# Patient Record
Sex: MALE | Race: WHITE | Employment: UNEMPLOYED | URBAN - METROPOLITAN AREA
[De-identification: names, ages, dates, MRNs, and addresses within clinical notes are randomized per-mention and may not be internally consistent; named-entity substitution may affect disease eponyms.]

---

## 2017-01-23 ENCOUNTER — TELEPHONE (OUTPATIENT)
Dept: PULMONOLOGY | Age: 57
End: 2017-01-23

## 2017-01-23 DIAGNOSIS — J44.1 COPD WITH EXACERBATION (HCC): Primary | ICD-10-CM

## 2017-01-25 ENCOUNTER — TELEPHONE (OUTPATIENT)
Dept: PULMONOLOGY | Age: 57
End: 2017-01-25

## 2017-03-27 RX ORDER — ACLIDINIUM BROMIDE 400 UG/1
POWDER, METERED RESPIRATORY (INHALATION)
Refills: 5 | OUTPATIENT
Start: 2017-03-27

## 2017-05-31 ENCOUNTER — HOSPITAL ENCOUNTER (OUTPATIENT)
Dept: PULMONOLOGY | Age: 57
Discharge: HOME | End: 2017-05-31
Attending: INTERNAL MEDICINE | Admitting: INTERNAL MEDICINE

## 2017-05-31 ENCOUNTER — OFFICE VISIT (OUTPATIENT)
Dept: PULMONOLOGY | Age: 57
End: 2017-05-31

## 2017-05-31 VITALS
DIASTOLIC BLOOD PRESSURE: 64 MMHG | RESPIRATION RATE: 16 BRPM | TEMPERATURE: 98 F | OXYGEN SATURATION: 94 % | WEIGHT: 126.4 LBS | BODY MASS INDEX: 19.16 KG/M2 | HEIGHT: 68 IN | SYSTOLIC BLOOD PRESSURE: 108 MMHG | HEART RATE: 63 BPM

## 2017-05-31 DIAGNOSIS — J44.1 COPD WITH EXACERBATION (HCC): Primary | ICD-10-CM

## 2017-05-31 DIAGNOSIS — J43.2 CENTRILOBULAR EMPHYSEMA (HCC): ICD-10-CM

## 2017-05-31 DIAGNOSIS — R05.9 COUGH: ICD-10-CM

## 2017-05-31 DIAGNOSIS — Z87.891 PERSONAL HISTORY OF TOBACCO USE: ICD-10-CM

## 2017-05-31 DIAGNOSIS — J44.1 COPD WITH EXACERBATION (HCC): ICD-10-CM

## 2017-05-31 DIAGNOSIS — J44.1 CHRONIC OBSTRUCTIVE PULMONARY DISEASE WITH ACUTE EXACERBATION (HCC): ICD-10-CM

## 2017-05-31 DIAGNOSIS — J43.2 CENTRILOBULAR EMPHYSEMA (HCC): Primary | ICD-10-CM

## 2017-05-31 LAB
DLCO %PRED: NORMAL
DLCO PRE: NORMAL
FEF 25-75%-POST: NORMAL
FEF 25-75%-PRE: NORMAL
FEV1-POST: NORMAL
FEV1-PRE: NORMAL
FEV1/FVC-POST: NORMAL
FEV1/FVC-PRE: NORMAL
FVC-POST: NORMAL
FVC-PRE: NORMAL
MEP: NORMAL
MIP: NORMAL
MVV %PRED-PRE: NORMAL
MVV-PRE: NORMAL
TLC %PRED: NORMAL
TLC PRE: NORMAL

## 2017-05-31 PROCEDURE — 94060 EVALUATION OF WHEEZING: CPT | Performed by: INTERNAL MEDICINE

## 2017-05-31 PROCEDURE — 94729 DIFFUSING CAPACITY: CPT | Performed by: INTERNAL MEDICINE

## 2017-05-31 PROCEDURE — 94727 GAS DIL/WSHOT DETER LNG VOL: CPT | Performed by: INTERNAL MEDICINE

## 2017-05-31 PROCEDURE — 99215 OFFICE O/P EST HI 40 MIN: CPT | Performed by: INTERNAL MEDICINE

## 2017-05-31 RX ORDER — RANITIDINE 300 MG/1
300 TABLET ORAL NIGHTLY
COMMUNITY
End: 2018-01-27 | Stop reason: ALTCHOICE

## 2017-05-31 RX ORDER — PANTOPRAZOLE SODIUM 40 MG/1
40 GRANULE, DELAYED RELEASE ORAL
COMMUNITY

## 2017-05-31 RX ORDER — GUAIFENESIN/DEXTROMETHORPHAN 100-10MG/5
5 SYRUP ORAL 3 TIMES DAILY PRN
Qty: 120 ML | Refills: 1 | Status: SHIPPED | OUTPATIENT
Start: 2017-05-31 | End: 2017-06-10

## 2017-05-31 RX ORDER — ALBUTEROL SULFATE 90 UG/1
4 AEROSOL, METERED RESPIRATORY (INHALATION) ONCE
Status: COMPLETED | OUTPATIENT
Start: 2017-05-31 | End: 2017-05-31

## 2017-05-31 RX ORDER — FLUTICASONE FUROATE AND VILANTEROL 100; 25 UG/1; UG/1
1 POWDER RESPIRATORY (INHALATION) DAILY
Qty: 1 EACH | Refills: 0 | Status: SHIPPED | COMMUNITY
Start: 2017-05-31 | End: 2018-01-27 | Stop reason: ALTCHOICE

## 2017-05-31 RX ORDER — NITROGLYCERIN 80 MG/1
1 PATCH TRANSDERMAL DAILY
COMMUNITY

## 2017-05-31 RX ORDER — FLUTICASONE FUROATE AND VILANTEROL 100; 25 UG/1; UG/1
1 POWDER RESPIRATORY (INHALATION) DAILY
Qty: 1 EACH | Refills: 3 | Status: SHIPPED | OUTPATIENT
Start: 2017-05-31

## 2017-05-31 RX ORDER — CLOPIDOGREL BISULFATE 75 MG/1
75 TABLET ORAL DAILY
COMMUNITY

## 2017-05-31 RX ADMIN — ALBUTEROL SULFATE 4 PUFF: 90 AEROSOL, METERED RESPIRATORY (INHALATION) at 09:56

## 2017-06-01 ENCOUNTER — TELEPHONE (OUTPATIENT)
Dept: PULMONOLOGY | Age: 57
End: 2017-06-01

## 2017-06-02 LAB
ALPHA-1 ANTITRYPSIN PHENOTYPE: NORMAL
ALPHA-1 ANTITRYPSIN: 148 MG/DL (ref 90–200)

## 2017-06-06 ENCOUNTER — TELEPHONE (OUTPATIENT)
Dept: PULMONOLOGY | Age: 57
End: 2017-06-06

## 2017-06-06 RX ORDER — DOXYCYCLINE HYCLATE 100 MG
100 TABLET ORAL 2 TIMES DAILY
Qty: 14 TABLET | Refills: 0 | Status: SHIPPED | OUTPATIENT
Start: 2017-06-06 | End: 2017-06-13

## 2017-08-22 ENCOUNTER — TELEPHONE (OUTPATIENT)
Dept: PULMONOLOGY | Age: 57
End: 2017-08-22

## 2017-08-22 RX ORDER — POLYETHYLENE GLYCOL 3350 17 G
2 POWDER IN PACKET (EA) ORAL PRN
Qty: 100 EACH | Refills: 3 | Status: SHIPPED | OUTPATIENT
Start: 2017-08-22 | End: 2017-11-17 | Stop reason: SDUPTHER

## 2017-09-05 ENCOUNTER — TELEPHONE (OUTPATIENT)
Dept: PULMONOLOGY | Age: 57
End: 2017-09-05

## 2017-09-11 ENCOUNTER — HOSPITAL ENCOUNTER (OUTPATIENT)
Dept: VASCULAR LAB | Age: 57
Discharge: HOME | End: 2017-09-11
Attending: PHYSICIAN ASSISTANT | Admitting: PHYSICIAN ASSISTANT

## 2017-09-11 DIAGNOSIS — I73.9 PERIPHERAL VASCULAR DISEASE (HCC): ICD-10-CM

## 2017-10-17 ENCOUNTER — TELEPHONE (OUTPATIENT)
Dept: PULMONOLOGY | Age: 57
End: 2017-10-17

## 2017-10-17 DIAGNOSIS — J44.1 COPD WITH EXACERBATION (HCC): Primary | ICD-10-CM

## 2017-10-17 NOTE — TELEPHONE ENCOUNTER
Fax from [employer] stating Flavio Machado not on [facility] formulary, they prefer Incruse.   Please advise

## 2017-10-18 ENCOUNTER — HOSPITAL ENCOUNTER (OUTPATIENT)
Dept: VASCULAR LAB | Age: 57
Discharge: HOME | End: 2017-10-18
Attending: PHYSICIAN ASSISTANT | Admitting: PHYSICIAN ASSISTANT

## 2017-10-18 DIAGNOSIS — I73.9 PERIPHERAL VASCULAR DISEASE (HCC): ICD-10-CM

## 2017-11-17 ENCOUNTER — TELEPHONE (OUTPATIENT)
Dept: PULMONOLOGY | Age: 57
End: 2017-11-17

## 2017-11-17 RX ORDER — POLYETHYLENE GLYCOL 3350 17 G
2 POWDER IN PACKET (EA) ORAL PRN
Qty: 100 EACH | Refills: 1 | Status: ON HOLD | OUTPATIENT
Start: 2017-11-17 | End: 2021-01-27

## 2017-11-17 NOTE — TELEPHONE ENCOUNTER
Will prescribe with 1 refill. Patient will need to follow up for additional medications to be prescribed.

## 2017-12-21 ENCOUNTER — TELEPHONE (OUTPATIENT)
Dept: PULMONOLOGY | Age: 57
End: 2017-12-21

## 2017-12-21 RX ORDER — VARENICLINE TARTRATE 25 MG
KIT ORAL
Qty: 63 TABLET | Refills: 0 | Status: SHIPPED | OUTPATIENT
Start: 2017-12-21 | End: 2018-01-27 | Stop reason: ALTCHOICE

## 2017-12-21 NOTE — TELEPHONE ENCOUNTER
Has he taken chantix before? I see anxiety listed on his problem list. Does he have depression? If so, he should not take chantix.

## 2017-12-22 ENCOUNTER — TELEPHONE (OUTPATIENT)
Dept: PULMONOLOGY | Age: 57
End: 2017-12-22

## 2017-12-22 DIAGNOSIS — Z72.0 TOBACCO ABUSE: Primary | ICD-10-CM

## 2017-12-22 RX ORDER — POLYETHYLENE GLYCOL 3350 17 G
4 POWDER IN PACKET (EA) ORAL PRN
Qty: 100 EACH | Refills: 3 | Status: SHIPPED | OUTPATIENT
Start: 2017-12-22 | End: 2018-02-15 | Stop reason: SDUPTHER

## 2018-01-25 ENCOUNTER — TELEPHONE (OUTPATIENT)
Dept: PULMONOLOGY | Age: 58
End: 2018-01-25

## 2018-02-13 RX ORDER — NICOTINE POLACRILEX 2 MG
2 LOZENGE BUCCAL PRN
Qty: 81 LOZENGE | Refills: 1 | OUTPATIENT
Start: 2018-02-13

## 2018-02-15 DIAGNOSIS — Z72.0 TOBACCO ABUSE: ICD-10-CM

## 2018-02-15 RX ORDER — POLYETHYLENE GLYCOL 3350 17 G
4 POWDER IN PACKET (EA) ORAL PRN
Qty: 100 EACH | Refills: 3 | Status: ON HOLD | OUTPATIENT
Start: 2018-02-15 | End: 2021-01-27

## 2018-03-22 RX ORDER — SODIUM CHLORIDE, SODIUM LACTATE, POTASSIUM CHLORIDE, CALCIUM CHLORIDE 600; 310; 30; 20 MG/100ML; MG/100ML; MG/100ML; MG/100ML
INJECTION, SOLUTION INTRAVENOUS ONCE
Status: CANCELLED | OUTPATIENT
Start: 2018-03-22 | End: 2018-03-22

## 2018-03-23 ENCOUNTER — HOSPITAL ENCOUNTER (OUTPATIENT)
Dept: OTHER | Age: 58
Discharge: HOME | End: 2018-03-23
Attending: INTERNAL MEDICINE | Admitting: INTERNAL MEDICINE

## 2018-05-21 ENCOUNTER — TELEPHONE (OUTPATIENT)
Dept: PULMONOLOGY | Age: 58
End: 2018-05-21

## 2018-09-20 ENCOUNTER — TELEPHONE (OUTPATIENT)
Dept: ORTHOPEDIC SURGERY | Age: 58
End: 2018-09-20

## 2018-09-20 ENCOUNTER — OFFICE VISIT (OUTPATIENT)
Dept: ORTHOPEDIC SURGERY | Age: 58
End: 2018-09-20

## 2018-09-20 VITALS — HEIGHT: 68 IN | BODY MASS INDEX: 21.98 KG/M2 | WEIGHT: 145 LBS

## 2018-09-20 DIAGNOSIS — M25.561 ACUTE PAIN OF RIGHT KNEE: Primary | ICD-10-CM

## 2018-09-20 DIAGNOSIS — M17.11 PRIMARY OSTEOARTHRITIS OF RIGHT KNEE: ICD-10-CM

## 2018-09-20 PROCEDURE — 1036F TOBACCO NON-USER: CPT | Performed by: ORTHOPAEDIC SURGERY

## 2018-09-20 PROCEDURE — L1810 KO ELASTIC WITH JOINTS: HCPCS | Performed by: ORTHOPAEDIC SURGERY

## 2018-09-20 PROCEDURE — 3017F COLORECTAL CA SCREEN DOC REV: CPT | Performed by: ORTHOPAEDIC SURGERY

## 2018-09-20 PROCEDURE — 99203 OFFICE O/P NEW LOW 30 MIN: CPT | Performed by: ORTHOPAEDIC SURGERY

## 2018-09-20 PROCEDURE — G8420 CALC BMI NORM PARAMETERS: HCPCS | Performed by: ORTHOPAEDIC SURGERY

## 2018-09-20 PROCEDURE — G8427 DOCREV CUR MEDS BY ELIG CLIN: HCPCS | Performed by: ORTHOPAEDIC SURGERY

## 2018-09-20 RX ORDER — MELOXICAM 15 MG/1
15 TABLET ORAL DAILY
Qty: 30 TABLET | Refills: 3 | Status: SHIPPED | OUTPATIENT
Start: 2018-09-20 | End: 2019-01-09 | Stop reason: SDUPTHER

## 2018-09-20 NOTE — PROGRESS NOTES
[facility].  [address] [facility] [address] [place], [address]  [phone]                                Endoscopic Ultrasound    NAME:  Seferino Adkins   :   1948   MRN:   [id number]       Date/Time:  10/11/2017   Procedure Type: Radial upper EUS    Indications: Dysphagia    Pre-operative Diagnosis: see indication above    Post-operative Diagnosis:  See findings below    : Carlos Alan MD    Referring Provider: -Patricia Carvajal MD -Marna Fabry, MD    Anethesia/Sedation:  MAC anesthesia      Procedure Details   After infom consent was obtained for the procedure, with all risks and benefits of procedure explained the patient was taken to the endoscopy suite and placed in the left lateral decubitus position. Following sequential administration of sedation as per above, the radial echoendoscope was inserted into the mouth and advanced under direct vision to second portion of the duodenum. A careful inspection was made as the gastroscope was withdrawn, including a retroflexed view of the proximal stomach; findings and interventions are described below. Findings:     Endoscopic:   Esophagus:normal   Stomach: Few 3-5 mm benign appearing fundic gland polyps were seen in the gastric cardia and body. Otherwise, the stomach was normal.    Duodenum/jejunum: normal    Ultrasound:   Esophagus: normal findings. No thickening of muscularis propria, mural nodule or extrinsic compression was seen in distal esophagus. Stomach: normal cardia   Pancreas: not examined              Lymph Node: no adenopathy        Specimen Removed:  None    Complications: None. EBL:  None.     Interventions: none      Recommendations:   -Follow symptoms  -Follow up with Dr Nunu Roberts current medications    Carlos Alan MD  10/11/2017  4:45 PM each 3    metFORMIN (GLUCOPHAGE) 500 MG tablet Take 500 mg by mouth 2 times daily (with meals)      tamsulosin (FLOMAX) 0.4 MG capsule Take 0.4 mg by mouth daily      simvastatin (ZOCOR) 40 MG tablet Take 40 mg by mouth nightly      gabapentin (NEURONTIN) 300 MG capsule Take 400 mg by mouth 3 times daily .  aspirin 81 MG tablet Take 81 mg by mouth daily      cetirizine (ZYRTEC) 10 MG tablet Take 10 mg by mouth daily      hydrOXYzine (VISTARIL) 25 MG capsule Take 25 mg by mouth 3 times daily as needed for Itching      albuterol (PROVENTIL HFA;VENTOLIN HFA) 108 (90 BASE) MCG/ACT inhaler Inhale 1 puff into the lungs every 6 hours as needed.  fluticasone (FLONASE) 50 MCG/ACT nasal spray 1 spray by Nasal route daily. Indications: EACH NOSTRIL      montelukast (SINGULAIR) 10 MG tablet Take 10 mg by mouth nightly.  cyclobenzaprine (FLEXERIL) 10 MG tablet Take 10 mg by mouth 3 times daily as needed.  omeprazole (PRILOSEC) 20 MG capsule Take 40 mg by mouth 2 times daily       topiramate (TOPAMAX) 100 MG tablet Take 50 mg by mouth 2 times daily. No current facility-administered medications for this visit. Social    Social History     Social History    Marital status: Single     Spouse name: N/A    Number of children: N/A    Years of education: N/A     Occupational History    Not on file.      Social History Main Topics    Smoking status: Former Smoker     Packs/day: 0.25     Years: 25.00     Quit date: 10/14/2015    Smokeless tobacco: Former User     Quit date: 10/3/2015    Alcohol use No    Drug use: No    Sexual activity: Yes     Partners: Female      Comment: smoke 2 cigaretees a day     Other Topics Concern    Not on file     Social History Narrative    No narrative on file       Family HISTORY    Family History   Problem Relation Age of Onset    High Blood Pressure Mother     Heart Disease Mother     High Blood Pressure Father     Heart Disease Father     Cancer Sister     Diabetes Sister     Other Sister         aneursym-brain       PHYSICAL EXAM    Vital Signs:  Ht 5' 8\" (1.727 m)   Wt 145 lb (65.8 kg)   BMI 22.05 kg/m²   General Appearance:  Normal body habitus. Alert and oriented to person, place, and time. Affect:  Normal.   Gait:  Normal. Good balance and coordination. Skin:  Intact. Sensation:  Intact. Strength:  Intact. Reflexes:  Intact. Pulses:  Intact. Knee Exam:    Effusion:  Negative    Range of Motion Right Left   Extension 0 0   Flexion 110 110     Provocative Test Right Left    Positive Negative Positive Negative   Anterior drawer [] [x] [] [x]   Lachman [] [x] [] [x]   Posterior drawer [] [x] [] [x]   Varus testing [] [x] [] [x]   Valgus testing [x] [] [] [x]   Joint line tenderness [x] [] [] [x]     Additional Exam Comments:  His neurocirculatory lymphatic exam is otherwise normal symmetric to both lower extremities. He has generalized pain mostly in the medial compartment of his knee to direct palpation and Janeth's maneuver. He does have some patellofemoral crepitus. IMAGING STUDIES    X-rays 3 views, the right knee reveal some early [place] changes. IMPRESSION    Right knee pain secondary to early arthritis and possible recurrent medial meniscus tear    PLAN      1. Conservative care options including physical therapy, NSAIDs, bracing, and activity modification were discussed. 2.  The indications for therapeutic injections were discussed. 3.  The indications for additional imaging studies were discussed. 4.  After considering the various options discussed, the patient elected to pursue a course that includes aggressive home directed physical therapy program, meloxicam, and a hinged knee support. If this problem does not substantially improve over the next few weeks consider scanning. Procedures    Economy Hinged Knee Wrap Around     Patient was prescribed a Breg Economy Hinged Wrap Around Knee Brace.   The

## 2019-01-09 RX ORDER — MELOXICAM 15 MG/1
TABLET ORAL
Qty: 30 TABLET | Refills: 3 | Status: SHIPPED | OUTPATIENT
Start: 2019-01-09 | End: 2019-05-03 | Stop reason: SDUPTHER

## 2019-02-06 ENCOUNTER — OFFICE VISIT (OUTPATIENT)
Dept: ENT CLINIC | Age: 59
End: 2019-02-06
Payer: MEDICAID

## 2019-02-06 VITALS
WEIGHT: 141 LBS | SYSTOLIC BLOOD PRESSURE: 148 MMHG | BODY MASS INDEX: 20.19 KG/M2 | HEIGHT: 70 IN | HEART RATE: 78 BPM | DIASTOLIC BLOOD PRESSURE: 83 MMHG

## 2019-02-06 DIAGNOSIS — H91.93 BILATERAL HEARING LOSS, UNSPECIFIED HEARING LOSS TYPE: Primary | ICD-10-CM

## 2019-02-06 PROCEDURE — 1036F TOBACCO NON-USER: CPT | Performed by: OTOLARYNGOLOGY

## 2019-02-06 PROCEDURE — 99203 OFFICE O/P NEW LOW 30 MIN: CPT | Performed by: OTOLARYNGOLOGY

## 2019-02-06 PROCEDURE — 3017F COLORECTAL CA SCREEN DOC REV: CPT | Performed by: OTOLARYNGOLOGY

## 2019-02-06 PROCEDURE — G8484 FLU IMMUNIZE NO ADMIN: HCPCS | Performed by: OTOLARYNGOLOGY

## 2019-02-06 PROCEDURE — G8427 DOCREV CUR MEDS BY ELIG CLIN: HCPCS | Performed by: OTOLARYNGOLOGY

## 2019-02-06 PROCEDURE — G8420 CALC BMI NORM PARAMETERS: HCPCS | Performed by: OTOLARYNGOLOGY

## 2019-02-06 ASSESSMENT — ENCOUNTER SYMPTOMS
SINUS PAIN: 0
SHORTNESS OF BREATH: 0
RHINORRHEA: 0
CHOKING: 0
TROUBLE SWALLOWING: 0
COUGH: 0
COLOR CHANGE: 0
CONSTIPATION: 0
BACK PAIN: 0
SINUS PRESSURE: 0
APNEA: 0
EYE DISCHARGE: 0
BLOOD IN STOOL: 0
EYE PAIN: 0
DIARRHEA: 0
CHEST TIGHTNESS: 0
STRIDOR: 0
FACIAL SWELLING: 0
VOMITING: 0
NAUSEA: 0
SORE THROAT: 0
VOICE CHANGE: 0
WHEEZING: 0

## 2019-03-25 ENCOUNTER — APPOINTMENT (OUTPATIENT)
Dept: CT IMAGING | Age: 59
End: 2019-03-25
Payer: MEDICAID

## 2019-03-25 ENCOUNTER — HOSPITAL ENCOUNTER (OUTPATIENT)
Age: 59
Discharge: HOME | End: 2019-03-25
Payer: MEDICAID

## 2019-03-25 ENCOUNTER — HOSPITAL ENCOUNTER (OUTPATIENT)
Dept: GENERAL RADIOLOGY | Age: 59
Discharge: HOME | End: 2019-03-25
Payer: MEDICAID

## 2019-03-25 ENCOUNTER — HOSPITAL ENCOUNTER (EMERGENCY)
Age: 59
Discharge: HOME | End: 2019-03-25
Attending: EMERGENCY MEDICINE
Payer: MEDICAID

## 2019-03-25 VITALS
HEART RATE: 57 BPM | HEIGHT: 68 IN | RESPIRATION RATE: 19 BRPM | WEIGHT: 144 LBS | SYSTOLIC BLOOD PRESSURE: 117 MMHG | OXYGEN SATURATION: 98 % | BODY MASS INDEX: 21.82 KG/M2 | TEMPERATURE: 98 F | DIASTOLIC BLOOD PRESSURE: 81 MMHG

## 2019-03-25 DIAGNOSIS — M25.511 ACUTE PAIN OF RIGHT SHOULDER: ICD-10-CM

## 2019-03-25 DIAGNOSIS — K92.0 HEMATEMESIS WITH NAUSEA: Primary | ICD-10-CM

## 2019-03-25 DIAGNOSIS — M25.512 ACUTE PAIN OF LEFT SHOULDER: ICD-10-CM

## 2019-03-25 DIAGNOSIS — R10.13 EPIGASTRIC PAIN: ICD-10-CM

## 2019-03-25 LAB
A/G RATIO: 1.7 (ref 1.1–2.2)
ALBUMIN SERPL-MCNC: 4 G/DL (ref 3.4–5)
ALP BLD-CCNC: 100 U/L (ref 40–129)
ALT SERPL-CCNC: 6 U/L (ref 10–40)
ANION GAP SERPL CALCULATED.3IONS-SCNC: 10 MMOL/L (ref 3–16)
AST SERPL-CCNC: 10 U/L (ref 15–37)
BASOPHILS ABSOLUTE: 0 K/UL (ref 0–0.2)
BASOPHILS RELATIVE PERCENT: 0.6 %
BILIRUB SERPL-MCNC: <0.2 MG/DL (ref 0–1)
BILIRUBIN URINE: NEGATIVE
BLOOD, URINE: NEGATIVE
BUN BLDV-MCNC: 10 MG/DL (ref 7–20)
CALCIUM SERPL-MCNC: 8.5 MG/DL (ref 8.3–10.6)
CHLORIDE BLD-SCNC: 110 MMOL/L (ref 99–110)
CLARITY: CLEAR
CO2: 23 MMOL/L (ref 21–32)
COLOR: YELLOW
CREAT SERPL-MCNC: 0.8 MG/DL (ref 0.9–1.3)
EKG ATRIAL RATE: 60 BPM
EKG DIAGNOSIS: NORMAL
EKG P AXIS: 77 DEGREES
EKG P-R INTERVAL: 138 MS
EKG Q-T INTERVAL: 432 MS
EKG QRS DURATION: 90 MS
EKG QTC CALCULATION (BAZETT): 432 MS
EKG R AXIS: 60 DEGREES
EKG T AXIS: 70 DEGREES
EKG VENTRICULAR RATE: 60 BPM
EOSINOPHILS ABSOLUTE: 0.3 K/UL (ref 0–0.6)
EOSINOPHILS RELATIVE PERCENT: 5.8 %
GFR AFRICAN AMERICAN: >60
GFR NON-AFRICAN AMERICAN: >60
GLOBULIN: 2.4 G/DL
GLUCOSE BLD-MCNC: 76 MG/DL (ref 70–99)
GLUCOSE URINE: NEGATIVE MG/DL
HCT VFR BLD CALC: 37.7 % (ref 40.5–52.5)
HEMOGLOBIN: 12.3 G/DL (ref 13.5–17.5)
KETONES, URINE: NEGATIVE MG/DL
LEUKOCYTE ESTERASE, URINE: NEGATIVE
LIPASE: 36 U/L (ref 13–60)
LYMPHOCYTES ABSOLUTE: 2 K/UL (ref 1–5.1)
LYMPHOCYTES RELATIVE PERCENT: 32.7 %
MCH RBC QN AUTO: 28.5 PG (ref 26–34)
MCHC RBC AUTO-ENTMCNC: 32.6 G/DL (ref 31–36)
MCV RBC AUTO: 87.7 FL (ref 80–100)
MICROSCOPIC EXAMINATION: NORMAL
MONOCYTES ABSOLUTE: 0.7 K/UL (ref 0–1.3)
MONOCYTES RELATIVE PERCENT: 10.9 %
NEUTROPHILS ABSOLUTE: 3 K/UL (ref 1.7–7.7)
NEUTROPHILS RELATIVE PERCENT: 50 %
NITRITE, URINE: NEGATIVE
PDW BLD-RTO: 16.5 % (ref 12.4–15.4)
PH UA: 7 (ref 5–8)
PLATELET # BLD: 186 K/UL (ref 135–450)
PMV BLD AUTO: 7.7 FL (ref 5–10.5)
POTASSIUM SERPL-SCNC: 3.7 MMOL/L (ref 3.5–5.1)
PROTEIN UA: NEGATIVE MG/DL
RBC # BLD: 4.3 M/UL (ref 4.2–5.9)
SODIUM BLD-SCNC: 143 MMOL/L (ref 136–145)
SPECIFIC GRAVITY UA: 1.01 (ref 1–1.03)
TOTAL PROTEIN: 6.4 G/DL (ref 6.4–8.2)
TROPONIN: <0.01 NG/ML
URINE TYPE: NORMAL
UROBILINOGEN, URINE: 0.2 E.U./DL
WBC # BLD: 6 K/UL (ref 4–11)

## 2019-03-25 PROCEDURE — 96374 THER/PROPH/DIAG INJ IV PUSH: CPT

## 2019-03-25 PROCEDURE — 93010 ELECTROCARDIOGRAM REPORT: CPT | Performed by: INTERNAL MEDICINE

## 2019-03-25 PROCEDURE — 80053 COMPREHEN METABOLIC PANEL: CPT

## 2019-03-25 PROCEDURE — 99284 EMERGENCY DEPT VISIT MOD MDM: CPT

## 2019-03-25 PROCEDURE — 6370000000 HC RX 637 (ALT 250 FOR IP): Performed by: EMERGENCY MEDICINE

## 2019-03-25 PROCEDURE — 83690 ASSAY OF LIPASE: CPT

## 2019-03-25 PROCEDURE — 73030 X-RAY EXAM OF SHOULDER: CPT

## 2019-03-25 PROCEDURE — 6360000002 HC RX W HCPCS: Performed by: EMERGENCY MEDICINE

## 2019-03-25 PROCEDURE — 74174 CTA ABD&PLVS W/CONTRAST: CPT

## 2019-03-25 PROCEDURE — 6360000004 HC RX CONTRAST MEDICATION: Performed by: EMERGENCY MEDICINE

## 2019-03-25 PROCEDURE — 84484 ASSAY OF TROPONIN QUANT: CPT

## 2019-03-25 PROCEDURE — 71275 CT ANGIOGRAPHY CHEST: CPT

## 2019-03-25 PROCEDURE — 93005 ELECTROCARDIOGRAM TRACING: CPT | Performed by: EMERGENCY MEDICINE

## 2019-03-25 PROCEDURE — 81003 URINALYSIS AUTO W/O SCOPE: CPT

## 2019-03-25 PROCEDURE — 85025 COMPLETE CBC W/AUTO DIFF WBC: CPT

## 2019-03-25 RX ORDER — MORPHINE SULFATE 4 MG/ML
4 INJECTION, SOLUTION INTRAMUSCULAR; INTRAVENOUS
Status: DISCONTINUED | OUTPATIENT
Start: 2019-03-25 | End: 2019-03-25 | Stop reason: HOSPADM

## 2019-03-25 RX ORDER — SUCRALFATE 1 G/1
1 TABLET ORAL 4 TIMES DAILY
Qty: 28 TABLET | Refills: 0 | Status: SHIPPED | OUTPATIENT
Start: 2019-03-25 | End: 2021-01-25

## 2019-03-25 RX ORDER — DICYCLOMINE HYDROCHLORIDE 10 MG/1
10 CAPSULE ORAL 4 TIMES DAILY PRN
Qty: 20 CAPSULE | Refills: 0 | Status: SHIPPED | OUTPATIENT
Start: 2019-03-25 | End: 2022-10-13

## 2019-03-25 RX ADMIN — IOPAMIDOL 85 ML: 755 INJECTION, SOLUTION INTRAVENOUS at 11:47

## 2019-03-25 RX ADMIN — MORPHINE SULFATE 4 MG: 4 INJECTION INTRAVENOUS at 11:40

## 2019-03-25 RX ADMIN — LIDOCAINE HYDROCHLORIDE: 20 SOLUTION ORAL; TOPICAL at 11:40

## 2019-03-25 ASSESSMENT — PAIN DESCRIPTION - LOCATION: LOCATION: ABDOMEN

## 2019-03-25 ASSESSMENT — PAIN DESCRIPTION - DESCRIPTORS: DESCRIPTORS: SHARP;STABBING;ACHING;BURNING

## 2019-03-25 ASSESSMENT — PAIN SCALES - GENERAL: PAINLEVEL_OUTOF10: 10

## 2019-04-04 ENCOUNTER — ANESTHESIA EVENT (OUTPATIENT)
Dept: ENDOSCOPY | Age: 59
End: 2019-04-04
Payer: MEDICAID

## 2019-04-04 ENCOUNTER — ANESTHESIA (OUTPATIENT)
Dept: ENDOSCOPY | Age: 59
End: 2019-04-04
Payer: MEDICAID

## 2019-04-04 ENCOUNTER — HOSPITAL ENCOUNTER (OUTPATIENT)
Age: 59
Setting detail: OUTPATIENT SURGERY
Discharge: HOME | End: 2019-04-04
Attending: INTERNAL MEDICINE | Admitting: INTERNAL MEDICINE
Payer: MEDICAID

## 2019-04-04 VITALS
OXYGEN SATURATION: 99 % | TEMPERATURE: 96.9 F | HEART RATE: 57 BPM | BODY MASS INDEX: 21.82 KG/M2 | SYSTOLIC BLOOD PRESSURE: 126 MMHG | RESPIRATION RATE: 16 BRPM | HEIGHT: 68 IN | WEIGHT: 144 LBS | DIASTOLIC BLOOD PRESSURE: 83 MMHG

## 2019-04-04 VITALS — OXYGEN SATURATION: 100 % | RESPIRATION RATE: 22 BRPM

## 2019-04-04 LAB
GLUCOSE BLD-MCNC: 83 MG/DL (ref 70–99)
PERFORMED ON: NORMAL

## 2019-04-04 PROCEDURE — 3700000001 HC ADD 15 MINUTES (ANESTHESIA): Performed by: INTERNAL MEDICINE

## 2019-04-04 PROCEDURE — 88342 IMHCHEM/IMCYTCHM 1ST ANTB: CPT

## 2019-04-04 PROCEDURE — 3609012400 HC EGD TRANSORAL BIOPSY SINGLE/MULTIPLE: Performed by: INTERNAL MEDICINE

## 2019-04-04 PROCEDURE — 2709999900 HC NON-CHARGEABLE SUPPLY: Performed by: INTERNAL MEDICINE

## 2019-04-04 PROCEDURE — 6360000002 HC RX W HCPCS: Performed by: NURSE ANESTHETIST, CERTIFIED REGISTERED

## 2019-04-04 PROCEDURE — 2580000003 HC RX 258: Performed by: INTERNAL MEDICINE

## 2019-04-04 PROCEDURE — 7100000010 HC PHASE II RECOVERY - FIRST 15 MIN: Performed by: INTERNAL MEDICINE

## 2019-04-04 PROCEDURE — 88305 TISSUE EXAM BY PATHOLOGIST: CPT

## 2019-04-04 PROCEDURE — 3700000000 HC ANESTHESIA ATTENDED CARE: Performed by: INTERNAL MEDICINE

## 2019-04-04 PROCEDURE — 2500000003 HC RX 250 WO HCPCS: Performed by: NURSE ANESTHETIST, CERTIFIED REGISTERED

## 2019-04-04 PROCEDURE — 7100000011 HC PHASE II RECOVERY - ADDTL 15 MIN: Performed by: INTERNAL MEDICINE

## 2019-04-04 RX ORDER — LIDOCAINE HYDROCHLORIDE 20 MG/ML
INJECTION, SOLUTION INFILTRATION; PERINEURAL PRN
Status: DISCONTINUED | OUTPATIENT
Start: 2019-04-04 | End: 2019-04-04 | Stop reason: SDUPTHER

## 2019-04-04 RX ORDER — SODIUM CHLORIDE, SODIUM LACTATE, POTASSIUM CHLORIDE, CALCIUM CHLORIDE 600; 310; 30; 20 MG/100ML; MG/100ML; MG/100ML; MG/100ML
INJECTION, SOLUTION INTRAVENOUS CONTINUOUS
Status: DISCONTINUED | OUTPATIENT
Start: 2019-04-04 | End: 2019-04-04 | Stop reason: HOSPADM

## 2019-04-04 RX ORDER — PROPOFOL 10 MG/ML
INJECTION, EMULSION INTRAVENOUS PRN
Status: DISCONTINUED | OUTPATIENT
Start: 2019-04-04 | End: 2019-04-04 | Stop reason: SDUPTHER

## 2019-04-04 RX ADMIN — LIDOCAINE HYDROCHLORIDE 60 MG: 20 INJECTION, SOLUTION INFILTRATION; PERINEURAL at 07:40

## 2019-04-04 RX ADMIN — PROPOFOL 150 MG: 10 INJECTION, EMULSION INTRAVENOUS at 07:40

## 2019-04-04 RX ADMIN — SODIUM CHLORIDE, POTASSIUM CHLORIDE, SODIUM LACTATE AND CALCIUM CHLORIDE: 600; 310; 30; 20 INJECTION, SOLUTION INTRAVENOUS at 07:36

## 2019-04-04 ASSESSMENT — ENCOUNTER SYMPTOMS: SHORTNESS OF BREATH: 1

## 2019-04-04 ASSESSMENT — PAIN - FUNCTIONAL ASSESSMENT: PAIN_FUNCTIONAL_ASSESSMENT: 0-10

## 2019-04-04 NOTE — H&P
Gastroenterology Preop Assessment    Patient:   Chalino Lockhart   :    1960   Facility:   [facility]  Referring/PCP: Raya Olguin  Date:     2019    Subjective:   Procedure:egd    HPI/Reason for procedure:  Patient with hematemesis    Past Medical History:   Diagnosis Date    Anxiety     Arterial stent thrombosis ([facility] [address] [facility]     Asthma     COPD (chronic obstructive pulmonary disease) ([facility] [facility]     Diabetes mellitus ([facility] [facility]     Hyperlipidemia     Pneumonia      Past Surgical History:   Procedure Laterality Date    CARDIAC SURGERY      stent placed    CARPAL TUNNEL RELEASE      CHOLECYSTECTOMY, LAPAROSCOPIC      KNEE ARTHROPLASTY      R knee x4    NASAL SINUS SURGERY      UPPER GASTROINTESTINAL ENDOSCOPY  11       Social:   Social History     Tobacco Use    Smoking status: Former Smoker     Packs/day: 0.25     Years: 25.00     Pack years: 6.25     Last attempt to quit: 2019     Years since quittin.1    Smokeless tobacco: Former User     Quit date: 10/3/2015   Substance Use Topics    Alcohol use: No     Alcohol/week: 0.0 oz     Family:   Family History   Problem Relation Age of Onset    High Blood Pressure Mother     Heart Disease Mother     High Blood Pressure Father     Heart Disease Father     Cancer Sister     Diabetes Sister     Other Sister         Emily Rollins       Scheduled Medications:     Current Medications:    Prior to Admission medications    Medication Sig Start Date End Date Taking?  Authorizing Provider   dicyclomine (BENTYL) 10 MG capsule Take 1 capsule by mouth 4 times daily as needed (abdominal pain/cramping) 3/25/19  Yes Shannan Siddiqi MD   meloxicam (MOBIC) 15 MG tablet TAKE 1 TABLET BY MOUTH EVERY DAY 19  Yes Arcelia Alexander MD   Multiple Vitamin (DAILY-CRISTINE PO) Take 1 tablet by mouth daily   Yes Historical Provider, MD   clopidogrel (PLAVIX) 75 MG tablet Take 75 mg by mouth daily   Yes Historical Provider, MD   pantoprazole sodium (PROTONIX) 40 MG PACK packet Take 40 mg by mouth 2 times daily (before meals)    Yes Historical Provider, MD   nitroGLYCERIN (NITRODUR) 0.2 MG/HR Place 1 patch onto the skin daily   Yes Historical Provider, MD   fluticasone-vilanterol (BREO ELLIPTA) 100-25 MCG/INH AEPB inhaler Inhale 1 puff into the lungs daily 5/31/17  Yes Lyric Andrade MD   metFORMIN (GLUCOPHAGE) 500 MG tablet Take 500 mg by mouth 2 times daily (with meals)   Yes Historical Provider, MD   tamsulosin (FLOMAX) 0.4 MG capsule Take 0.4 mg by mouth daily   Yes Historical Provider, MD   simvastatin (ZOCOR) 40 MG tablet Take 40 mg by mouth nightly   Yes Historical Provider, MD   gabapentin (NEURONTIN) 300 MG capsule Take 400 mg by mouth 3 times daily . Yes Historical Provider, MD   aspirin 81 MG tablet Take 81 mg by mouth daily   Yes Historical Provider, MD   cetirizine (ZYRTEC) 10 MG tablet Take 10 mg by mouth daily   Yes Historical Provider, MD   hydrOXYzine (VISTARIL) 25 MG capsule Take 25 mg by mouth 3 times daily as needed for Itching   Yes Historical Provider, MD   albuterol (PROVENTIL HFA;VENTOLIN HFA) 108 (90 BASE) MCG/ACT inhaler Inhale 1 puff into the lungs every 6 hours as needed. Yes Historical Provider, MD   fluticasone (FLONASE) 50 MCG/ACT nasal spray 1 spray by Nasal route daily. Indications: EACH NOSTRIL   Yes Historical Provider, MD   montelukast (SINGULAIR) 10 MG tablet Take 10 mg by mouth nightly. Yes Historical Provider, MD   cyclobenzaprine (FLEXERIL) 10 MG tablet Take 10 mg by mouth 3 times daily as needed. Yes Historical Provider, MD   topiramate (TOPAMAX) 100 MG tablet Take 50 mg by mouth 2 times daily.  11/17/10  Yes Historical Provider, MD   sucralfate (CARAFATE) 1 GM tablet Take 1 tablet by mouth 4 times daily for 7 days 3/25/19 4/1/19  Dc Duval MD   nicotine polacrilex (COMMIT) 4 MG lozenge Take 1 lozenge by mouth as needed for Smoking cessation 2/15/18   Colton Lentz, APRN - CNP   nicotine polacrilex (NICORETTE) 2 MG lozenge Take 1 lozenge by mouth as needed for Smoking cessation 11/17/17   James Lee MD   omeprazole (PRILOSEC) 20 MG capsule Take 40 mg by mouth 2 times daily  11/17/10   Historical Provider, MD         Current Facility-Administered Medications:     lactated ringers infusion, , Intravenous, Continuous, Dmitriy García DO      Infusions:    lactated ringers       PRN Medications: Allergies: Allergies   Allergen Reactions    Ritalin [Methylphenidate Hcl] Anxiety         Objective:     Physical Exam:   BP (!) 156/87   Pulse 56   Temp 98.5 °F (36.9 °C) (Temporal)   Resp 18   Ht 5' 8\" (1.727 m)   Wt 144 lb (65.3 kg)   SpO2 98%   BMI 21.90 kg/m²     HEENT: NCAT  Lungs: CTAB  CV: RRR  Abd: soft, ntd  Ext: dpi    Lab and Imaging Review   Labs:  CBC: No results for input(s): WBC, HGB, HCT, MCV, PLT in the last 72 hours. BMP: No results for input(s): NA, K, CL, CO2, PHOS, BUN, CREATININE in the last 72 hours. Invalid input(s): CA  LIVER PROFILE: No results for input(s): AST, ALT, LIPASE, PROT, BILIDIR, BILITOT, ALKPHOS in the last 72 hours. Invalid input(s): AMYLASE,  ALB  PT/INR: No results for input(s): INR in the last 72 hours. Invalid input(s): PT    Pre-Procedure Assessment / Plan:  ASA: Class 3 - A patient with severe systemic disease that limits activity but is not incapacitating  Airway: Mallampati: II (soft palate, uvula, fauces visible)  Level of Sedation Plan:Deep sedation  Post Procedure plan: Return to same level of care      Plan:   1. EGD    I assessed the patient and find that the patient is in satisfactory condition to proceed with the planned procedure and sedation plan. I have explained the risk, benefits, and alternatives to the procedure; the patient understands and agrees to proceed.        Ame[facility]  4/4/2019

## 2019-04-04 NOTE — ANESTHESIA PRE PROCEDURE
Department of Anesthesiology  Preprocedure Note       Name:  Renetta George   Age:  64 y.o.  :  1960                                          MRN:  [id number]         Date:  2019      Surgeon: Charlee Clemens):  Robin Duarte DO    Procedure: EGD W/ANES. (7:30) (N/A )    Medications prior to admission:   Prior to Admission medications    Medication Sig Start Date End Date Taking? Authorizing Provider   dicyclomine (BENTYL) 10 MG capsule Take 1 capsule by mouth 4 times daily as needed (abdominal pain/cramping) 3/25/19  Yes Jannie Lee MD   meloxicam (MOBIC) 15 MG tablet TAKE 1 TABLET BY MOUTH EVERY DAY 19  Yes Marygrace Mortimer, MD   Multiple Vitamin (DAILY-CRISTINE PO) Take 1 tablet by mouth daily   Yes Historical Provider, MD   clopidogrel (PLAVIX) 75 MG tablet Take 75 mg by mouth daily   Yes Historical Provider, MD   pantoprazole sodium (PROTONIX) 40 MG PACK packet Take 40 mg by mouth 2 times daily (before meals)    Yes Historical Provider, MD   nitroGLYCERIN (NITRODUR) 0.2 MG/HR Place 1 patch onto the skin daily   Yes Historical Provider, MD   fluticasone-vilanterol (BREO ELLIPTA) 100-25 MCG/INH AEPB inhaler Inhale 1 puff into the lungs daily 17  Yes Ivonne Churchill MD   metFORMIN (GLUCOPHAGE) 500 MG tablet Take 500 mg by mouth 2 times daily (with meals)   Yes Historical Provider, MD   tamsulosin (FLOMAX) 0.4 MG capsule Take 0.4 mg by mouth daily   Yes Historical Provider, MD   simvastatin (ZOCOR) 40 MG tablet Take 40 mg by mouth nightly   Yes Historical Provider, MD   gabapentin (NEURONTIN) 300 MG capsule Take 400 mg by mouth 3 times daily .    Yes Historical Provider, MD   aspirin 81 MG tablet Take 81 mg by mouth daily   Yes Historical Provider, MD   cetirizine (ZYRTEC) 10 MG tablet Take 10 mg by mouth daily   Yes Historical Provider, MD   hydrOXYzine (VISTARIL) 25 MG capsule Take 25 mg by mouth 3 times daily as needed for Itching   Yes Historical Provider, MD   albuterol (PROVENTIL mellitus ([facility] [address]     Hyperlipidemia     Pneumonia        Past Surgical History:        Procedure Laterality Date    CARDIAC SURGERY      stent placed    CARPAL TUNNEL RELEASE      CHOLECYSTECTOMY, LAPAROSCOPIC      KNEE ARTHROPLASTY      R knee x4    NASAL SINUS SURGERY      UPPER GASTROINTESTINAL ENDOSCOPY  11       Social History:    Social History     Tobacco Use    Smoking status: Former Smoker     Packs/day: 0.25     Years: 25.00     Pack years: 6.25     Last attempt to quit: 2019     Years since quittin.1    Smokeless tobacco: Former User     Quit date: 10/3/2015   Substance Use Topics    Alcohol use: No     Alcohol/week: 0.0 oz                                Counseling given: Not Answered      Vital Signs (Current):   Vitals:    19 0652   BP: (!) 156/87   Pulse: 56   Resp: 18   Temp: 98.5 °F (36.9 °C)   TempSrc: Temporal   SpO2: 98%   Weight: 144 lb (65.3 kg)   Height: 5' 8\" (1.727 m)                                              BP Readings from Last 3 Encounters:   19 (!) 156/87   19 117/81   19 (!) 148/83       NPO Status: Time of last liquid consumption:                         Time of last solid consumption:                         Date of last liquid consumption: 19                        Date of last solid food consumption: 19    BMI:   Wt Readings from Last 3 Encounters:   19 144 lb (65.3 kg)   19 144 lb (65.3 kg)   19 141 lb (64 kg)     Body mass index is 21.9 kg/m².     CBC:   Lab Results   Component Value Date    WBC 6.0 2019    RBC 4.30 2019    HGB 12.3 2019    HCT 37.7 2019    MCV 87.7 2019    RDW 16.5 2019     2019       CMP:   Lab Results   Component Value Date     2019    K 3.7 2019    K 5.0 2018     2019    CO2 23 2019    BUN 10 2019    CREATININE 0.8 2019    GFRAA >60 2019    AGRATIO 1.7 2019 LABGLOM >60 03/25/2019    GLUCOSE 76 03/25/2019    PROT 6.4 03/25/2019    CALCIUM 8.5 03/25/2019    BILITOT <0.2 03/25/2019    ALKPHOS 100 03/25/2019    AST 10 03/25/2019    ALT 6 03/25/2019       POC Tests: No results for input(s): POCGLU, POCNA, POCK, POCCL, POCBUN, POCHEMO, POCHCT in the last 72 hours. Coags: No results found for: PROTIME, INR, APTT    HCG (If Applicable): No results found for: PREGTESTUR, PREGSERUM, HCG, HCGQUANT     ABGs: No results found for: PHART, PO2ART, DAB9GOI, YDO0YYZ, BEART, J6ATUBZV     Type & Screen (If Applicable):  No results found for: LABABO, [address]    Anesthesia Evaluation  Patient summary reviewed and Nursing notes reviewed no history of anesthetic complications:   Airway: Mallampati: II     Neck ROM: full   Dental:          Pulmonary:   (+) pneumonia:  COPD:  shortness of breath:  asthma:                            Cardiovascular:                      Neuro/Psych:               GI/Hepatic/Renal:             Endo/Other:    (+) Diabetes, . Abdominal:           Vascular:                                        Anesthesia Plan      general     ASA 3     (Medications & allergies reviewed  All available lab & EKG data reviewed)  Induction: intravenous. Anesthetic plan and risks discussed with patient. Plan discussed with CRNA.                   Eleanora Mohs, MD   4/4/2019

## 2019-04-04 NOTE — ANESTHESIA POSTPROCEDURE EVALUATION
Department of Anesthesiology  Postprocedure Note    Patient: Shelli Quintanilla  MRN: [id number]  YOB: 1960  Date of evaluation: 4/4/2019  Time:  12:56 PM     Procedure Summary     Date:  04/04/19 [facility] / [facility]    Anesthesia Start:  6908 Anesthesia Stop:  1583    Procedure:  EGD BIOPSY (N/A ) Diagnosis:  (HEMATEMESIS)    Surgeon:  Edna Layne DO Responsible Provider:  Anupama Chapin MD    Anesthesia Type:  general ASA Status:  3          Anesthesia Type: general    Kadeem Phase I: Kadeem Score: 10    Kadeem Phase II: Kadeem Score: 10    Last vitals: Reviewed and per EMR flowsheets.        Anesthesia Post Evaluation    Comments: Postoperative Anesthesia Note    Name:    Shelli Quintanilla  MRN:      [id number]    Patient Vitals in the past 12 hrs:  04/04/19 0823, BP:126/83, Pulse:57, Resp:16, SpO2:99 %  04/04/19 0805, BP:110/75, Pulse:56, Resp:16, SpO2:98 %  04/04/19 0800, BP:100/65, Pulse:57, Resp:16, SpO2:98 %  04/04/19 0754, BP:86/62, Pulse:57, Resp:16, SpO2:98 %  04/04/19 0749, BP:87/63, Temp:96.9 °F (36.1 °C), Temp src:Temporal, Pulse:60, Resp:16, SpO2:98 %  04/04/19 0652, BP:(!) 156/87, Temp:98.5 °F (36.9 °C), Temp src:Temporal, Pulse:56, Resp:18, SpO2:98 %, Height:5' 8\" (1.727 m), Weight:144 lb (65.3 kg)     LABS:    CBC  Lab Results       Component                Value               Date/Time                  WBC                      6.0                 03/25/2019 10:49 AM        HGB                      12.3 (L)            03/25/2019 10:49 AM        HCT                      37.7 (L)            03/25/2019 10:49 AM        PLT                      186                 03/25/2019 10:49 AM   RENAL  Lab Results       Component                Value               Date/Time                  NA                       143                 03/25/2019 10:49 AM        K                        3.7                 03/25/2019 10:49 AM        K 5.0                 01/16/2018 06:20 PM        CL                       110                 03/25/2019 10:49 AM        CO2                      23                  03/25/2019 10:49 AM        BUN                      10                  03/25/2019 10:49 AM        CREATININE               0.8 (L)             03/25/2019 10:49 AM        GLUCOSE                  76                  03/25/2019 10:49 AM   COAGS  No results found for: PROTIME, INR, APTT    Intake & Output:  In: 200 (I.V.:200)  Out: -     Nausea & Vomiting:  No    Level of Consciousness:  Awake    Pain Assessment:  Adequate analgesia    Anesthesia Complications:  No apparent anesthetic complications    SUMMARY      Vital signs stable  OK to discharge from Stage I post anesthesia care.   Care transferred from Anesthesiology department on discharge from perioperative area

## 2019-04-04 NOTE — PROGRESS NOTES
Discharge instructions given to pt and friend,verbalize understanding. Pt discharged home stable condition.

## 2019-05-03 RX ORDER — MELOXICAM 15 MG/1
TABLET ORAL
Qty: 30 TABLET | Refills: 3 | Status: SHIPPED | OUTPATIENT
Start: 2019-05-03 | End: 2019-08-17 | Stop reason: SDUPTHER

## 2019-05-28 ENCOUNTER — HOSPITAL ENCOUNTER (OUTPATIENT)
Dept: ULTRASOUND IMAGING | Age: 59
Discharge: HOME | End: 2019-05-28
Payer: MEDICAID

## 2019-05-28 ENCOUNTER — HOSPITAL ENCOUNTER (OUTPATIENT)
Dept: PULMONOLOGY | Age: 59
Discharge: HOME | End: 2019-05-28
Payer: MEDICAID

## 2019-05-28 VITALS — OXYGEN SATURATION: 95 %

## 2019-05-28 DIAGNOSIS — R79.89 ELEVATED TSH: ICD-10-CM

## 2019-05-28 LAB
EXPIRATORY TIME-POST: NORMAL SEC
EXPIRATORY TIME: NORMAL SEC
FEF 25-75% %CHNG: NORMAL
FEF 25-75% %PRED-POST: NORMAL %
FEF 25-75% %PRED-PRE: NORMAL L/SEC
FEF 25-75% PRED: NORMAL L/SEC
FEF 25-75%-POST: NORMAL L/SEC
FEF 25-75%-PRE: NORMAL L/SEC
FEV1 %PRED-POST: 19 %
FEV1 %PRED-PRE: 59 %
FEV1 PRED: NORMAL L
FEV1-POST: NORMAL L
FEV1-PRE: NORMAL L
FEV1/FVC %PRED-POST: NORMAL %
FEV1/FVC %PRED-PRE: NORMAL %
FEV1/FVC PRED: NORMAL %
FEV1/FVC-POST: 28 %
FEV1/FVC-PRE: 95 %
FVC %PRED-POST: NORMAL L
FVC %PRED-PRE: NORMAL %
FVC PRED: NORMAL L
FVC-POST: NORMAL L
FVC-PRE: NORMAL L
PEF %PRED-POST: NORMAL %
PEF %PRED-PRE: NORMAL L/SEC
PEF PRED: NORMAL L/SEC
PEF%CHNG: NORMAL
PEF-POST: NORMAL L/SEC
PEF-PRE: NORMAL L/SEC

## 2019-05-28 PROCEDURE — 76536 US EXAM OF HEAD AND NECK: CPT

## 2019-05-28 PROCEDURE — 94060 EVALUATION OF WHEEZING: CPT

## 2019-05-28 PROCEDURE — 6360000002 HC RX W HCPCS: Performed by: PHYSICIAN ASSISTANT

## 2019-05-28 PROCEDURE — 94760 N-INVAS EAR/PLS OXIMETRY 1: CPT

## 2019-05-28 PROCEDURE — 94640 AIRWAY INHALATION TREATMENT: CPT

## 2019-05-28 RX ORDER — ALBUTEROL SULFATE 2.5 MG/3ML
2.5 SOLUTION RESPIRATORY (INHALATION) ONCE
Status: COMPLETED | OUTPATIENT
Start: 2019-05-28 | End: 2019-05-28

## 2019-05-28 RX ADMIN — ALBUTEROL SULFATE 2.5 MG: 2.5 SOLUTION RESPIRATORY (INHALATION) at 14:05

## 2019-05-28 ASSESSMENT — PULMONARY FUNCTION TESTS
FEV1_PERCENT_PREDICTED_PRE: 59
FEV1_PERCENT_PREDICTED_POST: 19
FEV1/FVC_POST: 28
FEV1/FVC_PRE: 95

## 2019-05-30 NOTE — PROCEDURES
[address]                 [address] [facility] [address]                               PULMONARY FUNCTION    PATIENT NAME: Renetta Araujo                :        1960  MED REC NO:   [id number]                          ROOM:  ACCOUNT NO:   [de-identified]                           ADMIT DATE: 2019  PROVIDER:     Estela Abel MD    DATE OF PROCEDURE:  2019    ORDERING PROVIDER:  PRESTON Hudson    GIVEN DIAGNOSIS:  COPD. SPIROMETRY:  The FEV1 is 2.03 liters or 59% of predicted. The FVC is  2.14 liters or 47% of predicted. The FEV1/FVC ratio is 95%. There is  no demonstrative response to inhaled bronchodilators. FLOW VOLUME LOOP:  Appears to be an obstructive defect pattern. IMPRESSION:  1. There is no obstructive lung defect according to FEV1/FVC ratio. 2.  There is no response to inhaled bronchodilators. 3.  The patient had variable efforts with procedure, so testing may not  be completely accurate. Clinical correlation is recommended.         Beena Padilla MD    D: 2019 10:34:35       T: 2019 19:59:30     [id number]  Job#: [id number]     Doc#: [id number]    CC:

## 2019-08-19 RX ORDER — MELOXICAM 15 MG/1
TABLET ORAL
Qty: 30 TABLET | Refills: 3 | Status: SHIPPED | OUTPATIENT
Start: 2019-08-19 | End: 2019-12-12 | Stop reason: SDUPTHER

## 2019-09-10 ENCOUNTER — HOSPITAL ENCOUNTER (EMERGENCY)
Age: 59
Discharge: HOME | End: 2019-09-10
Attending: EMERGENCY MEDICINE
Payer: MEDICAID

## 2019-09-10 VITALS
HEIGHT: 68 IN | BODY MASS INDEX: 23.49 KG/M2 | SYSTOLIC BLOOD PRESSURE: 145 MMHG | RESPIRATION RATE: 16 BRPM | TEMPERATURE: 98.4 F | WEIGHT: 155 LBS | OXYGEN SATURATION: 98 % | DIASTOLIC BLOOD PRESSURE: 86 MMHG | HEART RATE: 61 BPM

## 2019-09-10 DIAGNOSIS — Z98.890 S/P COLONOSCOPY: ICD-10-CM

## 2019-09-10 DIAGNOSIS — K92.2 LOWER GI BLEED: Primary | ICD-10-CM

## 2019-09-10 LAB
A/G RATIO: 1.5 (ref 1.1–2.2)
ALBUMIN SERPL-MCNC: 5 G/DL (ref 3.4–5)
ALP BLD-CCNC: 125 U/L (ref 40–129)
ALT SERPL-CCNC: 17 U/L (ref 10–40)
ANION GAP SERPL CALCULATED.3IONS-SCNC: 10 MMOL/L (ref 3–16)
AST SERPL-CCNC: 23 U/L (ref 15–37)
BASOPHILS ABSOLUTE: 0 K/UL (ref 0–0.2)
BASOPHILS RELATIVE PERCENT: 0.4 %
BILIRUB SERPL-MCNC: 0.4 MG/DL (ref 0–1)
BUN BLDV-MCNC: 10 MG/DL (ref 7–20)
CALCIUM SERPL-MCNC: 9.5 MG/DL (ref 8.3–10.6)
CHLORIDE BLD-SCNC: 105 MMOL/L (ref 99–110)
CO2: 25 MMOL/L (ref 21–32)
CREAT SERPL-MCNC: 0.9 MG/DL (ref 0.9–1.3)
EOSINOPHILS ABSOLUTE: 0.1 K/UL (ref 0–0.6)
EOSINOPHILS RELATIVE PERCENT: 1 %
GFR AFRICAN AMERICAN: >60
GFR NON-AFRICAN AMERICAN: >60
GLOBULIN: 3.4 G/DL
GLUCOSE BLD-MCNC: 85 MG/DL (ref 70–99)
HCT VFR BLD CALC: 44.7 % (ref 40.5–52.5)
HEMOGLOBIN: 14.9 G/DL (ref 13.5–17.5)
LYMPHOCYTES ABSOLUTE: 0.8 K/UL (ref 1–5.1)
LYMPHOCYTES RELATIVE PERCENT: 11.7 %
MCH RBC QN AUTO: 29.6 PG (ref 26–34)
MCHC RBC AUTO-ENTMCNC: 33.3 G/DL (ref 31–36)
MCV RBC AUTO: 89 FL (ref 80–100)
MONOCYTES ABSOLUTE: 0.5 K/UL (ref 0–1.3)
MONOCYTES RELATIVE PERCENT: 8.2 %
NEUTROPHILS ABSOLUTE: 5.2 K/UL (ref 1.7–7.7)
NEUTROPHILS RELATIVE PERCENT: 78.7 %
PDW BLD-RTO: 18.4 % (ref 12.4–15.4)
PLATELET # BLD: 187 K/UL (ref 135–450)
PMV BLD AUTO: 7.5 FL (ref 5–10.5)
POTASSIUM SERPL-SCNC: 4.5 MMOL/L (ref 3.5–5.1)
RBC # BLD: 5.02 M/UL (ref 4.2–5.9)
SODIUM BLD-SCNC: 140 MMOL/L (ref 136–145)
SPECIMEN STATUS: NORMAL
TOTAL PROTEIN: 8.4 G/DL (ref 6.4–8.2)
WBC # BLD: 6.6 K/UL (ref 4–11)

## 2019-09-10 PROCEDURE — 99283 EMERGENCY DEPT VISIT LOW MDM: CPT

## 2019-09-10 PROCEDURE — 85025 COMPLETE CBC W/AUTO DIFF WBC: CPT

## 2019-09-10 PROCEDURE — 80053 COMPREHEN METABOLIC PANEL: CPT

## 2019-09-10 ASSESSMENT — PAIN SCALES - GENERAL: PAINLEVEL_OUTOF10: 8

## 2019-09-10 NOTE — ED PROVIDER NOTES
[facility] Emergency Department    CHIEF COMPLAINT  Chief Complaint   Patient presents with    Rectal Bleeding     had colonoscopy today; they removed a polyp; had bright red blood per rectum after      HISTORY OF PRESENT ILLNESS  Rosalia Ward is a 64 y.o. male  who presents to the ED complaining of s/p colonoscopy earlier today by Dr. Sailaja Mejia.  He says they found a polyp today. After the procedure, he says he has a large bloody BM 3 times after the procedure so he was sent to the ED. He reports some epigastric abdominal pains since the procedure. No fevers, no lightheadedness right now but he does feel a little \"drugged\" from the medications he was given during the procedure. No syncope since the procedure. No chest pain, cough or SOB, no n/v and no fevers. He has pictures of internal hemorrhoids and a sigmoid polyp removed with EndoClip today. No other complaints, modifying factors or associated symptoms. I have reviewed the following from the nursing documentation.     Past Medical History:   Diagnosis Date    Anxiety     Arterial stent thrombosis (HCC)     Asthma     COPD (chronic obstructive pulmonary disease) ([address]     Diabetes mellitus ([address]     Hyperlipidemia     Pneumonia      Past Surgical History:   Procedure Laterality Date    CARDIAC SURGERY      stent placed    CARPAL TUNNEL RELEASE      CHOLECYSTECTOMY, LAPAROSCOPIC      KNEE ARTHROPLASTY      R knee x4    NASAL SINUS SURGERY      UPPER GASTROINTESTINAL ENDOSCOPY  7/14/11    UPPER GASTROINTESTINAL ENDOSCOPY N/A 4/4/2019    EGD BIOPSY performed by Kayla Howe DO at [facility]     Family History   Problem Relation Age of Onset    High Blood Pressure Mother     Heart Disease Mother     High Blood Pressure Father     Heart Disease Father     Cancer Sister     Diabetes Sister     Other Sister         aneursym-brain     Social History     Socioeconomic History    Marital status: capsule 0    nicotine polacrilex (COMMIT) 4 MG lozenge Take 1 lozenge by mouth as needed for Smoking cessation 100 each 3    nicotine polacrilex (NICORETTE) 2 MG lozenge Take 1 lozenge by mouth as needed for Smoking cessation 100 each 1    Multiple Vitamin (DAILY-CRISTINE PO) Take 1 tablet by mouth daily      clopidogrel (PLAVIX) 75 MG tablet Take 75 mg by mouth daily      pantoprazole sodium (PROTONIX) 40 MG PACK packet Take 40 mg by mouth 2 times daily (before meals)       nitroGLYCERIN (NITRODUR) 0.2 MG/HR Place 1 patch onto the skin daily      fluticasone-vilanterol (BREO ELLIPTA) 100-25 MCG/INH AEPB inhaler Inhale 1 puff into the lungs daily 1 each 3    metFORMIN (GLUCOPHAGE) 500 MG tablet Take 500 mg by mouth 2 times daily (with meals)      tamsulosin (FLOMAX) 0.4 MG capsule Take 0.4 mg by mouth daily      simvastatin (ZOCOR) 40 MG tablet Take 40 mg by mouth nightly      gabapentin (NEURONTIN) 300 MG capsule Take 400 mg by mouth 3 times daily .  aspirin 81 MG tablet Take 81 mg by mouth daily      cetirizine (ZYRTEC) 10 MG tablet Take 10 mg by mouth daily      hydrOXYzine (VISTARIL) 25 MG capsule Take 25 mg by mouth 3 times daily as needed for Itching      albuterol (PROVENTIL HFA;VENTOLIN HFA) 108 (90 BASE) MCG/ACT inhaler Inhale 1 puff into the lungs every 6 hours as needed.  fluticasone (FLONASE) 50 MCG/ACT nasal spray 1 spray by Nasal route daily. Indications: EACH NOSTRIL      montelukast (SINGULAIR) 10 MG tablet Take 10 mg by mouth nightly.  cyclobenzaprine (FLEXERIL) 10 MG tablet Take 10 mg by mouth 3 times daily as needed.  omeprazole (PRILOSEC) 20 MG capsule Take 40 mg by mouth 2 times daily       topiramate (TOPAMAX) 100 MG tablet Take 50 mg by mouth 2 times daily. Allergies   Allergen Reactions    Ritalin [Methylphenidate Hcl] Anxiety       REVIEW OF SYSTEMS  10 systems reviewed, pertinent positives per HPI otherwise noted to be negative.     PHYSICAL EXAM  BP Metabolic Panel   Result Value Ref Range    Sodium 140 136 - 145 mmol/L    Potassium 4.5 3.5 - 5.1 mmol/L    Chloride 105 99 - 110 mmol/L    CO2 25 21 - 32 mmol/L    Anion Gap 10 3 - 16    Glucose 85 70 - 99 mg/dL    BUN 10 7 - 20 mg/dL    CREATININE 0.9 0.9 - 1.3 mg/dL    GFR Non-African American >60 >60    GFR African American >60 >60    Calcium 9.5 8.3 - 10.6 mg/dL    Total Protein 8.4 (H) 6.4 - 8.2 g/dL    Alb 5.0 3.4 - 5.0 g/dL    Albumin/Globulin Ratio 1.5 1.1 - 2.2    Total Bilirubin 0.4 0.0 - 1.0 mg/dL    Alkaline Phosphatase 125 40 - 129 U/L    ALT 17 10 - 40 U/L    AST 23 15 - 37 U/L    Globulin 3.4 g/dL   Sample possible blood bank testing   Result Value Ref Range    Specimen Status HODAN        ED COURSE/MDM  Patient seen and evaluated. Old records reviewed. Labs and imaging reviewed and results discussed with patient. After initial evaluation, differential diagnostic considerations included: kidney stone, pyelonephritis, UTI, appendicitis, bowel obstruction, diverticulitis, hernia, gastritis/gastroenteritis, pancreatitis, cholecystitis, hepatitis, constipation, IBS, IBD, GIB, colonoscopy complication    The patient's ED workup was notable for benign-appearing examination. No case bleeding on rectal exam although he did have a tiny amount of redness in his stool in the ED when he had a bowel movement. His vitals are completely reassuring and his hemoglobin is greater than 14. His abdominal exam is benign. At this point I do not suspect active hemorrhaging anymore. I consulted and spoke with Dr. Jericho Pardo from GI about the patient's ED history, physical, workup, and course so far. Recommendations from this consultant included hold antiplatelets for 1 week, f/u in clinic. Agreed no need for imaging right now. Pt informed to return for worsening or progressive bleeding/pain, fevers, syncope, etc.    CLINICAL IMPRESSION  1. Lower GI bleed    2.  S/P colonoscopy        Blood pressure (!) 154/93, pulse 57, temperature 97.6 °F (36.4 °C), temperature source Oral, resp. rate 16, height 5' 8\" (1.727 m), weight 155 lb (70.3 kg), SpO2 96 %. DISPOSITION  Tania Alonzo was discharged to home in fair condition. I have discussed the findings of today's workup with the patient and addressed the patient's questions and concerns. Important warning signs as well as new or worsening symptoms which would necessitate immediate return to the ED were discussed. The plan is to discharge from the ED at this time, and the patient is in stable condition. The patient acknowledged understanding is agreeable with this plan. Follow-up with:  Neyda Oliva DO  [address] [facility] [address]  [phone]    Call   For symptom re-evaluation, If symptoms worsen    [facility]  ED  [facility] [address] [phone]  Go to   If symptoms worsen      DISCLAIMER: This chart was created using Dragon dictation software. Efforts were made by me to ensure accuracy, however some errors may be present due to limitations of this technology and occasionally words are not transcribed correctly.         Chel Bernal MD  09/10/19 [id number]

## 2019-12-12 RX ORDER — MELOXICAM 15 MG/1
TABLET ORAL
Qty: 30 TABLET | Refills: 3 | Status: SHIPPED | OUTPATIENT
Start: 2019-12-12 | End: 2021-01-25

## 2020-03-10 ENCOUNTER — HOSPITAL ENCOUNTER (OUTPATIENT)
Dept: ULTRASOUND IMAGING | Age: 60
Discharge: HOME | End: 2020-03-10
Payer: MEDICAID

## 2020-03-10 PROCEDURE — 76700 US EXAM ABDOM COMPLETE: CPT

## 2020-08-05 ENCOUNTER — TELEPHONE (OUTPATIENT)
Dept: ORTHOPEDIC SURGERY | Age: 60
End: 2020-08-05

## 2020-08-15 ENCOUNTER — HOSPITAL ENCOUNTER (EMERGENCY)
Age: 60
Discharge: HOME | End: 2020-08-15
Attending: EMERGENCY MEDICINE
Payer: MEDICAID

## 2020-08-15 VITALS
BODY MASS INDEX: 23.7 KG/M2 | HEIGHT: 69 IN | HEART RATE: 60 BPM | DIASTOLIC BLOOD PRESSURE: 98 MMHG | WEIGHT: 160 LBS | RESPIRATION RATE: 20 BRPM | OXYGEN SATURATION: 95 % | SYSTOLIC BLOOD PRESSURE: 145 MMHG | TEMPERATURE: 98.2 F

## 2020-08-15 PROCEDURE — 6370000000 HC RX 637 (ALT 250 FOR IP): Performed by: EMERGENCY MEDICINE

## 2020-08-15 PROCEDURE — 12001 RPR S/N/AX/GEN/TRNK 2.5CM/<: CPT

## 2020-08-15 PROCEDURE — 99282 EMERGENCY DEPT VISIT SF MDM: CPT

## 2020-08-15 RX ORDER — ACETAMINOPHEN 500 MG
1000 TABLET ORAL ONCE
Status: COMPLETED | OUTPATIENT
Start: 2020-08-16 | End: 2020-08-15

## 2020-08-15 RX ADMIN — ACETAMINOPHEN 1000 MG: 500 TABLET ORAL at 23:39

## 2020-08-15 ASSESSMENT — PAIN DESCRIPTION - LOCATION: LOCATION: FINGER (COMMENT WHICH ONE)

## 2020-08-15 ASSESSMENT — PAIN SCALES - GENERAL
PAINLEVEL_OUTOF10: 5
PAINLEVEL_OUTOF10: 7
PAINLEVEL_OUTOF10: 4

## 2020-08-15 ASSESSMENT — PAIN DESCRIPTION - ORIENTATION: ORIENTATION: LEFT

## 2020-08-16 NOTE — ED TRIAGE NOTES
Pt arrived via EMS, pt had cut left index finger while cutting meat with a knife.  Pt is on plavix, bleeding not controlled upon arrival. Mohs Rapid Report Verbiage: The area of clinically evident tumor was marked with skin marking ink and appropriately hatched.  The initial incision was made following the Mohs approach through the skin.  The specimen was taken to the lab, divided into the necessary number of pieces, chromacoded and processed according to the Mohs protocol.  This was repeated in successive stages until a tumor free defect was achieved.

## 2020-08-16 NOTE — ED PROVIDER NOTES
Pressure Mother     Heart Disease Mother     High Blood Pressure Father     Heart Disease Father     Cancer Sister     Diabetes Sister     Other Sister         aneursym-brain     Social History     Socioeconomic History    Marital status:      Spouse name: Not on file    Number of children: Not on file    Years of education: Not on file    Highest education level: Not on file   Occupational History    Not on file   Social Needs    Financial resource strain: Not on file    Food insecurity     Worry: Not on file     Inability: Not on file    Transportation needs     Medical: Not on file     Non-medical: Not on file   Tobacco Use    Smoking status: Former Smoker     Packs/day: 0.25     Years: 25.00     Pack years: 6.25     Last attempt to quit: 2019     Years since quittin.5    Smokeless tobacco: Former User     Quit date: 10/3/2015   Substance and Sexual Activity    Alcohol use: No     Alcohol/week: 0.0 standard drinks    Drug use: No    Sexual activity: Yes     Partners: Female     Comment: smoke 2 cigaretees a day   Lifestyle    Physical activity     Days per week: Not on file     Minutes per session: Not on file    Stress: Not on file   Relationships    Social connections     Talks on phone: Not on file     Gets together: Not on file     Attends [employer] service: Not on file     Active member of club or organization: Not on file     Attends meetings of clubs or organizations: Not on file     Relationship status: Not on file    Intimate partner violence     Fear of current or ex partner: Not on file     Emotionally abused: Not on file     Physically abused: Not on file     Forced sexual activity: Not on file   Other Topics Concern    Not on file   Social History Narrative    Not on file     No current facility-administered medications for this encounter.       Current Outpatient Medications   Medication Sig Dispense Refill    meloxicam (MOBIC) 15 MG tablet TAKE 1 TABLET BY MOUTH EVERY DAY 30 tablet 3    dicyclomine (BENTYL) 10 MG capsule Take 1 capsule by mouth 4 times daily as needed (abdominal pain/cramping) 20 capsule 0    Multiple Vitamin (DAILY-CRISTINE PO) Take 1 tablet by mouth daily      clopidogrel (PLAVIX) 75 MG tablet Take 75 mg by mouth daily      pantoprazole sodium (PROTONIX) 40 MG PACK packet Take 40 mg by mouth 2 times daily (before meals)       nitroGLYCERIN (NITRODUR) 0.2 MG/HR Place 1 patch onto the skin daily      fluticasone-vilanterol (BREO ELLIPTA) 100-25 MCG/INH AEPB inhaler Inhale 1 puff into the lungs daily 1 each 3    metFORMIN (GLUCOPHAGE) 500 MG tablet Take 500 mg by mouth 2 times daily (with meals)      tamsulosin (FLOMAX) 0.4 MG capsule Take 0.4 mg by mouth daily      simvastatin (ZOCOR) 40 MG tablet Take 40 mg by mouth nightly      gabapentin (NEURONTIN) 300 MG capsule Take 400 mg by mouth 3 times daily .  aspirin 81 MG tablet Take 81 mg by mouth daily      cetirizine (ZYRTEC) 10 MG tablet Take 10 mg by mouth daily      hydrOXYzine (VISTARIL) 25 MG capsule Take 25 mg by mouth 3 times daily as needed for Itching      albuterol (PROVENTIL HFA;VENTOLIN HFA) 108 (90 BASE) MCG/ACT inhaler Inhale 1 puff into the lungs every 6 hours as needed.  fluticasone (FLONASE) 50 MCG/ACT nasal spray 1 spray by Nasal route daily. Indications: EACH NOSTRIL      montelukast (SINGULAIR) 10 MG tablet Take 10 mg by mouth nightly.  cyclobenzaprine (FLEXERIL) 10 MG tablet Take 10 mg by mouth 3 times daily as needed.  omeprazole (PRILOSEC) 20 MG capsule Take 40 mg by mouth 2 times daily       topiramate (TOPAMAX) 100 MG tablet Take 50 mg by mouth 2 times daily.       sucralfate (CARAFATE) 1 GM tablet Take 1 tablet by mouth 4 times daily for 7 days 28 tablet 0    nicotine polacrilex (COMMIT) 4 MG lozenge Take 1 lozenge by mouth as needed for Smoking cessation 100 each 3    nicotine polacrilex (NICORETTE) 2 MG lozenge Take 1 lozenge by mouth as needed for Smoking cessation 100 each 1     Allergies   Allergen Reactions    Ritalin [Methylphenidate Hcl] Anxiety       REVIEW OF SYSTEMS  10 systems reviewed, pertinent positives per HPI otherwise noted to be negative     PHYSICAL EXAM  BP (!) 149/93   Pulse 72   Temp 98.2 °F (36.8 °C) (Oral)   Resp 20   Ht 5' 9\" (1.753 m)   Wt 160 lb (72.6 kg)   SpO2 95%   BMI 23.63 kg/m²   GENERAL APPEARANCE: Awake and alert. Cooperative. In no obvious distress. HEAD: Normocephalic. Atraumatic. EYES: PERRL. EOM's grossly intact. ENT: Mucous membranes are pink and moist.   NECK: Supple. HEART: RRR. No murmurs. LUNGS: Respirations unlabored. CTAB. Good air exchange. ABDOMEN: Soft. Non-distended. Non-tender. No masses. No organomegaly. No guarding or rebound. EXTREMITIES: No peripheral edema. Moves all extremities equally. All extremities neurovascularly intact. Patient has a 2 cm laceration on the index finger of his left hand just distal to the DIP   sKIN: Warm and dry. No acute rashes. NEUROLOGICAL: Alert and oriented. . Strength 5/5, sensation intact. Gait normal.   PSYCHIATRIC: Normal mood and affect. No HI or SI expressed to me. PROCEDURE:  LACERATION REPAIR  Destiny Aiken or their surrogate had an opportunity to ask questions, and the risks, benefits, and alternatives were discussed. The wound was prepped and draped to maintain a sterile field. Patient underwent a digital block with approximately 5 cc of 1% lidocaine without epinephrine was used to anesthetize the wound. It was copiously irrigated. It was explored to its depth in a bloodless field with no sign of tendon, nerve, or vascular injury. No foreign bodies were identified. Description: The wound was 1.5 cm in length on the index finger just distal to the DIP on the left hand  It was closed with 3 interrupted 5-0 nylon sutures. There were no complications during the procedure.   There was good wound edge approximation and

## 2020-12-09 ENCOUNTER — OFFICE VISIT (OUTPATIENT)
Dept: ORTHOPEDIC SURGERY | Age: 60
End: 2020-12-09
Payer: MEDICAID

## 2020-12-09 VITALS — WEIGHT: 160 LBS | BODY MASS INDEX: 23.7 KG/M2 | HEIGHT: 69 IN

## 2020-12-09 PROCEDURE — G8420 CALC BMI NORM PARAMETERS: HCPCS | Performed by: ORTHOPAEDIC SURGERY

## 2020-12-09 PROCEDURE — 99213 OFFICE O/P EST LOW 20 MIN: CPT | Performed by: ORTHOPAEDIC SURGERY

## 2020-12-09 PROCEDURE — 1036F TOBACCO NON-USER: CPT | Performed by: ORTHOPAEDIC SURGERY

## 2020-12-09 PROCEDURE — G8427 DOCREV CUR MEDS BY ELIG CLIN: HCPCS | Performed by: ORTHOPAEDIC SURGERY

## 2020-12-09 PROCEDURE — 20550 NJX 1 TENDON SHEATH/LIGAMENT: CPT | Performed by: ORTHOPAEDIC SURGERY

## 2020-12-09 PROCEDURE — 3017F COLORECTAL CA SCREEN DOC REV: CPT | Performed by: ORTHOPAEDIC SURGERY

## 2020-12-09 PROCEDURE — G8484 FLU IMMUNIZE NO ADMIN: HCPCS | Performed by: ORTHOPAEDIC SURGERY

## 2020-12-09 RX ORDER — LIDOCAINE HYDROCHLORIDE 10 MG/ML
20 INJECTION, SOLUTION INFILTRATION; PERINEURAL ONCE
Status: COMPLETED | OUTPATIENT
Start: 2020-12-09 | End: 2020-12-09

## 2020-12-09 RX ORDER — BETAMETHASONE SODIUM PHOSPHATE AND BETAMETHASONE ACETATE 3; 3 MG/ML; MG/ML
12 INJECTION, SUSPENSION INTRA-ARTICULAR; INTRALESIONAL; INTRAMUSCULAR; SOFT TISSUE ONCE
Status: COMPLETED | OUTPATIENT
Start: 2020-12-09 | End: 2020-12-09

## 2020-12-09 RX ADMIN — LIDOCAINE HYDROCHLORIDE 20 ML: 10 INJECTION, SOLUTION INFILTRATION; PERINEURAL at 15:03

## 2020-12-09 RX ADMIN — BETAMETHASONE SODIUM PHOSPHATE AND BETAMETHASONE ACETATE 12 MG: 3; 3 INJECTION, SUSPENSION INTRA-ARTICULAR; INTRALESIONAL; INTRAMUSCULAR; SOFT TISSUE at 15:03

## 2020-12-09 NOTE — PROGRESS NOTES
area of maximal tenderness, patient was in agreement. The risks and benefits of cortisone injection were discussed thoroughly. Risks discussed included infection, adverse drug reaction, increased pain post-injection, skin de-pigmentation, fatty atrophy, and persistent clinical symptoms despite injection. Use of ethyl chloride spray during injection to decrease injection site pain was discussed. The injection was given after cleansing the skin with alcohol. Left elbow lateral epicondyle and common extensor tendon sheath was injected with 1 cc of 1% lidocaine without epinephrine and 1 cc of Celestone without difficulty. The patient tolerated the injection well and a Band-aid was placed. Follow-up after nerve testing. All questions and concerns were addressed today. Patient is in agreement with the plan. Ivan Hernandez MD  Hand & Upper Extremity Surgery  [address] [facility]  A partner of [facility] ([facility])        Please note that this transcription was created using voice recognition software. Any errors are unintentional and may be due to voice recognition transcription.

## 2020-12-28 ENCOUNTER — HOSPITAL ENCOUNTER (OUTPATIENT)
Dept: NEUROLOGY | Age: 60
Discharge: HOME | End: 2020-12-28
Payer: MEDICAID

## 2020-12-28 PROCEDURE — 95910 NRV CNDJ TEST 7-8 STUDIES: CPT

## 2020-12-28 PROCEDURE — 95886 MUSC TEST DONE W/N TEST COMP: CPT

## 2020-12-28 NOTE — PROCEDURES
The above EMG needle exam was within normal limits. Nerve conduction studies demonstrate prolongation of both median sensory and motor distal latencies. Overall Impression: Bilateral carpal tunnel syndrome, moderate severity. No evidence of an acute radiculopathy or other entrapment neuropathy. Thank you. Electronically signed by:  Elli Mathis DO,12/28/2020,11:29 AM

## 2021-01-18 ENCOUNTER — OFFICE VISIT (OUTPATIENT)
Dept: ORTHOPEDIC SURGERY | Age: 61
End: 2021-01-18
Payer: MEDICAID

## 2021-01-18 VITALS — HEIGHT: 69 IN | WEIGHT: 160 LBS | BODY MASS INDEX: 23.7 KG/M2

## 2021-01-18 DIAGNOSIS — M77.12 LATERAL EPICONDYLITIS OF LEFT ELBOW: ICD-10-CM

## 2021-01-18 DIAGNOSIS — G56.03 BILATERAL CARPAL TUNNEL SYNDROME: Primary | ICD-10-CM

## 2021-01-18 PROCEDURE — G8420 CALC BMI NORM PARAMETERS: HCPCS | Performed by: ORTHOPAEDIC SURGERY

## 2021-01-18 PROCEDURE — G8484 FLU IMMUNIZE NO ADMIN: HCPCS | Performed by: ORTHOPAEDIC SURGERY

## 2021-01-18 PROCEDURE — 1036F TOBACCO NON-USER: CPT | Performed by: ORTHOPAEDIC SURGERY

## 2021-01-18 PROCEDURE — G8428 CUR MEDS NOT DOCUMENT: HCPCS | Performed by: ORTHOPAEDIC SURGERY

## 2021-01-18 PROCEDURE — 99213 OFFICE O/P EST LOW 20 MIN: CPT | Performed by: ORTHOPAEDIC SURGERY

## 2021-01-18 PROCEDURE — 3017F COLORECTAL CA SCREEN DOC REV: CPT | Performed by: ORTHOPAEDIC SURGERY

## 2021-01-18 NOTE — PROGRESS NOTES
Chief Complaint   Patient presents with    Follow-up     TR EMG BUE: ELBOW INJECTION DONE TO LEFT ELBOW LAST OV DID NOT HELP THAT MUCH       HISTORY OF PRESENT ILLNESS:  Orvil Cogan is a 64 y.o.  patient here for repeat evaluation after undergoing EMG testing for suspected carpal tunnel syndrome, in addition he follows up for lateral left elbow pain consistent with lateral epicondylitis. Patient states that the cortisone injection helped temporarily, for a few days and then wore off. ROS:  ROS neg     Past medical history, medications, and allergies are reviewed again today. There are no changes to report. PHYSICAL EXAMINATION:  Patient is alert and pleasant, in no acute distress. The affected extremity is examined once again today. Left elbow reveals no injection site change. Tenderness over the lateral condyle just distal to it. Full elbow motion without instability. Hands reveal no atrophy or clawing with a positive Tinel and positive Phalen over both carpal tunnels    X-rays:  None needed today. EMG of the Bilateral Upper Extremity is reviewed, impression: Moderate severity bilaterally    IMPRESSION AND PLAN:  Bilateral carpal tunnel syndrome, left elbow lateral epicondylitis    EMG discussed with the patient today and diagnosis reviewed. We discussed conservative and surgical intervention options, including, but not limited to, activity modifications, oral NSAIDs, splinting, therapy, and cortisone. After a detailed discussion today, the patient would like to proceed with MRI of the left elbow to rule out lateral tendon tear or other internal derangement. Follow-up after MRI to discuss results and treatment options. We may consider carpal tunnel release, he would like to consider left carpal tunnel release in addressing the left elbow surgically if indeed indicated. This will be discussed further after MRI results. All questions and concerns were addressed today.   Patient is in agreement with the plan.         Mechelle Taylor MD  Hand & Upper Extremity Surgery  [address] partner of [facility])

## 2021-01-25 ENCOUNTER — HOSPITAL ENCOUNTER (OUTPATIENT)
Age: 61
Setting detail: OBSERVATION
Discharge: ANOTHER HOSPITAL | End: 2021-01-27
Attending: EMERGENCY MEDICINE | Admitting: INTERNAL MEDICINE
Payer: MEDICAID

## 2021-01-25 ENCOUNTER — TELEPHONE (OUTPATIENT)
Dept: ORTHOPEDIC SURGERY | Age: 61
End: 2021-01-25

## 2021-01-25 ENCOUNTER — APPOINTMENT (OUTPATIENT)
Dept: GENERAL RADIOLOGY | Age: 61
End: 2021-01-25
Payer: MEDICAID

## 2021-01-25 ENCOUNTER — APPOINTMENT (OUTPATIENT)
Dept: CT IMAGING | Age: 61
End: 2021-01-25
Payer: MEDICAID

## 2021-01-25 DIAGNOSIS — R07.9 CHEST PAIN, UNSPECIFIED TYPE: Primary | ICD-10-CM

## 2021-01-25 DIAGNOSIS — J96.21 ACUTE ON CHRONIC RESPIRATORY FAILURE WITH HYPOXIA (HCC): ICD-10-CM

## 2021-01-25 LAB
A/G RATIO: 1.3 (ref 1.1–2.2)
ALBUMIN SERPL-MCNC: 3.9 G/DL (ref 3.4–5)
ALP BLD-CCNC: 90 U/L (ref 40–129)
ALT SERPL-CCNC: 9 U/L (ref 10–40)
ANION GAP SERPL CALCULATED.3IONS-SCNC: 7 MMOL/L (ref 3–16)
APTT: 32.9 SEC (ref 24.2–36.2)
AST SERPL-CCNC: 12 U/L (ref 15–37)
BASOPHILS ABSOLUTE: 0 K/UL (ref 0–0.2)
BASOPHILS RELATIVE PERCENT: 0.5 %
BILIRUB SERPL-MCNC: <0.2 MG/DL (ref 0–1)
BUN BLDV-MCNC: 13 MG/DL (ref 7–20)
CALCIUM SERPL-MCNC: 8.9 MG/DL (ref 8.3–10.6)
CHLORIDE BLD-SCNC: 103 MMOL/L (ref 99–110)
CO2: 32 MMOL/L (ref 21–32)
CREAT SERPL-MCNC: 0.8 MG/DL (ref 0.9–1.3)
D DIMER: 416 NG/ML DDU (ref 0–229)
EKG ATRIAL RATE: 72 BPM
EKG DIAGNOSIS: NORMAL
EKG P AXIS: 81 DEGREES
EKG P-R INTERVAL: 124 MS
EKG Q-T INTERVAL: 398 MS
EKG QRS DURATION: 80 MS
EKG QTC CALCULATION (BAZETT): 435 MS
EKG R AXIS: 78 DEGREES
EKG T AXIS: 78 DEGREES
EKG VENTRICULAR RATE: 72 BPM
EOSINOPHILS ABSOLUTE: 0.3 K/UL (ref 0–0.6)
EOSINOPHILS RELATIVE PERCENT: 6.3 %
GFR AFRICAN AMERICAN: >60
GFR NON-AFRICAN AMERICAN: >60
GLOBULIN: 2.9 G/DL
GLUCOSE BLD-MCNC: 131 MG/DL (ref 70–99)
GLUCOSE BLD-MCNC: 71 MG/DL (ref 70–99)
HCT VFR BLD CALC: 37 % (ref 40.5–52.5)
HEMOGLOBIN: 11.8 G/DL (ref 13.5–17.5)
INR BLD: 0.98 (ref 0.86–1.14)
LYMPHOCYTES ABSOLUTE: 1.7 K/UL (ref 1–5.1)
LYMPHOCYTES RELATIVE PERCENT: 32.9 %
MCH RBC QN AUTO: 27.1 PG (ref 26–34)
MCHC RBC AUTO-ENTMCNC: 31.8 G/DL (ref 31–36)
MCV RBC AUTO: 85.5 FL (ref 80–100)
MONOCYTES ABSOLUTE: 0.6 K/UL (ref 0–1.3)
MONOCYTES RELATIVE PERCENT: 11.1 %
NEUTROPHILS ABSOLUTE: 2.5 K/UL (ref 1.7–7.7)
NEUTROPHILS RELATIVE PERCENT: 49.2 %
PDW BLD-RTO: 17.5 % (ref 12.4–15.4)
PERFORMED ON: ABNORMAL
PLATELET # BLD: 183 K/UL (ref 135–450)
PMV BLD AUTO: 7.6 FL (ref 5–10.5)
POTASSIUM REFLEX MAGNESIUM: 4.4 MMOL/L (ref 3.5–5.1)
PRO-BNP: 116 PG/ML (ref 0–124)
PROTHROMBIN TIME: 11.4 SEC (ref 10–13.2)
RBC # BLD: 4.33 M/UL (ref 4.2–5.9)
SARS-COV-2, NAAT: NOT DETECTED
SODIUM BLD-SCNC: 142 MMOL/L (ref 136–145)
TOTAL PROTEIN: 6.8 G/DL (ref 6.4–8.2)
TROPONIN: <0.01 NG/ML
TROPONIN: <0.01 NG/ML
WBC # BLD: 5.1 K/UL (ref 4–11)

## 2021-01-25 PROCEDURE — 2580000003 HC RX 258: Performed by: INTERNAL MEDICINE

## 2021-01-25 PROCEDURE — 71045 X-RAY EXAM CHEST 1 VIEW: CPT

## 2021-01-25 PROCEDURE — 93010 ELECTROCARDIOGRAM REPORT: CPT | Performed by: INTERNAL MEDICINE

## 2021-01-25 PROCEDURE — 2700000000 HC OXYGEN THERAPY PER DAY

## 2021-01-25 PROCEDURE — 94761 N-INVAS EAR/PLS OXIMETRY MLT: CPT

## 2021-01-25 PROCEDURE — 84484 ASSAY OF TROPONIN QUANT: CPT

## 2021-01-25 PROCEDURE — 6360000004 HC RX CONTRAST MEDICATION: Performed by: EMERGENCY MEDICINE

## 2021-01-25 PROCEDURE — 93005 ELECTROCARDIOGRAM TRACING: CPT | Performed by: EMERGENCY MEDICINE

## 2021-01-25 PROCEDURE — G0378 HOSPITAL OBSERVATION PER HR: HCPCS

## 2021-01-25 PROCEDURE — 6370000000 HC RX 637 (ALT 250 FOR IP): Performed by: INTERNAL MEDICINE

## 2021-01-25 PROCEDURE — 83880 ASSAY OF NATRIURETIC PEPTIDE: CPT

## 2021-01-25 PROCEDURE — 85610 PROTHROMBIN TIME: CPT

## 2021-01-25 PROCEDURE — 36415 COLL VENOUS BLD VENIPUNCTURE: CPT

## 2021-01-25 PROCEDURE — 85730 THROMBOPLASTIN TIME PARTIAL: CPT

## 2021-01-25 PROCEDURE — 71260 CT THORAX DX C+: CPT

## 2021-01-25 PROCEDURE — U0002 COVID-19 LAB TEST NON-CDC: HCPCS

## 2021-01-25 PROCEDURE — 80053 COMPREHEN METABOLIC PANEL: CPT

## 2021-01-25 PROCEDURE — 85025 COMPLETE CBC W/AUTO DIFF WBC: CPT

## 2021-01-25 PROCEDURE — 99284 EMERGENCY DEPT VISIT MOD MDM: CPT

## 2021-01-25 PROCEDURE — 85379 FIBRIN DEGRADATION QUANT: CPT

## 2021-01-25 RX ORDER — ALBUTEROL SULFATE 2.5 MG/3ML
2.5 SOLUTION RESPIRATORY (INHALATION) EVERY 4 HOURS PRN
Status: DISCONTINUED | OUTPATIENT
Start: 2021-01-25 | End: 2021-01-27 | Stop reason: HOSPADM

## 2021-01-25 RX ORDER — BUDESONIDE AND FORMOTEROL FUMARATE DIHYDRATE 80; 4.5 UG/1; UG/1
2 AEROSOL RESPIRATORY (INHALATION) 2 TIMES DAILY
Status: DISCONTINUED | OUTPATIENT
Start: 2021-01-25 | End: 2021-01-27 | Stop reason: HOSPADM

## 2021-01-25 RX ORDER — PROMETHAZINE HYDROCHLORIDE 25 MG/1
12.5 TABLET ORAL EVERY 6 HOURS PRN
Status: DISCONTINUED | OUTPATIENT
Start: 2021-01-25 | End: 2021-01-27 | Stop reason: HOSPADM

## 2021-01-25 RX ORDER — CYCLOBENZAPRINE HCL 10 MG
10 TABLET ORAL 3 TIMES DAILY PRN
Status: DISCONTINUED | OUTPATIENT
Start: 2021-01-25 | End: 2021-01-27 | Stop reason: HOSPADM

## 2021-01-25 RX ORDER — DIVALPROEX SODIUM 500 MG/1
TABLET, DELAYED RELEASE ORAL
COMMUNITY
Start: 2021-01-18

## 2021-01-25 RX ORDER — FLUTICASONE PROPIONATE 50 MCG
1 SPRAY, SUSPENSION (ML) NASAL DAILY
Status: DISCONTINUED | OUTPATIENT
Start: 2021-01-26 | End: 2021-01-27 | Stop reason: HOSPADM

## 2021-01-25 RX ORDER — PANTOPRAZOLE SODIUM 40 MG/1
40 TABLET, DELAYED RELEASE ORAL
Status: DISCONTINUED | OUTPATIENT
Start: 2021-01-26 | End: 2021-01-27 | Stop reason: HOSPADM

## 2021-01-25 RX ORDER — ONDANSETRON 2 MG/ML
4 INJECTION INTRAMUSCULAR; INTRAVENOUS EVERY 6 HOURS PRN
Status: DISCONTINUED | OUTPATIENT
Start: 2021-01-25 | End: 2021-01-27 | Stop reason: HOSPADM

## 2021-01-25 RX ORDER — ACETAMINOPHEN 650 MG/1
650 SUPPOSITORY RECTAL EVERY 6 HOURS PRN
Status: DISCONTINUED | OUTPATIENT
Start: 2021-01-25 | End: 2021-01-27 | Stop reason: HOSPADM

## 2021-01-25 RX ORDER — MONTELUKAST SODIUM 10 MG/1
10 TABLET ORAL NIGHTLY
Status: DISCONTINUED | OUTPATIENT
Start: 2021-01-25 | End: 2021-01-27 | Stop reason: HOSPADM

## 2021-01-25 RX ORDER — SODIUM CHLORIDE 0.9 % (FLUSH) 0.9 %
10 SYRINGE (ML) INJECTION EVERY 12 HOURS SCHEDULED
Status: DISCONTINUED | OUTPATIENT
Start: 2021-01-25 | End: 2021-01-27 | Stop reason: HOSPADM

## 2021-01-25 RX ORDER — GABAPENTIN 400 MG/1
400 CAPSULE ORAL 3 TIMES DAILY
Status: DISCONTINUED | OUTPATIENT
Start: 2021-01-25 | End: 2021-01-27 | Stop reason: HOSPADM

## 2021-01-25 RX ORDER — ATORVASTATIN CALCIUM 40 MG/1
40 TABLET, FILM COATED ORAL NIGHTLY
Status: DISCONTINUED | OUTPATIENT
Start: 2021-01-25 | End: 2021-01-25 | Stop reason: ALTCHOICE

## 2021-01-25 RX ORDER — ASPIRIN 81 MG/1
81 TABLET, CHEWABLE ORAL DAILY
Status: DISCONTINUED | OUTPATIENT
Start: 2021-01-26 | End: 2021-01-27 | Stop reason: HOSPADM

## 2021-01-25 RX ORDER — QUETIAPINE FUMARATE 300 MG/1
TABLET, FILM COATED ORAL
COMMUNITY
Start: 2021-01-19

## 2021-01-25 RX ORDER — TAMSULOSIN HYDROCHLORIDE 0.4 MG/1
0.4 CAPSULE ORAL DAILY
Status: DISCONTINUED | OUTPATIENT
Start: 2021-01-26 | End: 2021-01-27 | Stop reason: HOSPADM

## 2021-01-25 RX ORDER — CLOPIDOGREL BISULFATE 75 MG/1
75 TABLET ORAL DAILY
Status: DISCONTINUED | OUTPATIENT
Start: 2021-01-26 | End: 2021-01-27 | Stop reason: HOSPADM

## 2021-01-25 RX ORDER — ALBUTEROL SULFATE 90 UG/1
1 AEROSOL, METERED RESPIRATORY (INHALATION) EVERY 6 HOURS PRN
Status: DISCONTINUED | OUTPATIENT
Start: 2021-01-25 | End: 2021-01-25

## 2021-01-25 RX ORDER — DIVALPROEX SODIUM 500 MG/1
500 TABLET, DELAYED RELEASE ORAL EVERY 12 HOURS SCHEDULED
Status: DISCONTINUED | OUTPATIENT
Start: 2021-01-25 | End: 2021-01-27 | Stop reason: HOSPADM

## 2021-01-25 RX ORDER — POLYETHYLENE GLYCOL 3350 17 G/17G
17 POWDER, FOR SOLUTION ORAL DAILY PRN
Status: DISCONTINUED | OUTPATIENT
Start: 2021-01-25 | End: 2021-01-27 | Stop reason: HOSPADM

## 2021-01-25 RX ORDER — SODIUM CHLORIDE 0.9 % (FLUSH) 0.9 %
10 SYRINGE (ML) INJECTION PRN
Status: DISCONTINUED | OUTPATIENT
Start: 2021-01-25 | End: 2021-01-27 | Stop reason: HOSPADM

## 2021-01-25 RX ORDER — ACETAMINOPHEN 325 MG/1
650 TABLET ORAL EVERY 6 HOURS PRN
Status: DISCONTINUED | OUTPATIENT
Start: 2021-01-25 | End: 2021-01-27 | Stop reason: HOSPADM

## 2021-01-25 RX ORDER — ATORVASTATIN CALCIUM 10 MG/1
20 TABLET, FILM COATED ORAL NIGHTLY
Status: DISCONTINUED | OUTPATIENT
Start: 2021-01-25 | End: 2021-01-27 | Stop reason: HOSPADM

## 2021-01-25 RX ADMIN — QUETIAPINE FUMARATE 300 MG: 200 TABLET ORAL at 22:53

## 2021-01-25 RX ADMIN — DIVALPROEX SODIUM 500 MG: 500 TABLET, DELAYED RELEASE ORAL at 22:53

## 2021-01-25 RX ADMIN — MONTELUKAST SODIUM 10 MG: 10 TABLET, COATED ORAL at 22:53

## 2021-01-25 RX ADMIN — Medication 10 ML: at 22:54

## 2021-01-25 RX ADMIN — ATORVASTATIN CALCIUM 20 MG: 10 TABLET, FILM COATED ORAL at 22:53

## 2021-01-25 RX ADMIN — IOPAMIDOL 75 ML: 755 INJECTION, SOLUTION INTRAVENOUS at 18:04

## 2021-01-25 RX ADMIN — GABAPENTIN 400 MG: 400 CAPSULE ORAL at 22:53

## 2021-01-25 RX ADMIN — CYCLOBENZAPRINE 10 MG: 10 TABLET, FILM COATED ORAL at 22:53

## 2021-01-25 ASSESSMENT — PAIN DESCRIPTION - LOCATION: LOCATION: CHEST

## 2021-01-25 ASSESSMENT — PAIN DESCRIPTION - DESCRIPTORS: DESCRIPTORS: PRESSURE

## 2021-01-25 ASSESSMENT — PAIN DESCRIPTION - PAIN TYPE: TYPE: ACUTE PAIN

## 2021-01-25 ASSESSMENT — PAIN SCALES - GENERAL: PAINLEVEL_OUTOF10: 9

## 2021-01-25 NOTE — ED PROVIDER NOTES
[facility] [facility]      Pt Name: Danette Matamoros  MRN: [id number]  [place] 1960  Date of evaluation: 1/25/2021  Provider: Val Gilliam MD    CHIEF COMPLAINT       Chief Complaint   Patient presents with    Chest Pain     dizziness, cp, and sob. 18g in [address] [facility] [address] per EMS. pt put on a nitro patch at home. BS 84. c/o generalized pain. HISTORY OF PRESENT ILLNESS   (Location/Symptom, Timing/Onset, Context/Setting, Quality, Duration, Modifying Factors, Severity)  Note limiting factors. Danette Matamoros is a 64 y.o. male with past medical history of COPD on intermittent home oxygen, hypertension, hyperlipidemia, diabetes, presenting with chest pain. Patient states for last 1 to 2 days he has been having a substernal pressure-like chest pain with shortness of breath and mild cough. States he feels somewhat dizzy. He denies any fevers but has had some chills. No lower extremity swelling, redness or pain. No obvious alleviating or aggravating factors. Because of his shortness of breath he has been using his home oxygen more frequently than usual.  No known sick contacts. Pain is mild. No obvious alleviating factors    HPI    Nursing Notes were reviewed. REVIEW OF SYSTEMS    (2-9 systems for level 4, 10 or more for level 5)     Review of Systems    Please see HPI for pertinent positive and negative review of system findings. A full 10 system ROS was performed and otherwise negative.         PAST MEDICAL HISTORY     Past Medical History:   Diagnosis Date    Anxiety     Arterial stent thrombosis ([address]     Asthma     COPD (chronic obstructive pulmonary disease) ([address]     Diabetes mellitus ([address]     Hyperlipidemia     Pneumonia          SURGICAL HISTORY       Past Surgical History:   Procedure Laterality Date    CARDIAC SURGERY      stent placed    CARPAL TUNNEL RELEASE      CHOLECYSTECTOMY, LAPAROSCOPIC      KNEE ARTHROPLASTY      R knee x4    NASAL SINUS SURGERY      UPPER GASTROINTESTINAL ENDOSCOPY  7/14/11    UPPER GASTROINTESTINAL ENDOSCOPY N/A 4/4/2019    EGD BIOPSY performed by Alexandra Lindsay DO at [address]       Previous Medications    ALBUTEROL (PROVENTIL HFA;VENTOLIN HFA) 108 (90 BASE) MCG/ACT INHALER    Inhale 1 puff into the lungs every 6 hours as needed. ASPIRIN 81 MG TABLET    Take 81 mg by mouth daily    CETIRIZINE (ZYRTEC) 10 MG TABLET    Take 10 mg by mouth daily    CLOPIDOGREL (PLAVIX) 75 MG TABLET    Take 75 mg by mouth daily    CYCLOBENZAPRINE (FLEXERIL) 10 MG TABLET    Take 10 mg by mouth 3 times daily as needed. DICYCLOMINE (BENTYL) 10 MG CAPSULE    Take 1 capsule by mouth 4 times daily as needed (abdominal pain/cramping)    DIVALPROEX (DEPAKOTE) 500 MG DR TABLET        FLUTICASONE (FLONASE) 50 MCG/ACT NASAL SPRAY    1 spray by Nasal route daily. Indications: EACH NOSTRIL    FLUTICASONE-VILANTEROL (BREO ELLIPTA) 100-25 MCG/INH AEPB INHALER    Inhale 1 puff into the lungs daily    GABAPENTIN (NEURONTIN) 300 MG CAPSULE    Take 400 mg by mouth 3 times daily . HYDROXYZINE (VISTARIL) 25 MG CAPSULE    Take 25 mg by mouth 3 times daily as needed for Itching    MONTELUKAST (SINGULAIR) 10 MG TABLET    Take 10 mg by mouth nightly.     MULTIPLE VITAMIN (DAILY-CRITSINE PO)    Take 1 tablet by mouth daily    NICOTINE POLACRILEX (COMMIT) 4 MG LOZENGE    Take 1 lozenge by mouth as needed for Smoking cessation    NICOTINE POLACRILEX (NICORETTE) 2 MG LOZENGE    Take 1 lozenge by mouth as needed for Smoking cessation    NITROGLYCERIN (NITRODUR) 0.2 MG/HR    Place 1 patch onto the skin daily    OMEPRAZOLE (PRILOSEC) 20 MG CAPSULE    Take 40 mg by mouth 2 times daily     PANTOPRAZOLE SODIUM (PROTONIX) 40 MG PACK PACKET    Take 40 mg by mouth 2 times daily (before meals)     QUETIAPINE (SEROQUEL) 300 MG TABLET        SIMVASTATIN (ZOCOR) 40 MG TABLET    Take 40 mg by mouth nightly    TAMSULOSIN (FLOMAX) 0.4 MG CAPSULE    Take 0.4 mg by mouth daily       ALLERGIES     Methylphenidate, Oxycodone-acetaminophen, Propoxyphene, and Ritalin [methylphenidate hcl]    FAMILY HISTORY       Family History   Problem Relation Age of Onset    High Blood Pressure Mother     Heart Disease Mother     High Blood Pressure Father     Heart Disease Father     Cancer Sister     Diabetes Sister     Other Sister         aneursym-brain          SOCIAL HISTORY       Social History     Socioeconomic History    Marital status:       Spouse name: None    Number of children: None    Years of education: None    Highest education level: None   Occupational History    None   Social Needs    Financial resource strain: None    Food insecurity     Worry: None     Inability: None    Transportation needs     Medical: None     Non-medical: None   Tobacco Use    Smoking status: Former Smoker     Packs/day: 0.25     Years: 25.00     Pack years: 6.25     Quit date: 2019     Years since quittin.9    Smokeless tobacco: Former User     Quit date: 10/3/2015   Substance and Sexual Activity    Alcohol use: No     Alcohol/week: 0.0 standard drinks    Drug use: No    Sexual activity: Yes     Partners: Female     Comment: smoke 2 cigaretees a day   Lifestyle    Physical activity     Days per week: None     Minutes per session: None    Stress: None   Relationships    Social connections     Talks on phone: None     Gets together: None     Attends Sikh service: None     Active member of club or organization: None     Attends meetings of clubs or organizations: None     Relationship status: None    Intimate partner violence     Fear of current or ex partner: None     Emotionally abused: None     Physically abused: None     Forced sexual activity: None   Other Topics Concern    None   Social History Narrative    None       SCREENINGS               PHYSICAL EXAM    (up to 7 for level 4, 8 or more for level 5)     ED Triage Vitals BP Temp Temp Source Pulse Resp SpO2 Height Weight   01/25/21 1642 01/25/21 1647 01/25/21 1647 01/25/21 1642 01/25/21 1642 01/25/21 1642 01/25/21 1642 01/25/21 1642   (!) 144/94 98.4 °F (36.9 °C) Oral 71 18 100 % 5' 8\" (1.727 m) 174 lb (78.9 kg)       Physical Exam    General appearance:  Cooperative. No acute distress. Skin:  Warm. Dry. Eye:  Extraocular movements intact. Ears, nose, mouth and throat:  Oral mucosa moist,  Neck:  Trachea midline. Heart:  Regular rate and rhythm  Perfusion:  intact  Respiratory:  Lungs clear to auscultation bilaterally. Respirations nonlabored. Abdominal:   Non distended. Nontender  Neurological:  Alert and oriented x 3. Moves all extremities spontaneously  Musculoskeletal:   Normal ROM, no deformities          Psychiatric:  Normal mood      DIAGNOSTIC RESULTS       Labs Reviewed   CBC WITH AUTO DIFFERENTIAL - Abnormal; Notable for the following components:       Result Value    Hemoglobin 11.8 (*)     Hematocrit 37.0 (*)     RDW 17.5 (*)     All other components within normal limits    Narrative:     Performed at:  [facility]. [facility]  [address] [place] [address]. [facility], [address] [place] [address]   Phone [phone]   COMPREHENSIVE METABOLIC PANEL W/ REFLEX TO MG FOR LOW K - Abnormal; Notable for the following components:    CREATININE 0.8 (*)     ALT 9 (*)     AST 12 (*)     All other components within normal limits    Narrative:     Performed at:  [facility]. [facility]  [address] [place] [address]. [facility], [address] [place] [address]   Phone [phone]   D-DIMER, QUANTITATIVE - Abnormal; Notable for the following components:    D-Dimer, Quant 416 (*)     All other components within normal limits    Narrative:     Performed at:  [facility]. [facility]  [address] [place] [address]. [facility], [facility]    Phone [phone]   TROPONIN    Narrative:     Performed at:  [facility].  [facility]  [address] [place] [address] [place]. [employer]   Phone [phone]    Narrative:     Performed at:  [facility]. [facility]  [address] [place] [address] [place]. [employer]   Phone [phone]    Narrative:     Performed at:  [facility]. [facility]  [address] [place] [address] [place]. [employer]   Phone [phone]   APTT    Narrative:     Performed at:  [facility]. [facility]  [address] [place] [address]. [employer]   Phone [phone]       Interpretation per the Radiologist below, if obtained/available at the time of this note:    CT CHEST PULMONARY EMBOLISM W CONTRAST   Final Result   No evidence of pulmonary embolism or acute pulmonary abnormality. Mildly dilated and atherosclerotic thoracic aorta with no aneurysm or   dissection      Calcified granulomas scattered in the mediastinum with no mediastinal mass or   adenopathy         XR CHEST PORTABLE   Final Result   No acute process. All other labs/imaging were within normal range or not returned as of this dictation. EMERGENCY DEPARTMENT COURSE and DIFFERENTIAL DIAGNOSIS/MDM:   Vitals:    Vitals:    01/25/21 1642 01/25/21 1647   BP: (!) 144/94    Pulse: 71    Resp: 18    Temp:  98.4 °F (36.9 °C)   TempSrc:  Oral   SpO2: 100%    Weight: 174 lb (78.9 kg)    Height: 5' 8\" (1.727 m)        EKG: Sinus rhythm rate of 72 bpm.  No ST elevation or arrhythmia. Patient presents the emergency department today complaining of chest pain some shortness of breath and dizziness. Ultimately found to be satting 88 to 90% on room air requiring 2 L nasal cannula. He has oxygen at home but usually only has to wear it at night. EKG was nondiagnostic. Troponin negative.   D-dimer was elevated prompting CT scan that shows cardiovascular disease and some granulomatous disease but no evidence of pulmonary embolism. Patient continues to have a mild chest pressure. Otherwise resting comfortably. Was given an aspirin by EMS. Will require admission to the hospital for further cardiovascular work-up and evaluation    MDM    CONSULTS     IP CONSULT TO HOSPITALIST    Critical Care:     CRITICAL CARE TIME   Total Critical Care time was 30 minutes, excluding separately reportable procedures. There was a high probability of clinically significant/life threatening deterioration in the patient's condition which required my urgent intervention. This time was spent reviewing the patient chart, interpreting diagnostic/laboratory data, administration of supplemental oxygen for hypoxic respiratory failure      REASSESSMENT          PROCEDURE     Unless otherwise noted below, none     Procedures      FINAL IMPRESSION      1. Chest pain, unspecified type    2. Acute on chronic respiratory failure with hypoxia [facility])            DISPOSITION/PLAN   DISPOSITION Decision To Admit 01/25/2021 06:40:08 PM        PATIENT REFERRED TO:  No follow-up provider specified. DISCHARGE MEDICATIONS:  New Prescriptions    No medications on file     Controlled Substances Monitoring:     No flowsheet data found.     (Please note that portions of this note were completed with a voice recognition program.  Efforts were made to edit the dictations but occasionally words are mis-transcribed.)    Yousuf James MD (electronically signed)  Attending Emergency Physician            En Carlos MD  01/25/21 [address] Yasmine Cuevas MD  01/25/21 8302

## 2021-01-26 ENCOUNTER — APPOINTMENT (OUTPATIENT)
Dept: NUCLEAR MEDICINE | Age: 61
End: 2021-01-26
Payer: MEDICAID

## 2021-01-26 LAB
ANION GAP SERPL CALCULATED.3IONS-SCNC: 7 MMOL/L (ref 3–16)
BUN BLDV-MCNC: 16 MG/DL (ref 7–20)
CALCIUM SERPL-MCNC: 9.1 MG/DL (ref 8.3–10.6)
CHLORIDE BLD-SCNC: 104 MMOL/L (ref 99–110)
CHOLESTEROL, TOTAL: 179 MG/DL (ref 0–199)
CO2: 32 MMOL/L (ref 21–32)
CREAT SERPL-MCNC: 0.9 MG/DL (ref 0.9–1.3)
EKG ATRIAL RATE: 71 BPM
EKG DIAGNOSIS: NORMAL
EKG P AXIS: 76 DEGREES
EKG P-R INTERVAL: 132 MS
EKG Q-T INTERVAL: 382 MS
EKG QRS DURATION: 84 MS
EKG QTC CALCULATION (BAZETT): 415 MS
EKG R AXIS: 70 DEGREES
EKG T AXIS: 72 DEGREES
EKG VENTRICULAR RATE: 71 BPM
GFR AFRICAN AMERICAN: >60
GFR NON-AFRICAN AMERICAN: >60
GLUCOSE BLD-MCNC: 102 MG/DL (ref 70–99)
GLUCOSE BLD-MCNC: 116 MG/DL (ref 70–99)
GLUCOSE BLD-MCNC: 159 MG/DL (ref 70–99)
GLUCOSE BLD-MCNC: 64 MG/DL (ref 70–99)
GLUCOSE BLD-MCNC: 74 MG/DL (ref 70–99)
GLUCOSE BLD-MCNC: 80 MG/DL (ref 70–99)
HCT VFR BLD CALC: 35.8 % (ref 40.5–52.5)
HDLC SERPL-MCNC: 48 MG/DL (ref 40–60)
HEMOGLOBIN: 11.6 G/DL (ref 13.5–17.5)
LDL CHOLESTEROL CALCULATED: 114 MG/DL
LV EF: 60 %
LVEF MODALITY: NORMAL
MCH RBC QN AUTO: 27.5 PG (ref 26–34)
MCHC RBC AUTO-ENTMCNC: 32.3 G/DL (ref 31–36)
MCV RBC AUTO: 85.2 FL (ref 80–100)
PDW BLD-RTO: 17.3 % (ref 12.4–15.4)
PERFORMED ON: ABNORMAL
PERFORMED ON: NORMAL
PLATELET # BLD: 175 K/UL (ref 135–450)
PMV BLD AUTO: 7.6 FL (ref 5–10.5)
POTASSIUM REFLEX MAGNESIUM: 4.4 MMOL/L (ref 3.5–5.1)
RBC # BLD: 4.21 M/UL (ref 4.2–5.9)
SODIUM BLD-SCNC: 143 MMOL/L (ref 136–145)
TRIGL SERPL-MCNC: 87 MG/DL (ref 0–150)
TROPONIN: <0.01 NG/ML
VLDLC SERPL CALC-MCNC: 17 MG/DL
WBC # BLD: 4.2 K/UL (ref 4–11)

## 2021-01-26 PROCEDURE — 6360000002 HC RX W HCPCS: Performed by: INTERNAL MEDICINE

## 2021-01-26 PROCEDURE — A9502 TC99M TETROFOSMIN: HCPCS | Performed by: INTERNAL MEDICINE

## 2021-01-26 PROCEDURE — 2580000003 HC RX 258: Performed by: INTERNAL MEDICINE

## 2021-01-26 PROCEDURE — 80048 BASIC METABOLIC PNL TOTAL CA: CPT

## 2021-01-26 PROCEDURE — 3430000000 HC RX DIAGNOSTIC RADIOPHARMACEUTICAL: Performed by: INTERNAL MEDICINE

## 2021-01-26 PROCEDURE — 80061 LIPID PANEL: CPT

## 2021-01-26 PROCEDURE — G0378 HOSPITAL OBSERVATION PER HR: HCPCS

## 2021-01-26 PROCEDURE — 93005 ELECTROCARDIOGRAM TRACING: CPT | Performed by: INTERNAL MEDICINE

## 2021-01-26 PROCEDURE — 93010 ELECTROCARDIOGRAM REPORT: CPT | Performed by: INTERNAL MEDICINE

## 2021-01-26 PROCEDURE — 85027 COMPLETE CBC AUTOMATED: CPT

## 2021-01-26 PROCEDURE — 93017 CV STRESS TEST TRACING ONLY: CPT

## 2021-01-26 PROCEDURE — 84484 ASSAY OF TROPONIN QUANT: CPT

## 2021-01-26 PROCEDURE — 6370000000 HC RX 637 (ALT 250 FOR IP): Performed by: INTERNAL MEDICINE

## 2021-01-26 PROCEDURE — 99226 PR SBSQ OBSERVATION CARE/DAY 35 MINUTES: CPT | Performed by: INTERNAL MEDICINE

## 2021-01-26 PROCEDURE — 94640 AIRWAY INHALATION TREATMENT: CPT

## 2021-01-26 PROCEDURE — 78452 HT MUSCLE IMAGE SPECT MULT: CPT

## 2021-01-26 PROCEDURE — 36415 COLL VENOUS BLD VENIPUNCTURE: CPT

## 2021-01-26 RX ORDER — ALBUTEROL SULFATE 2.5 MG/3ML
2.5 SOLUTION RESPIRATORY (INHALATION) 3 TIMES DAILY
Status: DISCONTINUED | OUTPATIENT
Start: 2021-01-26 | End: 2021-01-27 | Stop reason: HOSPADM

## 2021-01-26 RX ADMIN — ALBUTEROL SULFATE 2.5 MG: 2.5 SOLUTION RESPIRATORY (INHALATION) at 17:00

## 2021-01-26 RX ADMIN — Medication 2 PUFF: at 17:00

## 2021-01-26 RX ADMIN — ALBUTEROL SULFATE 2.5 MG: 2.5 SOLUTION RESPIRATORY (INHALATION) at 00:21

## 2021-01-26 RX ADMIN — Medication 2 PUFF: at 07:10

## 2021-01-26 RX ADMIN — DIVALPROEX SODIUM 500 MG: 500 TABLET, DELAYED RELEASE ORAL at 20:22

## 2021-01-26 RX ADMIN — GABAPENTIN 400 MG: 400 CAPSULE ORAL at 20:23

## 2021-01-26 RX ADMIN — ASPIRIN 81 MG: 81 TABLET, CHEWABLE ORAL at 10:54

## 2021-01-26 RX ADMIN — TETROFOSMIN 11 MILLICURIE: 1.38 INJECTION, POWDER, LYOPHILIZED, FOR SOLUTION INTRAVENOUS at 07:50

## 2021-01-26 RX ADMIN — TETROFOSMIN 33.8 MILLICURIE: 1.38 INJECTION, POWDER, LYOPHILIZED, FOR SOLUTION INTRAVENOUS at 09:00

## 2021-01-26 RX ADMIN — REGADENOSON 0.4 MG: 0.08 INJECTION, SOLUTION INTRAVENOUS at 08:59

## 2021-01-26 RX ADMIN — Medication 10 ML: at 20:23

## 2021-01-26 RX ADMIN — DIVALPROEX SODIUM 500 MG: 500 TABLET, DELAYED RELEASE ORAL at 10:53

## 2021-01-26 RX ADMIN — FLUTICASONE PROPIONATE 1 SPRAY: 50 SPRAY, METERED NASAL at 10:58

## 2021-01-26 RX ADMIN — ALBUTEROL SULFATE 2.5 MG: 2.5 SOLUTION RESPIRATORY (INHALATION) at 12:21

## 2021-01-26 RX ADMIN — GABAPENTIN 400 MG: 400 CAPSULE ORAL at 15:50

## 2021-01-26 RX ADMIN — ATORVASTATIN CALCIUM 20 MG: 10 TABLET, FILM COATED ORAL at 20:23

## 2021-01-26 RX ADMIN — CLOPIDOGREL BISULFATE 75 MG: 75 TABLET ORAL at 10:54

## 2021-01-26 RX ADMIN — GABAPENTIN 400 MG: 400 CAPSULE ORAL at 10:53

## 2021-01-26 RX ADMIN — ALBUTEROL SULFATE 2.5 MG: 2.5 SOLUTION RESPIRATORY (INHALATION) at 07:10

## 2021-01-26 RX ADMIN — MONTELUKAST SODIUM 10 MG: 10 TABLET, COATED ORAL at 20:23

## 2021-01-26 RX ADMIN — TAMSULOSIN HYDROCHLORIDE 0.4 MG: 0.4 CAPSULE ORAL at 10:54

## 2021-01-26 RX ADMIN — QUETIAPINE FUMARATE 300 MG: 200 TABLET ORAL at 20:22

## 2021-01-26 NOTE — H&P
Hospital Medicine History & Physical      PCP: Efra Helton PA-C    Date of Admission: 1/25/2021    Date of Service: Pt seen/examined on 1/25/2021    Chief Complaint: Chest pain    History Of Present Illness:    64 y.o. male who presented to the hospital with a chief complaint of chest pain and dizziness that started yesterday. Patient states he woke up with sternal chest pain radiating to his left arm. He describes the pain as a pressure, not associated with nausea or vomiting. His symptoms also was associated with shortness of breath. Of note he is on 4 L of oxygen chronically at nighttime. But with this episode today he felt he needed oxygen at the time. He has not felt right the last couple of days. He has a history of COPD, diabetes type 2 and coronary artery disease. He presents to the emergency department where all initial testing was negative. He was transferred for further cardiac evaluation. Past Medical History:        Diagnosis Date    Anxiety     Arterial stent thrombosis (HCC)     Asthma     COPD (chronic obstructive pulmonary disease) ([address]     Diabetes mellitus ([address]     Hyperlipidemia     Pneumonia        Past Surgical History:        Procedure Laterality Date    CARDIAC SURGERY      stent placed    CARPAL TUNNEL RELEASE      CHOLECYSTECTOMY, LAPAROSCOPIC      KNEE ARTHROPLASTY      R knee x4    NASAL SINUS SURGERY      UPPER GASTROINTESTINAL ENDOSCOPY  7/14/11    UPPER GASTROINTESTINAL ENDOSCOPY N/A 4/4/2019    EGD BIOPSY performed by Isacc Sharpe DO at [facility]       Medications Prior to Admission:      Prior to Admission medications    Medication Sig Start Date End Date Taking?  Authorizing Provider   divalproex (DEPAKOTE) 500 MG DR tablet  1/18/21  Yes Historical Provider, MD   QUEtiapine (SEROQUEL) 300 MG tablet  1/19/21  Yes Historical Provider, MD   dicyclomine (BENTYL) 10 MG capsule Take 1 capsule by mouth 4 times daily as needed (abdominal pain/cramping) 3/25/19  Yes Chinmay Cannon MD   nicotine polacrilex (COMMIT) 4 MG lozenge Take 1 lozenge by mouth as needed for Smoking cessation 2/15/18  Yes SHANTHI Pringle - CNP   nicotine polacrilex (NICORETTE) 2 MG lozenge Take 1 lozenge by mouth as needed for Smoking cessation 11/17/17  Yes Iwona Dugan MD   Multiple Vitamin (DAILY-CRISTINE PO) Take 1 tablet by mouth daily   Yes Historical Provider, MD   clopidogrel (PLAVIX) 75 MG tablet Take 75 mg by mouth daily   Yes Historical Provider, MD   pantoprazole sodium (PROTONIX) 40 MG PACK packet Take 40 mg by mouth 2 times daily (before meals)    Yes Historical Provider, MD   nitroGLYCERIN (NITRODUR) 0.2 MG/HR Place 1 patch onto the skin daily   Yes Historical Provider, MD   fluticasone-vilanterol (BREO ELLIPTA) 100-25 MCG/INH AEPB inhaler Inhale 1 puff into the lungs daily 5/31/17  Yes Iwona Dugan MD   tamsulosin (FLOMAX) 0.4 MG capsule Take 0.4 mg by mouth daily   Yes Historical Provider, MD   simvastatin (ZOCOR) 40 MG tablet Take 40 mg by mouth nightly   Yes Historical Provider, MD   gabapentin (NEURONTIN) 300 MG capsule Take 400 mg by mouth 3 times daily . Yes Historical Provider, MD   aspirin 81 MG tablet Take 81 mg by mouth daily   Yes Historical Provider, MD   cetirizine (ZYRTEC) 10 MG tablet Take 10 mg by mouth daily   Yes Historical Provider, MD   hydrOXYzine (VISTARIL) 25 MG capsule Take 25 mg by mouth 3 times daily as needed for Itching   Yes Historical Provider, MD   albuterol (PROVENTIL HFA;VENTOLIN HFA) 108 (90 BASE) MCG/ACT inhaler Inhale 1 puff into the lungs every 6 hours as needed. Yes Historical Provider, MD   fluticasone (FLONASE) 50 MCG/ACT nasal spray 1 spray by Nasal route daily. Indications: EACH NOSTRIL   Yes Historical Provider, MD   montelukast (SINGULAIR) 10 MG tablet Take 10 mg by mouth nightly. Yes Historical Provider, MD   cyclobenzaprine (FLEXERIL) 10 MG tablet Take 10 mg by mouth 3 times daily as needed. Yes Historical Provider, MD   omeprazole (PRILOSEC) 20 MG capsule Take 40 mg by mouth 2 times daily  11/17/10  Yes Historical Provider, MD       Allergies:  Methylphenidate, Oxycodone-acetaminophen, Propoxyphene, and Ritalin [methylphenidate hcl]    Social History:    TOBACCO:   reports that he quit smoking about 1 years ago. He has a 6.25 pack-year smoking history. He quit smokeless tobacco use about 5 years ago. ETOH:   reports no history of alcohol use. Family History:        Problem Relation Age of Onset    High Blood Pressure Mother     Heart Disease Mother     High Blood Pressure Father     Heart Disease Father     Cancer Sister     Diabetes Sister     Other Sister         aneursym-brain       REVIEW OF SYSTEMS:   Constitutional:  Negative for fever,chills or night sweats  ENT:  Negative for rhinorrhea, epistaxis, hoarseness, sore throat. Respiratory: Positive for shortness of breath  Cardiovascular:    Positive for chest pain  Gastrointestinal:  Negative for nausea, vomiting, diarrhea  Genitourinary:  Negative for polyuria, dysuria   Hematologic/Lymphatic:  Negative for  bleeding tendency, easy bruising  Musculoskeletal:  Negative for myalgias and arthralgias  Neurologic:  Negative for  confusion,dysarthria. Skin:  Negative for itching,rash  Psychiatric:  Negative for depression,anxiety, agitation. Endocrine:  Negative for polydipsia,polyuria,heat /cold intolerance. PHYSICAL EXAM:  /85   Pulse 74   Temp 98.2 °F (36.8 °C) (Oral)   Resp 16   Ht 5' 8\" (1.727 m)   Wt 158 lb 14.4 oz (72.1 kg)   SpO2 99%   BMI 24.16 kg/m²   General appearance:  No apparent distress, appears stated age and cooperative. HEENT:  Normal cephalic, atraumatic without obvious deformity. Pupils equal, round, and reactive to light. Extra ocular muscles intact. Conjunctivae/corneas clear. Neck: Supple, with full range of motion. No jugular venous distention. Trachea midline.   Respiratory: Diminished with emphysema, not in exacerbation  Diabetes mellitus type 2  Peripheral arterial disease    PLAN:  Rule out ACS with serial troponins, nuclear med stress test in a.m.  Keep n.p.o. at midnight. Resume home medications  Rapid Covid test, if negative start nebulizer treatments for shortness of breath if necessary.  -Sliding scale insulin    DVT Prophylaxis: Lovenox  Diet: Diet NPO, After Midnight  DIET CARB CONTROL; No Caffeine  Diet NPO, After Midnight  Code Status: Full Code    Dispo -admit to [facility] on telemetry      Thank you for the opportunity to be involved in this patient's care.       (Please note that portions of this note were completed with a voice recognition program. Efforts were made to edit the dictations but occasionally words are mis-transcribed.)

## 2021-01-26 NOTE — PROGRESS NOTES
RESPIRATORY THERAPY ASSESSMENT    [facility] Record Number:  [id number]  Age: 64 y.o. Gender: male  : 1960  Today's Date:  2021  Room:  /0313-02    Assessment     Is the patient being admitted for a COPD or Asthma exacerbation? No   (If yes the patient will be seen every 4 hours for the first 24 hours and then reassessed)    Patient Admission Diagnosis      Allergies  Allergies   Allergen Reactions    Methylphenidate      Other reaction(s): Hyperactive behavior    Oxycodone-Acetaminophen     Propoxyphene     Ritalin [Methylphenidate Hcl] Anxiety       Minimum Predicted Vital Capacity:               Actual Vital Capacity:                    Pulmonary History: COPD, Asthma  Home Oxygen Therapy:  4 lpm QHS, PRN day  Home Respiratory Therapy: Albuterol TID, Albuerol prn,  Breo  Current Respiratory Therapy:   Albuterol prn, Symbicort BID  Treatment Type: HHN  Medications: Albuterol    Respiratory Severity Index(RSI)   Patients with orders for inhalation medications, oxygen, or any therapeutic treatment modality will be placed on Respiratory Protocol. They will be assessed with the first treatment and at least every 72 hours thereafter. The following severity scale will be used to determine frequency of treatment intervention.     Smoking History: Pulmonary Disease or Smoking History, Greater than 15 pack year = 2    Social History  Social History     Tobacco Use    Smoking status: Former Smoker     Packs/day: 0.25     Years: 25.00     Pack years: 6.25     Quit date: 2019     Years since quittin.9    Smokeless tobacco: Former User     Quit date: 10/3/2015   Substance Use Topics    Alcohol use: No     Alcohol/week: 0.0 standard drinks    Drug use: No       Recent Surgical History: None = 0  Past Surgical History  Past Surgical History:   Procedure Laterality Date    CARDIAC SURGERY      stent placed    CARPAL TUNNEL RELEASE      CHOLECYSTECTOMY, LAPAROSCOPIC beats per minute, pending direction from physician. 3. Bronchodilators will be administered via Metered Dose Inhaler (MDI) with spacer when the following criteria are met:  a. Alert and cooperative     b. HR < 140 bpm  c. RR < 30 bpm                d. Can demonstrate a 2-3 second inspiratory hold  4. Bronchodilators will be administered via Hand Held Nebulizer [facility]) to patients when ANY of the following criteria are met  a. Incognizant or uncooperative          b. Patients treated with HHN at Home        c. Unable to demonstrate proper use of MDI with spacer     d. RR > 30 bpm   5. Bronchodilators will be delivered via Metered Dose Inhaler (MDI), HHN, Aerogen to intubated patients on mechanical ventilation. 6. Inhalation medication orders will be delivered and/or substituted as outlined below. Aerosolized Medications Ordering and Administration Guidelines:    1. All Medications will be ordered by a physician, and their frequency and/or modality will be adjusted as defined by the patients Respiratory Severity Index (RSI) score. 2. If the patient does not have documented COPD, consider discontinuing anticholinergics when RSI is less than 9.  3. If the bronchospasm worsens (increased RSI), then the bronchodilator frequency can be increased to a maximum of every 4 hours. If greater than every 4 hours is required, the physician will be contacted. 4. If the bronchospasm improves, the frequency of the bronchodilator can be decreased, based on the patient's RSI, but not less than home treatment regimen frequency. 5. Bronchodilator(s) will be discontinued if patient has a RSI less than 9 and has received no scheduled or as needed treatment for 72  Hrs. Patients Ordered on a Mucolytic Agent:    1. Must always be administered with a bronchodilator.     2. Discontinue if patient experiences worsened bronchospasm, or secretions have lessened to the point that the patient is able to clear them with a cough.    Anti-inflammatory and Combination Medications:    1. If the patient lacks prior history of lung disease, is not using inhaled anti-inflammatory medication at home, and lacks wheezing by examination or by history for at least 24 hours, contact physician for possible discontinuation.

## 2021-01-26 NOTE — PROGRESS NOTES
Patient admitted to room 313-02 from [place]. Patient oriented to room, call light, bed rails, phone, lights and bathroom. Patient instructed about the schedule of the day including: vital sign frequency, lab draws, possible tests, frequency of MD and staff rounds, daily weights, I &O's and prescribed diet. Bed alarm in place, patient aware of placement and reason. Telemetry box in place, patient aware of placement and reason. Bed locked, in lowest position, side rails up 2/4, call light within reach. Recliner Assessment  Patient is not able to demonstrated the ability to move from a reclining position to an upright position within the recliner. however patient is alert, oriented and able to provide informed consent    4 Eyes Skin Assessment     The patient is being assess for   Admission    I agree that 2 RN's have performed a thorough Head to Toe Skin Assessment on the patient. ALL assessment sites listed below have been assessed. Areas assessed by both nurses:   [x]   Head, Face, and Ears   [x]   Shoulders, Back, and Chest, Abdomen  [x]   Arms, Elbows, and Hands   [x]   Coccyx, Sacrum, and Ischium  [x]   Legs, Feet, and Heels        Scattered bruising. No redness or open areas noted. **SHARE this note so that the co-signing nurse is able to place an eSignature**    Co-signer eSignature: Electronically signed by Fabián Brown RN on 1/26/21 at 12:53 AM EST    Does the Patient have Skin Breakdown?   No          [place] Prevention initiated:  NA   Wound Care Orders initiated:  NA      [facility] nurse consulted for Pressure Injury (Stage 3,4, Unstageable, DTI, NWPT, Complex wounds)and New or Established Ostomies:  NA      Primary Nurse eSignature: Electronically signed by Kierra Nath on 1/25/21 at 10:02 PM EST

## 2021-01-26 NOTE — PROGRESS NOTES
Progress Note    Admit Date:  1/25/2021    Subjective:  Mr. Janee Jesus feels well. Denies any current chest pain. Objective:   Patient Vitals for the past 4 hrs:   BP Temp Temp src Pulse Resp SpO2   01/26/21 1509 104/65 97.1 °F (36.2 °C) Axillary 79 20 93 %   01/26/21 1258 96/61 97.8 °F (36.6 °C) Oral 75 20 98 %   01/26/21 1221 -- -- -- 75 20 100 %            Intake/Output Summary (Last 24 hours) at 1/26/2021 1513  Last data filed at 1/26/2021 1310  Gross per 24 hour   Intake 10 ml   Output 500 ml   Net -490 ml       Physical Exam:  Gen: No distress. Alert. Middle aged  male, resting comfortably  Eyes: No sclera icterus. No conjunctival injection. Glasses  Neck:  Trachea midline. Resp: No accessory muscle use. No crackles. No wheezes. No rhonchi. On RA  CV: Regular rate. Regular rhythm. No murmur. No rub. No edema. GI: Soft, Non-tender. Non-distended. Normal bowel sounds. Skin: Warm and dry. No rash on exposed extremities. Neuro: Awake. Grossly nonfocal, moves all extremities, follows commands    Psych: Oriented x 3. No anxiety or agitation.        Scheduled Meds:   albuterol  2.5 mg Nebulization TID    clopidogrel  75 mg Oral Daily    divalproex  500 mg Oral 2 times per day    fluticasone  1 spray Nasal Daily    budesonide-formoterol  2 puff Inhalation BID    gabapentin  400 mg Oral TID    montelukast  10 mg Oral Nightly    pantoprazole  40 mg Oral QAM AC    QUEtiapine  300 mg Oral Nightly    atorvastatin  20 mg Oral Nightly    tamsulosin  0.4 mg Oral Daily    sodium chloride flush  10 mL Intravenous 2 times per day    enoxaparin  40 mg Subcutaneous Daily    aspirin  81 mg Oral Daily       Continuous Infusions:      PRN Meds:  cyclobenzaprine, sodium chloride flush, promethazine **OR** ondansetron, acetaminophen **OR** acetaminophen, polyethylene glycol, albuterol      Data:  CBC:   Recent Labs     01/25/21  1645 01/26/21  0559   WBC 5.1 4.2   HGB 11.8* 11.6*   HCT 37.0* 35.8* MCV 85.5 85.2    175     BMP:   Recent Labs     01/25/21  1645 01/26/21  0559    143   K 4.4 4.4    104   CO2 32 32   BUN 13 16   CREATININE 0.8* 0.9     LIVER PROFILE:   Recent Labs     01/25/21  1645   AST 12*   ALT 9*   BILITOT <0.2   ALKPHOS 90     PT/INR:   Recent Labs     01/25/21 1645   PROTIME 11.4   INR 0.98       CULTURES  none     RADIOLOGY    NM Cardiac Stress Test Nuclear Imaging   Final Result   Summary    Normal LVEF >60%    Grossly normal wall motion (cannot exclude relative hypokinesis of basal    inferior wall)    Fixed inferior defect suggestive of attenuation artifact, however, scar    cannot be excluded.    Small area of apical ischemia        Overall, this would be considered an abnormal, intermediate risk, study    CT CHEST PULMONARY EMBOLISM W CONTRAST   Final Result   No evidence of pulmonary embolism or acute pulmonary abnormality. Mildly dilated and atherosclerotic thoracic aorta with no aneurysm or   dissection      Calcified granulomas scattered in the mediastinum with no mediastinal mass or   adenopathy         XR CHEST PORTABLE   Final Result   No acute process. Luci Medina have reviewed the chart on Rey Malloy and personally interviewed and examined patient, reviewed the data (labs and imaging) and after discussion with my PA formulated the plan. Agree with note with the following edits. HPI:     Patient denies any chest pain. His stress test is positive. I reviewed the patient's Past Medical History, Past Surgical History, Medications, and Allergies. Physical exam:    /65   Pulse 81   Temp 97.1 °F (36.2 °C) (Axillary)   Resp 18   Ht 5' 8\" (1.727 m)   Wt 158 lb 3.2 oz (71.8 kg)   SpO2 94%   BMI 24.05 kg/m²     Gen: No distress. Alert. Eyes: PERRL. No sclera icterus. No conjunctival injection. ENT: No discharge. Pharynx clear. Neck: Trachea midline. Normal thyroid.    Resp: No accessory muscle use. No crackles. No wheezes. No rhonchi. No dullness on percussion. CV: Regular rate. Regular rhythm. No murmur or rub. No edema.             Assessment/Plan:    Chest Pain  - Admitted to PCU, tele  - troponins negative x 3  - checked stress test which was abnormal - small area of apical ischemia - results discussed with patient  - on ASA, Lipitor  - cardiology consult, NPO after midnight    Chronic Hypoxic RF  - on 4L  - stable    COPD  - no AE  - cont Albuterol nebs, Symbicort    DM Type II  - well controlled  - hold Metformin  - BG stable, no SSI at this time    PAD  - cont ASA, Plavix, Lipitor    GERD  - cont Protonix    RAPID COVID WAS NEGATIVE    DVT Prophylaxis: Lovenox  Diet: DIET CARB CONTROL; Carb Control: 4 carb choices (60 gms)/meal; No Caffeine  Diet NPO, After Midnight  Code Status: Full Code    Dispo: cont shira Morgan PA-C 3:18 PM 1/26/2021       HYUN HURT 1/26/2021 5:50 PM

## 2021-01-26 NOTE — ED NOTES
Pt ambulated to bathroom without difficulty. Upon return to room pt placed in gown. Shirts, pants, coat, and shoes placed into belongings bag. Pt has retained cell phone for bedside but agreeable to wallet with chains, keys, and pocket knife to be placed into bag all still in pants pockets. Pt has large cloth bag with his medications, aware not to take any of these medications at this time, placed with rest of his belongings.       Joelle Johnson RN  01/25/21 2031

## 2021-01-26 NOTE — PROGRESS NOTES
EOS report and transfer of care to Glenice Buerger, [place]. Patient resting in bed, stable condition at hand off.

## 2021-01-26 NOTE — PROGRESS NOTES
Patient states he has valuables in his belongings bag at bedside. He requests for writer to lock up this bag which contains his wallet. Bag locked in dwarer in room.

## 2021-01-26 NOTE — PROGRESS NOTES
Rapid COVID-19 test is negative. Per Dr. Eduin frazier okay to DC droplet plus precautions at this time.

## 2021-01-26 NOTE — PROGRESS NOTES
Assessment complete as per flow sheets. VSS. Patient is A/O x 4. Unlabored breathing at rest on 4L NC (wears 4L while sleeping @ home, baseline). Normal sinus rhythm on cardiac monitor. Bowel sounds + x4 quads. Patient denies pain. Comfort measures provided. Patient voids per urinal. No issues noted at this time. PIV appears WNL. Dressing is C/D/I. Discussed POC, labs, testing, medical equipment and unit routine. Answered questions at this time. Meds as per MAR. Safety measures in place and will continue to monitor.

## 2021-01-26 NOTE — PROGRESS NOTES
Patient resting in bed, NPO for stress test this AM. Lungs decreased. BS+. INT intact. No acute distress noted. Call light in reach. Will continue to monitor.

## 2021-01-27 ENCOUNTER — HOSPITAL ENCOUNTER (INPATIENT)
Dept: CARDIAC CATH/INVASIVE PROCEDURES | Age: 61
LOS: 1 days | Discharge: HOME | DRG: 175 | End: 2021-01-28
Attending: INTERNAL MEDICINE | Admitting: INTERNAL MEDICINE
Payer: MEDICAID

## 2021-01-27 VITALS
WEIGHT: 161 LBS | RESPIRATION RATE: 16 BRPM | BODY MASS INDEX: 24.4 KG/M2 | HEART RATE: 64 BPM | OXYGEN SATURATION: 98 % | DIASTOLIC BLOOD PRESSURE: 76 MMHG | HEIGHT: 68 IN | TEMPERATURE: 97.6 F | SYSTOLIC BLOOD PRESSURE: 116 MMHG

## 2021-01-27 PROBLEM — R94.39 ABNORMAL STRESS TEST: Status: ACTIVE | Noted: 2021-01-27

## 2021-01-27 LAB
ALBUMIN SERPL-MCNC: 3.8 G/DL (ref 3.4–5)
ANION GAP SERPL CALCULATED.3IONS-SCNC: 5 MMOL/L (ref 3–16)
BUN BLDV-MCNC: 13 MG/DL (ref 7–20)
CALCIUM SERPL-MCNC: 9.3 MG/DL (ref 8.3–10.6)
CHLORIDE BLD-SCNC: 102 MMOL/L (ref 99–110)
CO2: 33 MMOL/L (ref 21–32)
CREAT SERPL-MCNC: 0.8 MG/DL (ref 0.9–1.3)
GFR AFRICAN AMERICAN: >60
GFR NON-AFRICAN AMERICAN: >60
GLUCOSE BLD-MCNC: 144 MG/DL (ref 70–99)
GLUCOSE BLD-MCNC: 169 MG/DL (ref 70–99)
GLUCOSE BLD-MCNC: 92 MG/DL (ref 70–99)
GLUCOSE BLD-MCNC: 97 MG/DL (ref 70–99)
PERFORMED ON: ABNORMAL
PERFORMED ON: ABNORMAL
PERFORMED ON: NORMAL
PHOSPHORUS: 4.4 MG/DL (ref 2.5–4.9)
POTASSIUM SERPL-SCNC: 4.3 MMOL/L (ref 3.5–5.1)
SODIUM BLD-SCNC: 140 MMOL/L (ref 136–145)

## 2021-01-27 PROCEDURE — 92920 PRQ TRLUML C ANGIOP 1ART&/BR: CPT | Performed by: INTERNAL MEDICINE

## 2021-01-27 PROCEDURE — 6360000002 HC RX W HCPCS

## 2021-01-27 PROCEDURE — 6370000000 HC RX 637 (ALT 250 FOR IP): Performed by: HOSPITALIST

## 2021-01-27 PROCEDURE — 2709999900 HC NON-CHARGEABLE SUPPLY

## 2021-01-27 PROCEDURE — 80069 RENAL FUNCTION PANEL: CPT

## 2021-01-27 PROCEDURE — 02703Z6 DILATION OF CORONARY ARTERY, ONE ARTERY, BIFURCATION, PERCUTANEOUS APPROACH: ICD-10-PCS | Performed by: INTERNAL MEDICINE

## 2021-01-27 PROCEDURE — 6360000002 HC RX W HCPCS: Performed by: REGISTERED NURSE

## 2021-01-27 PROCEDURE — 99152 MOD SED SAME PHYS/QHP 5/>YRS: CPT | Performed by: INTERNAL MEDICINE

## 2021-01-27 PROCEDURE — 93458 L HRT ARTERY/VENTRICLE ANGIO: CPT

## 2021-01-27 PROCEDURE — 36415 COLL VENOUS BLD VENIPUNCTURE: CPT

## 2021-01-27 PROCEDURE — 6370000000 HC RX 637 (ALT 250 FOR IP): Performed by: INTERNAL MEDICINE

## 2021-01-27 PROCEDURE — 6370000000 HC RX 637 (ALT 250 FOR IP): Performed by: REGISTERED NURSE

## 2021-01-27 PROCEDURE — 94640 AIRWAY INHALATION TREATMENT: CPT

## 2021-01-27 PROCEDURE — 2060000000 HC ICU INTERMEDIATE R&B

## 2021-01-27 PROCEDURE — 99254 IP/OBS CNSLTJ NEW/EST MOD 60: CPT | Performed by: INTERNAL MEDICINE

## 2021-01-27 PROCEDURE — 02C03ZZ EXTIRPATION OF MATTER FROM CORONARY ARTERY, ONE ARTERY, PERCUTANEOUS APPROACH: ICD-10-PCS | Performed by: INTERNAL MEDICINE

## 2021-01-27 PROCEDURE — 6370000000 HC RX 637 (ALT 250 FOR IP): Performed by: NURSE PRACTITIONER

## 2021-01-27 PROCEDURE — 6360000002 HC RX W HCPCS: Performed by: INTERNAL MEDICINE

## 2021-01-27 PROCEDURE — 6370000000 HC RX 637 (ALT 250 FOR IP)

## 2021-01-27 PROCEDURE — C1725 CATH, TRANSLUMIN NON-LASER: HCPCS

## 2021-01-27 PROCEDURE — 2500000003 HC RX 250 WO HCPCS

## 2021-01-27 PROCEDURE — C1769 GUIDE WIRE: HCPCS

## 2021-01-27 PROCEDURE — 2580000003 HC RX 258: Performed by: INTERNAL MEDICINE

## 2021-01-27 PROCEDURE — 92920 PRQ TRLUML C ANGIOP 1ART&/BR: CPT

## 2021-01-27 PROCEDURE — 99217 PR OBSERVATION CARE DISCHARGE MANAGEMENT: CPT | Performed by: INTERNAL MEDICINE

## 2021-01-27 PROCEDURE — 94761 N-INVAS EAR/PLS OXIMETRY MLT: CPT

## 2021-01-27 PROCEDURE — 93458 L HRT ARTERY/VENTRICLE ANGIO: CPT | Performed by: INTERNAL MEDICINE

## 2021-01-27 PROCEDURE — C1887 CATHETER, GUIDING: HCPCS

## 2021-01-27 PROCEDURE — C1894 INTRO/SHEATH, NON-LASER: HCPCS

## 2021-01-27 PROCEDURE — G0378 HOSPITAL OBSERVATION PER HR: HCPCS

## 2021-01-27 PROCEDURE — 2700000000 HC OXYGEN THERAPY PER DAY

## 2021-01-27 PROCEDURE — 6360000004 HC RX CONTRAST MEDICATION

## 2021-01-27 PROCEDURE — 85347 COAGULATION TIME ACTIVATED: CPT

## 2021-01-27 PROCEDURE — 4A023N7 MEASUREMENT OF CARDIAC SAMPLING AND PRESSURE, LEFT HEART, PERCUTANEOUS APPROACH: ICD-10-PCS | Performed by: INTERNAL MEDICINE

## 2021-01-27 PROCEDURE — B2111ZZ FLUOROSCOPY OF MULTIPLE CORONARY ARTERIES USING LOW OSMOLAR CONTRAST: ICD-10-PCS | Performed by: INTERNAL MEDICINE

## 2021-01-27 RX ORDER — CLOPIDOGREL BISULFATE 75 MG/1
75 TABLET ORAL DAILY
Status: DISCONTINUED | OUTPATIENT
Start: 2021-01-28 | End: 2021-01-28 | Stop reason: HOSPADM

## 2021-01-27 RX ORDER — POLYETHYLENE GLYCOL 3350 17 G/17G
17 POWDER, FOR SOLUTION ORAL DAILY PRN
Status: CANCELLED | OUTPATIENT
Start: 2021-01-27

## 2021-01-27 RX ORDER — CETIRIZINE HYDROCHLORIDE 10 MG/1
10 TABLET ORAL DAILY
Status: DISCONTINUED | OUTPATIENT
Start: 2021-01-28 | End: 2021-01-28 | Stop reason: HOSPADM

## 2021-01-27 RX ORDER — NICOTINE POLACRILEX 4 MG
15 LOZENGE BUCCAL PRN
Status: DISCONTINUED | OUTPATIENT
Start: 2021-01-27 | End: 2021-01-28 | Stop reason: HOSPADM

## 2021-01-27 RX ORDER — CLOPIDOGREL BISULFATE 75 MG/1
75 TABLET ORAL DAILY
Status: CANCELLED | OUTPATIENT
Start: 2021-01-28

## 2021-01-27 RX ORDER — SODIUM CHLORIDE 0.9 % (FLUSH) 0.9 %
10 SYRINGE (ML) INJECTION PRN
Status: DISCONTINUED | OUTPATIENT
Start: 2021-01-27 | End: 2021-01-28 | Stop reason: HOSPADM

## 2021-01-27 RX ORDER — SODIUM CHLORIDE 0.9 % (FLUSH) 0.9 %
10 SYRINGE (ML) INJECTION EVERY 12 HOURS SCHEDULED
Status: DISCONTINUED | OUTPATIENT
Start: 2021-01-27 | End: 2021-01-28 | Stop reason: HOSPADM

## 2021-01-27 RX ORDER — HEPARIN SODIUM 1000 [USP'U]/ML
INJECTION, SOLUTION INTRAVENOUS; SUBCUTANEOUS
Status: COMPLETED | OUTPATIENT
Start: 2021-01-27 | End: 2021-01-27

## 2021-01-27 RX ORDER — ASPIRIN 81 MG/1
81 TABLET ORAL DAILY
Status: DISCONTINUED | OUTPATIENT
Start: 2021-01-27 | End: 2021-01-27 | Stop reason: SDUPTHER

## 2021-01-27 RX ORDER — SODIUM CHLORIDE 0.9 % (FLUSH) 0.9 %
10 SYRINGE (ML) INJECTION PRN
OUTPATIENT
Start: 2021-01-27

## 2021-01-27 RX ORDER — SODIUM CHLORIDE 0.9 % (FLUSH) 0.9 %
10 SYRINGE (ML) INJECTION PRN
Status: CANCELLED | OUTPATIENT
Start: 2021-01-27

## 2021-01-27 RX ORDER — TAMSULOSIN HYDROCHLORIDE 0.4 MG/1
0.4 CAPSULE ORAL DAILY
Status: CANCELLED | OUTPATIENT
Start: 2021-01-28

## 2021-01-27 RX ORDER — ALBUTEROL SULFATE 2.5 MG/3ML
2.5 SOLUTION RESPIRATORY (INHALATION) EVERY 4 HOURS PRN
Status: DISCONTINUED | OUTPATIENT
Start: 2021-01-27 | End: 2021-01-27

## 2021-01-27 RX ORDER — SODIUM CHLORIDE 0.9 % (FLUSH) 0.9 %
10 SYRINGE (ML) INJECTION EVERY 12 HOURS SCHEDULED
OUTPATIENT
Start: 2021-01-27

## 2021-01-27 RX ORDER — ONDANSETRON 2 MG/ML
4 INJECTION INTRAMUSCULAR; INTRAVENOUS EVERY 6 HOURS PRN
Status: CANCELLED | OUTPATIENT
Start: 2021-01-27

## 2021-01-27 RX ORDER — ALBUTEROL SULFATE 2.5 MG/3ML
2.5 SOLUTION RESPIRATORY (INHALATION)
Status: DISCONTINUED | OUTPATIENT
Start: 2021-01-28 | End: 2021-01-28 | Stop reason: HOSPADM

## 2021-01-27 RX ORDER — ACETAMINOPHEN 650 MG/1
650 SUPPOSITORY RECTAL EVERY 6 HOURS PRN
Status: CANCELLED | OUTPATIENT
Start: 2021-01-27

## 2021-01-27 RX ORDER — MONTELUKAST SODIUM 10 MG/1
10 TABLET ORAL NIGHTLY
Status: CANCELLED | OUTPATIENT
Start: 2021-01-27

## 2021-01-27 RX ORDER — MIDAZOLAM HYDROCHLORIDE 5 MG/ML
INJECTION INTRAMUSCULAR; INTRAVENOUS
Status: COMPLETED | OUTPATIENT
Start: 2021-01-27 | End: 2021-01-27

## 2021-01-27 RX ORDER — FLUTICASONE PROPIONATE 50 MCG
1 SPRAY, SUSPENSION (ML) NASAL DAILY
Status: CANCELLED | OUTPATIENT
Start: 2021-01-28

## 2021-01-27 RX ORDER — CYCLOBENZAPRINE HCL 10 MG
10 TABLET ORAL 3 TIMES DAILY PRN
Status: CANCELLED | OUTPATIENT
Start: 2021-01-27

## 2021-01-27 RX ORDER — BUDESONIDE AND FORMOTEROL FUMARATE DIHYDRATE 160; 4.5 UG/1; UG/1
2 AEROSOL RESPIRATORY (INHALATION) 2 TIMES DAILY
Status: DISCONTINUED | OUTPATIENT
Start: 2021-01-27 | End: 2021-01-28 | Stop reason: HOSPADM

## 2021-01-27 RX ORDER — PROMETHAZINE HYDROCHLORIDE 25 MG/1
12.5 TABLET ORAL EVERY 6 HOURS PRN
Status: CANCELLED | OUTPATIENT
Start: 2021-01-27

## 2021-01-27 RX ORDER — DEXTROSE MONOHYDRATE 50 MG/ML
100 INJECTION, SOLUTION INTRAVENOUS PRN
Status: DISCONTINUED | OUTPATIENT
Start: 2021-01-27 | End: 2021-01-28 | Stop reason: HOSPADM

## 2021-01-27 RX ORDER — DIVALPROEX SODIUM 500 MG/1
500 TABLET, DELAYED RELEASE ORAL EVERY 12 HOURS SCHEDULED
Status: CANCELLED | OUTPATIENT
Start: 2021-01-27

## 2021-01-27 RX ORDER — ACETAMINOPHEN 325 MG/1
650 TABLET ORAL EVERY 6 HOURS PRN
Status: CANCELLED | OUTPATIENT
Start: 2021-01-27

## 2021-01-27 RX ORDER — PANTOPRAZOLE SODIUM 40 MG/1
40 TABLET, DELAYED RELEASE ORAL
Status: CANCELLED | OUTPATIENT
Start: 2021-01-28

## 2021-01-27 RX ORDER — ASPIRIN 81 MG/1
81 TABLET, CHEWABLE ORAL DAILY
Status: CANCELLED | OUTPATIENT
Start: 2021-01-28

## 2021-01-27 RX ORDER — ACETAMINOPHEN 325 MG/1
650 TABLET ORAL EVERY 6 HOURS PRN
Status: DISCONTINUED | OUTPATIENT
Start: 2021-01-27 | End: 2021-01-28 | Stop reason: HOSPADM

## 2021-01-27 RX ORDER — SODIUM CHLORIDE 0.9 % (FLUSH) 0.9 %
10 SYRINGE (ML) INJECTION EVERY 12 HOURS SCHEDULED
Status: CANCELLED | OUTPATIENT
Start: 2021-01-27

## 2021-01-27 RX ORDER — CLOPIDOGREL 300 MG/1
TABLET, FILM COATED ORAL
Status: COMPLETED | OUTPATIENT
Start: 2021-01-27 | End: 2021-01-27

## 2021-01-27 RX ORDER — ONDANSETRON 2 MG/ML
4 INJECTION INTRAMUSCULAR; INTRAVENOUS EVERY 6 HOURS PRN
Status: DISCONTINUED | OUTPATIENT
Start: 2021-01-27 | End: 2021-01-28 | Stop reason: HOSPADM

## 2021-01-27 RX ORDER — GABAPENTIN 400 MG/1
400 CAPSULE ORAL 3 TIMES DAILY
Status: DISCONTINUED | OUTPATIENT
Start: 2021-01-27 | End: 2021-01-28 | Stop reason: HOSPADM

## 2021-01-27 RX ORDER — PANTOPRAZOLE SODIUM 40 MG/1
40 TABLET, DELAYED RELEASE ORAL
Status: DISCONTINUED | OUTPATIENT
Start: 2021-01-27 | End: 2021-01-28 | Stop reason: HOSPADM

## 2021-01-27 RX ORDER — FENTANYL CITRATE 50 UG/ML
INJECTION, SOLUTION INTRAMUSCULAR; INTRAVENOUS
Status: COMPLETED | OUTPATIENT
Start: 2021-01-27 | End: 2021-01-27

## 2021-01-27 RX ORDER — MONTELUKAST SODIUM 10 MG/1
10 TABLET ORAL NIGHTLY
Status: DISCONTINUED | OUTPATIENT
Start: 2021-01-27 | End: 2021-01-28 | Stop reason: HOSPADM

## 2021-01-27 RX ORDER — ASPIRIN 81 MG/1
81 TABLET ORAL DAILY
Status: DISCONTINUED | OUTPATIENT
Start: 2021-01-28 | End: 2021-01-28 | Stop reason: HOSPADM

## 2021-01-27 RX ORDER — GABAPENTIN 400 MG/1
400 CAPSULE ORAL 3 TIMES DAILY
Status: CANCELLED | OUTPATIENT
Start: 2021-01-27

## 2021-01-27 RX ORDER — BUDESONIDE AND FORMOTEROL FUMARATE DIHYDRATE 80; 4.5 UG/1; UG/1
2 AEROSOL RESPIRATORY (INHALATION) 2 TIMES DAILY
Status: CANCELLED | OUTPATIENT
Start: 2021-01-27

## 2021-01-27 RX ORDER — ATORVASTATIN CALCIUM 10 MG/1
20 TABLET, FILM COATED ORAL NIGHTLY
Status: CANCELLED | OUTPATIENT
Start: 2021-01-27

## 2021-01-27 RX ORDER — ALBUTEROL SULFATE 2.5 MG/3ML
2.5 SOLUTION RESPIRATORY (INHALATION) EVERY 4 HOURS PRN
Status: CANCELLED | OUTPATIENT
Start: 2021-01-27

## 2021-01-27 RX ORDER — SIMVASTATIN 40 MG
40 TABLET ORAL NIGHTLY
Status: DISCONTINUED | OUTPATIENT
Start: 2021-01-27 | End: 2021-01-27 | Stop reason: CLARIF

## 2021-01-27 RX ORDER — HYDROCODONE BITARTRATE AND ACETAMINOPHEN 5; 325 MG/1; MG/1
1 TABLET ORAL ONCE
Status: COMPLETED | OUTPATIENT
Start: 2021-01-27 | End: 2021-01-27

## 2021-01-27 RX ORDER — DEXTROSE MONOHYDRATE 25 G/50ML
12.5 INJECTION, SOLUTION INTRAVENOUS PRN
Status: DISCONTINUED | OUTPATIENT
Start: 2021-01-27 | End: 2021-01-28 | Stop reason: HOSPADM

## 2021-01-27 RX ORDER — TAMSULOSIN HYDROCHLORIDE 0.4 MG/1
0.4 CAPSULE ORAL DAILY
Status: DISCONTINUED | OUTPATIENT
Start: 2021-01-28 | End: 2021-01-28 | Stop reason: HOSPADM

## 2021-01-27 RX ORDER — ALBUTEROL SULFATE 2.5 MG/3ML
2.5 SOLUTION RESPIRATORY (INHALATION) 3 TIMES DAILY
Status: CANCELLED | OUTPATIENT
Start: 2021-01-27

## 2021-01-27 RX ORDER — ATORVASTATIN CALCIUM 40 MG/1
40 TABLET, FILM COATED ORAL DAILY
Status: DISCONTINUED | OUTPATIENT
Start: 2021-01-27 | End: 2021-01-28 | Stop reason: HOSPADM

## 2021-01-27 RX ADMIN — HYDROCODONE BITARTRATE AND ACETAMINOPHEN 1 TABLET: 5; 325 TABLET ORAL at 21:03

## 2021-01-27 RX ADMIN — ALBUTEROL SULFATE 2.5 MG: 2.5 SOLUTION RESPIRATORY (INHALATION) at 08:23

## 2021-01-27 RX ADMIN — FENTANYL CITRATE 25 MCG: 50 INJECTION, SOLUTION INTRAMUSCULAR; INTRAVENOUS at 13:06

## 2021-01-27 RX ADMIN — TAMSULOSIN HYDROCHLORIDE 0.4 MG: 0.4 CAPSULE ORAL at 08:52

## 2021-01-27 RX ADMIN — DIVALPROEX SODIUM 500 MG: 500 TABLET, DELAYED RELEASE ORAL at 08:52

## 2021-01-27 RX ADMIN — Medication 2 PUFF: at 08:23

## 2021-01-27 RX ADMIN — ATORVASTATIN CALCIUM 40 MG: 40 TABLET, FILM COATED ORAL at 20:59

## 2021-01-27 RX ADMIN — GABAPENTIN 400 MG: 400 CAPSULE ORAL at 21:00

## 2021-01-27 RX ADMIN — GABAPENTIN 400 MG: 400 CAPSULE ORAL at 08:52

## 2021-01-27 RX ADMIN — Medication 2 PUFF: at 20:11

## 2021-01-27 RX ADMIN — Medication 10 ML: at 08:57

## 2021-01-27 RX ADMIN — ALBUTEROL SULFATE 2.5 MG: 2.5 SOLUTION RESPIRATORY (INHALATION) at 19:45

## 2021-01-27 RX ADMIN — INSULIN LISPRO 1 UNITS: 100 INJECTION, SOLUTION INTRAVENOUS; SUBCUTANEOUS at 21:00

## 2021-01-27 RX ADMIN — ASPIRIN 81 MG: 81 TABLET, CHEWABLE ORAL at 08:52

## 2021-01-27 RX ADMIN — MIDAZOLAM HYDROCHLORIDE 2 MG: 5 INJECTION INTRAMUSCULAR; INTRAVENOUS at 12:45

## 2021-01-27 RX ADMIN — CLOPIDOGREL BISULFATE 75 MG: 75 TABLET ORAL at 08:52

## 2021-01-27 RX ADMIN — CLOPIDOGREL 300 MG: 300 TABLET, FILM COATED ORAL at 13:16

## 2021-01-27 RX ADMIN — FLUTICASONE PROPIONATE 1 SPRAY: 50 SPRAY, METERED NASAL at 08:57

## 2021-01-27 RX ADMIN — Medication 10 ML: at 21:00

## 2021-01-27 RX ADMIN — MONTELUKAST SODIUM 10 MG: 10 TABLET ORAL at 20:59

## 2021-01-27 RX ADMIN — FENTANYL CITRATE 25 MCG: 50 INJECTION, SOLUTION INTRAMUSCULAR; INTRAVENOUS at 12:45

## 2021-01-27 RX ADMIN — HEPARIN SODIUM 5000 UNITS: 1000 INJECTION, SOLUTION INTRAVENOUS; SUBCUTANEOUS at 12:55

## 2021-01-27 RX ADMIN — ACETAMINOPHEN 650 MG: 325 TABLET ORAL at 19:00

## 2021-01-27 ASSESSMENT — PAIN SCALES - GENERAL
PAINLEVEL_OUTOF10: 0
PAINLEVEL_OUTOF10: 10

## 2021-01-27 ASSESSMENT — PAIN DESCRIPTION - ONSET: ONSET: ON-GOING

## 2021-01-27 ASSESSMENT — PAIN DESCRIPTION - FREQUENCY: FREQUENCY: CONTINUOUS

## 2021-01-27 ASSESSMENT — PAIN DESCRIPTION - DESCRIPTORS: DESCRIPTORS: ACHING

## 2021-01-27 ASSESSMENT — PAIN DESCRIPTION - LOCATION: LOCATION: BACK

## 2021-01-27 NOTE — H&P
Hospital Medicine History & Physical      PCP: Chester Malloy PA-C    Date of Admission: 1/27/2021    Date of Service: Pt seen/examined on 1/17/2021 and Admitted to Inpatient with expected LOS greater than two midnights due to medical therapy. Chief Complaint:  Chest pain     History Of Present Illness:     64 y.o. male with PMH of COPD on 4 LPM, PAD, GERD and DM2 who presented to [facility] on 1/25/21 due to complaints of chest pain. EKG was non-acute in the ED. Serial troponin trend was negative. Pt underwent stress test which was abnormal - small area of apical ischemia. Cardiology consulted. Pt transferred to [facility] for cardiac cath. Pt is s/p LHC on 1/27/21which showed critical stenosis of OM1, POBA and cutting balloon, no stent due to proximity to bifurcation/left main. Pt currently denies chest pain or dyspnea. No N/V/D. No fever chills. Hospitalist service was consulted for admission post-cath. Past Medical History:          Diagnosis Date    Anxiety     Arterial stent thrombosis (HCC)     Asthma     [facility]     Diabetes mellitus [facility]     Hyperlipidemia     Pneumonia        Past Surgical History:          Procedure Laterality Date    CARDIAC SURGERY      stent placed    CARPAL TUNNEL RELEASE      CHOLECYSTECTOMY, LAPAROSCOPIC      KNEE ARTHROPLASTY      R knee x4    NASAL SINUS SURGERY      UPPER GASTROINTESTINAL ENDOSCOPY  7/14/11    UPPER GASTROINTESTINAL ENDOSCOPY N/A 4/4/2019    EGD BIOPSY performed by Mari Layne DO at [facility]       Medications Prior to Admission:      Prior to Admission medications    Medication Sig Start Date End Date Taking?  Authorizing Provider   divalproex (DEPAKOTE) 500 MG DR tablet  1/18/21   Historical Provider, MD   QUEtiapine (SEROQUEL) 300 MG tablet  1/19/21   Historical Provider, MD   dicyclomine (BENTYL) 10 MG capsule Take 1 capsule by mouth 4 times daily as needed (abdominal pain/cramping) 3/25/19   Maria Teresa Reyes MD   nicotine polacrilex (COMMIT) 4 MG lozenge Take 1 lozenge by mouth as needed for Smoking cessation 2/15/18   SHANTHI Dubois - SVETA   nicotine polacrilex (NICORETTE) 2 MG lozenge Take 1 lozenge by mouth as needed for Smoking cessation 11/17/17   Luiza Ivory MD   Multiple Vitamin (DAILY-CRISTINE PO) Take 1 tablet by mouth daily    Historical Provider, MD   clopidogrel (PLAVIX) 75 MG tablet Take 75 mg by mouth daily    Historical Provider, MD   pantoprazole sodium (PROTONIX) 40 MG PACK packet Take 40 mg by mouth 2 times daily (before meals)     Historical Provider, MD   nitroGLYCERIN (NITRODUR) 0.2 MG/HR Place 1 patch onto the skin daily    Historical Provider, MD   fluticasone-vilanterol (BREO ELLIPTA) 100-25 MCG/INH AEPB inhaler Inhale 1 puff into the lungs daily 5/31/17   Luiza Ivory MD   tamsulosin (FLOMAX) 0.4 MG capsule Take 0.4 mg by mouth daily    Historical Provider, MD   simvastatin (ZOCOR) 40 MG tablet Take 40 mg by mouth nightly    Historical Provider, MD   gabapentin (NEURONTIN) 300 MG capsule Take 400 mg by mouth 3 times daily . Historical Provider, MD   aspirin 81 MG tablet Take 81 mg by mouth daily    Historical Provider, MD   cetirizine (ZYRTEC) 10 MG tablet Take 10 mg by mouth daily    Historical Provider, MD   hydrOXYzine (VISTARIL) 25 MG capsule Take 25 mg by mouth 3 times daily as needed for Itching    Historical Provider, MD   albuterol (PROVENTIL HFA;VENTOLIN HFA) 108 (90 BASE) MCG/ACT inhaler Inhale 1 puff into the lungs every 6 hours as needed. Historical Provider, MD   fluticasone (FLONASE) 50 MCG/ACT nasal spray 1 spray by Nasal route daily. Indications: EACH NOSTRIL    Historical Provider, MD   montelukast (SINGULAIR) 10 MG tablet Take 10 mg by mouth nightly. Historical Provider, MD   cyclobenzaprine (FLEXERIL) 10 MG tablet Take 10 mg by mouth 3 times daily as needed.     Historical Provider, MD   omeprazole ([address] [place] [address]) 20 MG capsule Take 40 mg by mouth 2 times daily  11/17/10   Historical Provider, MD       Allergies:  Methylphenidate, Oxycodone-acetaminophen, Propoxyphene, and Ritalin [methylphenidate hcl]    Social History:      The patient currently lives at home. TOBACCO:   reports that he quit smoking about 1 years ago. He has a 6.25 pack-year smoking history. He quit smokeless tobacco use about 5 years ago. ETOH:   reports no history of alcohol use. E-Cigarettes/Vaping Use     Questions Responses    E-Cigarette/Vaping Use Never User    Start Date     Passive Exposure     Quit Date     Counseling Given     Comments             Family History:      Reviewed in detail. Positive as follows:        Problem Relation Age of Onset    High Blood Pressure Mother     Heart Disease Mother     High Blood Pressure Father     Heart Disease Father     Cancer Sister     Diabetes Sister     Other Sister         aneursym-brain       REVIEW OF SYSTEMS:   Pertinent positives as noted in the HPI. All other systems reviewed and negative. PHYSICAL EXAM PERFORMED:    Ht 5' 8\" (1.727 m)   Wt 160 lb (72.6 kg)   BMI 24.33 kg/m²     General appearance:  No apparent distress, appears stated age and cooperative. HEENT:  Normal cephalic, atraumatic without obvious deformity. Pupils equal, round, and reactive to light. Extra ocular muscles intact. Conjunctivae/corneas clear. Neck: Supple, with full range of motion. No jugular venous distention. Trachea midline. Respiratory:  Normal respiratory effort. Clear to auscultation, bilaterally without Rales/Wheezes/Rhonchi. Cardiovascular:  Regular rate and rhythm with normal S1/S2 without murmurs, rubs or gallops. Abdomen: Soft, non-tender, non-distended with normal bowel sounds. Musculoskeletal:  No clubbing, cyanosis or edema bilaterally. Full range of motion without deformity. Skin: Skin color, texture, turgor normal.  No rashes or lesions.   Neurologic:  Neurovascularly intact without any focal sensory/motor deficits. Cranial nerves: II-XII intact, grossly non-focal.  Psychiatric:  Alert and oriented, thought content appropriate, normal insight  Capillary Refill: Brisk,< 3 seconds   Peripheral Pulses: +2 palpable, equal bilaterally       Labs:     Recent Labs     01/25/21  1645 01/26/21  0559   WBC 5.1 4.2   HGB 11.8* 11.6*   HCT 37.0* 35.8*    175     Recent Labs     01/25/21  1645 01/26/21  0559 01/27/21  0928    143 140   K 4.4 4.4 4.3    104 102   CO2 32 32 33*   BUN 13 16 13   CREATININE 0.8* 0.9 0.8*   CALCIUM 8.9 9.1 9.3   PHOS  --   --  4.4     Recent Labs     01/25/21  1645   AST 12*   ALT 9*   BILITOT <0.2   ALKPHOS 90     Recent Labs     01/25/21  1645   INR 0.98     Recent Labs     01/25/21  1645 01/25/21  2151 01/26/21  0105   TROPONINI <0.01 <0.01 <0.01       Urinalysis:      Lab Results   Component Value Date    NITRU Negative 03/25/2019    BLOODU Negative 03/25/2019    SPECGRAV 1.010 03/25/2019    GLUCOSEU Negative 03/25/2019         ASSESSMENT/PLAN:    Active Hospital Problems    Diagnosis Date Noted    Abnormal stress test [R94.39] 01/27/2021    Chest pain [R07.9] 01/25/2021       Chest Pain  - EKG non-acute in ED. Troponins negative x 3,  - Stress test obtained which was abnormal - small area of apical ischemia. - Cardiology consulted. Transferred to [facility] for [employer].  - S/p LHC on 1/27 which showed critical stenosis of OM1, POBA and cutting balloon, no stent due to proximity to bifurcation/left main. - Continue aspirin, plavix, statin and BB.   - Monitor pt on telemetry. Chronic hypoxic respiratory failure due to COPD  - Stable, no evidence of exacerbation.   - On baseline 4 LPM of supplemental O2.   - Continue inhaled bronchodilator therapy.      Diabetes mellitus type 2 with neuropathy   - Check A1C.   - Continue to hold his home Metformin.  - Blood glucose stable, no need for SSI at this time. - Continue his home gabapentin. - Carb-control diet.    PAD  - Continue ASA, Plavix, Lipitor.     GERD  - Stable. Continue PPI. DVT Prophylaxis: SCDs  Diet: DIET CARDIAC;  Code Status: Full Code    PT/OT Eval Status: Not indicated     Dispo - Possibly home tomorrow 1/28 if okay with Cardiology. [address] [facility], APRN - CNP    Thank you Chandan Teague PA-C for the opportunity to be involved in this patient's care. If you have any questions or concerns please feel free to contact me at [phone].

## 2021-01-27 NOTE — BRIEF OP NOTE
Brief Postoperative Note      Patient: Mechelle Horner  YOB: 1960  MRN: [id number]    Brief Postoperative Note  1. Pre-operative Diagnosis: USA< abnormal stress test        Post-operative Diagnosis: Same  2. Surgeons/Assistants: Nikita Will MD, [facility]  3. Complications: None  4. Estimated Blood Loss: less than 50   5. Specimens: Were Not Obtained  6. Anesthesia: Local and Moderate Sedation  For sedation: Moderated sedation was achieved with Versed (4mg) and Fentanyl (75mcg). Monitoring of the patient's vital signs and respiratory status was provided by a trained independent observer nurse during the entire course of the procedure(s) and under my direct supervision and recorded in patient's medical record. The duration of sedation was 1235 to 1315. 7. Procedure(s) performed: Moderate sedation   US access  LHC  LVG  Cors  POBA to osital OM1        Procedure Details:  Consent Access  site Bleed Risk Sedat US   Used*? Contrast Flouro TIme Fluoro  mGy   Yes Rt groin Low MCSFC Yes 130mL 4.9 487   *CPT 23411: If stated \"yes\", Ultrasound guidance was used to access rt femoral artery using Seldinger technique after local infiltration of 1% lidocaine. Ultrasound(US) documented/visualized size, patency, pulsatility and exact location for puncture and determined ok to proceed. Real-time imaging used for needle entry into the vessel(s). Image was printed off [employer] and sent to medical records on a progress note.    *Sedation Note: MCSFC = minimal conscious sedation for comfort      Left Heart Cath:   Findings   LVEDP 5   LVEF 55%   LV wall motion normal   Gradient across AV None   Mitral regurg none     Coronary Angiogram:  Artery Findings/Result   LM luminals   LAD Luminals, extensive L->Rt collaterals   LCx Luminals, extensive L->Rt collaterals  OM1- high ramus, 95%ostial/prox, TIMI3 flow       RCA Dominant, 100% prox chronic occlusion         Results of intervention     Lesion 1: prox Om1  Stent:  Pre:   90% stenosis, WILVER 3 flow  Post: 0% stenosis, WILVER 3 flow      Assessment/Plan  1. Critical stenosis OM1, POBA and cutting ballon. No stent due to proximity to bifurcation/left main  2. ASA 81mg qday for life, plavix 75mg poqday for 1 yr w/o intturuption   - BB, statin as tolerated      Med Rec:   Recommendation Indicated?  Not Given Due To: Note   DAPT  y     STATIN - HIGH DOSE y intollerance    BETA BLOCKER y     ACE/ARB/ARNI n     ALDACTONE n         Electronically signed by Adria Seaman MD on 1/27/2021 at 1:13 PM

## 2021-01-27 NOTE — PROGRESS NOTES
Shift assessment complete, scheduled meds given. Vss. Call light and bedside table In reach. Pt states no further needs at this time. IV in RFA removed d/t infiltration. Will continue to monitor.

## 2021-01-27 NOTE — FLOWSHEET NOTE
01/27/21 0848   Vital Signs   Temp 97.6 °F (36.4 °C)   Temp Source Oral   Pulse 64   Heart Rate Source Monitor   Resp 16   /76   BP Location Right upper arm   Patient Position Sitting   Level of Consciousness Alert (0)   MEWS Score 1   Patient Currently in Pain Yes   Pain Assessment   Pain Assessment 0-10   Pain Level 9   Pain Type Chronic pain   Pain Location Back   Pain Descriptors Aching   Pain Frequency Continuous   Patient's Stated Pain Goal No pain   Pain Onset On-going   Clinical Progression Not changed   Functional Pain Assessment Prevents or interferes some active activities and ADLs   Non-Pharmaceutical Pain Intervention(s) Repositioned;Relaxation techniques; Rest   Oxygen Therapy   SpO2 98 %     AM assessment complete. Patient lying in bed with eyes open. NPO. Set to have cardiac cath today. Transport to arrive around 0945. Exp wheezing noted. Denies any sob or cough. No edema noted. BS present x4. Denies any urinary s/s. He is aware of cardiac cath and what it is. Peripheral iv to LAC, flushes without difficulty. Call light and bedside table within reach. Will continue to monitor.

## 2021-01-27 NOTE — DISCHARGE SUMMARY
Name:  Sherron Larose  Room:  /2860-51  MRN:    [id number]    Discharge Summary      This discharge summary is in conjunction with a complete physical exam done on the day of discharge. Discharging Physician: Dr. Vernell Torres: 1/25/2021  Discharge:  1/27/2021    HPI taken from admission H&P:    64 y.o. male who presented to the hospital with a chief complaint of chest pain and dizziness that started yesterday. Patient states he woke up with sternal chest pain radiating to his left arm. He describes the pain as a pressure, not associated with nausea or vomiting. His symptoms also was associated with shortness of breath. Of note he is on 4 L of oxygen chronically at nighttime. But with this episode today he felt he needed oxygen at the time. He has not felt right the last couple of days. He has a history of COPD, diabetes type 2 and coronary artery disease. He presents to the emergency department where all initial testing was negative. He was transferred for further cardiac evaluation. Diagnoses this Admission and Hospital Course     Chest Pain  - Admitted to PCU, tele  - troponins negative x 3  - checked stress test which was abnormal - small area of apical ischemia - results discussed with patient  - on ASA, Lipitor  - cardiology consulted, transfer to [facility] for cath      Chronic Hypoxic RF  - on 4L  - stable     COPD  - no AE  - cont Albuterol nebs, Symbicort     DM Type II  - well controlled  - held Metformin  - BG stable, no SSI at this time     PAD  - cont ASA, Plavix, Lipitor     GERD  - cont Protonix    Procedures (Please Review Full Report for Details)  None     Consults    Cardio      Physical Exam at Discharge:    /76   Pulse 64   Temp 97.6 °F (36.4 °C) (Oral)   Resp 16   Ht 5' 8\" (1.727 m)   Wt 161 lb (73 kg)   SpO2 98%   BMI 24.48 kg/m²     Gen: No distress. Alert. Middle aged  male, resting comfortably  Eyes: No sclera icterus.  No conjunctival injection. Glasses  Neck:  Trachea midline. Resp: No accessory muscle use. No crackles. No wheezes. No rhonchi. On RA  CV: Regular rate. Regular rhythm. No murmur. No rub. No edema. GI: Soft, Non-tender. Non-distended. Normal bowel sounds. Skin: Warm and dry. No rash on exposed extremities. Neuro: Awake. Grossly nonfocal, moves all extremities, follows commands    Psych: Oriented x 3. No anxiety or agitation. CBC:   Recent Labs     01/25/21  1645 01/26/21  0559   WBC 5.1 4.2   HGB 11.8* 11.6*   HCT 37.0* 35.8*   MCV 85.5 85.2    175     BMP:   Recent Labs     01/25/21  1645 01/26/21  0559 01/27/21  0928    143 140   K 4.4 4.4 4.3    104 102   CO2 32 32 33*   PHOS  --   --  4.4   BUN 13 16 13   CREATININE 0.8* 0.9 0.8*     LIVER PROFILE:   Recent Labs     01/25/21  1645   AST 12*   ALT 9*   BILITOT <0.2   ALKPHOS 90     PT/INR:   Recent Labs     01/25/21  1645   PROTIME 11.4   INR 0.98     APTT:   Recent Labs     01/25/21  1645   APTT 32.9       CULTURES  None     RADIOLOGY  NM Cardiac Stress Test Nuclear Imaging   Final Result      CT CHEST PULMONARY EMBOLISM W CONTRAST   Final Result   No evidence of pulmonary embolism or acute pulmonary abnormality. Mildly dilated and atherosclerotic thoracic aorta with no aneurysm or   dissection      Calcified granulomas scattered in the mediastinum with no mediastinal mass or   adenopathy         XR CHEST PORTABLE   Final Result   No acute process. Dispo: transferred in stable condition to [facility] hospitalist informed via Perfect serve.       Morris Hayward 1/27/2021 5:53 PM

## 2021-01-27 NOTE — H&P
Brief Pre-Op Note/Sedation Assessment      Dayo Dove  1960  Cath Pool Rm/NONE      [id number]  12:04 PM    Planned Procedure: Cardiac Catheterization Procedure    Post Procedure Plan: Return to same level of care    Consent: I have discussed with the patient and/or the patient representative the indication, alternatives, and the possible risks and/or complications of the planned procedure and the anesthesia methods. The patient and/or patient representative appear to understand and agree to proceed. Chief Complaint: Chest Pain/Pressure      Indications for Cath Procedure:  ACS > 24 hrs, Worsening Angina and Suspected CAD  Anginal Classification within 2 weeks:  CCS III - Symptoms with everyday living activities, i.e., moderate limitation  NYHA Heart Failure Class within 2 weeks: No symptoms  Is Cath Lab Visit Valve-related?: No  Surgical Risk: N/A  Functional Type: N/A    Anti- Anginal Meds within 2 weeks:   Yes: Aspirin and Statin (Any)    Stress or Imaging Studies Performed (within 6 months):  Stress Test with SPECT Result: Positive:  apical Risk/Extent of Ischemia:  Low      Vital Signs: There were no vitals taken for this visit. Allergies:   Allergies   Allergen Reactions    Methylphenidate      Other reaction(s): Hyperactive behavior    Oxycodone-Acetaminophen     Propoxyphene     Ritalin [Methylphenidate Hcl] Anxiety       Past Medical History:  Past Medical History:   Diagnosis Date    Anxiety     Arterial stent thrombosis (HCC)     Asthma     [facility]) [facility]     Diabetes mellitus ([facility]     Hyperlipidemia     Pneumonia          Surgical History:  Past Surgical History:   Procedure Laterality Date    CARDIAC SURGERY      stent placed    CARPAL TUNNEL RELEASE      CHOLECYSTECTOMY, LAPAROSCOPIC      KNEE ARTHROPLASTY      R knee x4    NASAL SINUS SURGERY      UPPER GASTROINTESTINAL ENDOSCOPY  7/14/11    UPPER GASTROINTESTINAL ENDOSCOPY

## 2021-01-27 NOTE — CONSULTS
[address] [facility]  [phone]        Reason for Consultation/Chief Complaint: \"I have been having chest pain\"  Consulted for positive stress test    History of Present Illness:  To Ku is a 64 y.o. patient who presented to [facility] 1/25/21 with c/o CP. Normally follows with Dr Jakob Selby for cardiac care. He has PMH HLD, DM, COPD, hx tobacco abuse, and arterial stent thrombosis. Patient reports cath 10 yrs ago reportedly normal but says he has \"leaky valve. \" He does not know specifics and no records available to me for review at this time . Most recent 24 hr Holter moitor 11/17/2010 Sinus rhythm with periods of sinus bradycardia and sinus tachycardia; Rare PVCs; Rare PACs   Now presents with c/o CP starting day of admit. Reports left-sided pain described as \"elephant sitting on my chest.\" Occurred at rest but worse with exertion. Lasted several minutes intermittently for few hours. Pain radiated to left arm with numbness and he had associated dizziness and SOB with CP. Also reports CP similar but not as intense for last 2 weeks worse with exertion. Admit EKG revealed NSR low voltage; nonspecific ST change. (no change from 3/19 EKG). Note CXR no acute findings, Liss negative x 3. QO=382, LDL=87. Hospitalist ordered Lexiscan myoview stress test 01/26/21 LVEF >60%  Grossly normal wall motion (cannot exclude relative hypokinesis of basal  inferior wall)  Fixed inferior defect suggestive of attenuation artifact, however, scar  cannot be excluded. Small area of apical ischemia. Patient with no complaints of SOB, palpitations, dizziness, edema, or orthopnea/PND. I have been asked to provide consultation regarding further management and testing. Past Medical History:   has a past medical history of Anxiety, Arterial stent thrombosis ([address] Asthma, COPD (chronic obstructive pulmonary disease) ([address] Diabetes mellitus ([address] Hyperlipidemia, and Pneumonia.     Surgical History:   has a past surgical history that includes Knee Arthroplasty; Cholecystectomy, laparoscopic; Carpal tunnel release; Nasal sinus surgery; Upper gastrointestinal endoscopy (11); Cardiac surgery; and Upper gastrointestinal endoscopy (N/A, 2019). Social History:   He is  3 children lives alone in [facility]. He reports that he quit smoking about 1 years ago after smoking for 35 yrs 1-2 ppd. He has a 6.25 pack-year smoking history. He quit smokeless tobacco use about 5 years ago. He reports that he does not drink alcohol or use drugs. He quit heavy alcohol use 4 yrs ago    Family History: Mom stroke age 63's  MI age 66 Dad  age 66 MI  family history includes Cancer in his sister; Diabetes in his sister; Heart Disease in his father and mother; High Blood Pressure in his father and mother; Other in his sister. Home Medications:  Were reviewed and are listed in nursing record. and/or listed below  Prior to Admission medications    Medication Sig Start Date End Date Taking?  Authorizing Provider   divalproex (DEPAKOTE) 500 MG DR tablet  21  Yes Historical Provider, MD   QUEtiapine (SEROQUEL) 300 MG tablet  21  Yes Historical Provider, MD   dicyclomine (BENTYL) 10 MG capsule Take 1 capsule by mouth 4 times daily as needed (abdominal pain/cramping) 3/25/19  Yes Cheri Lugo MD   nicotine polacrilex (COMMIT) 4 MG lozenge Take 1 lozenge by mouth as needed for Smoking cessation 2/15/18  Yes SHANTHI Kenny - CNP   nicotine polacrilex (NICORETTE) 2 MG lozenge Take 1 lozenge by mouth as needed for Smoking cessation 17  Yes Yuri Thakkar MD   Multiple Vitamin (DAILY-CRISTINE PO) Take 1 tablet by mouth daily   Yes Historical Provider, MD   clopidogrel (PLAVIX) 75 MG tablet Take 75 mg by mouth daily   Yes Historical Provider, MD   pantoprazole sodium (PROTONIX) 40 MG PACK packet Take 40 mg by mouth 2 times daily (before meals)    Yes Historical Provider, MD   nitroGLYCERIN (NITRODUR) 0.2 MG/HR Place 1 patch onto the skin daily   Yes Historical Provider, MD   fluticasone-vilanterol (BREO ELLIPTA) 100-25 MCG/INH AEPB inhaler Inhale 1 puff into the lungs daily 5/31/17  Yes Alana Bailey MD   tamsulosin (FLOMAX) 0.4 MG capsule Take 0.4 mg by mouth daily   Yes Historical Provider, MD   simvastatin (ZOCOR) 40 MG tablet Take 40 mg by mouth nightly   Yes Historical Provider, MD   gabapentin (NEURONTIN) 300 MG capsule Take 400 mg by mouth 3 times daily . Yes Historical Provider, MD   aspirin 81 MG tablet Take 81 mg by mouth daily   Yes Historical Provider, MD   cetirizine (ZYRTEC) 10 MG tablet Take 10 mg by mouth daily   Yes Historical Provider, MD   hydrOXYzine (VISTARIL) 25 MG capsule Take 25 mg by mouth 3 times daily as needed for Itching   Yes Historical Provider, MD   albuterol (PROVENTIL HFA;VENTOLIN HFA) 108 (90 BASE) MCG/ACT inhaler Inhale 1 puff into the lungs every 6 hours as needed. Yes Historical Provider, MD   fluticasone (FLONASE) 50 MCG/ACT nasal spray 1 spray by Nasal route daily. Indications: EACH NOSTRIL   Yes Historical Provider, MD   montelukast (SINGULAIR) 10 MG tablet Take 10 mg by mouth nightly. Yes Historical Provider, MD   cyclobenzaprine (FLEXERIL) 10 MG tablet Take 10 mg by mouth 3 times daily as needed.    Yes Historical Provider, MD   omeprazole (PRILOSEC) 20 MG capsule Take 40 mg by mouth 2 times daily  11/17/10  Yes Historical Provider, MD        Allergies:  Methylphenidate, Oxycodone-acetaminophen, Propoxyphene, and Ritalin [methylphenidate hcl]     Review of Systems:   12 point ROS negative in all areas as listed below except as in [place]  Constitutional, EENT, Cardiovascular, pulmonary, GI, , Musculoskeletal, skin, neurological, hematological, endocrine, Psychiatric    Physical Examination:    Vitals:    01/27/21 0453   BP: 96/65   Pulse: 68   Resp: 16   Temp: 97.6 °F (36.4 °C)   SpO2: 98%    Weight: 161 lb (73 kg)         General Appearance:  Alert, cooperative, with ECG of 25-JAN-2021 16:43,No significant change was foundConfirmed by Al Love MD, [address] ([id number]) on 1/26/2021 2:42:43 PM    CTPA 01/25/21  No evidence of pulmonary embolism or acute pulmonary abnormality. Mildly dilated and atherosclerotic thoracic aorta with no aneurysm or dissection   Calcified granulomas scattered in the mediastinum with no mediastinal mass or adenopathy   FINDINGS: Pulmonary Arteries: Pulmonary arteries are adequately opacified for evaluation. No evidence of intraluminal filling defect to suggest pulmonary embolism. Main pulmonary artery is normal in caliber. Mediastinum: No evidence of mediastinal lymphadenopathy. The heart and pericardium demonstrate no acute abnormality. There is no acute abnormality of the thoracic aorta. There are calcifications scattered in the mediastinum. Lungs/pleura: The lungs are hyperinflated and emphysematous. No consolidation or effusion is seen. There is no pulmonary nodule or mass. There is a tiny calcified granuloma along the left upper lobe   Upper Abdomen: Limited images of the upper abdomen are unremarkable. Soft Tissues/Bones: No acute bone or soft tissue abnormality. 24 hr Holter [facility] 11/17/2010  IMPRESSION:     1.  Sinus rhythm with periods of sinus bradycardia and sinus   tachycardia   which were within the physiological range.     2.  Rare PVCs.     3.  Rare PACs including 5 complex and 1 quadruple. Lexiscan myoview stress test 01/26/21  Summary  Normal LVEF >60%  Grossly normal wall motion (cannot exclude relative hypokinesis of basal  inferior wall)  Fixed inferior defect suggestive of attenuation artifact, however, scar  cannot be excluded. Small area of apical ischemia     Assessment:  Albert Reynoso is a 64 y.o. patient who presented to [facility] 1/25/21 with c/o CP. Normally follows with Dr Grisel Traylor for cardiac care. He has PMH HLD, DM, COPD, hx tobacco abuse, and arterial stent thrombosis.   Patient reports cath 10 yrs ago reportedly normal but says he has \"leaky valve. \" He does not know specifics and no records available to me for review at this time . Most recent 24 hr Holter moitor 11/17/2010 Sinus rhythm with periods of sinus bradycardia and sinus tachycardia; Rare PVCs; Rare PACs   Now presents with c/o CP starting day of admit. Reports left-sided pain described as \"elephant sitting on my chest.\" Occurred at rest but worse with exertion. Lasted several minutes intermittently for few hours. Pain radiated to left arm with numbness and he had associated dizziness and SOB with CP. Also reports CP similar but not as intense for last 2 weeks worse with exertion. Admit EKG revealed NSR low voltage; nonspecific ST change. (no change from 3/19 EKG). Note CXR no acute findings, Liss negative x 3. ZM=455, LDL=87. Hospitalist ordered Lexiscan myoview stress test 01/26/21 LVEF >60%  Grossly normal wall motion (cannot exclude relative hypokinesis of basal  inferior wall)  Fixed inferior defect suggestive of attenuation artifact, however, scar  cannot be excluded. Small area of apical ischemia. Diagnosis of unspecified CP and abnormal nuclear imaging study in middle-aged male with CAD risk factors including HLD, DM, age, and tobacco abuse. Ruled out for MI and EKG negative for ischemia. Recs:  1. Given concerning CP, risk factors, and abnormal nuc imaging study based on these findings I recommend interventional partner consult for possible left heart cath for definitive evaluation of coronary arteries. Risks, benefits, expectations, and alternative treatments were discussed. Questions appropriately answered. Rosalba Valdivia agrees to proceed if interventional partner agrees and verbalized understanding. 2. Continue baby asa qd, plavix 75mg qd, lipitor 20mg qd.       Patient Active Problem List   Diagnosis    Acute exacerbation of chronic obstructive pulmonary disease (COPD) ([address]    COPD with exacerbation ([address]    Hypoxia    SOB (shortness of breath)    Centrilobular emphysema (HCC)    Acute pain of right knee    Primary osteoarthritis of right knee    Chest pain    Diabetes mellitus [facility]    Anxiety         Thank you for allowing to us to participate in the care or Camryn Joe. Further evaluation will be based upon the patient's clinical course and testing results.

## 2021-01-27 NOTE — PROGRESS NOTES
Per Dr. Dat Unger orders placed for transfer to [facility] for further interventional cardiac evaluation  with Dr. Lesli Amaro. Pt to arrive at [facility] this am. RN, Shift lead, MD, cath lab and unit secretary all aware.  Risks benefits and alternatives to transfer discussed by Dr. Dat Unger and pt agreeable to proceed Jcarlos Denson aware to call [facility] hospitalist for transfer report

## 2021-01-28 VITALS
HEART RATE: 68 BPM | HEIGHT: 68 IN | SYSTOLIC BLOOD PRESSURE: 138 MMHG | DIASTOLIC BLOOD PRESSURE: 89 MMHG | WEIGHT: 156.1 LBS | BODY MASS INDEX: 23.66 KG/M2 | RESPIRATION RATE: 16 BRPM | OXYGEN SATURATION: 93 % | TEMPERATURE: 97.8 F

## 2021-01-28 LAB
ANION GAP SERPL CALCULATED.3IONS-SCNC: 8 MMOL/L (ref 3–16)
BUN BLDV-MCNC: 13 MG/DL (ref 7–20)
CALCIUM SERPL-MCNC: 9 MG/DL (ref 8.3–10.6)
CHLORIDE BLD-SCNC: 102 MMOL/L (ref 99–110)
CHOLESTEROL, TOTAL: 158 MG/DL (ref 0–199)
CO2: 28 MMOL/L (ref 21–32)
CREAT SERPL-MCNC: 0.7 MG/DL (ref 0.9–1.3)
EKG ATRIAL RATE: 65 BPM
EKG DIAGNOSIS: NORMAL
EKG P AXIS: 76 DEGREES
EKG P-R INTERVAL: 132 MS
EKG Q-T INTERVAL: 396 MS
EKG QRS DURATION: 84 MS
EKG QTC CALCULATION (BAZETT): 411 MS
EKG R AXIS: 59 DEGREES
EKG T AXIS: 68 DEGREES
EKG VENTRICULAR RATE: 65 BPM
ESTIMATED AVERAGE GLUCOSE: 122.6 MG/DL
GFR AFRICAN AMERICAN: >60
GFR NON-AFRICAN AMERICAN: >60
GLUCOSE BLD-MCNC: 135 MG/DL (ref 70–99)
GLUCOSE BLD-MCNC: 77 MG/DL (ref 70–99)
GLUCOSE BLD-MCNC: 89 MG/DL (ref 70–99)
HBA1C MFR BLD: 5.9 %
HCT VFR BLD CALC: 35.7 % (ref 40.5–52.5)
HDLC SERPL-MCNC: 45 MG/DL (ref 40–60)
HEMOGLOBIN: 11.4 G/DL (ref 13.5–17.5)
LDL CHOLESTEROL CALCULATED: 94 MG/DL
MCH RBC QN AUTO: 27.1 PG (ref 26–34)
MCHC RBC AUTO-ENTMCNC: 32.1 G/DL (ref 31–36)
MCV RBC AUTO: 84.4 FL (ref 80–100)
PDW BLD-RTO: 17.6 % (ref 12.4–15.4)
PERFORMED ON: ABNORMAL
PERFORMED ON: NORMAL
PLATELET # BLD: 170 K/UL (ref 135–450)
PMV BLD AUTO: 7.6 FL (ref 5–10.5)
POC ACT LR: 158 SEC
POC ACT LR: 184 SEC
POC ACT LR: 222 SEC
POTASSIUM SERPL-SCNC: 4.4 MMOL/L (ref 3.5–5.1)
RBC # BLD: 4.23 M/UL (ref 4.2–5.9)
SODIUM BLD-SCNC: 138 MMOL/L (ref 136–145)
TRIGL SERPL-MCNC: 97 MG/DL (ref 0–150)
VLDLC SERPL CALC-MCNC: 19 MG/DL
WBC # BLD: 5.7 K/UL (ref 4–11)

## 2021-01-28 PROCEDURE — 2580000003 HC RX 258: Performed by: INTERNAL MEDICINE

## 2021-01-28 PROCEDURE — 80061 LIPID PANEL: CPT

## 2021-01-28 PROCEDURE — 36415 COLL VENOUS BLD VENIPUNCTURE: CPT

## 2021-01-28 PROCEDURE — 6370000000 HC RX 637 (ALT 250 FOR IP): Performed by: REGISTERED NURSE

## 2021-01-28 PROCEDURE — 93005 ELECTROCARDIOGRAM TRACING: CPT | Performed by: INTERNAL MEDICINE

## 2021-01-28 PROCEDURE — 99232 SBSQ HOSP IP/OBS MODERATE 35: CPT | Performed by: NURSE PRACTITIONER

## 2021-01-28 PROCEDURE — 85027 COMPLETE CBC AUTOMATED: CPT

## 2021-01-28 PROCEDURE — 6370000000 HC RX 637 (ALT 250 FOR IP): Performed by: INTERNAL MEDICINE

## 2021-01-28 PROCEDURE — 94761 N-INVAS EAR/PLS OXIMETRY MLT: CPT

## 2021-01-28 PROCEDURE — 94640 AIRWAY INHALATION TREATMENT: CPT

## 2021-01-28 PROCEDURE — 93010 ELECTROCARDIOGRAM REPORT: CPT | Performed by: INTERNAL MEDICINE

## 2021-01-28 PROCEDURE — 6360000002 HC RX W HCPCS: Performed by: INTERNAL MEDICINE

## 2021-01-28 PROCEDURE — 83036 HEMOGLOBIN GLYCOSYLATED A1C: CPT

## 2021-01-28 PROCEDURE — 80048 BASIC METABOLIC PNL TOTAL CA: CPT

## 2021-01-28 PROCEDURE — 2700000000 HC OXYGEN THERAPY PER DAY

## 2021-01-28 RX ORDER — METOPROLOL SUCCINATE 25 MG/1
12.5 TABLET, EXTENDED RELEASE ORAL DAILY
Qty: 45 TABLET | Refills: 1 | Status: SHIPPED | OUTPATIENT
Start: 2021-01-28 | End: 2021-07-30

## 2021-01-28 RX ADMIN — TAMSULOSIN HYDROCHLORIDE 0.4 MG: 0.4 CAPSULE ORAL at 09:45

## 2021-01-28 RX ADMIN — CETIRIZINE HYDROCHLORIDE 10 MG: 10 TABLET, FILM COATED ORAL at 09:45

## 2021-01-28 RX ADMIN — GABAPENTIN 400 MG: 400 CAPSULE ORAL at 09:44

## 2021-01-28 RX ADMIN — CLOPIDOGREL BISULFATE 75 MG: 75 TABLET ORAL at 09:45

## 2021-01-28 RX ADMIN — PANTOPRAZOLE SODIUM 40 MG: 40 TABLET, DELAYED RELEASE ORAL at 05:26

## 2021-01-28 RX ADMIN — ALBUTEROL SULFATE 2.5 MG: 2.5 SOLUTION RESPIRATORY (INHALATION) at 12:05

## 2021-01-28 RX ADMIN — ALBUTEROL SULFATE 2.5 MG: 2.5 SOLUTION RESPIRATORY (INHALATION) at 08:15

## 2021-01-28 RX ADMIN — ASPIRIN 81 MG: 81 TABLET, COATED ORAL at 09:44

## 2021-01-28 RX ADMIN — Medication 2 PUFF: at 08:15

## 2021-01-28 RX ADMIN — Medication 10 ML: at 09:45

## 2021-01-28 ASSESSMENT — PAIN SCALES - GENERAL: PAINLEVEL_OUTOF10: 0

## 2021-01-28 NOTE — PROGRESS NOTES
[address]   Daily Progress Note    Admit Date:  1/27/2021  HPI:  No chief complaint on file. Interval history: Zach Lou presented with Chest pain to [facility] 1/25/21. Hx of HLD, DM, COPD, chronic hypoxemic respiratory failure. Abnormal stress testing 1/26/21 showing small area of apical ischemia. Transferred to [facility] 1/27/21 underwent LHC showing 95% OM1 lesion s/p POBA and cutting balloon, no stent due to proximity of bifurcation/left main. 100% RCA prox  with extensive collaterals. Subjective:  Mr. Christy Bernal denies any chest pain. Denies any shortness of breath. No more back pain, improved after not laying flat anymore. Has ambulated without dizziness or lightheadedness. Objective:   /89   Pulse 68   Temp 97.8 °F (36.6 °C) (Oral)   Resp 16   Ht 5' 8\" (1.727 m)   Wt 156 lb 1.6 oz (70.8 kg)   SpO2 97%   BMI 23.73 kg/m²       Intake/Output Summary (Last 24 hours) at 1/28/2021 1117  Last data filed at 1/28/2021 1115  Gross per 24 hour   Intake 490 ml   Output 650 ml   Net -160 ml       NYHA: II    Physical Exam:  General:  Awake, alert, NAD  Skin:  Warm and dry  Neck:  JVD<8  Chest:  Clear to auscultation, no wheezes/rhonchi/rales  Telemetry: NSR rate 70's  Cardiovascular:  RRR S1S2, no m/r/g   Abdomen:  Soft, nontender, +bowel sounds  Extremities:  No bilateral lower extremity edema, right groin without hematoma or bruising.  2+ femoral pulses + pedal pulses     Medications:    clopidogrel  75 mg Oral Daily    sodium chloride flush  10 mL Intravenous 2 times per day    atorvastatin  40 mg Oral Daily    aspirin  81 mg Oral Daily    pantoprazole  40 mg Oral BID AC    budesonide-formoterol  2 puff Inhalation BID    tamsulosin  0.4 mg Oral Daily    gabapentin  400 mg Oral TID    cetirizine  10 mg Oral Daily    montelukast  10 mg Oral Nightly    albuterol  2.5 mg Nebulization Q4H WA    insulin lispro  0-6 Units Subcutaneous TID WC    insulin lispro 0-3 Units Subcutaneous Nightly      dextrose         Lab Data:  CBC:   Recent Labs     01/25/21  1645 01/26/21  0559 01/28/21  0525   WBC 5.1 4.2 5.7   HGB 11.8* 11.6* 11.4*    175 170     BMP:    Recent Labs     01/26/21  0559 01/27/21  0928 01/28/21  0525    140 138   K 4.4 4.3 4.4   CO2 32 33* 28   BUN 16 13 13   CREATININE 0.9 0.8* 0.7*     INR:    Recent Labs     01/25/21  1645   INR 0.98     BNP:    Recent Labs     01/25/21  1645   PROBNP 116     No results found for: LVEF, LVEFMODE    Testing:  Stress test:Lexiscan myoview stress test 01/26/21  Summary  Normal LVEF >60%  Grossly normal wall motion (cannot exclude relative hypokinesis of basal  inferior wall)  Fixed inferior defect suggestive of attenuation artifact, however, scar  cannot be excluded.  Small area of apical ischemia     Left Heart Cath:    Findings   LVEDP 5   LVEF 55%   LV wall motion normal   Gradient across AV None   Mitral regurg none      Coronary Angiogram:  Artery Findings/Result   LM luminals   LAD Luminals, extensive L->Rt collaterals   LCx Luminals, extensive L->Rt collaterals  OM1- high ramus, 95%ostial/prox, TIMI3 flow         RCA Dominant, 100% prox chronic occlusion            Results of intervention      Lesion 1: prox Om1  Stent:  Pre:   90% stenosis, WILVER 3 flow  Post: 0% stenosis, WILVER 3 flow     Assessment/Plan  1. Critical stenosis OM1, POBA and cutting ballon. No stent due to proximity to bifurcation/left main  2. ASA 81mg qday for life, plavix 75mg poqday for 1 yr w/o intturuption                - BB, statin as tolerated    Active Problems:    Chest pain    Abnormal stress test  Resolved Problems:    * No resolved hospital problems.  *      Assessment:  Chest pain  CAD s/p POBA and cutting balloon of OM1, no stent due to bifurcation   of RCA  HLD  DM  TObacco abuse  COPD  Chronic hypoxemic respiratory failure     Plan:  Continue aspirin and plavix  lipitor 40mg daily  Add beta blocker toprol 12.5mg daily Discussed activity  Okay for discharge, follow up arranged    Discussed with nursing and IM    Opal Joiner CNP, 1/28/2021, 11:24 AM

## 2021-01-28 NOTE — CARE COORDINATION
Discharge order noted. Spoke with NP and patient who state patient is independent at home and has no needs from CM. He has oxygen at home that he uses only at night.

## 2021-01-28 NOTE — DISCHARGE SUMMARY
Hospital Medicine Discharge Summary    Patient ID: Amador Contreras      Patient's PCP: Marycarmen Anguiano PA-C    Admit Date: 1/27/2021     Discharge Date:   1/28/2021    Admitting Physician: Uche Taylor MD     Discharge Physician: SHANTHI Adamson - CNP     Discharge Diagnoses: Active Hospital Problems    Diagnosis    Abnormal stress test [R94.39]    Chest pain [R07.9]       The patient was seen and examined on day of discharge and this discharge summary is in conjunction with any daily progress note from day of discharge. Hospital Course:   64 y.o. male with PMH of COPD on 4 LPM, PAD, GERD and DM2 who presented to [facility] on 1/25/21 due to complaints of chest pain. EKG was non-acute in the ED. Serial troponin trend was negative. Pt underwent stress test which was abnormal - small area of apical ischemia. Cardiology consulted. Pt transferred to [facility] for cardiac cath. Pt is s/p LHC on 1/27/21which showed critical stenosis of OM1, POBA and cutting balloon, no stent due to proximity to bifurcation/left main. Pt currently denies chest pain or dyspnea. No N/V/D. No fever chills. Hospitalist service was consulted for admission post-cath. Chest pain (resolved)  - EKG non-acute in ED. Troponins negative x 3.  - Stress test obtained which was abnormal - small area of apical ischemia. - Cardiology consulted. Transferred to [facility] for [address] [place] [address].  - S/p LHC on 1/27 which showed critical stenosis of OM1, POBA and cutting balloon, no stent due to proximity to bifurcation/left main. - Continue aspirin, plavix, statin and BB.      Chronic hypoxic respiratory failure due to COPD  - Stable, no evidence of exacerbation.   - On baseline 4 LPM of supplemental O2.   - Continue inhaled bronchodilator therapy.      Diabetes mellitus type 2 with neuropathy   - Blood glucose well controlled, no need for SSI while inpt. Not on any meds at home. - Continue his home gabapentin.    - Carb-control diet.      PAD  - Continue ASA, Plavix, Lipitor.     GERD  - Stable. Continue PPI. Physical Exam Performed:     /89   Pulse 68   Temp 97.8 °F (36.6 °C) (Oral)   Resp 16   Ht 5' 8\" (1.727 m)   Wt 156 lb 1.6 oz (70.8 kg)   SpO2 93%   BMI 23.73 kg/m²       General appearance:  No apparent distress, appears stated age and cooperative. HEENT:  Normal cephalic, atraumatic without obvious deformity. Pupils equal, round, and reactive to light. Extra ocular muscles intact. Conjunctivae/corneas clear. Neck: Supple, with full range of motion. No jugular venous distention. Trachea midline. Respiratory:  Normal respiratory effort. Clear to auscultation, bilaterally without Rales/Wheezes/Rhonchi. Cardiovascular:  Regular rate and rhythm with normal S1/S2 without murmurs, rubs or gallops. Abdomen: Soft, non-tender, non-distended with normal bowel sounds. Musculoskeletal:  No clubbing, cyanosis or edema bilaterally. Full range of motion without deformity. Skin: Skin color, texture, turgor normal.  No rashes or lesions. Neurologic:  Neurovascularly intact without any focal sensory/motor deficits. Cranial nerves: II-XII intact, grossly non-focal.  Psychiatric:  Alert and oriented, thought content appropriate, normal insight  Capillary Refill: Brisk,< 3 seconds   Peripheral Pulses: +2 palpable, equal bilaterally       Labs:  For convenience and continuity at follow-up the following most recent labs are provided:      CBC:    Lab Results   Component Value Date    WBC 5.7 01/28/2021    HGB 11.4 01/28/2021    HCT 35.7 01/28/2021     01/28/2021       Renal:    Lab Results   Component Value Date     01/28/2021    K 4.4 01/28/2021    K 4.4 01/26/2021     01/28/2021    CO2 28 01/28/2021    BUN 13 01/28/2021    CREATININE 0.7 01/28/2021    CALCIUM 9.0 01/28/2021    PHOS 4.4 01/27/2021         Significant Diagnostic Studies    Radiology:   No orders to display          Consults:     IP CONSULT TO CARDIAC REHAB    Disposition:  Home     Condition at Discharge: Stable    Discharge Instructions/Follow-up:  Follow-up with PCP and Cardiology    Code Status:  Full Code     Activity: activity as tolerated    Diet: diabetic diet      Discharge Medications:     Current Discharge Medication List           Details   metoprolol succinate (TOPROL XL) 25 MG extended release tablet Take 0.5 tablets by mouth daily  Qty: 45 tablet, Refills: 1              Details   divalproex (DEPAKOTE) 500 MG DR tablet       QUEtiapine (SEROQUEL) 300 MG tablet       dicyclomine (BENTYL) 10 MG capsule Take 1 capsule by mouth 4 times daily as needed (abdominal pain/cramping)  Qty: 20 capsule, Refills: 0      Multiple Vitamin (DAILY-CRISTINE PO) Take 1 tablet by mouth daily      clopidogrel (PLAVIX) 75 MG tablet Take 75 mg by mouth daily      pantoprazole sodium (PROTONIX) 40 MG PACK packet Take 40 mg by mouth 2 times daily (before meals)       nitroGLYCERIN (NITRODUR) 0.2 MG/HR Place 1 patch onto the skin daily      fluticasone-vilanterol (BREO ELLIPTA) 100-25 MCG/INH AEPB inhaler Inhale 1 puff into the lungs daily  Qty: 1 each, Refills: 3      tamsulosin (FLOMAX) 0.4 MG capsule Take 0.4 mg by mouth daily      simvastatin (ZOCOR) 40 MG tablet Take 40 mg by mouth nightly      gabapentin (NEURONTIN) 300 MG capsule Take 400 mg by mouth 3 times daily . aspirin 81 MG tablet Take 81 mg by mouth daily      cetirizine (ZYRTEC) 10 MG tablet Take 10 mg by mouth daily      hydrOXYzine (VISTARIL) 25 MG capsule Take 25 mg by mouth 3 times daily as needed for Itching      albuterol (PROVENTIL HFA;VENTOLIN HFA) 108 (90 BASE) MCG/ACT inhaler Inhale 1 puff into the lungs every 6 hours as needed. fluticasone (FLONASE) 50 MCG/ACT nasal spray 1 spray by Nasal route daily. Indications: EACH NOSTRIL      montelukast (SINGULAIR) 10 MG tablet Take 10 mg by mouth nightly. cyclobenzaprine (FLEXERIL) 10 MG tablet Take 10 mg by mouth 3 times daily as needed. omeprazole (PRILOSEC) 20 MG capsule Take 40 mg by mouth 2 times daily              Time Spent on discharge is more than 45 minutes in the examination, evaluation, counseling and review of medications and discharge plan. Signed:    Regina Bates, SHANTHI - CNP   1/28/2021      Thank you Vesna Grayson PA-C for the opportunity to be involved in this patient's care. If you have any questions or concerns please feel free to contact me at [phone].

## 2021-01-28 NOTE — PROGRESS NOTES
Pt is post-cath, on bedrest until 2215. Complaining of pain after receiving PRN Tylenol. Is there anything we can get for him for pain?      Page sent to Stefano Yarbrough CNP

## 2021-01-28 NOTE — PROGRESS NOTES
RESPIRATORY THERAPY ASSESSMENT    [facility] Record Number:  [id number]  Age: 64 y.o. Gender: male  : 1960  Today's Date:  2021  Room:  [facility]/6108-22    Assessment     Is the patient being admitted for a COPD or Asthma exacerbation? No   (If yes the patient will be seen every 4 hours for the first 24 hours and then reassessed)    Patient Admission Diagnosis      Allergies  Allergies   Allergen Reactions    Methylphenidate      Other reaction(s): Hyperactive behavior    Oxycodone-Acetaminophen     Propoxyphene     Ritalin [Methylphenidate Hcl] Anxiety       Minimum Predicted Vital Capacity:     N/A          Actual Vital Capacity:      N/A              Pulmonary History:COPD and Asthma  Home Oxygen Therapy:  room air  Home Respiratory Therapy:Albuterol and Vilanterol/Fluticasone Furoate   Current Respiratory Therapy:  Albuterol HHN PRN, Symbicort BID  Treatment Type: MDI  Medications: Budesonide/Formoterol    Respiratory Severity Index(RSI)   Patients with orders for inhalation medications, oxygen, or any therapeutic treatment modality will be placed on Respiratory Protocol. They will be assessed with the first treatment and at least every 72 hours thereafter. The following severity scale will be used to determine frequency of treatment intervention.     Smoking History: Smoking History Less than 1ppd or less than 15 pack year = 1    Social History  Social History     Tobacco Use    Smoking status: Former Smoker     Packs/day: 0.25     Years: 25.00     Pack years: 6.25     Quit date: 2019     Years since quittin.9    Smokeless tobacco: Former User     Quit date: 10/3/2015   Substance Use Topics    Alcohol use: No     Alcohol/week: 0.0 standard drinks    Drug use: No       Recent Surgical History: None = 0  Past Surgical History  Past Surgical History:   Procedure Laterality Date    CARDIAC SURGERY      stent placed    CARPAL TUNNEL RELEASE      CHOLECYSTECTOMY, LAPAROSCOPIC      KNEE ARTHROPLASTY      R knee x4    NASAL SINUS SURGERY      UPPER GASTROINTESTINAL ENDOSCOPY  7/14/11    UPPER GASTROINTESTINAL ENDOSCOPY N/A 4/4/2019    EGD BIOPSY performed by Megan Ramirez DO at [facility]       Level of Consciousness: Alert, Oriented, and Cooperative = 0    Level of Activity: Non weight bearing- transfers bed to chair only = 3    Respiratory Pattern: Regular Pattern; RR 8-20 = 0    Breath Sounds: Diminshed bilaterally and/or crackles = 2    Sputum   ,  ,    Cough: Strong, spontaneous, non-productive = 0    Vital Signs   BP (!) 143/91   Pulse 73   Temp 97.6 °F (36.4 °C) (Oral)   Resp 18   Ht 5' 8\" (1.727 m)   Wt 160 lb (72.6 kg)   SpO2 96%   BMI 24.33 kg/m²   SPO2 (COPD values may differ): 90-91% on room air or greater than 92% on FiO2 24- 28% = 1    Peak Flow (asthma only): not applicable = 0    RSI: 7-8 = BID and Q4HPRN (every four hours as needed) for dyspnea        Plan       Goals: medication delivery, mobilize retained secretions, volume expansion and improve oxygenation    Patient/caregiver was educated on the proper method of use for Respiratory Care Devices:  Yes      Level of patient/caregiver understanding able to:   ? Verbalize understanding   ? Demonstrate understanding       ? Teach back        ? Needs reinforcement       ? No available caregiver               ? Other:     Response to education:  Good     Is patient being placed on Home Treatment Regimen? Yes     Does the patient have everything they need prior to discharge? NA     Comments: Evaluated pt and reviewed chart. Plan of Care: Albuterol HHN Q4WA, Symbicort BID    Electronically signed by Anjel Tesfaye RCP on 1/27/2021 at 8:14 PM    Respiratory Protocol Guidelines     1. Assessment and treatment by Respiratory Therapy will be initiated for medication and therapeutic interventions upon initiation of aerosolized medication.   2. Physician will be

## 2021-02-02 LAB — POC ACT LR: 352 SEC

## 2021-02-05 ENCOUNTER — TELEPHONE (OUTPATIENT)
Dept: ORTHOPEDIC SURGERY | Age: 61
End: 2021-02-05

## 2021-02-08 PROBLEM — E78.49 OTHER HYPERLIPIDEMIA: Status: ACTIVE | Noted: 2021-02-08

## 2021-02-08 PROBLEM — I25.10 CORONARY ARTERY DISEASE INVOLVING NATIVE CORONARY ARTERY OF NATIVE HEART WITHOUT ANGINA PECTORIS: Status: ACTIVE | Noted: 2021-02-08

## 2021-02-08 PROBLEM — I10 ESSENTIAL HYPERTENSION: Status: ACTIVE | Noted: 2021-02-08

## 2021-03-10 ENCOUNTER — HOSPITAL ENCOUNTER (OUTPATIENT)
Age: 61
Discharge: HOME | End: 2021-03-10
Payer: MEDICAID

## 2021-03-10 ENCOUNTER — HOSPITAL ENCOUNTER (OUTPATIENT)
Dept: GENERAL RADIOLOGY | Age: 61
Discharge: HOME | End: 2021-03-10
Payer: MEDICAID

## 2021-03-10 DIAGNOSIS — M25.511 ACUTE PAIN OF BOTH SHOULDERS: ICD-10-CM

## 2021-03-10 DIAGNOSIS — M25.512 ACUTE PAIN OF LEFT SHOULDER: ICD-10-CM

## 2021-03-10 DIAGNOSIS — M25.512 ACUTE PAIN OF BOTH SHOULDERS: ICD-10-CM

## 2021-03-10 PROCEDURE — 73030 X-RAY EXAM OF SHOULDER: CPT

## 2021-03-18 ENCOUNTER — OFFICE VISIT (OUTPATIENT)
Dept: ORTHOPEDIC SURGERY | Age: 61
End: 2021-03-18
Payer: MEDICAID

## 2021-03-18 VITALS — HEIGHT: 68 IN | WEIGHT: 156 LBS | BODY MASS INDEX: 23.64 KG/M2

## 2021-03-18 DIAGNOSIS — M25.561 ACUTE PAIN OF RIGHT KNEE: ICD-10-CM

## 2021-03-18 DIAGNOSIS — T79.2XXA TRAUMATIC SEROMA OF RIGHT LOWER LEG, INITIAL ENCOUNTER (HCC): Primary | ICD-10-CM

## 2021-03-18 PROCEDURE — 99213 OFFICE O/P EST LOW 20 MIN: CPT | Performed by: ORTHOPAEDIC SURGERY

## 2021-03-18 PROCEDURE — 20610 DRAIN/INJ JOINT/BURSA W/O US: CPT | Performed by: ORTHOPAEDIC SURGERY

## 2021-03-18 RX ORDER — TIZANIDINE 4 MG/1
4 TABLET ORAL 3 TIMES DAILY
Qty: 90 TABLET | Refills: 0 | Status: SHIPPED | OUTPATIENT
Start: 2021-03-18 | End: 2021-04-09

## 2021-03-18 RX ORDER — LIDOCAINE HYDROCHLORIDE 10 MG/ML
3 INJECTION, SOLUTION INFILTRATION; PERINEURAL ONCE
Status: COMPLETED | OUTPATIENT
Start: 2021-03-18 | End: 2021-03-18

## 2021-03-18 RX ADMIN — LIDOCAINE HYDROCHLORIDE 3 ML: 10 INJECTION, SOLUTION INFILTRATION; PERINEURAL at 14:17

## 2021-03-18 NOTE — PROGRESS NOTES
KNEE VISIT      HISTORY OF PRESENT ILLNESS    Camryn Joe is a 63 y.o. male who presents for evaluation of his right knee. He has had multiple surgeries in the knee by the providers. He presents here because he has some swelling in the medial side of the knee and says it needs to be drained. May bend his knee does have some swelling is visible in the medial side of the knee. He grades the pain 3/10. He says the meloxicam is taking is not working well. ROS    Well-documented patient history form dated 3/18/2021  All other ROS negative except for above.     Past Surgical history    Past Surgical History:   Procedure Laterality Date    CARDIAC SURGERY      stent placed    CARPAL TUNNEL RELEASE      CHOLECYSTECTOMY, LAPAROSCOPIC      KNEE ARTHROPLASTY      R knee x4    NASAL SINUS SURGERY      UPPER GASTROINTESTINAL ENDOSCOPY  7/14/11    UPPER GASTROINTESTINAL ENDOSCOPY N/A 4/4/2019    EGD BIOPSY performed by Denise Celestin DO at [facility]    Past Medical History:   Diagnosis Date    Anxiety     [facility]     Asthma     COPD [facility]chronic obstructive pulmonary disease) [facility]     Coronary artery disease involving native coronary artery of native heart without angina pectoris 2/8/2021    Diabetes mellitus ([facility]     Essential hypertension 2/8/2021    Hyperlipidemia     Pneumonia        Allergies    Allergies   Allergen Reactions    Methylphenidate      Other reaction(s): Hyperactive behavior    Oxycodone-Acetaminophen     Propoxyphene     Ritalin [Methylphenidate Hcl] Anxiety       Meds    Current Outpatient Medications   Medication Sig Dispense Refill    oxaprozin (DAYPRO) 600 MG tablet Take 1 tablet by mouth daily 30 tablet 0    metoprolol succinate (TOPROL XL) 25 MG extended release tablet Take 0.5 tablets by mouth daily 45 tablet 1    divalproex (DEPAKOTE) 500 MG DR tablet       QUEtiapine (SEROQUEL) 300 MG tablet       dicyclomine (BENTYL) 10 MG capsule Take 1 capsule by mouth 4 times daily as needed (abdominal pain/cramping) 20 capsule 0    Multiple Vitamin (DAILY-CRISTINE PO) Take 1 tablet by mouth daily      clopidogrel (PLAVIX) 75 MG tablet Take 75 mg by mouth daily      pantoprazole sodium (PROTONIX) 40 MG PACK packet Take 40 mg by mouth 2 times daily (before meals)       nitroGLYCERIN (NITRODUR) 0.2 MG/HR Place 1 patch onto the skin daily      fluticasone-vilanterol (BREO ELLIPTA) 100-25 MCG/INH AEPB inhaler Inhale 1 puff into the lungs daily 1 each 3    tamsulosin (FLOMAX) 0.4 MG capsule Take 0.4 mg by mouth daily      simvastatin (ZOCOR) 40 MG tablet Take 40 mg by mouth nightly      gabapentin (NEURONTIN) 300 MG capsule Take 400 mg by mouth 3 times daily .  aspirin 81 MG tablet Take 81 mg by mouth daily      cetirizine (ZYRTEC) 10 MG tablet Take 10 mg by mouth daily      hydrOXYzine (VISTARIL) 25 MG capsule Take 25 mg by mouth 3 times daily as needed for Itching      albuterol (PROVENTIL HFA;VENTOLIN HFA) 108 (90 BASE) MCG/ACT inhaler Inhale 1 puff into the lungs every 6 hours as needed.  fluticasone (FLONASE) 50 MCG/ACT nasal spray 1 spray by Nasal route daily. Indications: EACH NOSTRIL      montelukast (SINGULAIR) 10 MG tablet Take 10 mg by mouth nightly.  cyclobenzaprine (FLEXERIL) 10 MG tablet Take 10 mg by mouth 3 times daily as needed.  omeprazole (PRILOSEC) 20 MG capsule Take 40 mg by mouth 2 times daily        No current facility-administered medications for this visit. Social    Social History     Socioeconomic History    Marital status:       Spouse name: Not on file    Number of children: Not on file    Years of education: Not on file    Highest education level: Not on file   Occupational History    Not on file   Social Needs    Financial resource strain: Not on file    Food insecurity     Worry: Not on file     Inability: Not on file   [employer] needs Medical: Not on file     Non-medical: Not on file   Tobacco Use    Smoking status: Former Smoker     Packs/day: 0.25     Years: 25.00     Pack years: 6.25     Quit date: 2019     Years since quittin.1    Smokeless tobacco: Former User     Quit date: 10/3/2015   Substance and Sexual Activity    Alcohol use: No     Alcohol/week: 0.0 standard drinks    Drug use: No    Sexual activity: Yes     Partners: Female     Comment: smoke 2 cigaretees a day   Lifestyle    Physical activity     Days per week: Not on file     Minutes per session: Not on file    Stress: Not on file   Relationships    Social connections     Talks on phone: Not on file     Gets together: Not on file     Attends Congregational service: Not on file     Active member of club or organization: Not on file     Attends meetings of clubs or organizations: Not on file     Relationship status: Not on file    Intimate partner violence     Fear of current or ex partner: Not on file     Emotionally abused: Not on file     Physically abused: Not on file     Forced sexual activity: Not on file   Other Topics Concern    Not on file   Social History Narrative    Not on file       Family HISTORY    Family History   Problem Relation Age of Onset    High Blood Pressure Mother     Heart Disease Mother     High Blood Pressure Father     Heart Disease Father     Cancer Sister     Diabetes Sister     Other Sister         aneursym-brain       PHYSICAL EXAM    Vital Signs:  Ht 5' 8\" (1.727 m)   Wt 156 lb (70.8 kg)   BMI 23.72 kg/m²   General Appearance:  Normal body habitus. Alert and oriented to person, place, and time. Affect:  Normal.   Gait:  Normal. Good balance and coordination. Skin:  Intact. Sensation:  Intact. Strength:  Intact. Reflexes:  Intact. Pulses:  Intact. Knee Exam:    Effusion: Negative but he has a cyst but appears to be a small seroma in the subcutaneous tissue over the VMO.     Range of Motion Right Left   Extension 0 0 Flexion 115 115     Provocative Test Right Left    Positive Negative Positive Negative   Anterior drawer [] [x] [] [x]   Lachman [] [x] [] [x]   Posterior drawer [] [x] [] [x]   Varus testing [] [x] [] [x]   Valgus testing [] [x] [] [x]   Joint line tenderness [x] [] [] [x]     Additional Exam Comments: His neurocirculatory lymphatic exam is normal symmetric both lower extremities. He has no gross instability. IMAGING STUDIES    X-rays 3 views right knee are unremarkable for acute or chronic pathology    IMPRESSION    Right knee pain secondary to seroma    PLAN      1. Conservative care options including physical therapy, NSAIDs, bracing, and activity modification were discussed. 2.  The indications for therapeutic injections were discussed. 3.  The indications for additional imaging studies were discussed. 4.  After considering the various options discussed, the patient elected to pursue a course that includes aspiration of the seroma. I thought about giving him Daypro as opposed to meloxicam but he is on Plavix I told him he really should not take anti-inflammatory medication. Under sterile Betadine prep using 1% lidocaine as local anesthetic attempted aspiration of cerumen was performed at about 2 cc but nothing else expressed. He appeared to tolerate the procedure well and will observe its effect.

## 2021-04-09 DIAGNOSIS — T79.2XXA TRAUMATIC SEROMA OF RIGHT LOWER LEG, INITIAL ENCOUNTER (HCC): ICD-10-CM

## 2021-04-09 DIAGNOSIS — M25.561 ACUTE PAIN OF RIGHT KNEE: ICD-10-CM

## 2021-04-09 RX ORDER — TIZANIDINE 4 MG/1
TABLET ORAL
Qty: 90 TABLET | Refills: 0 | Status: SHIPPED | OUTPATIENT
Start: 2021-04-09 | End: 2021-05-05

## 2021-04-15 ENCOUNTER — OFFICE VISIT (OUTPATIENT)
Dept: ORTHOPEDIC SURGERY | Age: 61
End: 2021-04-15
Payer: MEDICAID

## 2021-04-15 VITALS — HEIGHT: 68 IN | BODY MASS INDEX: 23.64 KG/M2 | WEIGHT: 156 LBS

## 2021-04-15 DIAGNOSIS — R22.41 MASS OF KNEE, RIGHT: Primary | ICD-10-CM

## 2021-04-15 PROCEDURE — G8420 CALC BMI NORM PARAMETERS: HCPCS | Performed by: ORTHOPAEDIC SURGERY

## 2021-04-15 PROCEDURE — 3017F COLORECTAL CA SCREEN DOC REV: CPT | Performed by: ORTHOPAEDIC SURGERY

## 2021-04-15 PROCEDURE — G8428 CUR MEDS NOT DOCUMENT: HCPCS | Performed by: ORTHOPAEDIC SURGERY

## 2021-04-15 PROCEDURE — 1036F TOBACCO NON-USER: CPT | Performed by: ORTHOPAEDIC SURGERY

## 2021-04-15 PROCEDURE — 99212 OFFICE O/P EST SF 10 MIN: CPT | Performed by: ORTHOPAEDIC SURGERY

## 2021-04-21 ENCOUNTER — TELEPHONE (OUTPATIENT)
Dept: ORTHOPEDIC SURGERY | Age: 61
End: 2021-04-21

## 2021-04-21 NOTE — TELEPHONE ENCOUNTER
Left voicemail for patient that their MRI has been authorized and that they can call and schedule scan at their convenience. Also told them that they can call and schedule a f/u with Dr. Claudia Issa once they have MRI scheduled, leaving at least 2-3 days for our office to receive their results.

## 2021-05-04 DIAGNOSIS — R22.41 MASS OF KNEE, RIGHT: Primary | ICD-10-CM

## 2021-05-05 DIAGNOSIS — M25.561 ACUTE PAIN OF RIGHT KNEE: ICD-10-CM

## 2021-05-05 DIAGNOSIS — T79.2XXA TRAUMATIC SEROMA OF RIGHT LOWER LEG, INITIAL ENCOUNTER (HCC): ICD-10-CM

## 2021-05-05 RX ORDER — TIZANIDINE 4 MG/1
TABLET ORAL
Qty: 90 TABLET | Refills: 0 | Status: SHIPPED | OUTPATIENT
Start: 2021-05-05

## 2021-05-07 ENCOUNTER — HOSPITAL ENCOUNTER (OUTPATIENT)
Dept: MRI IMAGING | Age: 61
Discharge: HOME | End: 2021-05-07
Payer: MEDICAID

## 2021-05-07 ENCOUNTER — HOSPITAL ENCOUNTER (OUTPATIENT)
Age: 61
Discharge: HOME | End: 2021-05-07
Payer: MEDICAID

## 2021-05-07 DIAGNOSIS — R22.41 MASS OF KNEE, RIGHT: ICD-10-CM

## 2021-05-07 LAB
A/G RATIO: 1.3 (ref 1.1–2.2)
ALBUMIN SERPL-MCNC: 4.3 G/DL (ref 3.4–5)
ALP BLD-CCNC: 95 U/L (ref 40–129)
ALT SERPL-CCNC: 8 U/L (ref 10–40)
ANION GAP SERPL CALCULATED.3IONS-SCNC: 9 MMOL/L (ref 3–16)
AST SERPL-CCNC: 15 U/L (ref 15–37)
BILIRUB SERPL-MCNC: 0.3 MG/DL (ref 0–1)
BUN BLDV-MCNC: 10 MG/DL (ref 7–20)
CALCIUM SERPL-MCNC: 9.4 MG/DL (ref 8.3–10.6)
CHLORIDE BLD-SCNC: 105 MMOL/L (ref 99–110)
CO2: 27 MMOL/L (ref 21–32)
CREAT SERPL-MCNC: 0.8 MG/DL (ref 0.9–1.3)
GFR AFRICAN AMERICAN: >60
GFR NON-AFRICAN AMERICAN: >60
GLOBULIN: 3.3 G/DL
GLUCOSE BLD-MCNC: 71 MG/DL (ref 70–99)
POTASSIUM SERPL-SCNC: 4.1 MMOL/L (ref 3.5–5.1)
SODIUM BLD-SCNC: 141 MMOL/L (ref 136–145)
TOTAL PROTEIN: 7.6 G/DL (ref 6.4–8.2)

## 2021-05-07 PROCEDURE — 73723 MRI JOINT LWR EXTR W/O&W/DYE: CPT

## 2021-05-07 PROCEDURE — A9579 GAD-BASE MR CONTRAST NOS,1ML: HCPCS | Performed by: ORTHOPAEDIC SURGERY

## 2021-05-07 PROCEDURE — 6360000004 HC RX CONTRAST MEDICATION: Performed by: ORTHOPAEDIC SURGERY

## 2021-05-07 PROCEDURE — 36415 COLL VENOUS BLD VENIPUNCTURE: CPT

## 2021-05-07 PROCEDURE — 80053 COMPREHEN METABOLIC PANEL: CPT

## 2021-05-07 RX ADMIN — GADOTERIDOL 15 ML: 279.3 INJECTION, SOLUTION INTRAVENOUS at 13:10

## 2021-05-19 ENCOUNTER — OFFICE VISIT (OUTPATIENT)
Dept: ORTHOPEDIC SURGERY | Age: 61
End: 2021-05-19
Payer: MEDICAID

## 2021-05-19 DIAGNOSIS — M25.561 ACUTE PAIN OF RIGHT KNEE: Primary | ICD-10-CM

## 2021-05-19 PROCEDURE — L1810 KO ELASTIC WITH JOINTS: HCPCS | Performed by: ORTHOPAEDIC SURGERY

## 2021-05-19 PROCEDURE — G8420 CALC BMI NORM PARAMETERS: HCPCS | Performed by: ORTHOPAEDIC SURGERY

## 2021-05-19 PROCEDURE — 99212 OFFICE O/P EST SF 10 MIN: CPT | Performed by: ORTHOPAEDIC SURGERY

## 2021-05-19 PROCEDURE — 3017F COLORECTAL CA SCREEN DOC REV: CPT | Performed by: ORTHOPAEDIC SURGERY

## 2021-05-19 PROCEDURE — G8428 CUR MEDS NOT DOCUMENT: HCPCS | Performed by: ORTHOPAEDIC SURGERY

## 2021-05-19 PROCEDURE — 1036F TOBACCO NON-USER: CPT | Performed by: ORTHOPAEDIC SURGERY

## 2021-05-19 NOTE — PROGRESS NOTES
He returns after his MRI. The MRI is fairly unremarkable showing some mild arthritis but no mass is noted. He asked me if I could take his kneecap out and replaced with another 1 told me know. I would not recommend anti-inflammatory medications since he has a lot of COPD which may be aggravated by that medication. I am basically at a loss as to what to do for him. I do not see any overt signs of anything I can do surgically to help the pain where he has it located. He does try and can use Voltaren gel but would recommend he be referred to Dr. Marisa Recinos for second opinion to ensure that I am not overlooking anything that may benefit him. He has a sense of instability and is requesting a knee brace. I would recommend an economy hinge        Procedures    Breg / DJO Economy Hinged Knee Brace     Patient was prescribed an Economy Hinged Knee Brace. The right knee will require stabilization / immobilization from this semi-rigid / rigid orthosis to improve their function. The orthosis will assist in protecting the affected area, provide functional support and facilitate healing. The patient was educated and fit by a healthcare professional with expert knowledge and specialization in brace application while under the direct supervision of the treating physician. Verbal and written instructions for the use of and application of this item were provided. They were instructed to contact the office immediately should the brace result in increased pain, decreased sensation, increased swelling or worsening of the condition.

## 2021-06-18 ENCOUNTER — OFFICE VISIT (OUTPATIENT)
Dept: ORTHOPEDIC SURGERY | Age: 61
End: 2021-06-18
Payer: MEDICAID

## 2021-06-18 VITALS — HEIGHT: 68 IN | BODY MASS INDEX: 23.64 KG/M2 | WEIGHT: 156 LBS

## 2021-06-18 DIAGNOSIS — M62.559 ATROPHY OF QUADRICEPS FEMORIS MUSCLE: ICD-10-CM

## 2021-06-18 DIAGNOSIS — M25.561 RIGHT ANTERIOR KNEE PAIN: Primary | ICD-10-CM

## 2021-06-18 PROCEDURE — G8427 DOCREV CUR MEDS BY ELIG CLIN: HCPCS | Performed by: ORTHOPAEDIC SURGERY

## 2021-06-18 PROCEDURE — 3017F COLORECTAL CA SCREEN DOC REV: CPT | Performed by: ORTHOPAEDIC SURGERY

## 2021-06-18 PROCEDURE — 1036F TOBACCO NON-USER: CPT | Performed by: ORTHOPAEDIC SURGERY

## 2021-06-18 PROCEDURE — 99213 OFFICE O/P EST LOW 20 MIN: CPT | Performed by: ORTHOPAEDIC SURGERY

## 2021-06-18 PROCEDURE — G8420 CALC BMI NORM PARAMETERS: HCPCS | Performed by: ORTHOPAEDIC SURGERY

## 2021-06-18 ASSESSMENT — ENCOUNTER SYMPTOMS
VOMITING: 0
NAUSEA: 0

## 2021-06-18 NOTE — PROGRESS NOTES
CHOLECYSTECTOMY, LAPAROSCOPIC      KNEE ARTHROPLASTY      R knee x4    NASAL SINUS SURGERY      UPPER GASTROINTESTINAL ENDOSCOPY  11    UPPER GASTROINTESTINAL ENDOSCOPY N/A 2019    EGD BIOPSY performed by Enrique Larsen DO at [place] [address].   Allergen Reactions    Methylphenidate      Other reaction(s): Hyperactive behavior    Oxycodone-Acetaminophen     Propoxyphene     Ritalin [Methylphenidate Hcl] Anxiety       Family History   Problem Relation Age of Onset    High Blood Pressure Mother     Heart Disease Mother     High Blood Pressure Father     Heart Disease Father     Cancer Sister     Diabetes Sister     Other Sister         aneursym-brain       Social History     Socioeconomic History    Marital status:      Spouse name: Not on file    Number of children: Not on file    Years of education: Not on file    Highest education level: Not on file   Occupational History    Not on file   Tobacco Use    Smoking status: Former Smoker     Packs/day: 0.25     Years: 25.00     Pack years: 6.25     Quit date: 2019     Years since quittin.3    Smokeless tobacco: Former User     Quit date: 10/3/2015   Vaping Use    Vaping Use: Never used   Substance and Sexual Activity    Alcohol use: No     Alcohol/week: 0.0 standard drinks    Drug use: No    Sexual activity: Yes     Partners: Female     Comment: smoke 2 cigaretees a day   Other Topics Concern    Not on file   Social History Narrative    Not on file     Social Determinants of Health     Financial Resource Strain:     Difficulty of Paying Living Expenses:    Food Insecurity:     Worried About Running Out of Food in the Last Year:     [address] in the Last Year:    Transportation Needs:     Lack of Transportation (Medical):      Lack of Transportation (Non-Medical):    Physical Activity:     Days of Exercise per Week:     Minutes of Exercise per Session:    Stress:     Feeling of Stress :    Social Connections:     Frequency of Communication with Friends and Family:     Frequency of Social Gatherings with Friends and Family:     Attends Congregation Services:     Active Member of Clubs or Organizations:     Attends Club or Organization Meetings:     Marital Status:    Intimate Partner Violence:     Fear of Current or Ex-Partner:     Emotionally Abused:     Physically Abused:     Sexually Abused:      Review of Systems   Constitutional: Negative for chills and fever. Cardiovascular: Negative for leg swelling. Gastrointestinal: Negative for nausea and vomiting. Musculoskeletal: Positive for gait problem, joint swelling and myalgias. Neurological: Positive for weakness and numbness. PHYSICAL EXAM  Gen: awake, alert, oriented  HEENT: atraumatic, normocephalic  Neck: supple  Resp: equal chest rise bilaterally, no audible wheezes, no accessory muscle use. CV: Lower extremities warm and well perfused. Abd: soft, nontender   Focused examination of the right knee: There are no cuts, open wounds, or abrasions. There are numerous prior surgical scars and traumatic scars on the right knee. There is no obvious effusion. There is no soft tissue mass or swelling appreciated. He indicates an area of swelling over the VMO, which is not appreciated. He has significant weakness with resisted knee extension. He has minor subpatellar crepitus. Patellar tracking is appropriate. There is negative anterior and posterior drawer test.  Negative Lachman. There is no appreciable instability with varus or valgus stress at 0 or 30 degrees of flexion. Range of motion actively is full extension to 110 degrees of flexion. The patient has irritability with the terminal few degrees of extension. There is no significant tenderness to palpation about the lateral knee. There is minor soft tissue tenderness about the patella.   There is most significant tenderness about the medial joint line and medial epicondyle. There is tenderness as a positive Tinel's sign which sends pain into the medial thigh. Sensation is intact to light touch in deep peroneal, superficial peroneal, tibial, sural, and saphenous nerve distributions. Motor function is intact to EHL, FHL, tibialis anterior, and gastroc. There is brisk capillary refill to the toes and a strong palpable dorsalis pedis pulse. Compartments are soft and compressible. There is no calf tenderness and a negative Homans' sign. RADIOGRAPHIC EXAM:  3 views of the right knee including weightbearing AP, tunnel, and lateral x-rays dated March 18, 2021 demonstrate no evidence of fracture or dislocation. Joint spaces are well-preserved. No significant joint space narrowing. No significant osteophyte formation. No significant effusion. No worrisome or erosive bone lesions. MRI dated May 7, 2021 demonstrates no evidence of fracture or dislocation. There is blunting of the posterior aspect of the lateral meniscus consistent with degeneration, or possible prior surgical intervention. Cruciate and collateral ligaments are intact. Quad and patellar tendons are intact. There is no significant osteochondral defects or bone marrow edema. There is a small effusion. 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 Patella tracking is appropriate. No significant lateral subluxation or tilt. ASSESSEMENT AND PLAN    63 y.o. male with the following orthopaedic surgery problems:    1. Right anterior knee pain  2. Right quadriceps VMO atrophy    X-ray and MRI imaging is unimpressive. He has had numerous surgical interventions to the knee. He complains of severe pain. His most striking feature on exam is atrophy of his VMO as well as a radiating type medial knee pain. In the absence of more obvious pathology. I believe it is important to rule out neurologic injury/neuropathy, particularly in light of his significant quad atrophy.   I recommended an EMG/nerve conduction study. I am unsure if they are able to evaluate saphenous nerve pathology, however it would be possible to have a neuroma from his prior injury in this area which is contributing to his pain. I will see the patient back after his EMG/nerve conduction study for discussion of the results.         Fernando Orosco MD

## 2021-06-23 ENCOUNTER — HOSPITAL ENCOUNTER (OUTPATIENT)
Dept: GENERAL RADIOLOGY | Age: 61
Discharge: HOME | End: 2021-06-23
Payer: MEDICAID

## 2021-06-23 ENCOUNTER — HOSPITAL ENCOUNTER (OUTPATIENT)
Age: 61
Discharge: HOME | End: 2021-06-23
Payer: MEDICAID

## 2021-06-23 DIAGNOSIS — J44.1 COPD EXACERBATION (HCC): ICD-10-CM

## 2021-06-23 PROCEDURE — 71046 X-RAY EXAM CHEST 2 VIEWS: CPT

## 2021-07-08 NOTE — PROGRESS NOTES
Sandhya Espinal   1960, 63 y.o. male   [id number]       Referring Provider: Glendy Wells MD, PhD  Referral Type: In an order in [address] [place] [address]    Reason for Visit: Evaluation of suspected change in hearing, tinnitus, or balance. ADULT AUDIOLOGIC EVALUATION      Sandhya Espinal is a 63 y.o. male seen today, 7/12/2021 , for an initial audiologic evaluation. Patient was seen by Glendy Wells MD, PhD for cerumen removal prior to today's evaluation. Patient was accompanied by ***. AUDIOLOGIC AND OTHER PERTINENT MEDICAL HISTORY:      Sandhya Espinal noted a perceived gradual decline in hearing, bilaterally. {Audiology Symptoms:28484}. Patient reports ***. Medical history is reportedly significant for ***. Sandhya Espinal denied {Audiology Symptoms:12684}. Date: 7/12/2021     IMPRESSIONS:      ***. Follow medical recommendations of Glendy Wells MD, PhD.     [place] Trav:     Otoscopy revealed: ***Clear ear canals bilaterally    RIGHT EAR:  Hearing Sensitivity: {Hearing Sensitivity:05206}  Speech Recognition Threshold: *** dB HL  Word Recognition: {FFC:10119}, based on {Axi[employer] Word Lists:07671} {Axi[employer]S Word List # of Words:99111} at *** dBHL using {Speech Stimuli:15877} speech stimuli. Tympanometry: {Tympanometry:32834}  Acoustic Reflexes: Ipsilateral: {Acoustic Reflexes:09303}. Contralateral: {Acoustic Reflexes:59666}. LEFT EAR:  Hearing Sensitivity: {Hearing Sensitivity:01838}  Speech Recognition Threshold: *** dB HL  Word Recognition: {GSN:86079}, based on {WRS Word Lists:87360} {Axi[employer]S Word List # of Words:70266} at *** dBHL using {Speech Stimuli:16344} speech stimuli. Tympanometry: {Tympanometry:89413}  Acoustic Reflexes: Ipsilateral: {Acoustic Reflexes:82482}. Contralateral: {Acoustic Reflexes:13285}. Reliability: ***Good  Transducer: ***Inserts    See scanned audiogram dated 7/12/2021  for results.     PATIENT EDUCATION:       The following items were discussed with the patient: {Patient Education Audiology Options:31475::\" - Good Communication Strategies\"}    Educational information was shared in the After Visit Summary. ***                                               RECOMMENDATIONS:                                                                                                                                                                                                                                                            The following items are recommended based on patient report and results from today's appointment:   - Continue medical follow-up with Laina Lakhani MD, PhD.   - [facility] hearing as medically indicated and/or sooner if a change in hearing is noted.   {Audiology Recommendations:27127}       Julio Kaur  Audiologist    Chart CC'd to: Tomasa Baca MD, PhD      Degree of   Hearing Sensitivity dB Range   Within Normal Limits (WNL) 0 - 20   Mild 20 - 40   Moderate 40 - 55   Moderately-Severe 55 - 70   Severe 70 - 90   Profound 90 +

## 2021-07-12 ENCOUNTER — PROCEDURE VISIT (OUTPATIENT)
Dept: AUDIOLOGY | Age: 61
End: 2021-07-12
Payer: MEDICAID

## 2021-07-12 ENCOUNTER — OFFICE VISIT (OUTPATIENT)
Dept: ENT CLINIC | Age: 61
End: 2021-07-12
Payer: MEDICAID

## 2021-07-12 VITALS — HEIGHT: 69 IN | BODY MASS INDEX: 22.81 KG/M2 | WEIGHT: 154 LBS

## 2021-07-12 DIAGNOSIS — H90.3 SENSORINEURAL HEARING LOSS (SNHL) OF BOTH EARS: Primary | ICD-10-CM

## 2021-07-12 DIAGNOSIS — H93.13 TINNITUS, BILATERAL: ICD-10-CM

## 2021-07-12 DIAGNOSIS — H90.A22 SENSORINEURAL HEARING LOSS (SNHL) OF LEFT EAR WITH RESTRICTED HEARING OF RIGHT EAR: Primary | ICD-10-CM

## 2021-07-12 DIAGNOSIS — H90.3 SENSORINEURAL HEARING LOSS, BILATERAL: Primary | ICD-10-CM

## 2021-07-12 PROCEDURE — 99214 OFFICE O/P EST MOD 30 MIN: CPT | Performed by: OTOLARYNGOLOGY

## 2021-07-12 PROCEDURE — 92557 COMPREHENSIVE HEARING TEST: CPT | Performed by: AUDIOLOGIST

## 2021-07-12 PROCEDURE — G8420 CALC BMI NORM PARAMETERS: HCPCS | Performed by: OTOLARYNGOLOGY

## 2021-07-12 PROCEDURE — 92567 TYMPANOMETRY: CPT | Performed by: AUDIOLOGIST

## 2021-07-12 PROCEDURE — 3017F COLORECTAL CA SCREEN DOC REV: CPT | Performed by: OTOLARYNGOLOGY

## 2021-07-12 PROCEDURE — 1036F TOBACCO NON-USER: CPT | Performed by: OTOLARYNGOLOGY

## 2021-07-12 PROCEDURE — G8427 DOCREV CUR MEDS BY ELIG CLIN: HCPCS | Performed by: OTOLARYNGOLOGY

## 2021-07-12 ASSESSMENT — ENCOUNTER SYMPTOMS
SHORTNESS OF BREATH: 0
DIARRHEA: 0
SINUS PAIN: 0
FACIAL SWELLING: 0
VOMITING: 0
SINUS PRESSURE: 0
TROUBLE SWALLOWING: 0
BACK PAIN: 0
CHOKING: 0
STRIDOR: 0
RHINORRHEA: 0
COUGH: 0
VOICE CHANGE: 0
COLOR CHANGE: 0
CONSTIPATION: 0
NAUSEA: 0
APNEA: 0
CHEST TIGHTNESS: 0
WHEEZING: 0
EYE DISCHARGE: 0
BLOOD IN STOOL: 0
EYE PAIN: 0
SORE THROAT: 0

## 2021-07-12 NOTE — Clinical Note
Dr. Victor M Rodriguez,    Please see note from this patient's audiogram from today. Please let me know if there is anything further you need.       Ricky Cramer [address] [place]  Audiologist

## 2021-07-12 NOTE — PATIENT INSTRUCTIONS
Tinnitus: Overview and Management Strategies          Many people have some ringing sounds in their ears once in a while. You may hear a roar, a hiss, a tinkle, or a buzz. The sound usually lasts only a few minutes. If it goes on all the time, you may have tinnitus. Tinnitus is usually caused by long-term exposure to loud noise. This damages the nerves in the inner ear. It can occur with all types of hearing loss. It may be a symptom of almost any ear problem. Tinnitus may be caused by a buildup of earwax. Or, it may be caused by ear infections or certain medicines (especially antibiotics or large amounts of aspirin). You can also hear noises in your ears because of an injury to the ears, drinking too much alcohol or caffeine, or a medical condition. Other conditions may also contribute to tinnitus, including: head and neck trauma, temporomandibular joint disorder (TMJ), sinus pressure and barometric trauma, traumatic brain injury, metabolic disorders, autoimmune disorders, stress, and high blood pressure. You may need tests to evaluate your hearing and to find causes of long-lasting tinnitus. Your doctor may suggest one or more treatments to help you cope with the tinnitus. You can also do things at home to help reduce symptoms. Follow-up care is a key part of your treatment and safety. Be sure to make and go to all appointments, and call your doctor if you are having problems. It's also a good idea to know your test results and keep a list of the medicines you take. How can you care for yourself at home? · Limit or cut out alcohol, caffeine, and sodium. They can make your symptoms worse. · Do not smoke or use other tobacco products. Nicotine reduces blood flow to the ear and makes tinnitus worse. If you need help quitting, talk to your doctor about stop-smoking programs and medicines. These can increase your chances of quitting for good.   · Talk to your doctor about whether to stop taking aspirin confusion or trouble understanding simple statements. ? Sudden problems with walking or balance. ? A sudden, severe headache that is different from past headaches. Call your doctor now or seek immediate medical care if:    · You develop other symptoms. These may include hearing loss (or worse hearing loss), balance problems, dizziness, nausea, or vomiting. Watch closely for changes in your health, and be sure to contact your doctor if:    · Your tinnitus moves from both ears to one ear. · Your hearing loss gets worse within 1 day after an ear injury. · Your tinnitus or hearing loss does not get better within 1 week after an ear injury. · Your tinnitus bothers you enough that you want to take medicines to help you cope with it. If you notice changes in your tinnitus and/or your hearing, it is recommended that you have your hearing tested by your audiologist and to follow-up with your physician that manages your hearing loss (such as your ENT or Primary Care doctor). Good Communication Strategies    Communication can be challenging for anyone, but can be especially difficult for those with some degree of hearing loss. While we may not be able to control every factor that may lead to difficulty with communication, there are Good Communication Strategies that we can all use in our day-to-day lives. Communication takes both parties working together for it to be successful. Tips as a Listener:   1. Control your environment. It is important to limit the amount of background noise in the room when possible. You should also consider having a good light source in the room to best see the other person. 2. Ask for clarification. Instead of saying \"What?\", you can use parts of what you heard to make a new question. For example, if you heard the word \"Thursday\" but not the rest of the week, you may ask \"What was that about Thursday? \" or \"What did you want to do Thursday? \".   This shows the person talking that you are listening and will help them better explain what they are saying. 3. Be an advocate for yourself. If you are hearing but not understanding, tell the other person \"I can hear you, but I need you to slow down when you speak. \"  Or if someone is facing the other direction, say \"I cannot hear you when you are not looking at me when we talk. \"       Tips as a Talker:   - Sit or stand 3 to 6 feet away to maximize audibility         -- It is unrealistic to believe someone else will fully hear your message if you are speaking from across the room or in a different room in the house   - Stay at eye level to help with visual cues   - Make sure you have the persons attention before speaking   - Use facial expressions and gestures to accentuate your message   - Raise your voice slightly (do not scream)   - Speak slowly and distinctly   - Use short, simple sentences   - Rephrase your words if the person is having a hard time understanding you    - To avoid distortion, dont speak directly into a persons ear      Some additional items that may be helpful:   - Remain patient - this is important for both parties   - Write down items that still cannot be heard/understood. You may write with pen/paper or consider typing/texting on a cell phone or smart device. - If background noise is unavoidable, try to keep yourself in a good position in the room. By sitting at a crowe on the side of the restaurant (preferably a corner), it will be easier to communicate than if you were sitting at a table in the middle with background noise surrounding you. Keep yourself positioned away from music speakers or heavy foot traffic.   - If you have difficulty with the television, consider these options:      -- Use closed-captioning, which is a setting you can turn on that displays the spoken words in a written form on the screen. There may be a slight delay, but this can help fill in missing information.   This can be especially helpful when watching programs with accented speech. -- Consider use of a sound bar or speakers that come from the front of the TV. With modern flat screen TVs, many of them have speakers that come out of the back of the device, which makes sound bounce off the wall behind it, then go into the room. Sound bars can allow the sound to go straight in your direction and can improve sound quality. -- Consider ear level devices to help improve the volume and/or sound quality of the program.  There are devices that work like headphones that you can adjust the volume for your ears while others can have the volume at a more comfortable level, such as \"TV Ears\". Most hearing aids have devices that allow them to connect directly to the TV and improve sound quality. Hearing Loss: Care Instructions  Your Care Instructions      Hearing loss is a sudden or slow decrease in how well you hear. It can range from mild to profound. Permanent hearing loss can occur with aging, and it can happen when you are exposed long-term to loud noise. Examples include listening to loud music, riding motorcycles, or being around other loud machines. Hearing loss can affect your work and home life. It can make you feel lonely or depressed. You may feel that you have lost your independence. But hearing aids and other devices can help you hear better and feel connected to others. Follow-up care is a key part of your treatment and safety. Be sure to make and go to all appointments, and call your doctor if you are having problems. It's also a good idea to know your test results and keep a list of the medicines you take. How can you care for yourself at home? · Avoid loud noises whenever possible. This helps keep your hearing from getting worse. Always wear hearing protection around loud noises. · If appropriate, wear hearing aid(s) as directed.   It is recommended that hearing aids are worn during all waking hours to keep your brain active and give it access to the sounds it is missing. · If you are beginning your process with hearing aid(s), schedule a \"Hearing Aid Evaluation\" with an audiologist to discuss your lifestyle, features of hearing aid technology, and styles of hearing aids available. It is recommended that you contact your insurance company to determine if you have a hearing aid benefit, as this may dictate who you can see for these services. · Have hearing tests as your doctor suggests. They can show whether your hearing has changed. Your hearing aid may need to be adjusted. · Use other assistive devices as needed. These may include:  ? Telephone amplifiers and hearing aids that can connect to a television, stereo, radio, or microphone. ? Devices that use lights or vibrations. These alert you to the doorbell, a ringing telephone, or a baby monitor. ? Television closed-captioning. This shows the words at the bottom of the screen. Most new TVs can do this. ? TTY (text telephone). This lets you type messages back and forth on the telephone instead of talking or listening. These devices are also called TDD. When messages are typed on the keyboard, they are sent over the phone line to a receiving TTY. The message is shown on a monitor. · Use pagers, fax machines, text, and email if it is hard for you to communicate by telephone. · Try to learn a listening technique called speech-reading. It is not lip-reading. You pay attention to people's gestures, expressions, posture, and tone of voice. These clues can help you understand what a person is saying. Face the person you are talking to, and have him or her face you. Make sure the lighting is good. You need to see the other person's face clearly. · Think about counseling if you need help to adjust to your hearing loss. When should you call for help?   Watch closely for changes in your health, and be sure to contact your doctor if:    · You think your hearing is getting worse. · You have new symptoms, such as dizziness or nausea. Noise-Induced Hearing Loss  What it is, and what you can do to prevent it    Exposure to loud sounds, in an occupational setting or recreational, can cause permanent hearing loss. Sound is measured in decibels (dB). Noise-induced hearing loss is the ONLY type of preventable hearing loss. Hearing loss related to noise exposure can occur at any age. There are small sensory cells, called inner and outer hair cells, within the inner ear (cochlea). These cells process the loudness (intensity) and pitch (frequency) of sound and send the signal to the brain via our auditory nerve (vestibulocochlear nerve, cranial nerve VIII). When these cells are damaged, they can result in permanent hearing loss and/or tinnitus. The hair cells responsible for high frequency sounds, like birds chirping, are most likely to be damaged due to loud sounds. The high frequency sounds are also very important for our clarity and understanding of speech. OCCUPATIONAL NOISE EXPOSURE RECREATIONAL NOISE EXPOSURE   Some jobs may have exposure to loud sounds in the workplace. These jobs may include but are not limited to:  [employer]   Welding   Landscaping   Hairdressing/hairstyling   [employer]    ... And more! Many activities outside of work may cause permanent hearing loss. These activities may include but are not limited to:  Lawnmowers, leaf blowers  [employer] (such as pigs squealing)   Chainsaws and other power tools  incrediblue musical instruments and/or singing   Listening to music too loudly - at concerts, through stereo, through ear buds or headphones   Attending sporting events   Attending fireworks shows or using fireworks at home  [employer] of firearms   . .. And more!        REDUCE OR PROTECT YOUR EARS FROM NOISE EXPOSURE    To do your best to avoid noise-induced hearing loss, here are some tips:   Limit exposure to loud sounds. 85 dB (decibels) is safe for 8 hours. As sounds are louder, the length of time the sound is safe lessens. These numbers are cumulative across a 24-hour period. (NIOSH and CDC, 2002)  o 85 dB is safe for 8 hours  o 88 dB is safe for 4 hours  o 91 dB is safe for 2 hours  o 94 dB is safe for 1 hour  o 97 dB is safe for 30 minutes  o 100 dB is safe for 15 minutes  o 103 dB is safe for 7.5 minutes  o 106 dB is safe for 3.75 minutes  o 109 dB is safe for LESS THAN 2 minutes  o 112 dB is safe for LESS THAN 1 minute  o 115 dB is safe for ~ 30 seconds  o 130 dB can cause IMMEDIATE hearing loss   If you are unsure if a sound is too loud, consider checking the sound level with a \"sound level meter\". There are apps on smart devices, such as \"Decibel X\", that can measure the loudness of the sound. They are not as accurate as expensive equipment used by scientists, but it will give you a guesstimate of how loud the sound is, and if it may be damaging to your hearing.  If you cannot avoid loud sounds, here are ways to reduce your exposure:  o 1. Wear hearing protection  - Ear plugs and protective ear muffs can be used to reduce the intensity of the sound. The higher the NRR (noise reduction rating), the better reduction of the intensity of the sound   o 2. Turn the volume down  - When listening to music, turn the volume down, especially when wearing ear buds or headphones. A good rule of thumb is to not go beyond the middle setting on your device. If you can't hear someone talking to you from arm's length away, your music may be at a level that it can cause damage. If someone else can hear your music from 3 feet away, it may also be at a level that it can cause damage.   o 3. Walk away from the sound  - If you do not have the ability to wear hearing protection or turn down the volume of the sound, you should do your best to move away from the source of the sound. - Sound decreases in intensity as we move further from the source. The sound will decrease by 6 dB for every doubling of distance from the sound source. TYPES OF HEARING PROTECTION    The most common types of hearing protection are protective ear muffs and ear plugs. Protective ear muffs are commonly found at home improvement or sporting good stores, they can be worn time and time again and are great if you need to take your hearing protection off frequently. Ear plugs are often made of foam or soft silicone. The foam ones are designed for one-time use, while silicone ear plugs may be used multiple times. There are also \"filtered\" ear plugs that help provide even attenuation of the sound across all frequencies. These are great for listening to music or going to concerts, and allow for better understanding of speech in louder environments. They can be purchased at music stores or online retailers (search \"Ety Plugs\" or \"filtered ear plugs\"), or custom earmolds can be made with an audiologist.    There are \"custom\" hearing protection devices that you can further discuss with your audiologist based on your specific needs, if desired. Exposure to these sounds may cause permanent damage to your hearing.   If you suspect your hearing has changed, it is recommended that you have your hearing tested by your audiologist.

## 2021-07-12 NOTE — PROGRESS NOTES
Subjective:      Patient ID: Imtiaz Gaytan is a 63 y.o. male. HPI  Hearing Loss HPI  CC: hearing loss    General: Demario Merrill is a(n) 63 y.o. male who presents with a long history of hearing loss. Bilateral. For many years. [employer]. Bi-handed. Chainsaws and blowers. Tinnitus. Not bad. Now with a several week history of ears feeling plugged. Thinks he has wax. Last audio 11/2017 at [facility]. Saw PRESTON Longoria  How long: 3 weeks  Side:bilateral  Prior audiogram:Yes   DPQA:5624   Where:[facility]  Previous episodes: no  Tinnitus:Yes  Otorrhea:No  Vertigo:No  Prior ear surgery: No  Ear trauma: No  History of hearing loss: No  PMHx: Patient denies. ..  birth anoxia, ototoxic medication exposure, poor vision or progressive vision loss, syncope, thyroid problems  Family history of hearing loss: No        Patient Active Problem List   Diagnosis    Acute exacerbation of chronic obstructive pulmonary disease (COPD) ([address]    COPD with exacerbation (HCC)    Hypoxia    SOB (shortness of breath)    Centrilobular emphysema (HCC)    Acute pain of right knee    Primary osteoarthritis of right knee    Chest pain    Diabetes mellitus ([address]    Anxiety    Abnormal nuclear cardiac imaging test    Abnormal stress test    Coronary artery disease involving native coronary artery of native heart without angina pectoris    Essential hypertension    Other hyperlipidemia    Traumatic seroma of right lower leg (HCC)    Mass of knee, right     Past Surgical History:   Procedure Laterality Date    CARDIAC SURGERY      stent placed    CARPAL TUNNEL RELEASE      CHOLECYSTECTOMY, LAPAROSCOPIC      KNEE ARTHROPLASTY      R knee x4    NASAL SINUS SURGERY      UPPER GASTROINTESTINAL ENDOSCOPY  7/14/11    UPPER GASTROINTESTINAL ENDOSCOPY N/A 4/4/2019    EGD BIOPSY performed by Francisco Simon DO at [address] [facility]     Family History   Problem Relation Age of Onset    High Blood Pressure Mother     Heart Disease Mother     High Blood Pressure Father     Heart Disease Father     Cancer Sister     Diabetes Sister     Other Sister         aneursym-brain     Social History     Socioeconomic History    Marital status:      Spouse name: Not on file    Number of children: Not on file    Years of education: Not on file    Highest education level: Not on file   Occupational History    Not on file   Tobacco Use    Smoking status: Former Smoker     Packs/day: 0.25     Years: 25.00     Pack years: 6.25     Quit date: 2019     Years since quittin.4    Smokeless tobacco: Former User     Quit date: 10/3/2015   Vaping Use    Vaping Use: Never used   Substance and Sexual Activity    Alcohol use: No     Alcohol/week: 0.0 standard drinks    Drug use: No    Sexual activity: Yes     Partners: Female     Comment: smoke 2 cigaretees a day   Other Topics Concern    Not on file   Social History Narrative    Not on file     Social Determinants of Health     Financial Resource Strain:     Difficulty of Paying Living Expenses:    Food Insecurity:     Worried About Running Out of Food in the Last Year:     [address] in the Last Year:    Transportation Needs:     Lack of Transportation (Medical):      Lack of Transportation (Non-Medical):    Physical Activity:     Days of Exercise per Week:     Minutes of Exercise per Session:    Stress:     Feeling of Stress :    Social Connections:     Frequency of Communication with Friends and Family:     Frequency of Social Gatherings with Friends and Family:     Attends [employer] Services:     Active Member of Clubs or Organizations:     Attends Club or Organization Meetings:     Marital Status:    Intimate Partner Violence:     Fear of Current or Ex-Partner:     Emotionally Abused:     Physically Abused:     Sexually Abused:        DRUG/FOOD ALLERGIES: Methylphenidate, Oxycodone-acetaminophen, Propoxyphene, and Ritalin [methylphenidate hcl]    CURRENT MEDICATIONS  Prior to Admission medications    Medication Sig Start Date End Date Taking? Authorizing Provider   tiZANidine (ZANAFLEX) 4 MG tablet TAKE 1 TABLET BY MOUTH THREE TIMES A DAY 5/5/21  Yes Pratik Collier MD   metoprolol succinate (TOPROL XL) 25 MG extended release tablet Take 0.5 tablets by mouth daily 1/28/21  Yes Duncan Parham APRN - CNP   divalproex (DEPAKOTE) 500 MG DR tablet  1/18/21  Yes Historical Provider, MD   QUEtiapine (SEROQUEL) 300 MG tablet  1/19/21  Yes Historical Provider, MD   dicyclomine (BENTYL) 10 MG capsule Take 1 capsule by mouth 4 times daily as needed (abdominal pain/cramping) 3/25/19  Yes Hellen Leventhal, MD   Multiple Vitamin (DAILY-CRISTINE PO) Take 1 tablet by mouth daily   Yes Historical Provider, MD   clopidogrel (PLAVIX) 75 MG tablet Take 75 mg by mouth daily   Yes Historical Provider, MD   pantoprazole sodium (PROTONIX) 40 MG PACK packet Take 40 mg by mouth 2 times daily (before meals)    Yes Historical Provider, MD   nitroGLYCERIN (NITRODUR) 0.2 MG/HR Place 1 patch onto the skin daily   Yes Historical Provider, MD   fluticasone-vilanterol (BREO ELLIPTA) 100-25 MCG/INH AEPB inhaler Inhale 1 puff into the lungs daily 5/31/17  Yes Reji Rodarte MD   tamsulosin (FLOMAX) 0.4 MG capsule Take 0.4 mg by mouth daily   Yes Historical Provider, MD   simvastatin (ZOCOR) 40 MG tablet Take 40 mg by mouth nightly   Yes Historical Provider, MD   gabapentin (NEURONTIN) 300 MG capsule Take 400 mg by mouth 3 times daily . Yes Historical Provider, MD   aspirin 81 MG tablet Take 81 mg by mouth daily   Yes Historical Provider, MD   cetirizine (ZYRTEC) 10 MG tablet Take 10 mg by mouth daily   Yes Historical Provider, MD   hydrOXYzine (VISTARIL) 25 MG capsule Take 25 mg by mouth 3 times daily as needed for Itching   Yes Historical Provider, MD   albuterol (PROVENTIL HFA;VENTOLIN HFA) 108 (90 BASE) MCG/ACT inhaler Inhale 1 puff into the lungs every 6 hours as needed.    Yes Historical Provider, MD   fluticasone (FLONASE) 50 MCG/ACT nasal spray 1 spray by Nasal route daily. Indications: EACH NOSTRIL   Yes Historical Provider, MD   montelukast (SINGULAIR) 10 MG tablet Take 10 mg by mouth nightly. Yes Historical Provider, MD   cyclobenzaprine (FLEXERIL) 10 MG tablet Take 10 mg by mouth 3 times daily as needed. Yes Historical Provider, MD   omeprazole (PRILOSEC) 20 MG capsule Take 40 mg by mouth 2 times daily  11/17/10  Yes Historical Provider, MD       Lab Studies:  Lab Results   Component Value Date    WBC 5.7 01/28/2021    HGB 11.4 (L) 01/28/2021    HCT 35.7 (L) 01/28/2021    MCV 84.4 01/28/2021     01/28/2021     Lab Results   Component Value Date    GLUCOSE 71 05/07/2021    BUN 10 05/07/2021    CREATININE 0.8 (L) 05/07/2021    K 4.1 05/07/2021     05/07/2021     05/07/2021    CALCIUM 9.4 05/07/2021     No results found for: MG  Lab Results   Component Value Date    PHOS 4.4 01/27/2021     Lab Results   Component Value Date    ALKPHOS 95 05/07/2021    ALT 8 (L) 05/07/2021    AST 15 05/07/2021    BILITOT 0.3 05/07/2021    PROT 7.6 05/07/2021     Review of Systems   Constitutional: Negative for activity change, appetite change, chills, fatigue and fever. HENT: Positive for hearing loss and tinnitus. Negative for congestion, dental problem, drooling, ear discharge, ear pain, facial swelling, mouth sores, nosebleeds, postnasal drip, rhinorrhea, sinus pressure, sinus pain, sneezing, sore throat, trouble swallowing and voice change. Eyes: Negative for pain, discharge and visual disturbance. Respiratory: Negative for apnea, cough, choking, chest tightness, shortness of breath, wheezing and stridor. Cardiovascular: Negative for palpitations. Gastrointestinal: Negative for blood in stool, constipation, diarrhea, nausea and vomiting. Endocrine: Negative for cold intolerance, heat intolerance, polydipsia, polyphagia and polyuria.    Musculoskeletal: Negative for back pain, gait problem, neck pain and neck stiffness. Skin: Negative for color change, pallor, rash and wound. Allergic/Immunologic: Negative for environmental allergies, food allergies and immunocompromised state. Neurological: Negative for dizziness, facial asymmetry, speech difficulty, light-headedness, numbness and headaches. Hematological: Negative for adenopathy. Does not bruise/bleed easily. Psychiatric/Behavioral: Negative for agitation, confusion, self-injury and sleep disturbance. The patient is not nervous/anxious. Objective:   Physical Exam  Constitutional:       Appearance: He is well-developed. He is not ill-appearing. HENT:      Head: Normocephalic and atraumatic. Not macrocephalic and not microcephalic. No raccoon eyes, Weir's sign, abrasion, contusion, right periorbital erythema, left periorbital erythema or laceration. Hair is normal.      Jaw: No trismus. Salivary Glands: Right salivary gland is not diffusely enlarged. Left salivary gland is not diffusely enlarged. Right Ear: Hearing, tympanic membrane and external ear normal. No decreased hearing noted. No drainage, swelling or tenderness. No middle ear effusion. No mastoid tenderness. Tympanic membrane is not perforated, retracted or bulging. Tympanic membrane has normal mobility. Left Ear: Hearing, tympanic membrane and external ear normal. No decreased hearing noted. No drainage, swelling or tenderness. No middle ear effusion. No mastoid tenderness. Tympanic membrane is not perforated, retracted or bulging. Tympanic membrane has normal mobility. Ears:      Hess exam findings: does not lateralize. Right Rinne: AC > BC. Left Rinne: AC > BC. Nose: No nasal deformity, septal deviation, laceration, mucosal edema or rhinorrhea. Right Nostril: No epistaxis. Left Nostril: No epistaxis. Right Turbinates: Not enlarged. Left Turbinates: Not enlarged.       Right Sinus: No maxillary sinus tenderness or frontal sinus tenderness. Left Sinus: No maxillary sinus tenderness or frontal sinus tenderness. Mouth/Throat:      Lips: No lesions. Mouth: Mucous membranes are not pale, not dry and not cyanotic. No lacerations or oral lesions. Dentition: Normal dentition. No dental caries or dental abscesses. Tongue: No lesions. Palate: No mass. Pharynx: Uvula midline. No oropharyngeal exudate, posterior oropharyngeal erythema or uvula swelling. Tonsils: No tonsillar abscesses. Eyes:      General: Lids are normal. Lids are everted, no foreign bodies appreciated. Right eye: No discharge. Left eye: No discharge. Extraocular Movements:      Right eye: Normal extraocular motion and no nystagmus. Left eye: Normal extraocular motion and no nystagmus. Conjunctiva/sclera:      Right eye: No chemosis or exudate. Left eye: No chemosis or exudate. Neck:      Thyroid: No thyroid mass or thyromegaly. Vascular: Normal carotid pulses. Trachea: Trachea normal. No tracheal deviation. Cardiovascular:      Rate and Rhythm: Normal rate and regular rhythm. Pulmonary:      Effort: No tachypnea, bradypnea or respiratory distress. Breath sounds: No stridor. Musculoskeletal:      Right shoulder: Normal range of motion. Left shoulder: Normal range of motion. Cervical back: Neck supple. Lymphadenopathy:      Head:      Right side of head: No submental, submandibular, tonsillar, preauricular, posterior auricular or occipital adenopathy. Left side of head: No submental, submandibular, tonsillar, preauricular, posterior auricular or occipital adenopathy. Cervical:      Right cervical: No superficial, deep or posterior cervical adenopathy. Left cervical: No superficial, deep or posterior cervical adenopathy. Skin:     Findings: No bruising, erythema, laceration or lesion.    Neurological:      Mental Status: He is alert and oriented to person, place, and time. Psychiatric:         Speech: Speech normal.         Behavior: Behavior normal.       Reviewed MRI  Head at [facility] 9-2-15  Reviewed Bert JOHNSTON notes from [facility] 11-25-15    Patient here for audiology results. Asymmetric hearing loss. Left greater than right. Good tymps and discriminations. Explained audiogram to patient and discussed findings in light of symptoms. Answered all patient questions and formulated treatment plan. Please see Plan section for further details. Patient visit for ten total minutes. Assessment:       Diagnosis Orders   1. Sensorineural hearing loss (SNHL) of left ear with restricted hearing of right ear             Plan:      Follow up audiogram in one year. Recommend new hearing aids. Call if acute changes. Reviewed  MRI. Reviewed  audiology and primary care provider.            Kirby Turk MD

## 2021-07-12 NOTE — PROGRESS NOTES
Dino Hernández   1960, 63 y.o. male   [id number]       Referring Provider: Jonathan Alonzo MD, PhD  Referral Type: In an order in [address] [place] [address]    Reason for Visit: Evaluation of suspected change in hearing, tinnitus, or balance. ADULT AUDIOLOGIC EVALUATION      Dino Hernández is a 63 y.o. male seen today, 7/12/2021, for an initial audiologic evaluation. 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 Dino Hernández denied aural fullness, otorrhea, dizziness, history of head trauma, history of ear surgery, and family history of hearing loss. IMPRESSIONS:       Today's results are consistent with bilateral sensorineural hearing loss, LE>RE 5848-7334 Hz, with normal middle ear function and good to excellent word recognition for both ears. Hearing loss is significant enough to result in difficulty understanding speech in most listening environments. Discussed good communication strategies and discussed considerations for new hearing aid technology, as his devices are 9years old. Recommended he schedule HAE with [facility] to discuss options. ASSESSMENT AND FINDINGS:       Otoscopy revealed: Clear ear canals bilaterally      RIGHT EAR:  Hearing Sensitivity: Essentially mild through 4000 Hz sloping to moderately-severe sensorineural hearing loss. Speech Recognition Threshold: 40 dBHL  Word Recognition: Excellent (92%), based on NU-6 25-word list at 65 dBHL using recorded speech stimuli.     Tympanometry: Normal peak pressure and compliance, Type A tympanogram, consistent with normal middle ear function. LEFT EAR:  Hearing Sensitivity: Essentially mild through 4000 Hz sloping to moderately-severe sensorineural hearing loss. Speech Recognition Threshold: 40 dBHL  Word Recognition: Good (88%), based on NU-6 25-word list at 70 dBHL using recorded speech stimuli. Tympanometry: Normal peak pressure and compliance, Type A tympanogram, consistent with normal middle ear function. NOTE: An asymmetry of 15-25 dBHL is present 8588-3514 Hz with left ear worse than right ear. Reliability: Good  Transducer: Inserts    See scanned audiogram dated 7/12/2021 for results. PATIENT EDUCATION:       Provided courtesy hearing and troubleshooting. Hearing aids were cleaned and checked. Replaced batteries. A listening check revealed good function of the left device and no audible sound from right device. Replaced wax traps and improved function was noted. Microphone and  ports were suctioned, devices were wiped with earmold spray, and devices completed a desiccant cycle through [employer]. Post-cleaning listening check revealed good function of the devices. Provided him with a supply of wax traps. PATIENT EDUCATION:       The following items were discussed with the patient:    - Good Communication Strategies   - Hearing Loss and Hearing Aids   - Tinnitus Management Strategies    - Noise-Induced Hearing Loss and use of Hearing Protection Devices (HPDs)    Educational information was shared in the After Visit Summary.                                                 RECOMMENDATIONS:                                                                                                                                                                                                                                                                      The following items are recommended based on patient report and results from today's appointment:   - Continue medical follow-up with Lukasz Posey MD, PhD.   - Josehernando EsquedaTyler hearing as medically indicated and/or sooner if a change in hearing is noted. - If desired, schedule a Hearing Aid Evaluation (HAE) appointment to discuss hearing aid options. He was provided with a list of known Medicaid providers. - Utilize \"Good Communication Strategies\" as discussed to assist in speech understanding with communication partners. - Maintain a sound enriched environment to assist in the management of tinnitus symptoms.      - Use hearing protection devices (HPDs), such as protective ear muffs and ear plugs, when exposed to dangerous sound levels.          [facility] [place], [place]  Audiologist      Chart CC'd to: Lukasz Posey MD, PhD      Degree of   Hearing Sensitivity dB Range   Within Normal Limits (WNL) 0 - 20   Mild 20 - 40   Moderate 40 - 55   Moderately-Severe 55 - 70   Severe 70 - 90   Profound 90 +

## 2021-07-20 ENCOUNTER — HOSPITAL ENCOUNTER (EMERGENCY)
Age: 61
Discharge: HOME | End: 2021-07-20
Payer: MEDICAID

## 2021-07-20 ENCOUNTER — APPOINTMENT (OUTPATIENT)
Dept: CT IMAGING | Age: 61
End: 2021-07-20
Payer: MEDICAID

## 2021-07-20 VITALS
DIASTOLIC BLOOD PRESSURE: 84 MMHG | OXYGEN SATURATION: 95 % | RESPIRATION RATE: 18 BRPM | HEART RATE: 62 BPM | TEMPERATURE: 98.1 F | SYSTOLIC BLOOD PRESSURE: 149 MMHG | BODY MASS INDEX: 26.22 KG/M2 | HEIGHT: 69 IN | WEIGHT: 177 LBS

## 2021-07-20 DIAGNOSIS — S39.012A STRAIN OF LUMBAR REGION, INITIAL ENCOUNTER: Primary | ICD-10-CM

## 2021-07-20 DIAGNOSIS — I71.40 ABDOMINAL AORTIC ANEURYSM (AAA) WITHOUT RUPTURE: ICD-10-CM

## 2021-07-20 DIAGNOSIS — J44.1 ACUTE EXACERBATION OF CHRONIC OBSTRUCTIVE PULMONARY DISEASE (COPD) (HCC): ICD-10-CM

## 2021-07-20 LAB
ANION GAP SERPL CALCULATED.3IONS-SCNC: 8 MMOL/L (ref 3–16)
BASOPHILS ABSOLUTE: 0 K/UL (ref 0–0.2)
BASOPHILS RELATIVE PERCENT: 0.8 %
BILIRUBIN URINE: NEGATIVE
BLOOD, URINE: ABNORMAL
BUN BLDV-MCNC: 14 MG/DL (ref 7–20)
CALCIUM SERPL-MCNC: 9.2 MG/DL (ref 8.3–10.6)
CHLORIDE BLD-SCNC: 106 MMOL/L (ref 99–110)
CLARITY: CLEAR
CO2: 28 MMOL/L (ref 21–32)
COLOR: YELLOW
CREAT SERPL-MCNC: 0.8 MG/DL (ref 0.9–1.3)
EOSINOPHILS ABSOLUTE: 0.2 K/UL (ref 0–0.6)
EOSINOPHILS RELATIVE PERCENT: 5.8 %
EPITHELIAL CELLS, UA: ABNORMAL /HPF (ref 0–5)
GFR AFRICAN AMERICAN: >60
GFR NON-AFRICAN AMERICAN: >60
GLUCOSE BLD-MCNC: 92 MG/DL (ref 70–99)
GLUCOSE URINE: NEGATIVE MG/DL
HCT VFR BLD CALC: 36.4 % (ref 40.5–52.5)
HEMOGLOBIN: 11.9 G/DL (ref 13.5–17.5)
KETONES, URINE: NEGATIVE MG/DL
LEUKOCYTE ESTERASE, URINE: NEGATIVE
LYMPHOCYTES ABSOLUTE: 1.4 K/UL (ref 1–5.1)
LYMPHOCYTES RELATIVE PERCENT: 34.4 %
MCH RBC QN AUTO: 28.7 PG (ref 26–34)
MCHC RBC AUTO-ENTMCNC: 32.6 G/DL (ref 31–36)
MCV RBC AUTO: 88.2 FL (ref 80–100)
MICROSCOPIC EXAMINATION: YES
MONOCYTES ABSOLUTE: 0.5 K/UL (ref 0–1.3)
MONOCYTES RELATIVE PERCENT: 11.6 %
MUCUS: ABNORMAL /LPF
NEUTROPHILS ABSOLUTE: 2 K/UL (ref 1.7–7.7)
NEUTROPHILS RELATIVE PERCENT: 47.4 %
NITRITE, URINE: NEGATIVE
PDW BLD-RTO: 18.6 % (ref 12.4–15.4)
PH UA: 6 (ref 5–8)
PLATELET # BLD: 168 K/UL (ref 135–450)
PMV BLD AUTO: 7.6 FL (ref 5–10.5)
POTASSIUM REFLEX MAGNESIUM: 4.3 MMOL/L (ref 3.5–5.1)
PROTEIN UA: NEGATIVE MG/DL
RBC # BLD: 4.13 M/UL (ref 4.2–5.9)
RBC UA: ABNORMAL /HPF (ref 0–4)
SODIUM BLD-SCNC: 142 MMOL/L (ref 136–145)
SPECIFIC GRAVITY UA: 1.02 (ref 1–1.03)
URINE TYPE: ABNORMAL
UROBILINOGEN, URINE: 0.2 E.U./DL
WBC # BLD: 4.2 K/UL (ref 4–11)
WBC UA: ABNORMAL /HPF (ref 0–5)

## 2021-07-20 PROCEDURE — 96374 THER/PROPH/DIAG INJ IV PUSH: CPT

## 2021-07-20 PROCEDURE — 85025 COMPLETE CBC W/AUTO DIFF WBC: CPT

## 2021-07-20 PROCEDURE — 2580000003 HC RX 258: Performed by: PHYSICIAN ASSISTANT

## 2021-07-20 PROCEDURE — 96361 HYDRATE IV INFUSION ADD-ON: CPT

## 2021-07-20 PROCEDURE — 87591 N.GONORRHOEAE DNA AMP PROB: CPT

## 2021-07-20 PROCEDURE — 80048 BASIC METABOLIC PNL TOTAL CA: CPT

## 2021-07-20 PROCEDURE — 96372 THER/PROPH/DIAG INJ SC/IM: CPT

## 2021-07-20 PROCEDURE — 6370000000 HC RX 637 (ALT 250 FOR IP): Performed by: PHYSICIAN ASSISTANT

## 2021-07-20 PROCEDURE — 74176 CT ABD & PELVIS W/O CONTRAST: CPT

## 2021-07-20 PROCEDURE — 81001 URINALYSIS AUTO W/SCOPE: CPT

## 2021-07-20 PROCEDURE — 36415 COLL VENOUS BLD VENIPUNCTURE: CPT

## 2021-07-20 PROCEDURE — 99285 EMERGENCY DEPT VISIT HI MDM: CPT

## 2021-07-20 PROCEDURE — 87491 CHLMYD TRACH DNA AMP PROBE: CPT

## 2021-07-20 PROCEDURE — 6360000002 HC RX W HCPCS: Performed by: PHYSICIAN ASSISTANT

## 2021-07-20 RX ORDER — LIDOCAINE 4 G/G
1 PATCH TOPICAL DAILY
Qty: 30 PATCH | Refills: 0 | Status: SHIPPED | OUTPATIENT
Start: 2021-07-20 | End: 2021-08-19

## 2021-07-20 RX ORDER — CYCLOBENZAPRINE HCL 5 MG
5 TABLET ORAL 2 TIMES DAILY PRN
Qty: 10 TABLET | Refills: 0 | Status: SHIPPED | OUTPATIENT
Start: 2021-07-20 | End: 2021-07-30

## 2021-07-20 RX ORDER — 0.9 % SODIUM CHLORIDE 0.9 %
500 INTRAVENOUS SOLUTION INTRAVENOUS ONCE
Status: COMPLETED | OUTPATIENT
Start: 2021-07-20 | End: 2021-07-20

## 2021-07-20 RX ORDER — ORPHENADRINE CITRATE 30 MG/ML
60 INJECTION INTRAMUSCULAR; INTRAVENOUS ONCE
Status: COMPLETED | OUTPATIENT
Start: 2021-07-20 | End: 2021-07-20

## 2021-07-20 RX ORDER — KETOROLAC TROMETHAMINE 30 MG/ML
15 INJECTION, SOLUTION INTRAMUSCULAR; INTRAVENOUS ONCE
Status: DISCONTINUED | OUTPATIENT
Start: 2021-07-20 | End: 2021-07-20

## 2021-07-20 RX ORDER — KETOROLAC TROMETHAMINE 30 MG/ML
15 INJECTION, SOLUTION INTRAMUSCULAR; INTRAVENOUS ONCE
Status: COMPLETED | OUTPATIENT
Start: 2021-07-20 | End: 2021-07-20

## 2021-07-20 RX ORDER — IPRATROPIUM BROMIDE AND ALBUTEROL SULFATE 2.5; .5 MG/3ML; MG/3ML
1 SOLUTION RESPIRATORY (INHALATION) ONCE
Status: COMPLETED | OUTPATIENT
Start: 2021-07-20 | End: 2021-07-20

## 2021-07-20 RX ADMIN — ORPHENADRINE CITRATE 60 MG: 30 INJECTION INTRAMUSCULAR; INTRAVENOUS at 20:21

## 2021-07-20 RX ADMIN — SODIUM CHLORIDE 500 ML: 9 INJECTION, SOLUTION INTRAVENOUS at 20:25

## 2021-07-20 RX ADMIN — KETOROLAC TROMETHAMINE 15 MG: 30 INJECTION, SOLUTION INTRAMUSCULAR; INTRAVENOUS at 20:21

## 2021-07-20 RX ADMIN — IPRATROPIUM BROMIDE AND ALBUTEROL SULFATE 1 AMPULE: .5; 3 SOLUTION RESPIRATORY (INHALATION) at 21:17

## 2021-07-20 ASSESSMENT — PAIN SCALES - GENERAL
PAINLEVEL_OUTOF10: 5
PAINLEVEL_OUTOF10: 10

## 2021-07-20 ASSESSMENT — PAIN DESCRIPTION - PAIN TYPE
TYPE: ACUTE PAIN
TYPE: ACUTE PAIN

## 2021-07-20 ASSESSMENT — PAIN DESCRIPTION - LOCATION
LOCATION: FLANK
LOCATION: BACK;FLANK;LEG

## 2021-07-20 ASSESSMENT — PAIN DESCRIPTION - PROGRESSION: CLINICAL_PROGRESSION: NOT CHANGED

## 2021-07-20 ASSESSMENT — PAIN DESCRIPTION - ONSET: ONSET: ON-GOING

## 2021-07-20 ASSESSMENT — PAIN DESCRIPTION - DESCRIPTORS: DESCRIPTORS: BURNING

## 2021-07-20 ASSESSMENT — PAIN DESCRIPTION - FREQUENCY: FREQUENCY: CONTINUOUS

## 2021-07-21 NOTE — ED NOTES
.Reviewed discharge instructions with patient. Patient verbalized understanding. No distress noted. Ambulatory from department.      Mk Olivera RN  07/20/21 [id number]

## 2021-07-21 NOTE — ED PROVIDER NOTES
History   Problem Relation Age of Onset    High Blood Pressure Mother     Heart Disease Mother     High Blood Pressure Father     Heart Disease Father     Cancer Sister     Diabetes Sister     Other Sister         aneursym-brain     Social History     Socioeconomic History    Marital status:      Spouse name: Not on file    Number of children: Not on file    Years of education: Not on file    Highest education level: Not on file   Occupational History    Not on file   Tobacco Use    Smoking status: Former Smoker     Packs/day: 0.25     Years: 25.00     Pack years: 6.25     Quit date: 2019     Years since quittin.4    Smokeless tobacco: Former User     Quit date: 10/3/2015   Vaping Use    Vaping Use: Never used   Substance and Sexual Activity    Alcohol use: No     Alcohol/week: 0.0 standard drinks    Drug use: No    Sexual activity: Yes     Partners: Female     Comment: smoke 2 cigaretees a day   Other Topics Concern    Not on file   Social History Narrative    Not on file     Social Determinants of Health     Financial Resource Strain:     Difficulty of Paying Living Expenses:    Food Insecurity:     Worried About Running Out of Food in the Last Year:     [address] in the Last Year:    Transportation Needs:     Lack of Transportation (Medical):      Lack of Transportation (Non-Medical):    Physical Activity:     Days of Exercise per Week:     Minutes of Exercise per Session:    Stress:     Feeling of Stress :    Social Connections:     Frequency of Communication with Friends and Family:     Frequency of Social Gatherings with Friends and Family:     Attends Confucianist Services:     Active Member of Clubs or Organizations:     Attends Club or Organization Meetings:     Marital Status:    Intimate Partner Violence:     Fear of Current or Ex-Partner:     Emotionally Abused:     Physically Abused:     Sexually Abused:      Current Facility-Administered Medications   Medication Dose Route Frequency Provider Last Rate Last Admin    0.9 % sodium chloride bolus  500 mL Intravenous Once Tello Sandhu PA-C 500 mL/hr at 07/20/21 2025 500 mL at 07/20/21 2025    ipratropium-albuterol (DUONEB) nebulizer solution 1 ampule  1 ampule Inhalation Once Tello Sandhu PA-C         Current Outpatient Medications   Medication Sig Dispense Refill    cyclobenzaprine (FLEXERIL) 5 MG tablet Take 1 tablet by mouth 2 times daily as needed for Muscle spasms 10 tablet 0    lidocaine 4 % external patch Place 1 patch onto the skin daily 30 patch 0    tiZANidine (ZANAFLEX) 4 MG tablet TAKE 1 TABLET BY MOUTH THREE TIMES A DAY 90 tablet 0    metoprolol succinate (TOPROL XL) 25 MG extended release tablet Take 0.5 tablets by mouth daily 45 tablet 1    divalproex (DEPAKOTE) 500 MG DR tablet       QUEtiapine (SEROQUEL) 300 MG tablet       dicyclomine (BENTYL) 10 MG capsule Take 1 capsule by mouth 4 times daily as needed (abdominal pain/cramping) 20 capsule 0    Multiple Vitamin (DAILY-CRISTINE PO) Take 1 tablet by mouth daily      clopidogrel (PLAVIX) 75 MG tablet Take 75 mg by mouth daily      pantoprazole sodium (PROTONIX) 40 MG PACK packet Take 40 mg by mouth 2 times daily (before meals)       nitroGLYCERIN (NITRODUR) 0.2 MG/HR Place 1 patch onto the skin daily      fluticasone-vilanterol (BREO ELLIPTA) 100-25 MCG/INH AEPB inhaler Inhale 1 puff into the lungs daily 1 each 3    tamsulosin (FLOMAX) 0.4 MG capsule Take 0.4 mg by mouth daily      simvastatin (ZOCOR) 40 MG tablet Take 40 mg by mouth nightly      gabapentin (NEURONTIN) 300 MG capsule Take 400 mg by mouth 3 times daily .       aspirin 81 MG tablet Take 81 mg by mouth daily      cetirizine (ZYRTEC) 10 MG tablet Take 10 mg by mouth daily      hydrOXYzine (VISTARIL) 25 MG capsule Take 25 mg by mouth 3 times daily as needed for Itching      albuterol (PROVENTIL HFA;VENTOLIN HFA) 108 (90 BASE) MCG/ACT inhaler Inhale 1 puff into the lungs every 6 hours as needed.  fluticasone (FLONASE) 50 MCG/ACT nasal spray 1 spray by Nasal route daily. Indications: EACH NOSTRIL      montelukast (SINGULAIR) 10 MG tablet Take 10 mg by mouth nightly.  omeprazole (PRILOSEC) 20 MG capsule Take 40 mg by mouth 2 times daily        Allergies   Allergen Reactions    Methylphenidate      Other reaction(s): Hyperactive behavior    Oxycodone-Acetaminophen     Propoxyphene     Ritalin [Methylphenidate Hcl] Anxiety       REVIEW OF SYSTEMS  10 systems reviewed, pertinent positives per HPI otherwise noted to be negative    PHYSICAL EXAM  BP (!) 141/86   Pulse 77   Temp 98.1 °F (36.7 °C) (Oral)   Resp 20   Ht 5' 9\" (1.753 m)   Wt 177 lb (80.3 kg)   SpO2 97%   BMI 26.14 kg/m²   GENERAL APPEARANCE: Awake and alert. Cooperative. HEAD: Normocephalic. Atraumatic. EYES: PERRL. EOM's grossly intact. ENT: Mucous membranes are moist.   NECK: Supple. HEART: RRR. No murmurs. LUNGS: Respirations unlabored. CTAB. Good air exchange. Speaking comfortably in full sentences. ABDOMEN: Soft. Non-distended. Non-tender. No guarding or rebound. No masses. No organomegaly. Bilateral reproducible flank pain. EXTREMITIES: No peripheral edema. Moves all extremities equally. All extremities neurovascularly intact. Bilateral tenderness to the SI joints. Positive straight leg test.  Dorsi flexion plantar flexion bilateral ankles intact. Flexion in bilateral knees intact. Sensation is intact and equal across all specks of the lower extremity including the upper thigh. BACK: On exam of lumbar spine, there is no swelling, bruising, or color change noted. There is no midline bony tenderness, without crepitus, deformity, or step off. Patient exhibits tenderness of paraspinal musculature to no of midline. There is bilateral point tenderness over the SI Joint. SKIN: Warm and dry. No acute rashes. NEUROLOGICAL: Alert and oriented. CN's 2-12 intact.  No gross facial drooping. Strength 5/5, sensation intact. PSYCHIATRIC: Normal mood and affect. RADIOLOGY  CT ABDOMEN PELVIS WO CONTRAST Additional Contrast? None    (Results Pending)         LABS  Labs Reviewed   URINALYSIS - Abnormal; Notable for the following components:       Result Value    Blood, Urine TRACE-INTACT (*)     All other components within normal limits    Narrative:     Performed at:  [facility]  [address] [place] [address]. [place] [id number] [employer]   Phone [phone]   MICROSCOPIC URINALYSIS - Abnormal; Notable for the following components:    Mucus, UA 1+ (*)     All other components within normal limits    Narrative:     Performed at:  [facility]. [facility]  [address] [place] [address]. [place] [id number]   Phone [phone]   CBC WITH AUTO DIFFERENTIAL - Abnormal; Notable for the following components:    RBC 4.13 (*)     Hemoglobin 11.9 (*)     Hematocrit 36.4 (*)     RDW 18.6 (*)     All other components within normal limits    Narrative:     Performed at:  [facility]. [facility]  [address] [place] [address]. [place] [facility] [employer]   Phone [phone]   BASIC METABOLIC PANEL W/ REFLEX TO MG FOR LOW K - Abnormal; Notable for the following components:    CREATININE 0.8 (*)     All other components within normal limits    Narrative:     Performed at:  [facility]. [facility]  [address] [place] [address]. [place] [facility] [employer]   Phone [phone]   C. TRACHOMATIS N.GONORRHOEAE DNA, URINE       PROCEDURES  Unless otherwise noted below, none  Procedures    ED COURSE  Patient received Toradol and Norflex for pain, with good relief. Triage vitals for normal limits. A discussion was had with patient regarding results of his lab work and then to follow-up with his primary care provider. Discussed return precautions at length with the patient. .  Risk management discussed and shared decision making had with patient and/or surrogate. All questions were answered. Patient will follow up with primary care provider for further evaluation/treatment. All questions answered. Patient will return to ED for new/worsening symptoms. Patient was sent home with a prescription for Flexeril, lidocaine patches. .       CRITICAL CARE TIME  0Minutes of critical care time spent not including separately billable procedures. MDM  MDM  This is a 61-year-old male present emergency department for evaluation of bilateral lower back pain as well as dysuria. Patient has no red flags for spinal epidural abscess or cauda equina syndrome. Patient does have history of diabetes. Physical exam was remarkable for reproducible tenderness over the bilateral flank as well as the bilateral SI joints. High-sensitivity neurovascular exam was unremarkable. Lab work was obtained to evaluate for possible nephrolithiasis versus pyelonephritis versus ureterolithiasis versus musculoskeletal pain. Analysis was remarkable for trace amount of blood and red blood cells. Lab work was obtained to evaluate for any acute kidney injuries or electrolyte abnormalities. No leukocytosis was appreciated. Renal function preserved within normal limits. Pending CT the abdomen pelvis to evaluate for obstructive uropathy. Results of the CT if unremarkable patient will be discharged home with a short course of muscle relaxers as well as anti-inflammatories and lidocaine patches. Recommend he follow-up with his primary care provider. Patient will check to increase hydration. In the emergency department symptoms worsen or do not improve the next 5 to 7 days. Patient was given 500 cc normal saline in the emergency department.   Results for orders placed or performed during the hospital encounter of 07/20/21   Urinalysis, reflex to microscopic   Result Value Ref Range    Color, UA Yellow Straw/Yellow    Clarity, UA Clear Clear    Glucose, Ur Negative Negative mg/dL    Bilirubin Urine Negative Negative    Ketones, Urine Negative Negative mg/dL    Specific Gravity, UA 1.020 1.005 - 1.030    Blood, Urine TRACE-INTACT (A) Negative    pH, UA 6.0 5.0 - 8.0    Protein, UA Negative Negative mg/dL    Urobilinogen, Urine 0.2 <2.0 E.U./dL    Nitrite, Urine Negative Negative    Leukocyte Esterase, Urine Negative Negative    Microscopic Examination YES     Urine Type NotGiven    Microscopic Urinalysis   Result Value Ref Range    Mucus, UA 1+ (A) None Seen /LPF    WBC, UA 0-2 0 - 5 /HPF    RBC, UA 3-4 0 - 4 /HPF    Epithelial Cells, UA 0-1 0 - 5 /HPF   CBC Auto Differential   Result Value Ref Range    WBC 4.2 4.0 - 11.0 K/uL    RBC 4.13 (L) 4.20 - 5.90 M/uL    Hemoglobin 11.9 (L) 13.5 - 17.5 g/dL    Hematocrit 36.4 (L) 40.5 - 52.5 %    MCV 88.2 80.0 - 100.0 fL    MCH 28.7 26.0 - 34.0 pg    MCHC 32.6 31.0 - 36.0 g/dL    RDW 18.6 (H) 12.4 - 15.4 %    Platelets 965 782 - 888 K/uL    MPV 7.6 5.0 - 10.5 fL    Neutrophils % 47.4 %    Lymphocytes % 34.4 %    Monocytes % 11.6 %    Eosinophils % 5.8 %    Basophils % 0.8 %    Neutrophils Absolute 2.0 1.7 - 7.7 K/uL    Lymphocytes Absolute 1.4 1.0 - 5.1 K/uL    Monocytes Absolute 0.5 0.0 - 1.3 K/uL    Eosinophils Absolute 0.2 0.0 - 0.6 K/uL    Basophils Absolute 0.0 0.0 - 0.2 K/uL   Basic Metabolic Panel w/ Reflex to MG   Result Value Ref Range    Sodium 142 136 - 145 mmol/L    Potassium reflex Magnesium 4.3 3.5 - 5.1 mmol/L    Chloride 106 99 - 110 mmol/L    CO2 28 21 - 32 mmol/L    Anion Gap 8 3 - 16    Glucose 92 70 - 99 mg/dL    BUN 14 7 - 20 mg/dL    CREATININE 0.8 (L) 0.9 - 1.3 mg/dL    GFR Non-African American >60 >60    GFR African American >60 >60    Calcium 9.2 8.3 - 10.6 mg/dL       I estimate there is LOW risk for ABDOMINAL AORTIC ANEURYSM, CAUDA EQUINA SYNDROME, EPIDURAL MASS LESION, SPINAL STENOSIS, OR HERNIATED DISK CAUSING SEVERE STENOSIS, thus I consider the discharge disposition reasonable.  Mishel Hines and I have discussed the diagnosis and risks, and we agree with discharging home to follow-up with their primary doctor. We also discussed returning to the Emergency Department immediately if new or worsening symptoms occur. We have discussed the symptoms which are most concerning (e.g., saddle anesthesia, urinary or bowel incontinence or retention, changing or worsening pain) that necessitate immediate return. Blood pressure (!) 141/86, pulse 77, temperature 98.1 °F (36.7 °C), temperature source Oral, resp. rate 20, height 5' 9\" (1.753 m), weight 177 lb (80.3 kg), SpO2 97 %. DISPOSITION  Patient was discharged to home in good condition. CLINICAL IMPRESSION  1.  Strain of lumbar region, initial encounter           Aashish Louis PA-C  07/23/21 [id number]

## 2021-07-21 NOTE — ED NOTES
Dr Waleska Owen at bedside discussing plan of care with patient.      Melissa Cortez RN  07/20/21 [id number]

## 2021-07-22 LAB
C. TRACHOMATIS DNA ,URINE: NEGATIVE
N. GONORRHOEAE DNA, URINE: NEGATIVE

## 2021-08-09 ENCOUNTER — HOSPITAL ENCOUNTER (OUTPATIENT)
Dept: ULTRASOUND IMAGING | Age: 61
Discharge: HOME | End: 2021-08-09
Payer: MEDICAID

## 2021-08-09 DIAGNOSIS — I71.40 ABDOMINAL AORTIC ANEURYSM WITHOUT RUPTURE: ICD-10-CM

## 2021-08-09 PROCEDURE — 76706 US ABDL AORTA SCREEN AAA: CPT

## 2021-08-23 PROBLEM — I71.40 AAA (ABDOMINAL AORTIC ANEURYSM): Status: ACTIVE | Noted: 2021-08-23

## 2021-08-30 ENCOUNTER — PROCEDURE VISIT (OUTPATIENT)
Dept: AUDIOLOGY | Age: 61
End: 2021-08-30

## 2021-08-30 DIAGNOSIS — H90.3 SENSORINEURAL HEARING LOSS, BILATERAL: Primary | ICD-10-CM

## 2021-08-30 PROCEDURE — 99999 PR OFFICE/OUTPT VISIT,PROCEDURE ONLY: CPT | Performed by: AUDIOLOGIST

## 2021-09-14 RX ORDER — METOPROLOL SUCCINATE 25 MG/1
TABLET, EXTENDED RELEASE ORAL
Qty: 15 TABLET | Refills: 0 | OUTPATIENT
Start: 2021-09-14

## 2021-09-14 NOTE — TELEPHONE ENCOUNTER
Patient has never follow up with us after hospitalization. Looks like he may be seeing Dr. Ernst Merino. Please contact patient to confirm.    Thank you, Ora Dalton

## 2021-09-20 RX ORDER — METOPROLOL SUCCINATE 25 MG/1
TABLET, EXTENDED RELEASE ORAL
Qty: 15 TABLET | Refills: 0 | OUTPATIENT
Start: 2021-09-20

## 2021-09-27 ENCOUNTER — APPOINTMENT (OUTPATIENT)
Dept: GENERAL RADIOLOGY | Age: 61
End: 2021-09-27
Payer: MEDICAID

## 2021-09-27 ENCOUNTER — HOSPITAL ENCOUNTER (EMERGENCY)
Age: 61
Discharge: HOME | End: 2021-09-27
Attending: EMERGENCY MEDICINE
Payer: MEDICAID

## 2021-09-27 VITALS
DIASTOLIC BLOOD PRESSURE: 89 MMHG | TEMPERATURE: 98.2 F | HEIGHT: 69 IN | SYSTOLIC BLOOD PRESSURE: 139 MMHG | BODY MASS INDEX: 24.44 KG/M2 | WEIGHT: 165 LBS | OXYGEN SATURATION: 98 % | RESPIRATION RATE: 16 BRPM | HEART RATE: 76 BPM

## 2021-09-27 DIAGNOSIS — J44.9 CHRONIC OBSTRUCTIVE PULMONARY DISEASE, UNSPECIFIED COPD TYPE (HCC): Primary | ICD-10-CM

## 2021-09-27 DIAGNOSIS — J96.11 CHRONIC RESPIRATORY FAILURE WITH HYPOXIA (HCC): ICD-10-CM

## 2021-09-27 DIAGNOSIS — J44.1 COPD EXACERBATION (HCC): ICD-10-CM

## 2021-09-27 DIAGNOSIS — R07.89 ATYPICAL CHEST PAIN: ICD-10-CM

## 2021-09-27 PROBLEM — I20.0 UNSTABLE ANGINA (HCC): Status: ACTIVE | Noted: 2021-09-27

## 2021-09-27 LAB
A/G RATIO: 1.4 (ref 1.1–2.2)
ALBUMIN SERPL-MCNC: 4.3 G/DL (ref 3.4–5)
ALP BLD-CCNC: 122 U/L (ref 40–129)
ALT SERPL-CCNC: 11 U/L (ref 10–40)
ANION GAP SERPL CALCULATED.3IONS-SCNC: 9 MMOL/L (ref 3–16)
APTT: 33.2 SEC (ref 26.2–38.6)
APTT: 78.5 SEC (ref 26.2–38.6)
AST SERPL-CCNC: 15 U/L (ref 15–37)
BASOPHILS ABSOLUTE: 0 K/UL (ref 0–0.2)
BASOPHILS RELATIVE PERCENT: 0.6 %
BILIRUB SERPL-MCNC: <0.2 MG/DL (ref 0–1)
BUN BLDV-MCNC: 11 MG/DL (ref 7–20)
CALCIUM SERPL-MCNC: 9.3 MG/DL (ref 8.3–10.6)
CHLORIDE BLD-SCNC: 102 MMOL/L (ref 99–110)
CO2: 30 MMOL/L (ref 21–32)
CREAT SERPL-MCNC: 0.8 MG/DL (ref 0.9–1.3)
EKG ATRIAL RATE: 73 BPM
EKG ATRIAL RATE: 74 BPM
EKG DIAGNOSIS: NORMAL
EKG DIAGNOSIS: NORMAL
EKG P AXIS: 66 DEGREES
EKG P AXIS: 82 DEGREES
EKG P-R INTERVAL: 120 MS
EKG P-R INTERVAL: 124 MS
EKG Q-T INTERVAL: 388 MS
EKG Q-T INTERVAL: 490 MS
EKG QRS DURATION: 86 MS
EKG QRS DURATION: 90 MS
EKG QTC CALCULATION (BAZETT): 427 MS
EKG QTC CALCULATION (BAZETT): 543 MS
EKG R AXIS: 51 DEGREES
EKG R AXIS: 93 DEGREES
EKG T AXIS: 72 DEGREES
EKG T AXIS: 79 DEGREES
EKG VENTRICULAR RATE: 73 BPM
EKG VENTRICULAR RATE: 74 BPM
EOSINOPHILS ABSOLUTE: 0.3 K/UL (ref 0–0.6)
EOSINOPHILS RELATIVE PERCENT: 6.3 %
GFR AFRICAN AMERICAN: >60
GFR NON-AFRICAN AMERICAN: >60
GLOBULIN: 3.1 G/DL
GLUCOSE BLD-MCNC: 90 MG/DL (ref 70–99)
HCT VFR BLD CALC: 40 % (ref 40.5–52.5)
HEMOGLOBIN: 13.1 G/DL (ref 13.5–17.5)
LYMPHOCYTES ABSOLUTE: 1.9 K/UL (ref 1–5.1)
LYMPHOCYTES RELATIVE PERCENT: 35.5 %
MCH RBC QN AUTO: 29.4 PG (ref 26–34)
MCHC RBC AUTO-ENTMCNC: 32.8 G/DL (ref 31–36)
MCV RBC AUTO: 89.4 FL (ref 80–100)
MONOCYTES ABSOLUTE: 0.6 K/UL (ref 0–1.3)
MONOCYTES RELATIVE PERCENT: 11.1 %
NEUTROPHILS ABSOLUTE: 2.5 K/UL (ref 1.7–7.7)
NEUTROPHILS RELATIVE PERCENT: 46.5 %
PDW BLD-RTO: 17.1 % (ref 12.4–15.4)
PLATELET # BLD: 198 K/UL (ref 135–450)
PMV BLD AUTO: 6.6 FL (ref 5–10.5)
POTASSIUM REFLEX MAGNESIUM: 4.5 MMOL/L (ref 3.5–5.1)
PRO-BNP: 128 PG/ML (ref 0–124)
RBC # BLD: 4.48 M/UL (ref 4.2–5.9)
SODIUM BLD-SCNC: 141 MMOL/L (ref 136–145)
TOTAL PROTEIN: 7.4 G/DL (ref 6.4–8.2)
TROPONIN: <0.01 NG/ML
TROPONIN: <0.01 NG/ML
VALPROIC ACID LEVEL: 67.3 UG/ML (ref 50–100)
WBC # BLD: 5.5 K/UL (ref 4–11)

## 2021-09-27 PROCEDURE — 93010 ELECTROCARDIOGRAM REPORT: CPT | Performed by: INTERNAL MEDICINE

## 2021-09-27 PROCEDURE — 84484 ASSAY OF TROPONIN QUANT: CPT

## 2021-09-27 PROCEDURE — 96375 TX/PRO/DX INJ NEW DRUG ADDON: CPT

## 2021-09-27 PROCEDURE — 36415 COLL VENOUS BLD VENIPUNCTURE: CPT

## 2021-09-27 PROCEDURE — 74018 RADEX ABDOMEN 1 VIEW: CPT

## 2021-09-27 PROCEDURE — 99285 EMERGENCY DEPT VISIT HI MDM: CPT

## 2021-09-27 PROCEDURE — 96366 THER/PROPH/DIAG IV INF ADDON: CPT

## 2021-09-27 PROCEDURE — 83880 ASSAY OF NATRIURETIC PEPTIDE: CPT

## 2021-09-27 PROCEDURE — 96374 THER/PROPH/DIAG INJ IV PUSH: CPT

## 2021-09-27 PROCEDURE — 85025 COMPLETE CBC W/AUTO DIFF WBC: CPT

## 2021-09-27 PROCEDURE — 6370000000 HC RX 637 (ALT 250 FOR IP): Performed by: EMERGENCY MEDICINE

## 2021-09-27 PROCEDURE — 6360000002 HC RX W HCPCS: Performed by: EMERGENCY MEDICINE

## 2021-09-27 PROCEDURE — 80053 COMPREHEN METABOLIC PANEL: CPT

## 2021-09-27 PROCEDURE — 96365 THER/PROPH/DIAG IV INF INIT: CPT

## 2021-09-27 PROCEDURE — 96376 TX/PRO/DX INJ SAME DRUG ADON: CPT

## 2021-09-27 PROCEDURE — 80164 ASSAY DIPROPYLACETIC ACD TOT: CPT

## 2021-09-27 PROCEDURE — 85730 THROMBOPLASTIN TIME PARTIAL: CPT

## 2021-09-27 PROCEDURE — 93005 ELECTROCARDIOGRAM TRACING: CPT | Performed by: EMERGENCY MEDICINE

## 2021-09-27 PROCEDURE — 71045 X-RAY EXAM CHEST 1 VIEW: CPT

## 2021-09-27 RX ORDER — ASPIRIN 81 MG/1
324 TABLET, CHEWABLE ORAL ONCE
Status: COMPLETED | OUTPATIENT
Start: 2021-09-27 | End: 2021-09-27

## 2021-09-27 RX ORDER — ONDANSETRON 2 MG/ML
4 INJECTION INTRAMUSCULAR; INTRAVENOUS ONCE
Status: COMPLETED | OUTPATIENT
Start: 2021-09-27 | End: 2021-09-27

## 2021-09-27 RX ORDER — IPRATROPIUM BROMIDE AND ALBUTEROL SULFATE 2.5; .5 MG/3ML; MG/3ML
1 SOLUTION RESPIRATORY (INHALATION) ONCE
Status: COMPLETED | OUTPATIENT
Start: 2021-09-27 | End: 2021-09-27

## 2021-09-27 RX ORDER — HEPARIN SODIUM 10000 [USP'U]/100ML
900 INJECTION, SOLUTION INTRAVENOUS CONTINUOUS
Status: DISCONTINUED | OUTPATIENT
Start: 2021-09-27 | End: 2021-09-27 | Stop reason: HOSPADM

## 2021-09-27 RX ORDER — HEPARIN SODIUM 1000 [USP'U]/ML
2000 INJECTION, SOLUTION INTRAVENOUS; SUBCUTANEOUS PRN
Status: DISCONTINUED | OUTPATIENT
Start: 2021-09-27 | End: 2021-09-27 | Stop reason: HOSPADM

## 2021-09-27 RX ORDER — IPRATROPIUM BROMIDE AND ALBUTEROL SULFATE 2.5; .5 MG/3ML; MG/3ML
1 SOLUTION RESPIRATORY (INHALATION) EVERY 4 HOURS
Qty: 360 ML | Refills: 2 | Status: SHIPPED | OUTPATIENT
Start: 2021-09-27

## 2021-09-27 RX ORDER — HEPARIN SODIUM 1000 [USP'U]/ML
4000 INJECTION, SOLUTION INTRAVENOUS; SUBCUTANEOUS ONCE
Status: COMPLETED | OUTPATIENT
Start: 2021-09-27 | End: 2021-09-27

## 2021-09-27 RX ORDER — DEXAMETHASONE SODIUM PHOSPHATE 10 MG/ML
8 INJECTION INTRAMUSCULAR; INTRAVENOUS ONCE
Status: COMPLETED | OUTPATIENT
Start: 2021-09-27 | End: 2021-09-27

## 2021-09-27 RX ORDER — HEPARIN SODIUM 1000 [USP'U]/ML
4000 INJECTION, SOLUTION INTRAVENOUS; SUBCUTANEOUS PRN
Status: DISCONTINUED | OUTPATIENT
Start: 2021-09-27 | End: 2021-09-27 | Stop reason: HOSPADM

## 2021-09-27 RX ORDER — MORPHINE SULFATE 4 MG/ML
4 INJECTION, SOLUTION INTRAMUSCULAR; INTRAVENOUS ONCE
Status: COMPLETED | OUTPATIENT
Start: 2021-09-27 | End: 2021-09-27

## 2021-09-27 RX ORDER — IPRATROPIUM BROMIDE AND ALBUTEROL SULFATE 2.5; .5 MG/3ML; MG/3ML
2 SOLUTION RESPIRATORY (INHALATION) ONCE
Status: COMPLETED | OUTPATIENT
Start: 2021-09-27 | End: 2021-09-27

## 2021-09-27 RX ADMIN — MORPHINE SULFATE 4 MG: 4 INJECTION INTRAVENOUS at 04:57

## 2021-09-27 RX ADMIN — HYDROMORPHONE HYDROCHLORIDE 0.5 MG: 1 INJECTION, SOLUTION INTRAMUSCULAR; INTRAVENOUS; SUBCUTANEOUS at 05:54

## 2021-09-27 RX ADMIN — NITROGLYCERIN 1 INCH: 20 OINTMENT TOPICAL at 02:59

## 2021-09-27 RX ADMIN — IPRATROPIUM BROMIDE AND ALBUTEROL SULFATE 1 AMPULE: .5; 3 SOLUTION RESPIRATORY (INHALATION) at 18:58

## 2021-09-27 RX ADMIN — IPRATROPIUM BROMIDE AND ALBUTEROL SULFATE 1 AMPULE: .5; 3 SOLUTION RESPIRATORY (INHALATION) at 18:59

## 2021-09-27 RX ADMIN — HEPARIN SODIUM 4000 UNITS: 1000 INJECTION INTRAVENOUS; SUBCUTANEOUS at 05:19

## 2021-09-27 RX ADMIN — ASPIRIN 324 MG: 81 TABLET, CHEWABLE ORAL at 02:52

## 2021-09-27 RX ADMIN — IPRATROPIUM BROMIDE AND ALBUTEROL SULFATE 1 AMPULE: .5; 3 SOLUTION RESPIRATORY (INHALATION) at 16:40

## 2021-09-27 RX ADMIN — ONDANSETRON HYDROCHLORIDE 4 MG: 2 INJECTION, SOLUTION INTRAMUSCULAR; INTRAVENOUS at 04:56

## 2021-09-27 RX ADMIN — HEPARIN SODIUM 900 UNITS/HR: 10000 INJECTION, SOLUTION INTRAVENOUS at 05:22

## 2021-09-27 RX ADMIN — HYDROMORPHONE HYDROCHLORIDE 0.5 MG: 1 INJECTION, SOLUTION INTRAMUSCULAR; INTRAVENOUS; SUBCUTANEOUS at 16:40

## 2021-09-27 RX ADMIN — IPRATROPIUM BROMIDE AND ALBUTEROL SULFATE 2 AMPULE: .5; 3 SOLUTION RESPIRATORY (INHALATION) at 02:55

## 2021-09-27 RX ADMIN — DEXAMETHASONE SODIUM PHOSPHATE 8 MG: 10 INJECTION INTRAMUSCULAR; INTRAVENOUS at 02:53

## 2021-09-27 ASSESSMENT — PAIN DESCRIPTION - LOCATION: LOCATION: HEAD

## 2021-09-27 ASSESSMENT — ENCOUNTER SYMPTOMS
ABDOMINAL PAIN: 0
COUGH: 1
EYE PAIN: 0
WHEEZING: 1
SHORTNESS OF BREATH: 1
VOMITING: 0
BACK PAIN: 0
STRIDOR: 0
CHEST TIGHTNESS: 1

## 2021-09-27 ASSESSMENT — PAIN SCALES - GENERAL
PAINLEVEL_OUTOF10: 8
PAINLEVEL_OUTOF10: 9
PAINLEVEL_OUTOF10: 8

## 2021-09-27 NOTE — ED PROVIDER NOTES
[facility]        Pt Name: Imtiaz Gaytan  MRN: [id number]  [place] 1960  Date of evaluation: 9/27/2021  Provider: Benito Kehr, MD  PCP: Arianna Shea PA-C      CHIEF COMPLAINT       Chief Complaint   Patient presents with    Shortness of Breath     Pt brought to ED via EMS from home. pt. states that he has had increased shortness of breath that started yesterday. Hx COPD. Pt. reports that he has been out of his breathing txt's x3 days. HISTORY OFPRESENT ILLNESS   (Location/Symptom, Timing/Onset, Context/Setting, Quality, Duration, Modifying Factors,Severity)  Note limiting factors. Imtiaz Gaytan is a 63 y.o. male presenting today due to concern for increasing shortness of breath since yesterday along with worsening chest pressure similar to whenever he has needed cardiac intervention such as stents in the past.  He reports the chest pain is waxing and waning. He does have a history of COPD and only wears 2 L nasal cannula at baseline but states this requirement is not currently helping with his symptoms. He also was reportedly out of breathing treatments over the last couple of days. He currently describes the chest pain as 8 out of 10. He had a coronary catheterization in January 2021 where he required PCI but still had reported extensive disease. He denies any fever. He did get his Covid vaccine. He has a mild headache. He denies any abdominal pain. Due to worsening chest pain with shortness of breath, he came by EMS for further evaluation. He denies any pain with breathing or history of blood clots. REVIEW OF SYSTEMS    (2-9 systems for level 4, 10 or more for level 5)     Review of Systems   Constitutional: Positive for fatigue. Negative for chills, diaphoresis and fever. HENT: Negative for congestion. Eyes: Negative for pain and visual disturbance.    Respiratory: Positive for cough, chest tightness, shortness of breath and wheezing. Negative for stridor. Cardiovascular: Positive for chest pain. Negative for leg swelling. Gastrointestinal: Negative for abdominal pain and vomiting. Genitourinary: Negative for flank pain. Musculoskeletal: Negative for back pain. Neurological: Positive for weakness (generalized) and headaches (mild). Negative for syncope. Psychiatric/Behavioral: Negative for confusion. Positives and Pertinent negatives as per HPI. PASTMEDICAL HISTORY     Past Medical History:   Diagnosis Date    Anxiety     Arterial stent thrombosis (HCC)     Asthma     [facility]     Coronary artery disease involving native coronary artery of native heart without angina pectoris 2/8/2021    Diabetes mellitus ([facility] [address] [facility]     Essential hypertension 2/8/2021    Hyperlipidemia     Pneumonia          SURGICAL HISTORY       Past Surgical History:   Procedure Laterality Date    CARDIAC SURGERY      stent placed    CARPAL TUNNEL RELEASE      CHOLECYSTECTOMY, LAPAROSCOPIC      KNEE ARTHROPLASTY      R knee x4    NASAL SINUS SURGERY      UPPER GASTROINTESTINAL ENDOSCOPY  7/14/11    UPPER GASTROINTESTINAL ENDOSCOPY N/A 4/4/2019    EGD BIOPSY performed by Sonny Davis DO at [address]       Previous Medications    ALBUTEROL (PROVENTIL HFA;VENTOLIN HFA) 108 (90 BASE) MCG/ACT INHALER    Inhale 1 puff into the lungs every 6 hours as needed. ASPIRIN 81 MG TABLET    Take 81 mg by mouth daily    CETIRIZINE (ZYRTEC) 10 MG TABLET    Take 10 mg by mouth daily    CLOPIDOGREL (PLAVIX) 75 MG TABLET    Take 75 mg by mouth daily    DICYCLOMINE (BENTYL) 10 MG CAPSULE    Take 1 capsule by mouth 4 times daily as needed (abdominal pain/cramping)    DIVALPROEX (DEPAKOTE) 500 MG DR TABLET        FLUTICASONE (FLONASE) 50 MCG/ACT NASAL SPRAY    1 spray by Nasal route daily.  Indications: EACH NOSTRIL Activity    Alcohol use: No     Alcohol/week: 0.0 standard drinks    Drug use: No    Sexual activity: Yes     Partners: Female     Comment: smoke 2 cigaretees a day   Other Topics Concern    None   Social History Narrative    None     Social Determinants of Health     Financial Resource Strain:     Difficulty of Paying Living Expenses:    Food Insecurity:     Worried About Running Out of Food in the Last Year:     Ran Out of Food in the Last Year:    Transportation Needs:     Lack of Transportation (Medical):  Lack of Transportation (Non-Medical):    Physical Activity:     Days of Exercise per Week:     Minutes of Exercise per Session:    Stress:     Feeling of Stress :    Social Connections:     Frequency of Communication with Friends and Family:     Frequency of Social Gatherings with Friends and Family:     Attends Confucianism Services:     Active Member of Clubs or Organizations:     Attends Club or Organization Meetings:     Marital Status:    Intimate Partner Violence:     Fear of Current or Ex-Partner:     Emotionally Abused:     Physically Abused:     Sexually Abused:        SCREENINGS                PHYSICAL EXAM    (up to 7 for level 4, 8 or more for level 5)     ED Triage Vitals [09/27/21 0008]   BP Temp Temp Source Pulse Resp SpO2 Height Weight   (!) 161/114 98.2 °F (36.8 °C) Oral 80 23 99 % 5' 9\" (1.753 m) 165 lb (74.8 kg)       Physical Exam  Vitals reviewed. Constitutional:       General: He is not in acute distress. Appearance: Normal appearance. He is well-developed and normal weight. He is not ill-appearing, toxic-appearing or diaphoretic. Interventions: He is not intubated. HENT:      Head: Normocephalic and atraumatic. Right Ear: External ear normal.      Left Ear: External ear normal.      Nose: Congestion present. Mouth/Throat:      Mouth: Mucous membranes are dry. Pharynx: Oropharynx is clear. Eyes:      General: No scleral icterus. Right eye: No discharge. Left eye: No discharge. Pupils: Pupils are equal, round, and reactive to light. Neck:      Trachea: Trachea normal. No tracheal deviation. Cardiovascular:      Rate and Rhythm: Normal rate and regular rhythm. Pulses: Normal pulses. Radial pulses are 2+ on the right side and 2+ on the left side. Heart sounds: No friction rub. No gallop. Pulmonary:      Effort: Pulmonary effort is normal. Tachypnea present. No bradypnea, accessory muscle usage, prolonged expiration, respiratory distress or retractions. He is not intubated. Breath sounds: Normal air entry. No stridor, decreased air movement or transmitted upper airway sounds. Examination of the right-upper field reveals decreased breath sounds and wheezing. Examination of the left-upper field reveals decreased breath sounds and wheezing. Examination of the right-middle field reveals decreased breath sounds and wheezing. Examination of the left-middle field reveals decreased breath sounds and wheezing. Examination of the right-lower field reveals decreased breath sounds and wheezing. Examination of the left-lower field reveals decreased breath sounds and wheezing. Decreased breath sounds, wheezing and rhonchi present. No rales. Chest:      Chest wall: No tenderness. Abdominal:      General: Bowel sounds are normal. There is distension (mild). Palpations: Abdomen is soft. Tenderness: There is generalized abdominal tenderness (mild). There is no guarding or rebound. Negative signs include Bustillo's sign and McBurney's sign. Musculoskeletal:         General: No tenderness or deformity. Normal range of motion. Cervical back: Full passive range of motion without pain, normal range of motion and neck supple. No edema, erythema, rigidity or tenderness. Normal range of motion. Right lower leg: No edema. Left lower leg: No edema. Skin:     General: Skin is warm and dry. Coloration: Skin is not jaundiced or pale. Findings: No bruising, erythema, lesion or rash. Neurological:      General: No focal deficit present. Mental Status: He is alert and oriented to person, place, and time. Mental status is at baseline. GCS: GCS eye subscore is 4. GCS verbal subscore is 5. GCS motor subscore is 6. Cranial Nerves: No dysarthria. Sensory: Sensation is intact. No sensory deficit. Motor: Motor function is intact. No weakness, tremor, atrophy, abnormal muscle tone or seizure activity. Psychiatric:         Attention and Perception: Attention normal.         Mood and Affect: Affect normal. Mood is anxious. Speech: Speech normal. Speech is not slurred. Behavior: Behavior normal. Behavior is cooperative. DIAGNOSTIC RESULTS   :    Labs Reviewed   CBC WITH AUTO DIFFERENTIAL - Abnormal; Notable for the following components:       Result Value    Hemoglobin 13.1 (*)     Hematocrit 40.0 (*)     RDW 17.1 (*)     All other components within normal limits    Narrative:     Performed at:  [facility]. [facility]  [address] [place] [address]. [address]   Phone [phone]   COMPREHENSIVE METABOLIC PANEL W/ REFLEX TO MG FOR LOW K - Abnormal; Notable for the following components:    CREATININE 0.8 (*)     All other components within normal limits    Narrative:     Performed at:  [facility]. [facility]  [address] [place] [address]. [place] [address] [email or web address]    Phone [phone] - Abnormal; Notable for the following components:    Pro- (*)     All other components within normal limits    Narrative:     Performed at:  [facility]. [facility]  [address] [place] [address]. [place] [address] [email or web address]    Phone [phone]   TROPONIN    Narrative:     Performed at:  [facility].  [facility]  [address] [place] [address] [address] [facility], [address] [place] [address]   Phone [phone]   TROPONIN    Narrative:     Performed at:  [facility]. [facility]  [address] [place] [address]   Phone [phone]   APTT    Narrative:     Performed at:  [facility]. [facility]  [address] [facility] [address]   Phone [phone]   APTT   APTT   VALPROIC ACID LEVEL, TOTAL       All other labs were within normal range or not returned asof this dictation. EKG: All EKG's are interpreted by the Emergency Department Physician who either signs or Co-signs this chart in the absence of a cardiologist.    The Ekg interpreted by me shows  normal sinus rhythm with a rate of 73  Axis is   Right axis deviation  QTc is  normal  Intervals and Durations are unremarkable. ST Segments: no acute change and nonspecific changes  No significant change from prior EKG dated - 1/26/21  No STEMI   Repeat EKG with no acute changes, no STEMI           RADIOLOGY:   Non-plain film images such as CT, Ultrasound and MRI are read by the radiologist. Oliverio Sussex images are visualized and preliminarily interpreted by the  ED Provider with the belowfindings:        Interpretation per the Radiologist below, if available at the time of this note:    XR ABDOMEN (KUB) (SINGLE AP VIEW)   Final Result   Moderate stool within the colon. Paucity of small bowel gas limits small bowel evaluation. XR CHEST PORTABLE   Final Result   Emphysematous lungs and old granulomatous disease. No new pneumonia or   pulmonary edema. PROCEDURES   Unless otherwise noted below, none     Procedures    CRITICAL CARE TIME   Time: 35 minutes  Includes repeat examinations, speaking with consultants, lab interpretation, charting, treating for unstable angina requiring heparin drip after discussion with hospitalist   Excludes separate billable procedures.   Patient at risk for serious decompensation if not treated for this life-threatening condition. CONSULTS: Spoke with Dr. Chula Salazar at [facility] at [facility]. He did accept the patient for transfer. IP CONSULT TO HOSPITALIST    EMERGENCY DEPARTMENT COURSE and DIFFERENTIAL DIAGNOSIS/MDM:   Vitals:    Vitals:    09/27/21 0300 09/27/21 0400 09/27/21 0500 09/27/21 0600   BP: (!) 158/90 114/82 131/86 (!) 162/105   Pulse: 72 74 84 72   Resp: 18 14 16 14   Temp:       TempSrc:       SpO2: 97% 99% 96% 95%   Weight:       Height:           Patient was given the following medications:  Medications   heparin (porcine) injection 4,000 Units (has no administration in time range)   heparin (porcine) injection 2,000 Units (has no administration in time range)   heparin 25,000 units in dextrose 5% 250 mL (premix) infusion (900 Units/hr IntraVENous New Bag 9/27/21 0522)   ipratropium-albuterol (DUONEB) nebulizer solution 2 ampule (2 ampules Inhalation Given 9/27/21 0255)   dexamethasone (DECADRON) injection 8 mg (8 mg IntraVENous Given 9/27/21 0253)   nitroglycerin (NITRO-BID) 2 % ointment 1 inch (1 inch Topical Given 9/27/21 0259)   aspirin chewable tablet 324 mg (324 mg Oral Given 9/27/21 0252)   morphine sulfate (PF) injection 4 mg (4 mg IntraVENous Given 9/27/21 0457)   ondansetron (ZOFRAN) injection 4 mg (4 mg IntraVENous Given 9/27/21 0456)   heparin (porcine) injection 4,000 Units (4,000 Units IntraVENous Given 9/27/21 0519)   HYDROmorphone (DILAUDID) injection 0.5 mg (0.5 mg IntraVENous Given 9/27/21 0554)     Patient was evaluated due to concern for worsening chest pain associate with shortness of breath since yesterday. He had significant wheezing with diminished breath sounds throughout on lung exam and had reportedly been out of his inhalers recently which could be a cause of his symptoms related to COPD.   However, he states that his chest pain is equivalent to prior anginal events whenever he is needed coronary stent and therefore we did evaluate from a cardiac standpoint as well. Initial troponin was negative. EKG did not show any concern for STEMI. He denied any pain with breathing and story not suggestive of pulmonary embolism. Upon repeat assessment he was still having persistent chest pain. Based on significant risk factors for heart disease, I did speak the hospitalist at [facility] and he did accept the patient for transfer. The benefit of transfer outweighs the risk at this time and the patient feels comfortable with this plan. He is vaccinated for Covid and at this time I have low suspicion for Covid but this may need to be tested at [facility] upon arrival.  I am not testing for this at this point since we do not have a rapid test and feel that his symptoms are more likely related to either COPD related to missing medications or unstable angina due to having significant cardiac history. The patient tolerated their visit well. The patient and / or the family were informed of the results of any tests, a time was given to answer questions. FINAL IMPRESSION      1. Anginal equivalent ([address]    2. Unstable angina ([address]    3. Chronic obstructive pulmonary disease, unspecified COPD type ([facility] [address]    4. Chronic respiratory failure with hypoxia [facility])          DISPOSITION/PLAN   DISPOSITION Decision To Transfer 09/27/2021 04:29:54 AM      PATIENT REFERRED TO:  No follow-up provider specified.     DISCHARGEMEDICATIONS:  New Prescriptions    No medications on file       DISCONTINUED MEDICATIONS:  Discontinued Medications    No medications on file              (Please note that portions of this note were completed with a voicerecognition program.  Efforts were made to edit the dictations but occasionally words are mis-transcribed.)    Alisson Landry MD (electronically signed)            Alisson Landry MD  09/27/21 Elli Mathis MD  09/27/21 Elli Mathis MD  09/27/21 1800

## 2021-09-27 NOTE — ED NOTES
Report given to BRYSON WHITT [facility].      Jeanne Zavala RN  09/27/21 8947

## 2021-09-27 NOTE — ED PROVIDER NOTES
ED attending note: This patient was waiting for a bed at [facility] but apparently after being here over 18 hours now all of a sudden they said that they would not have a bed. I did explain this to the patient. He has been pain-free repeat troponins have been negative he is pain-free at this point he said the main reason he came was because he ran out of his DuoNeb solution at this point obviously does have a little bit of a risk factor for cardiac origin but looking at his last cath it sounds like they were successful in balloon angioplasty and no further treatment was needed and they were treating him medically he is followed closely by Dr. Lori Pedersen. I think with the patient being totally pain-free with eating him endorsing that his true problem coming in last night was his the fact that he ran out of his DuoNeb solution he is on oxygen therapy he is pain-free at this point I think at this point I think that he could go home I did offer him to stay versus leaving. He said he would rather just go home and sleep in his own bed he will return if any worsening.     Assessment  Prescription refill for DuoNeb solution  Atypical chest pain      Plan prescription refill patient given for DuoNeb's tonight  Advised to return for any worsening advised to continue his aggressive medical therapy for his cardiac disease  Signed MD Duran Delcid MD  09/27/21 7782

## 2021-09-27 NOTE — ED NOTES
Pt ambulated down hallway on 2L home oxygen. Pt tolerated fairly well and oxygen saturation 93% while ambulating.      Deniz Boland RN  09/27/21 5638

## 2021-10-13 ENCOUNTER — HOSPITAL ENCOUNTER (OUTPATIENT)
Dept: CT IMAGING | Age: 61
Discharge: HOME | End: 2021-10-13
Payer: MEDICAID

## 2021-10-13 DIAGNOSIS — R10.9 FLANK PAIN: ICD-10-CM

## 2021-10-13 PROCEDURE — 74176 CT ABD & PELVIS W/O CONTRAST: CPT

## 2021-10-27 ENCOUNTER — OFFICE VISIT (OUTPATIENT)
Dept: ORTHOPEDIC SURGERY | Age: 61
End: 2021-10-27
Payer: MEDICAID

## 2021-10-27 VITALS — HEIGHT: 69 IN | BODY MASS INDEX: 24.44 KG/M2 | WEIGHT: 165 LBS

## 2021-10-27 DIAGNOSIS — M77.12 LATERAL EPICONDYLITIS OF LEFT ELBOW: ICD-10-CM

## 2021-10-27 DIAGNOSIS — T79.2XXA TRAUMATIC SEROMA OF RIGHT LOWER LEG, INITIAL ENCOUNTER (HCC): ICD-10-CM

## 2021-10-27 DIAGNOSIS — G56.03 CARPAL TUNNEL SYNDROME, BILATERAL: Primary | ICD-10-CM

## 2021-10-27 DIAGNOSIS — M62.559 ATROPHY OF QUADRICEPS FEMORIS MUSCLE: ICD-10-CM

## 2021-10-27 DIAGNOSIS — M25.561 RIGHT ANTERIOR KNEE PAIN: ICD-10-CM

## 2021-10-27 DIAGNOSIS — G56.03 BILATERAL CARPAL TUNNEL SYNDROME: ICD-10-CM

## 2021-10-27 PROCEDURE — 1036F TOBACCO NON-USER: CPT | Performed by: ORTHOPAEDIC SURGERY

## 2021-10-27 PROCEDURE — 99212 OFFICE O/P EST SF 10 MIN: CPT | Performed by: ORTHOPAEDIC SURGERY

## 2021-10-27 PROCEDURE — G8420 CALC BMI NORM PARAMETERS: HCPCS | Performed by: ORTHOPAEDIC SURGERY

## 2021-10-27 PROCEDURE — 3017F COLORECTAL CA SCREEN DOC REV: CPT | Performed by: ORTHOPAEDIC SURGERY

## 2021-10-27 PROCEDURE — G8484 FLU IMMUNIZE NO ADMIN: HCPCS | Performed by: ORTHOPAEDIC SURGERY

## 2021-10-27 PROCEDURE — G8428 CUR MEDS NOT DOCUMENT: HCPCS | Performed by: ORTHOPAEDIC SURGERY

## 2021-10-27 RX ORDER — METHYLPREDNISOLONE 4 MG/1
TABLET ORAL
Qty: 1 KIT | Refills: 0 | Status: SHIPPED | OUTPATIENT
Start: 2021-10-27 | End: 2021-11-02

## 2021-10-27 NOTE — PROGRESS NOTES
He returns for evaluation. He had 2 questions. 1 he says his right knee still hurts and although he saw Dr. Josh Fierro in consultation he did not follow through with recommendations for an EMG stating that he lost the appointment. For that I would recommend we reschedule him for the EMG and he may actually benefit from another evaluation after that. His other concern was that he has difficulty with bilateral carpal tunnel syndrome pain in her thumbs and tennis elbow issues for which she was to have surgery by Dr. Amie Gill. He says he cannot locate that physician but I told him we know how we can reconnect him with Dr. Liliana Gordon for reevaluation to see if he can benefit and improve his situation. He was pleased with this and will return after testing, specifically the EMG for his right leg.

## 2021-11-28 ENCOUNTER — HOSPITAL ENCOUNTER (OUTPATIENT)
Dept: GENERAL RADIOLOGY | Age: 61
Discharge: HOME | End: 2021-11-28
Payer: MEDICAID

## 2021-11-28 ENCOUNTER — HOSPITAL ENCOUNTER (OUTPATIENT)
Age: 61
Discharge: HOME | End: 2021-11-28
Payer: MEDICAID

## 2021-11-28 DIAGNOSIS — R06.89 DECREASED LUNG SOUNDS: ICD-10-CM

## 2021-11-28 DIAGNOSIS — M54.50 MIDLINE LOW BACK PAIN, UNSPECIFIED CHRONICITY, UNSPECIFIED WHETHER SCIATICA PRESENT: ICD-10-CM

## 2021-11-28 PROCEDURE — 71046 X-RAY EXAM CHEST 2 VIEWS: CPT

## 2021-11-28 PROCEDURE — 72100 X-RAY EXAM L-S SPINE 2/3 VWS: CPT

## 2021-12-01 ENCOUNTER — HOSPITAL ENCOUNTER (OUTPATIENT)
Dept: NEUROLOGY | Age: 61
Discharge: HOME | End: 2021-12-01
Payer: MEDICAID

## 2021-12-01 DIAGNOSIS — M25.561 RIGHT ANTERIOR KNEE PAIN: ICD-10-CM

## 2021-12-01 DIAGNOSIS — M62.559 ATROPHY OF QUADRICEPS FEMORIS MUSCLE: ICD-10-CM

## 2021-12-01 PROCEDURE — 95886 MUSC TEST DONE W/N TEST COMP: CPT

## 2021-12-01 PROCEDURE — 95908 NRV CNDJ TST 3-4 STUDIES: CPT

## 2021-12-01 NOTE — PROCEDURES
Test Date:  2021    Patient: Sandi Thompson : 1960 Physician: Essence Dumont DO   Sex: Male ID#:  Ref Phys: Cm Garcia MD     Patient Complaints:  Patient is a 63year-old male  who presents with weakness in the right lower extremity Patient  has low back pain. for years no back surgery     Patient History / Exam:  PMH  + lumbar disk disease + right knee surgery x 5 + borderline diabetes, PE: reflexes trace, normal strength     NCV & EMG Findings:  Evaluation of the right saphenous sensory nerve showed no response. The right sural sensory nerve showed reduced amplitude (3 µV). All remaining nerves (as indicated in the following tables) were within normal limits. All examined muscles (as indicated in the following table) showed no evidence of electrical instability. Impression:  Essentially normal study, response to saphenous nerve absent, No evidence of an acute radiculopathy or other entrapment neuropathy.  Thank you         Essence Dumont DO        Nerve Conduction Studies  Motor Nerve Results      Latency Amplitude F-Lat Segment Distance CV Comment   Site (ms) Norm (mV) Norm (ms)  (cm) (m/s) Norm    Right Fibular (EDB) Motor   Ankle 4.0  < 6.1 4.2  > 2.0         Bel Fib Head 12.0 - 3.6 -  Bel Fib Head-Ankle 36 45  > 38    Right Tibial (AHB) Motor   Ankle 5.1  < 6.1 9.4  > 4.4         Knee 12.9 - 8.5 -  Knee-Ankle 36 46  > 39      Sensory Nerve Results      Latency (Peak) Amplitude (P-P) Segment Distance CV Comment   Site (ms) Norm (µV) Norm  (cm) (m/s) Norm    Right Saphenous Sensory   14 cm-Ant Med Mall NR  < 4.4 NR  > 2 14 cm-Ant Med Mall 14 NR  > 32    Right Sural Sensory   Calf-Lat Mall 2.8  < 4.0 3  > 5 Calf-Lat Mall 14 50  > 35        Electromyography     Side Muscle Nerve Root Ins Act Fibs Psw Amp Dur Poly Recrt Int Lenord Medico Comment   Right Gluteus Med Sup Gluteal L5-S1 Nml Nml Nml Nml Nml 0 Nml Nml    Right Vastus Med Femoral L2-L4 Nml Nml Nml Nml Nml 0 Nml Nml    Right Add Longus Obturator L2-L4 Nml Nml Nml Nml Nml 0 Nml Nml    Right Tib Anterior Deep Fibular,  Fibula. .. L4-L5 Nml Nml Nml Nml Nml 0 Nml Nml    Right Fib longus  L5-S1 Nml Nml Nml Nml Nml 0 Nml Nml    Right Gastroc MH Tibial S1-S2 Nml Nml Nml Nml Nml 0 Nml Nml    Right Ext Jimenez Long Deep Fibular,  Fibula. .. L5-S1 Nml Nml Nml Nml Nml 0 Nml Nml    Right EDB Deep Fibular,  Fibula. .. L5-S1 Nml Nml Nml Nml Nml 0 Nml Nml    Right AHB Medial Plantar,  Tibi. ..  S1-S2 Nml Nml Nml Nml Nml 0 Nml Nml    Right Lumbo Paraspinal (Upper) Rami L1-L2 Nml Nml Nml         Right Lumbo Paraspinal (Mid) Rami L3-L4 Nml Nml Nml         Right Lumbo Paraspinal (Lower) Rami L5-S1 Nml Nml Nml         Electronically signed by Jm Alvarado DO on 12/1/2021 at 11:01 AM

## 2021-12-15 ENCOUNTER — HOSPITAL ENCOUNTER (INPATIENT)
Age: 61
LOS: 2 days | Discharge: HOME | DRG: 137 | End: 2021-12-17
Attending: EMERGENCY MEDICINE | Admitting: INTERNAL MEDICINE
Payer: MEDICAID

## 2021-12-15 ENCOUNTER — APPOINTMENT (OUTPATIENT)
Dept: GENERAL RADIOLOGY | Age: 61
DRG: 137 | End: 2021-12-15
Payer: MEDICAID

## 2021-12-15 DIAGNOSIS — Z99.81 OXYGEN DEPENDENT: ICD-10-CM

## 2021-12-15 DIAGNOSIS — U07.1 COVID-19: Primary | ICD-10-CM

## 2021-12-15 LAB
A/G RATIO: 1.4 (ref 1.1–2.2)
ALBUMIN SERPL-MCNC: 3.6 G/DL (ref 3.4–5)
ALP BLD-CCNC: 60 U/L (ref 40–129)
ALT SERPL-CCNC: 14 U/L (ref 10–40)
ANION GAP SERPL CALCULATED.3IONS-SCNC: 12 MMOL/L (ref 3–16)
APTT: 37.1 SEC (ref 26.2–38.6)
AST SERPL-CCNC: 40 U/L (ref 15–37)
BASOPHILS ABSOLUTE: 0 K/UL (ref 0–0.2)
BASOPHILS RELATIVE PERCENT: 0.3 %
BILIRUB SERPL-MCNC: 0.3 MG/DL (ref 0–1)
BUN BLDV-MCNC: 21 MG/DL (ref 7–20)
CALCIUM SERPL-MCNC: 8.3 MG/DL (ref 8.3–10.6)
CHLORIDE BLD-SCNC: 106 MMOL/L (ref 99–110)
CO2: 24 MMOL/L (ref 21–32)
CREAT SERPL-MCNC: 1.3 MG/DL (ref 0.9–1.3)
EKG ATRIAL RATE: 82 BPM
EKG DIAGNOSIS: NORMAL
EKG P AXIS: 60 DEGREES
EKG P-R INTERVAL: 110 MS
EKG Q-T INTERVAL: 354 MS
EKG QRS DURATION: 82 MS
EKG QTC CALCULATION (BAZETT): 413 MS
EKG R AXIS: 35 DEGREES
EKG T AXIS: 65 DEGREES
EKG VENTRICULAR RATE: 82 BPM
EOSINOPHILS ABSOLUTE: 0 K/UL (ref 0–0.6)
EOSINOPHILS RELATIVE PERCENT: 0.1 %
GFR AFRICAN AMERICAN: >60
GFR NON-AFRICAN AMERICAN: 56
GLUCOSE BLD-MCNC: 106 MG/DL (ref 70–99)
HCT VFR BLD CALC: 35.2 % (ref 40.5–52.5)
HEMOGLOBIN: 11.6 G/DL (ref 13.5–17.5)
INR BLD: 1.3 (ref 0.88–1.12)
LYMPHOCYTES ABSOLUTE: 0.9 K/UL (ref 1–5.1)
LYMPHOCYTES RELATIVE PERCENT: 22.6 %
MCH RBC QN AUTO: 29.1 PG (ref 26–34)
MCHC RBC AUTO-ENTMCNC: 33.1 G/DL (ref 31–36)
MCV RBC AUTO: 88.1 FL (ref 80–100)
MONOCYTES ABSOLUTE: 0.5 K/UL (ref 0–1.3)
MONOCYTES RELATIVE PERCENT: 12.7 %
NEUTROPHILS ABSOLUTE: 2.6 K/UL (ref 1.7–7.7)
NEUTROPHILS RELATIVE PERCENT: 64.3 %
PDW BLD-RTO: 16.7 % (ref 12.4–15.4)
PLATELET # BLD: 83 K/UL (ref 135–450)
PLATELET SLIDE REVIEW: ABNORMAL
PMV BLD AUTO: 7.2 FL (ref 5–10.5)
POTASSIUM REFLEX MAGNESIUM: 4.2 MMOL/L (ref 3.5–5.1)
PROTHROMBIN TIME: 14.9 SEC (ref 9.9–12.7)
RBC # BLD: 3.99 M/UL (ref 4.2–5.9)
SARS-COV-2, NAAT: DETECTED
SLIDE REVIEW: ABNORMAL
SODIUM BLD-SCNC: 142 MMOL/L (ref 136–145)
TOTAL PROTEIN: 6.2 G/DL (ref 6.4–8.2)
TROPONIN: <0.01 NG/ML
WBC # BLD: 4 K/UL (ref 4–11)

## 2021-12-15 PROCEDURE — 85730 THROMBOPLASTIN TIME PARTIAL: CPT

## 2021-12-15 PROCEDURE — 84484 ASSAY OF TROPONIN QUANT: CPT

## 2021-12-15 PROCEDURE — 1200000000 HC SEMI PRIVATE

## 2021-12-15 PROCEDURE — 99285 EMERGENCY DEPT VISIT HI MDM: CPT

## 2021-12-15 PROCEDURE — 85025 COMPLETE CBC W/AUTO DIFF WBC: CPT

## 2021-12-15 PROCEDURE — 36415 COLL VENOUS BLD VENIPUNCTURE: CPT

## 2021-12-15 PROCEDURE — 2580000003 HC RX 258: Performed by: EMERGENCY MEDICINE

## 2021-12-15 PROCEDURE — 93005 ELECTROCARDIOGRAM TRACING: CPT | Performed by: EMERGENCY MEDICINE

## 2021-12-15 PROCEDURE — 87635 SARS-COV-2 COVID-19 AMP PRB: CPT

## 2021-12-15 PROCEDURE — 96361 HYDRATE IV INFUSION ADD-ON: CPT

## 2021-12-15 PROCEDURE — 93010 ELECTROCARDIOGRAM REPORT: CPT | Performed by: INTERNAL MEDICINE

## 2021-12-15 PROCEDURE — 6360000002 HC RX W HCPCS

## 2021-12-15 PROCEDURE — 71045 X-RAY EXAM CHEST 1 VIEW: CPT

## 2021-12-15 PROCEDURE — 96374 THER/PROPH/DIAG INJ IV PUSH: CPT

## 2021-12-15 PROCEDURE — 6360000002 HC RX W HCPCS: Performed by: EMERGENCY MEDICINE

## 2021-12-15 PROCEDURE — 2700000000 HC OXYGEN THERAPY PER DAY

## 2021-12-15 PROCEDURE — 6370000000 HC RX 637 (ALT 250 FOR IP): Performed by: EMERGENCY MEDICINE

## 2021-12-15 PROCEDURE — 80053 COMPREHEN METABOLIC PANEL: CPT

## 2021-12-15 PROCEDURE — 6370000000 HC RX 637 (ALT 250 FOR IP)

## 2021-12-15 PROCEDURE — 94761 N-INVAS EAR/PLS OXIMETRY MLT: CPT

## 2021-12-15 PROCEDURE — 85610 PROTHROMBIN TIME: CPT

## 2021-12-15 RX ORDER — DEXAMETHASONE SODIUM PHOSPHATE 4 MG/ML
4 INJECTION, SOLUTION INTRA-ARTICULAR; INTRALESIONAL; INTRAMUSCULAR; INTRAVENOUS; SOFT TISSUE ONCE
Status: COMPLETED | OUTPATIENT
Start: 2021-12-15 | End: 2021-12-15

## 2021-12-15 RX ORDER — ONDANSETRON 2 MG/ML
4 INJECTION INTRAMUSCULAR; INTRAVENOUS ONCE
Status: COMPLETED | OUTPATIENT
Start: 2021-12-15 | End: 2021-12-15

## 2021-12-15 RX ORDER — IPRATROPIUM BROMIDE AND ALBUTEROL SULFATE 2.5; .5 MG/3ML; MG/3ML
1 SOLUTION RESPIRATORY (INHALATION) ONCE
Status: COMPLETED | OUTPATIENT
Start: 2021-12-15 | End: 2021-12-15

## 2021-12-15 RX ORDER — ACETAMINOPHEN 500 MG
TABLET ORAL
Status: COMPLETED
Start: 2021-12-15 | End: 2021-12-15

## 2021-12-15 RX ORDER — ONDANSETRON 4 MG/1
8 TABLET, ORALLY DISINTEGRATING ORAL ONCE
Status: COMPLETED | OUTPATIENT
Start: 2021-12-15 | End: 2021-12-15

## 2021-12-15 RX ORDER — ACETAMINOPHEN 500 MG
1000 TABLET ORAL ONCE
Status: COMPLETED | OUTPATIENT
Start: 2021-12-15 | End: 2021-12-15

## 2021-12-15 RX ORDER — ONDANSETRON 2 MG/ML
INJECTION INTRAMUSCULAR; INTRAVENOUS
Status: COMPLETED
Start: 2021-12-15 | End: 2021-12-15

## 2021-12-15 RX ORDER — 0.9 % SODIUM CHLORIDE 0.9 %
1000 INTRAVENOUS SOLUTION INTRAVENOUS ONCE
Status: COMPLETED | OUTPATIENT
Start: 2021-12-15 | End: 2021-12-15

## 2021-12-15 RX ORDER — IBUPROFEN 600 MG/1
600 TABLET ORAL ONCE
Status: COMPLETED | OUTPATIENT
Start: 2021-12-15 | End: 2021-12-15

## 2021-12-15 RX ADMIN — IPRATROPIUM BROMIDE AND ALBUTEROL SULFATE 1 AMPULE: .5; 3 SOLUTION RESPIRATORY (INHALATION) at 18:33

## 2021-12-15 RX ADMIN — ACETAMINOPHEN 1000 MG: 500 TABLET ORAL at 13:07

## 2021-12-15 RX ADMIN — Medication 1000 MG: at 13:07

## 2021-12-15 RX ADMIN — ACETAMINOPHEN 1000 MG: 500 TABLET ORAL at 19:24

## 2021-12-15 RX ADMIN — DEXAMETHASONE SODIUM PHOSPHATE 4 MG: 4 INJECTION, SOLUTION INTRAMUSCULAR; INTRAVENOUS at 18:33

## 2021-12-15 RX ADMIN — ONDANSETRON 8 MG: 4 TABLET, ORALLY DISINTEGRATING ORAL at 19:24

## 2021-12-15 RX ADMIN — SODIUM CHLORIDE 1000 ML: 9 INJECTION, SOLUTION INTRAVENOUS at 13:08

## 2021-12-15 RX ADMIN — ONDANSETRON 4 MG: 2 INJECTION INTRAMUSCULAR; INTRAVENOUS at 22:43

## 2021-12-15 RX ADMIN — ONDANSETRON HYDROCHLORIDE 4 MG: 2 INJECTION, SOLUTION INTRAMUSCULAR; INTRAVENOUS at 22:43

## 2021-12-15 RX ADMIN — IBUPROFEN 600 MG: 600 TABLET ORAL at 19:24

## 2021-12-15 ASSESSMENT — ENCOUNTER SYMPTOMS
TROUBLE SWALLOWING: 0
ABDOMINAL PAIN: 0
COUGH: 1
BACK PAIN: 0
RHINORRHEA: 1
SHORTNESS OF BREATH: 1
WHEEZING: 1

## 2021-12-15 ASSESSMENT — PAIN SCALES - GENERAL: PAINLEVEL_OUTOF10: 0

## 2021-12-15 NOTE — ED NOTES
Pt states he believes he may have COVID. States worsening SOB, fevers, N/V and changes  In taste x approx 3 wks. O2 continued @ 5L/NC as per EMS application.  Cardiac monitor applied, resps even and unlab, denies further c/o's     Florencia Abdi RN  12/15/21 7315

## 2021-12-15 NOTE — ED PROVIDER NOTES
[address] [facility]      Pt Name: Anamika Edwards  MRN: [id number]  [place] 1960  Date of evaluation: 12/15/2021  Provider: Bar Mueller MD    CHIEF COMPLAINT       Chief Complaint   Patient presents with    Concern For COVID-19     Pt ststes worsening fevers, SOB, N/V and changes in taste x approx 3 wks     Patient apparently lives at home alone  He is oxygen dependent  He does have bad COPD  He said he supposed to have seen a lung doctor but never did  He does use inhalers  Said that he is feeling very weak rundown he says that he has fever and chills he does endorse that he is coughing yellow sputum he says that he is very very scared to go home  He is not a diabetic he is not obese he weighs 150 pounds    HISTORY OF PRESENT ILLNESS   (Location/Symptom, Timing/Onset, Context/Setting, Quality, Duration, Modifying Factors, Severity)  Note limiting factors. Anamika Edwards is a 63 y.o. male who presents to the emergency department     The history is provided by the patient. Nursing Notes were reviewed. REVIEW OF SYSTEMS    (2-9 systems for level 4, 10 or more for level 5)     Review of Systems   Constitutional: Positive for activity change, appetite change, chills and fever. HENT: Positive for congestion, postnasal drip, rhinorrhea and sneezing. Negative for trouble swallowing. Eyes: Negative for visual disturbance. Respiratory: Positive for cough, shortness of breath and wheezing. Cardiovascular: Negative for chest pain, palpitations and leg swelling. Gastrointestinal: Negative for abdominal pain. Musculoskeletal: Negative for back pain and neck pain. Allergic/Immunologic: Negative for immunocompromised state. Neurological: Positive for light-headedness. Negative for dizziness and headaches. Psychiatric/Behavioral: Negative for behavioral problems. All other systems reviewed and are negative.       Except as noted above the remainder of the review of systems was reviewed and negative. PAST MEDICAL HISTORY     Past Medical History:   Diagnosis Date    Anxiety     Arterial stent thrombosis ([facility]     Asthma     COPD [facility]chronic obstructive pulmonary disease) ([facility]     Coronary artery disease involving native coronary artery of native heart without angina pectoris 2/8/2021    Diabetes mellitus ([facility]     Essential hypertension 2/8/2021    Hyperlipidemia     Pneumonia          SURGICAL HISTORY       Past Surgical History:   Procedure Laterality Date    CARDIAC SURGERY      stent placed    CARPAL TUNNEL RELEASE      CHOLECYSTECTOMY, LAPAROSCOPIC      KNEE ARTHROPLASTY      R knee x4    NASAL SINUS SURGERY      UPPER GASTROINTESTINAL ENDOSCOPY  7/14/11    UPPER GASTROINTESTINAL ENDOSCOPY N/A 4/4/2019    EGD BIOPSY performed by Nikki Delarosa DO at [address]       Previous Medications    ALBUTEROL (PROVENTIL HFA;VENTOLIN HFA) 108 (90 BASE) MCG/ACT INHALER    Inhale 1 puff into the lungs every 6 hours as needed. ASPIRIN 81 MG TABLET    Take 81 mg by mouth daily    CETIRIZINE (ZYRTEC) 10 MG TABLET    Take 10 mg by mouth daily    CLOPIDOGREL (PLAVIX) 75 MG TABLET    Take 75 mg by mouth daily    DICYCLOMINE (BENTYL) 10 MG CAPSULE    Take 1 capsule by mouth 4 times daily as needed (abdominal pain/cramping)    DIVALPROEX (DEPAKOTE) 500 MG DR TABLET        FLUTICASONE (FLONASE) 50 MCG/ACT NASAL SPRAY    1 spray by Nasal route daily. Indications: EACH NOSTRIL    FLUTICASONE-VILANTEROL (BREO ELLIPTA) 100-25 MCG/INH AEPB INHALER    Inhale 1 puff into the lungs daily    GABAPENTIN (NEURONTIN) 300 MG CAPSULE    Take 400 mg by mouth 3 times daily .     HYDROXYZINE (VISTARIL) 25 MG CAPSULE    Take 25 mg by mouth 3 times daily as needed for Itching    IPRATROPIUM-ALBUTEROL (DUONEB) 0.5-2.5 (3) MG/3ML SOLN NEBULIZER SOLUTION    Take 3 mLs by nebulization every 4 hours    METOPROLOL SUCCINATE (TOPROL XL) 25 MG EXTENDED RELEASE TABLET    TAKE 1/2 TABLET BY MOUTH EVERY DAY    MONTELUKAST (SINGULAIR) 10 MG TABLET    Take 10 mg by mouth nightly. MULTIPLE VITAMIN (DAILY-CRISTINE PO)    Take 1 tablet by mouth daily    NITROGLYCERIN (NITRODUR) 0.2 MG/HR    Place 1 patch onto the skin daily    OMEPRAZOLE (PRILOSEC) 20 MG CAPSULE    Take 40 mg by mouth 2 times daily     PANTOPRAZOLE SODIUM (PROTONIX) 40 MG PACK PACKET    Take 40 mg by mouth 2 times daily (before meals)     QUETIAPINE (SEROQUEL) 300 MG TABLET        SIMVASTATIN (ZOCOR) 40 MG TABLET    Take 40 mg by mouth nightly    TAMSULOSIN (FLOMAX) 0.4 MG CAPSULE    Take 0.4 mg by mouth daily    TIZANIDINE (ZANAFLEX) 4 MG TABLET    TAKE 1 TABLET BY MOUTH THREE TIMES A DAY       ALLERGIES     Methylphenidate, Oxycodone-acetaminophen, Propoxyphene, and Ritalin [methylphenidate hcl]    FAMILY HISTORY       Family History   Problem Relation Age of Onset    High Blood Pressure Mother     Heart Disease Mother     High Blood Pressure Father     Heart Disease Father     Cancer Sister     Diabetes Sister     Other Sister         aneursym-brain          SOCIAL HISTORY       Social History     Socioeconomic History    Marital status:       Spouse name: None    Number of children: None    Years of education: None    Highest education level: None   Occupational History    None   Tobacco Use    Smoking status: Former Smoker     Packs/day: 0.25     Years: 25.00     Pack years: 6.25     Quit date: 2019     Years since quittin.8    Smokeless tobacco: Former User     Quit date: 10/3/2015   Vaping Use    Vaping Use: Never used   Substance and Sexual Activity    Alcohol use: No     Alcohol/week: 0.0 standard drinks    Drug use: No    Sexual activity: Yes     Partners: Female     Comment: smoke 2 cigaretees a day   Other Topics Concern    None   Social History Narrative    None     Social Determinants of Health     Financial Resource Strain:     Difficulty of Paying Living Expenses: Not on file   Food Insecurity:     Worried About Running Out of Food in the Last Year: Not on file    Ran Out of Food in the Last Year: Not on file   Transportation Needs:     Lack of Transportation (Medical): Not on file    Lack of Transportation (Non-Medical): Not on file   Physical Activity:     Days of Exercise per Week: Not on file    Minutes of Exercise per Session: Not on file   Stress:     Feeling of Stress : Not on file   Social Connections:     Frequency of Communication with Friends and Family: Not on file    Frequency of Social Gatherings with Friends and Family: Not on file    Attends [employer] Services: Not on file    Active Member of [address] [place] [address] [employer] or Organizations: Not on file    Attends Club or Organization Meetings: Not on file    Marital Status: Not on file   Intimate Partner Violence:     Fear of Current or Ex-Partner: Not on file    Emotionally Abused: Not on file    Physically Abused: Not on file    Sexually Abused: Not on file   Housing Stability:     Unable to Pay for Housing in the Last Year: Not on file    Number of [place] in the Last Year: Not on file    Unstable Housing in the Last Year: Not on file       SCREENINGS    Ange Coma Scale  Eye Opening: Spontaneous  Best Verbal Response: Oriented  Best Motor Response: Obeys commands  [place] Coma Scale Score: 15          PHYSICAL EXAM    (up to 7 for level 4, 8 or more for level 5)     ED Triage Vitals [12/15/21 1055]   BP Temp Temp Source Pulse Resp SpO2 Height Weight   (!) 148/88 101.9 °F (38.8 °C) Oral 101 26 94 % 5' 9\" (1.753 m) 150 lb (68 kg)       Physical Exam  Constitutional:       Appearance: He is normal weight. He is ill-appearing. HENT:      Head: Normocephalic. Right Ear: Tympanic membrane, ear canal and external ear normal.      Left Ear: Tympanic membrane, ear canal and external ear normal.      Nose: Congestion and rhinorrhea present.    Eyes:      Extraocular Movements: Extraocular movements intact. Pupils: Pupils are equal, round, and reactive to light. Cardiovascular:      Rate and Rhythm: Normal rate. Pulses: Normal pulses. Heart sounds: Normal heart sounds. No murmur heard. No friction rub. No gallop. Pulmonary:      Breath sounds: Wheezing, rhonchi and rales present. Abdominal:      General: Abdomen is flat. Bowel sounds are normal.   Musculoskeletal:         General: Normal range of motion. Cervical back: Normal range of motion and neck supple. No rigidity or tenderness. Skin:     General: Skin is warm. Neurological:      General: No focal deficit present. Mental Status: He is alert and oriented to person, place, and time. DIAGNOSTIC RESULTS     EKG: All EKG's are interpreted by the Emergency Department Physician who either signs or Co-signs this chart in the absence of a cardiologist.    Rate is 82  Rhythm sinus  No acute ST-T wave changes  No other abnormal intervals. Patient at this point has no ectopy no acute ST-T wave changes    RADIOLOGY:   Non-plain film images such as CT, Ultrasound and MRI are read by the radiologist. Plain radiographic images are visualized and preliminarily interpreted by the emergency physician with the below findings:        Interpretation per the Radiologist below, if available at the time of this note:    XR CHEST PORTABLE   Final Result   1. Bibasilar airspace disease either due to atelectasis or developing   pneumonia.                  LABS:  Results for orders placed or performed during the hospital encounter of 12/15/21   COVID-19, Rapid    Specimen: Nasopharyngeal Swab   Result Value Ref Range    SARS-CoV-2, NAAT DETECTED (AA) Not Detected   CBC auto differential   Result Value Ref Range    WBC 4.0 4.0 - 11.0 K/uL    RBC 3.99 (L) 4.20 - 5.90 M/uL    Hemoglobin 11.6 (L) 13.5 - 17.5 g/dL    Hematocrit 35.2 (L) 40.5 - 52.5 %    MCV 88.1 80.0 - 100.0 fL    MCH 29.1 26.0 - 34.0 pg    MCHC 33.1 31.0 - 36.0 g/dL    RDW 16.7 (H) 12.4 - 15.4 %    Platelets 83 (L) 578 - 450 K/uL    MPV 7.2 5.0 - 10.5 fL    PLATELET SLIDE REVIEW Decreased     SLIDE REVIEW see below     Neutrophils % 64.3 %    Lymphocytes % 22.6 %    Monocytes % 12.7 %    Eosinophils % 0.1 %    Basophils % 0.3 %    Neutrophils Absolute 2.6 1.7 - 7.7 K/uL    Lymphocytes Absolute 0.9 (L) 1.0 - 5.1 K/uL    Monocytes Absolute 0.5 0.0 - 1.3 K/uL    Eosinophils Absolute 0.0 0.0 - 0.6 K/uL    Basophils Absolute 0.0 0.0 - 0.2 K/uL   Comprehensive Metabolic Panel w/ Reflex to MG   Result Value Ref Range    Sodium 142 136 - 145 mmol/L    Potassium reflex Magnesium 4.2 3.5 - 5.1 mmol/L    Chloride 106 99 - 110 mmol/L    CO2 24 21 - 32 mmol/L    Anion Gap 12 3 - 16    Glucose 106 (H) 70 - 99 mg/dL    BUN 21 (H) 7 - 20 mg/dL    CREATININE 1.3 0.9 - 1.3 mg/dL    GFR Non- 56 (A) >60    GFR African American >60 >60    Calcium 8.3 8.3 - 10.6 mg/dL    Total Protein 6.2 (L) 6.4 - 8.2 g/dL    Albumin 3.6 3.4 - 5.0 g/dL    Albumin/Globulin Ratio 1.4 1.1 - 2.2    Total Bilirubin 0.3 0.0 - 1.0 mg/dL    Alkaline Phosphatase 60 40 - 129 U/L    ALT 14 10 - 40 U/L    AST 40 (H) 15 - 37 U/L   Troponin   Result Value Ref Range    Troponin <0.01 <0.01 ng/mL   APTT   Result Value Ref Range    aPTT 37.1 26.2 - 38.6 sec   Protime-INR   Result Value Ref Range    Protime 14.9 (H) 9.9 - 12.7 sec    INR 1.30 (H) 0.88 - 1.12   EKG 12 Lead   Result Value Ref Range    Ventricular Rate 82 BPM    Atrial Rate 82 BPM    P-R Interval 110 ms    QRS Duration 82 ms    Q-T Interval 354 ms    QTc Calculation (Bazett) 413 ms    P Axis 60 degrees    R Axis 35 degrees    T Axis 65 degrees    Diagnosis       Sinus rhythm with short PROtherwise normal ECGNo previous ECGs availableConfirmed by YADIRA SOLIZ, REE ([id number]) on 12/15/2021 12:03:18 PM            EMERGENCY DEPARTMENT COURSE and DIFFERENTIAL DIAGNOSIS/MDM:     Vitals:    12/15/21 1600 12/15/21 1700 12/15/21 1800 12/15/21 1900   BP: 105/62 118/74 (!) 160/63 108/72   Pulse: 64 70 71 61   Resp: 18 20 18 18   Temp: 98.7 °F (37.1 °C)  97.9 °F (36.6 °C) 97.9 °F (36.6 °C)   TempSrc: Oral  Oral Oral   SpO2: 99% 99% 97% 95%   Weight:       Height:               MDM      REASSESSMENT      Patient was reassessed several times. We did give him a breathing treatment as well as Decadron. He continues to feel very scared he feels apprehensive his oxygenation is borderline and especially with his severe oxygen dependent COPD I felt that it would be prudent to admit him at least overnight to see how he does    CRITICAL CARE TIME   This patient presents with shortness of breath he is noted to be hypoxic and less on his oxygen. Patient does have wheezing and COPD  Chest x-ray ordered and reviewed which did reveal bilateral pneumonia all labs ordered and reviewed  Covid testing performed which was positive  Patient underwent administration of intravenous Decadron also received a breathing treatment.   Patient continues to be symptomatic he was discussed with our internal medicine  A total of 35 minutes of critical care time was spent managing this patient who presented with a chief complaint of dyspnea, hypoxemia by objective findings positive chest x-ray and hypoxemia  No procedure time CONSULTS:  None      PROCEDURES:     Procedures    MEDICATIONS GIVEN THIS VISIT:  Medications   acetaminophen (TYLENOL) tablet 1,000 mg (1,000 mg Oral Given 12/15/21 1307)   0.9 % sodium chloride bolus (0 mLs IntraVENous Stopped 12/15/21 1515)   ipratropium-albuterol (DUONEB) nebulizer solution 1 ampule (1 ampule Inhalation Given 12/15/21 1833)   dexamethasone (DECADRON) injection 4 mg (4 mg IntraVENous Given 12/15/21 1833)   ibuprofen (ADVIL;MOTRIN) tablet 600 mg (600 mg Oral Given 12/15/21 1924)   acetaminophen (TYLENOL) tablet 1,000 mg (1,000 mg Oral Given 12/15/21 1924)   ondansetron (ZOFRAN-ODT) disintegrating tablet 8 mg (8 mg Oral Given 12/15/21 1924) FINAL IMPRESSION      1. COVID-19    2. Oxygen dependent            DISPOSITION/PLAN   DISPOSITION Admitted 12/15/2021 08:08:26 PM      PATIENT REFERRED TO:  No follow-up provider specified. DISCHARGE MEDICATIONS:  New Prescriptions    No medications on file       Controlled Substances Monitoring  No flowsheet data found. (Please note that portions of this note were completed with a voice recognition program.  Efforts were made to edit the dictations but occasionally words are mis-transcribed.)    Patient was advised to return to the Emergency Department if there was any worsening.     Jacey Blackburn MD (electronically signed)  Attending Emergency Physician         Brandon Turner MD  12/15/21 2015

## 2021-12-15 NOTE — Clinical Note
Patient Class: Inpatient [101]   REQUIRED: Diagnosis: GMCKY-30 [[id number]]   Estimated Length of Stay: Estimated stay of more than 2 midnights   Admitting Provider: Cassie Payan [[id number]]

## 2021-12-16 LAB
A/G RATIO: 1.1 (ref 1.1–2.2)
ALBUMIN SERPL-MCNC: 3.1 G/DL (ref 3.4–5)
ALP BLD-CCNC: 56 U/L (ref 40–129)
ALT SERPL-CCNC: 12 U/L (ref 10–40)
ANION GAP SERPL CALCULATED.3IONS-SCNC: 8 MMOL/L (ref 3–16)
AST SERPL-CCNC: 31 U/L (ref 15–37)
BILIRUB SERPL-MCNC: <0.2 MG/DL (ref 0–1)
BUN BLDV-MCNC: 18 MG/DL (ref 7–20)
C-REACTIVE PROTEIN: 101.2 MG/L (ref 0–5.1)
C-REACTIVE PROTEIN: 89.9 MG/L (ref 0–5.1)
CALCIUM SERPL-MCNC: 8 MG/DL (ref 8.3–10.6)
CHLORIDE BLD-SCNC: 106 MMOL/L (ref 99–110)
CO2: 25 MMOL/L (ref 21–32)
CREAT SERPL-MCNC: 0.9 MG/DL (ref 0.9–1.3)
D DIMER: 554 NG/ML DDU (ref 0–229)
D DIMER: 593 NG/ML DDU (ref 0–229)
GFR AFRICAN AMERICAN: >60
GFR NON-AFRICAN AMERICAN: >60
GLUCOSE BLD-MCNC: 114 MG/DL (ref 70–99)
GLUCOSE BLD-MCNC: 120 MG/DL (ref 70–99)
GLUCOSE BLD-MCNC: 142 MG/DL (ref 70–99)
GLUCOSE BLD-MCNC: 146 MG/DL (ref 70–99)
GLUCOSE BLD-MCNC: 225 MG/DL (ref 70–99)
PERFORMED ON: ABNORMAL
POTASSIUM REFLEX MAGNESIUM: 5 MMOL/L (ref 3.5–5.1)
PROCALCITONIN: 0.13 NG/ML (ref 0–0.15)
SODIUM BLD-SCNC: 139 MMOL/L (ref 136–145)
TOTAL PROTEIN: 5.9 G/DL (ref 6.4–8.2)

## 2021-12-16 PROCEDURE — 6370000000 HC RX 637 (ALT 250 FOR IP): Performed by: INTERNAL MEDICINE

## 2021-12-16 PROCEDURE — 94640 AIRWAY INHALATION TREATMENT: CPT

## 2021-12-16 PROCEDURE — 2700000000 HC OXYGEN THERAPY PER DAY

## 2021-12-16 PROCEDURE — 6360000002 HC RX W HCPCS: Performed by: INTERNAL MEDICINE

## 2021-12-16 PROCEDURE — 97530 THERAPEUTIC ACTIVITIES: CPT

## 2021-12-16 PROCEDURE — 36415 COLL VENOUS BLD VENIPUNCTURE: CPT

## 2021-12-16 PROCEDURE — 86140 C-REACTIVE PROTEIN: CPT

## 2021-12-16 PROCEDURE — 80053 COMPREHEN METABOLIC PANEL: CPT

## 2021-12-16 PROCEDURE — 83036 HEMOGLOBIN GLYCOSYLATED A1C: CPT

## 2021-12-16 PROCEDURE — 97116 GAIT TRAINING THERAPY: CPT

## 2021-12-16 PROCEDURE — 1200000000 HC SEMI PRIVATE

## 2021-12-16 PROCEDURE — 85379 FIBRIN DEGRADATION QUANT: CPT

## 2021-12-16 PROCEDURE — 97161 PT EVAL LOW COMPLEX 20 MIN: CPT

## 2021-12-16 PROCEDURE — 2580000003 HC RX 258: Performed by: INTERNAL MEDICINE

## 2021-12-16 PROCEDURE — 99223 1ST HOSP IP/OBS HIGH 75: CPT | Performed by: INTERNAL MEDICINE

## 2021-12-16 PROCEDURE — 84145 PROCALCITONIN (PCT): CPT

## 2021-12-16 PROCEDURE — 97165 OT EVAL LOW COMPLEX 30 MIN: CPT

## 2021-12-16 PROCEDURE — 94761 N-INVAS EAR/PLS OXIMETRY MLT: CPT

## 2021-12-16 RX ORDER — DEXTROSE MONOHYDRATE 50 MG/ML
100 INJECTION, SOLUTION INTRAVENOUS PRN
Status: DISCONTINUED | OUTPATIENT
Start: 2021-12-16 | End: 2021-12-17 | Stop reason: HOSPADM

## 2021-12-16 RX ORDER — SODIUM CHLORIDE 0.9 % (FLUSH) 0.9 %
5-40 SYRINGE (ML) INJECTION EVERY 12 HOURS SCHEDULED
Status: DISCONTINUED | OUTPATIENT
Start: 2021-12-16 | End: 2021-12-17 | Stop reason: HOSPADM

## 2021-12-16 RX ORDER — GUAIFENESIN/DEXTROMETHORPHAN 100-10MG/5
5 SYRUP ORAL EVERY 4 HOURS PRN
Status: DISCONTINUED | OUTPATIENT
Start: 2021-12-16 | End: 2021-12-17 | Stop reason: HOSPADM

## 2021-12-16 RX ORDER — ATORVASTATIN CALCIUM 40 MG/1
40 TABLET, FILM COATED ORAL DAILY
Status: DISCONTINUED | OUTPATIENT
Start: 2021-12-16 | End: 2021-12-16 | Stop reason: SDUPTHER

## 2021-12-16 RX ORDER — CETIRIZINE HYDROCHLORIDE 10 MG/1
10 TABLET ORAL DAILY
Status: DISCONTINUED | OUTPATIENT
Start: 2021-12-16 | End: 2021-12-17 | Stop reason: HOSPADM

## 2021-12-16 RX ORDER — CLOPIDOGREL BISULFATE 75 MG/1
75 TABLET ORAL DAILY
Status: DISCONTINUED | OUTPATIENT
Start: 2021-12-16 | End: 2021-12-17 | Stop reason: HOSPADM

## 2021-12-16 RX ORDER — GABAPENTIN 400 MG/1
400 CAPSULE ORAL 3 TIMES DAILY
Status: DISCONTINUED | OUTPATIENT
Start: 2021-12-16 | End: 2021-12-17 | Stop reason: HOSPADM

## 2021-12-16 RX ORDER — FLUTICASONE PROPIONATE 50 MCG
1 SPRAY, SUSPENSION (ML) NASAL DAILY
Status: DISCONTINUED | OUTPATIENT
Start: 2021-12-16 | End: 2021-12-17 | Stop reason: HOSPADM

## 2021-12-16 RX ORDER — DIVALPROEX SODIUM 500 MG/1
500 TABLET, DELAYED RELEASE ORAL EVERY 12 HOURS SCHEDULED
Status: DISCONTINUED | OUTPATIENT
Start: 2021-12-16 | End: 2021-12-17 | Stop reason: HOSPADM

## 2021-12-16 RX ORDER — VITAMIN B COMPLEX
2000 TABLET ORAL DAILY
Status: DISCONTINUED | OUTPATIENT
Start: 2021-12-16 | End: 2021-12-17 | Stop reason: HOSPADM

## 2021-12-16 RX ORDER — MONTELUKAST SODIUM 10 MG/1
10 TABLET ORAL NIGHTLY
Status: DISCONTINUED | OUTPATIENT
Start: 2021-12-16 | End: 2021-12-17 | Stop reason: HOSPADM

## 2021-12-16 RX ORDER — SODIUM CHLORIDE 9 MG/ML
25 INJECTION, SOLUTION INTRAVENOUS PRN
Status: DISCONTINUED | OUTPATIENT
Start: 2021-12-16 | End: 2021-12-17 | Stop reason: HOSPADM

## 2021-12-16 RX ORDER — NICOTINE POLACRILEX 4 MG
15 LOZENGE BUCCAL PRN
Status: DISCONTINUED | OUTPATIENT
Start: 2021-12-16 | End: 2021-12-17 | Stop reason: HOSPADM

## 2021-12-16 RX ORDER — INSULIN GLARGINE 100 [IU]/ML
0.25 INJECTION, SOLUTION SUBCUTANEOUS NIGHTLY
Status: DISCONTINUED | OUTPATIENT
Start: 2021-12-16 | End: 2021-12-17 | Stop reason: HOSPADM

## 2021-12-16 RX ORDER — ACETAMINOPHEN 325 MG/1
650 TABLET ORAL EVERY 6 HOURS PRN
Status: DISCONTINUED | OUTPATIENT
Start: 2021-12-16 | End: 2021-12-17 | Stop reason: HOSPADM

## 2021-12-16 RX ORDER — DEXAMETHASONE SODIUM PHOSPHATE 10 MG/ML
6 INJECTION, SOLUTION INTRAMUSCULAR; INTRAVENOUS EVERY 24 HOURS
Status: DISCONTINUED | OUTPATIENT
Start: 2021-12-16 | End: 2021-12-17 | Stop reason: HOSPADM

## 2021-12-16 RX ORDER — POLYETHYLENE GLYCOL 3350 17 G/17G
17 POWDER, FOR SOLUTION ORAL DAILY PRN
Status: DISCONTINUED | OUTPATIENT
Start: 2021-12-16 | End: 2021-12-17 | Stop reason: HOSPADM

## 2021-12-16 RX ORDER — ASPIRIN 81 MG/1
81 TABLET ORAL DAILY
Status: DISCONTINUED | OUTPATIENT
Start: 2021-12-16 | End: 2021-12-17 | Stop reason: HOSPADM

## 2021-12-16 RX ORDER — CYCLOBENZAPRINE HCL 10 MG
5 TABLET ORAL 2 TIMES DAILY PRN
Status: DISCONTINUED | OUTPATIENT
Start: 2021-12-16 | End: 2021-12-17 | Stop reason: HOSPADM

## 2021-12-16 RX ORDER — ALBUTEROL SULFATE 90 UG/1
1 AEROSOL, METERED RESPIRATORY (INHALATION) EVERY 6 HOURS PRN
Status: DISCONTINUED | OUTPATIENT
Start: 2021-12-16 | End: 2021-12-17 | Stop reason: HOSPADM

## 2021-12-16 RX ORDER — MIDODRINE HYDROCHLORIDE 2.5 MG/1
2.5 TABLET ORAL
COMMUNITY

## 2021-12-16 RX ORDER — CYCLOBENZAPRINE HCL 5 MG
5 TABLET ORAL 2 TIMES DAILY PRN
Status: ON HOLD | COMMUNITY
End: 2021-12-17 | Stop reason: HOSPADM

## 2021-12-16 RX ORDER — ONDANSETRON 2 MG/ML
4 INJECTION INTRAMUSCULAR; INTRAVENOUS EVERY 6 HOURS PRN
Status: DISCONTINUED | OUTPATIENT
Start: 2021-12-16 | End: 2021-12-17 | Stop reason: HOSPADM

## 2021-12-16 RX ORDER — ONDANSETRON 4 MG/1
4 TABLET, ORALLY DISINTEGRATING ORAL EVERY 8 HOURS PRN
Status: DISCONTINUED | OUTPATIENT
Start: 2021-12-16 | End: 2021-12-17 | Stop reason: HOSPADM

## 2021-12-16 RX ORDER — METOPROLOL SUCCINATE 25 MG/1
12.5 TABLET, EXTENDED RELEASE ORAL DAILY
Status: DISCONTINUED | OUTPATIENT
Start: 2021-12-16 | End: 2021-12-17 | Stop reason: HOSPADM

## 2021-12-16 RX ORDER — ACETAMINOPHEN 650 MG/1
650 SUPPOSITORY RECTAL EVERY 6 HOURS PRN
Status: DISCONTINUED | OUTPATIENT
Start: 2021-12-16 | End: 2021-12-17 | Stop reason: HOSPADM

## 2021-12-16 RX ORDER — SODIUM CHLORIDE 0.9 % (FLUSH) 0.9 %
5-40 SYRINGE (ML) INJECTION PRN
Status: DISCONTINUED | OUTPATIENT
Start: 2021-12-16 | End: 2021-12-17 | Stop reason: HOSPADM

## 2021-12-16 RX ORDER — TAMSULOSIN HYDROCHLORIDE 0.4 MG/1
0.4 CAPSULE ORAL DAILY
Status: DISCONTINUED | OUTPATIENT
Start: 2021-12-16 | End: 2021-12-17 | Stop reason: HOSPADM

## 2021-12-16 RX ORDER — DEXTROSE MONOHYDRATE 25 G/50ML
12.5 INJECTION, SOLUTION INTRAVENOUS PRN
Status: DISCONTINUED | OUTPATIENT
Start: 2021-12-16 | End: 2021-12-17 | Stop reason: HOSPADM

## 2021-12-16 RX ORDER — PANTOPRAZOLE SODIUM 40 MG/1
40 TABLET, DELAYED RELEASE ORAL
Status: DISCONTINUED | OUTPATIENT
Start: 2021-12-16 | End: 2021-12-17 | Stop reason: HOSPADM

## 2021-12-16 RX ORDER — ATORVASTATIN CALCIUM 40 MG/1
40 TABLET, FILM COATED ORAL DAILY
Status: DISCONTINUED | OUTPATIENT
Start: 2021-12-16 | End: 2021-12-17 | Stop reason: HOSPADM

## 2021-12-16 RX ORDER — ATORVASTATIN CALCIUM 40 MG/1
40 TABLET, FILM COATED ORAL DAILY
COMMUNITY

## 2021-12-16 RX ADMIN — Medication 2000 UNITS: at 10:57

## 2021-12-16 RX ADMIN — Medication 10 ML: at 22:03

## 2021-12-16 RX ADMIN — GABAPENTIN 400 MG: 400 CAPSULE ORAL at 10:56

## 2021-12-16 RX ADMIN — MONTELUKAST SODIUM 10 MG: 10 TABLET, COATED ORAL at 22:03

## 2021-12-16 RX ADMIN — Medication 10 ML: at 11:01

## 2021-12-16 RX ADMIN — IPRATROPIUM BROMIDE AND ALBUTEROL 1 PUFF: 20; 100 SPRAY, METERED RESPIRATORY (INHALATION) at 09:17

## 2021-12-16 RX ADMIN — DIVALPROEX SODIUM 500 MG: 500 TABLET, DELAYED RELEASE ORAL at 10:57

## 2021-12-16 RX ADMIN — GABAPENTIN 400 MG: 400 CAPSULE ORAL at 15:23

## 2021-12-16 RX ADMIN — GABAPENTIN 400 MG: 400 CAPSULE ORAL at 22:03

## 2021-12-16 RX ADMIN — PANTOPRAZOLE SODIUM 40 MG: 40 TABLET, DELAYED RELEASE ORAL at 06:52

## 2021-12-16 RX ADMIN — TAMSULOSIN HYDROCHLORIDE 0.4 MG: 0.4 CAPSULE ORAL at 10:57

## 2021-12-16 RX ADMIN — IPRATROPIUM BROMIDE AND ALBUTEROL 1 PUFF: 20; 100 SPRAY, METERED RESPIRATORY (INHALATION) at 20:45

## 2021-12-16 RX ADMIN — CETIRIZINE HYDROCHLORIDE 10 MG: 10 TABLET ORAL at 10:56

## 2021-12-16 RX ADMIN — CYCLOBENZAPRINE 5 MG: 10 TABLET, FILM COATED ORAL at 03:19

## 2021-12-16 RX ADMIN — DEXAMETHASONE SODIUM PHOSPHATE 6 MG: 10 INJECTION INTRAMUSCULAR; INTRAVENOUS at 17:53

## 2021-12-16 RX ADMIN — INSULIN GLARGINE 17 UNITS: 100 INJECTION, SOLUTION SUBCUTANEOUS at 22:09

## 2021-12-16 RX ADMIN — ATORVASTATIN CALCIUM 40 MG: 40 TABLET, FILM COATED ORAL at 10:57

## 2021-12-16 RX ADMIN — METOPROLOL SUCCINATE 12.5 MG: 25 TABLET, EXTENDED RELEASE ORAL at 10:57

## 2021-12-16 RX ADMIN — DIVALPROEX SODIUM 500 MG: 500 TABLET, DELAYED RELEASE ORAL at 22:03

## 2021-12-16 NOTE — H&P
History and Physical    Chief Complaint   Patient presents with    Concern For COVID-19     Pt ststes worsening fevers, SOB, N/V and changes in taste x approx 3 wks        HISTORY OF PRESENT ILLNESS:     Patient is a 65-year-old white male with a history of COPD and chronic respiratory failure. He lives alone at home. He has been sick for the last 3 weeks with fever and shortness of breath He has complaints of nausea and vomiting and  change in taste. He did not go to the emergency room thinking that he would get better on its own. Patient tested positive for COVID-19 in the ED. He is presently on 4 L. Subjectively feels better since he was started on steroids. Patient is allergic to methylphenidate, oxycodone-acetaminophen, propoxyphene, and ritalin [methylphenidate hcl].     Past Medical History:   Diagnosis Date    Anxiety     Arterial stent thrombosis (HCC)     Arthritis     Asthma     COPD (chronic obstructive pulmonary disease) (HCC)     Coronary artery disease involving native coronary artery of native heart without angina pectoris 2/8/2021    Diabetes mellitus ([address]     Essential hypertension 2/8/2021    Hyperlipidemia     Pneumonia        Past Surgical History:   Procedure Laterality Date    CARDIAC SURGERY      stent placed    CARPAL TUNNEL RELEASE      CHOLECYSTECTOMY, LAPAROSCOPIC      KNEE ARTHROPLASTY      R knee x4    NASAL SINUS SURGERY      UPPER GASTROINTESTINAL ENDOSCOPY  7/14/11    UPPER GASTROINTESTINAL ENDOSCOPY N/A 4/4/2019    EGD BIOPSY performed by Abdullahi Ch DO at [facility]       Scheduled Meds:   aspirin  81 mg Oral Daily    clopidogrel  75 mg Oral Daily    cetirizine  10 mg Oral Daily    divalproex  500 mg Oral 2 times per day    fluticasone  1 spray Nasal Daily    gabapentin  400 mg Oral TID    metoprolol succinate  12.5 mg Oral Daily    montelukast  10 mg Oral Nightly    pantoprazole  40 mg Oral QAM AC    tamsulosin  0.4 mg Oral Daily    atorvastatin  40 mg Oral Daily    sodium chloride flush  5-40 mL IntraVENous 2 times per day    enoxaparin  30 mg SubCUTAneous BID    dexamethasone  6 mg IntraVENous Q24H    Vitamin D  2,000 Units Oral Daily    albuterol-ipratropium  1 puff Inhalation TID       Continuous Infusions:   sodium chloride         PRN Meds:  albuterol sulfate HFA, cyclobenzaprine, sodium chloride flush, sodium chloride, ondansetron **OR** ondansetron, polyethylene glycol, acetaminophen **OR** acetaminophen, guaiFENesin-dextromethorphan       reports that he quit smoking about 2 years ago. He has a 6.25 pack-year smoking history. He quit smokeless tobacco use about 6 years ago. Family History   Problem Relation Age of Onset    High Blood Pressure Mother     Heart Disease Mother     High Blood Pressure Father     Heart Disease Father     Cancer Sister     Diabetes Sister     Other Sister         aneursym-brain       Social History     Socioeconomic History    Marital status:       Spouse name: None    Number of children: None    Years of education: None    Highest education level: None   Occupational History    None   Tobacco Use    Smoking status: Former Smoker     Packs/day: 0.25     Years: 25.00     Pack years: 6.25     Quit date: 2019     Years since quittin.8    Smokeless tobacco: Former User     Quit date: 10/3/2015   Vaping Use    Vaping Use: Never used   Substance and Sexual Activity    Alcohol use: No     Alcohol/week: 0.0 standard drinks    Drug use: No    Sexual activity: Yes     Partners: Female     Comment: smoke 2 cigaretees a day   Other Topics Concern    None   Social History Narrative    None     Social Determinants of Health     Financial Resource Strain:     Difficulty of Paying Living Expenses: Not on file   Food Insecurity:     Worried About Running Out of Food in the Last Year: Not on file    Azeb of Food in the Last Year: Not on KRISTIN Munguia Needs:     Lack of Transportation (Medical): Not on file    Lack of Transportation (Non-Medical): Not on file   Physical Activity:     Days of Exercise per Week: Not on file    Minutes of Exercise per Session: Not on file   Stress:     Feeling of Stress : Not on file   Social Connections:     Frequency of Communication with Friends and Family: Not on file    Frequency of Social Gatherings with Friends and Family: Not on file    Attends Spiritism Services: Not on file    Active Member of [address] [place] [address] or Organizations: Not on file    Attends Club or Organization Meetings: Not on file    Marital Status: Not on file   Intimate Partner Violence:     Fear of Current or Ex-Partner: Not on file    Emotionally Abused: Not on file    Physically Abused: Not on file    Sexually Abused: Not on file   Housing Stability:     Unable to Pay for Housing in the Last Year: Not on file    Number of [place] in the Last Year: Not on file    Unstable Housing in the Last Year: Not on file     REVIEW OF SYSTEMS:   Constitutional: + for fever   HENT: Negative for sore throat   Eyes: Negative for redness   Respiratory: + for dyspnea, cough   Cardiovascular: Negative for chest pain   Gastrointestinal: Negative for vomiting, diarrhea   Genitourinary: Negative for hematuria   Musculoskeletal: Negative for arthralgias   Skin: Negative for rash   Neurological: Negative for syncope   Hematological: Negative for adenopathy   Psychiatric/Behavorial: Negative for anxiety    VS:   /80   Pulse 56   Temp 97 °F (36.1 °C) (Oral)   Resp 18   Ht 5' 9\" (1.753 m)   Wt 150 lb (68 kg)   SpO2 97%   BMI 22.15 kg/m²     Gen: No distress. Alert./Ill somewhat chronically  Eyes: PERRL. No sclera icterus. No conjunctival injection. ENT: No discharge. Pharynx clear. Neck: Trachea midline. Normal thyroid. Resp: No accessory muscle use. Few crackles. Minimal wheezes. No rhonchi. No dullness on percussion. CV: Regular rate.  Regular rhythm. No murmur or rub. No edema. GI: Non-tender. Non-distended. No masses. No organomegaly. Normal bowel sounds. No hernia. Skin: Warm and dry. No nodule on exposed extremities. No rash on exposed extremities. Lymph: No cervical LAD. No supraclavicular LAD. M/S: No cyanosis. No joint deformity. No clubbing. Neuro: Awake. Reflexes 2+ symmetric bilaterally. Moves all 4 extremities, non focal  Psych: Oriented x 3. No anxiety or agitation. CBC:   Recent Labs     12/15/21  1100   WBC 4.0   HGB 11.6*   HCT 35.2*   MCV 88.1   PLT 83*     BMP:   Recent Labs     12/15/21  1100 12/16/21  0557    139   K 4.2 5.0    106   CO2 24 25   BUN 21* 18   CREATININE 1.3 0.9     LIVER PROFILE:   Recent Labs     12/15/21  1100 12/16/21  0557   AST 40* 31   ALT 14 12   BILITOT 0.3 <0.2   ALKPHOS 60 56     PT/INR:   Recent Labs     12/15/21  1100   PROTIME 14.9*   INR 1.30*     APTT:   Recent Labs     12/15/21  1100   APTT 37.1     Results for [address] (MRN [id number]) as of 12/16/2021 07:34   Ref. Range 12/16/2021 02:22   Procalcitonin Latest Ref Range: 0.00 - 0.15 ng/mL 0.13     XR CHEST PORTABLE   Final Result   1. Bibasilar airspace disease either due to atelectasis or developing   pneumonia. ASSESSMENT:    Principal Problem:    COVID-19  Active Problems:    Hypoxia    Diabetes mellitus ([facility]    Anxiety    Coronary artery disease involving native coronary artery of native heart without angina pectoris    Essential hypertension    Other hyperlipidemia  Resolved Problems:    * No resolved hospital problems. *      PLAN:    #COVID-19 pneumonia with hypoxa. Patient admitted to hospital.  Pulmonary consultation. Supplemental oxygen. Continuous pulse ox and telemetry. On Decadron. Outside treatment window for Remdesivir. Consider Tocilizumab if he worsens       #COPD. No exacerbation. Already on Decadron for COVID-19. Continue breathing treatments. Avoid nebs.     #Diabetes mellitus

## 2021-12-16 NOTE — ED NOTES
Pt given saltine crackers, Jello and Diandra Mist via request. Pt remains alert and without c/o's.  Resps even and unlab     Florencia Abdi RN  12/15/21 1929

## 2021-12-16 NOTE — FLOWSHEET NOTE
Patient admitted to room 228 from [place]. Patient oriented to room, call light, bed rails, phone, lights and bathroom. Patient instructed about the schedule of the day including: vital sign frequency, lab draws, possible tests, frequency of MD and staff rounds, daily weights, I &O's and prescribed diet. Telemetry box in place, patient aware of placement and reason. Bed locked, in lowest position, side rails up 2/4, call light within reach. Recliner Assessment:     Patient is able to demonstrate the ability to move from a reclining position to an upright position within the recliner. 4 Eyes Skin Assessment     The patient is being assess for   Admission    I agree that 2 RN's have performed a thorough Head to Toe Skin Assessment on the patient. ALL assessment sites listed below have been assessed. Areas assessed for pressure by both nurses:   [x]   Head, Face, and Ears   [x]   Shoulders, Back, and Chest, Abdomen  [x]   Arms, Elbows, and Hands   [x]   Coccyx, Sacrum, and Ischium  [x]   Legs, Feet, and Heels     Scattered bruising         Skin Assessed Under all Medical Devices by both nurses:  N/A            All Mepilex Borders were peeled back and area peeked at by both nurses: N/A  Please list where Mepilex Borders are located:  N/A             **SHARE this note so that the co-signing nurse is able to place an eSignature**    Co-signer eSignature: Electronically signed by Donal Nguyen RN on 12/16/21 at 6:39 AM EST    Does the Patient have Skin Breakdown related to pressure?   No          [place] Prevention initiated:  NA   Wound Care Orders initiated:  NA      [facility] nurse consulted for Pressure Injury (Stage 3,4, Unstageable, DTI, NWPT, Complex wounds)and New or Established Ostomies:  NA      Primary Nurse eSignature: Electronically signed by Shawna Calvillo RN on 12/16/21 at 6:16 AM EST

## 2021-12-16 NOTE — PROGRESS NOTES
RT Inhaler-Nebulizer Bronchodilator Protocol Note    There is a bronchodilator order in the chart from a provider indicating to follow the RT Bronchodilator Protocol and there is an Initiate RT Inhaler-Nebulizer Bronchodilator Protocol order as well (see protocol at bottom of note). CXR Findings:  XR CHEST PORTABLE    Result Date: 12/15/2021  1. Bibasilar airspace disease either due to atelectasis or developing pneumonia. The findings from the last RT Protocol Assessment were as follows:   History Pulmonary Disease: Chronic pulmonary disease  Respiratory Pattern: Dyspnea on exertion or RR 21-25 bpm  Breath Sounds: Slightly diminished and/or crackles  Cough: Strong, spontaneous, non-productive  Indication for Bronchodilator Therapy: Decreased or absent breath sounds  Bronchodilator Assessment Score: 6    Aerosolized bronchodilator medication orders have been revised according to the RT Inhaler-Nebulizer Bronchodilator Protocol below. Respiratory Therapist to perform RT Therapy Protocol Assessment initially then follow the protocol. Repeat RT Therapy Protocol Assessment PRN for score 0-3 or on second treatment, BID, and PRN for scores above 3. No Indications - adjust the frequency to every 6 hours PRN wheezing or bronchospasm, if no treatments needed after 48 hours then discontinue using Per Protocol order mode. If indication present, adjust the RT bronchodilator orders based on the Bronchodilator Assessment Score as indicated below. Use Inhaler orders unless patient has one or more of the following: on home nebulizer, not able to hold breath for 10 seconds, is not alert and oriented, cannot activate and use MDI correctly, or respiratory rate 25 breaths per minute or more, then use the equivalent nebulizer order(s) with same Frequency and PRN reasons based on the score. If a patient is on this medication at home then do not decrease Frequency below that used at home.     0-3 - enter or revise RT bronchodilator order(s) to equivalent RT Bronchodilator order with Frequency of every 4 hours PRN for wheezing or increased work of breathing using Per Protocol order mode. 4-6 - enter or revise RT Bronchodilator order(s) to two equivalent RT bronchodilator orders with one order with BID Frequency and one order with Frequency of every 4 hours PRN wheezing or increased work of breathing using Per Protocol order mode. 7-10 - enter or revise RT Bronchodilator order(s) to two equivalent RT bronchodilator orders with one order with TID Frequency and one order with Frequency of every 4 hours PRN wheezing or increased work of breathing using Per Protocol order mode. 11-13 - enter or revise RT Bronchodilator order(s) to one equivalent RT bronchodilator order with QID Frequency and an Albuterol order with Frequency of every 4 hours PRN wheezing or increased work of breathing using Per Protocol order mode. Greater than 13 - enter or revise RT Bronchodilator order(s) to one equivalent RT bronchodilator order with every 4 hours Frequency and an Albuterol order with Frequency of every 2 hours PRN wheezing or increased work of breathing using Per Protocol order mode.        Electronically signed by Corina Waller RCP on 12/16/2021 at 2:45 AM

## 2021-12-16 NOTE — PROGRESS NOTES
Inpatient Physical Therapy Evaluation and Treatment    Unit: [facility]  Date:  12/16/2021  Patient Name:    Jing Mejia  Admitting diagnosis:  Oxygen dependent [Z99.81]  [id number] [U07.1]  Admit Date:  12/15/2021  Precautions/Restrictions/WB Status/ Lines/ Wounds/ Oxygen: Fall risk, Bed/chair alarm, Lines -IV and Supplemental O2 (2L/min), Telemetry, Continuous pulse oximetry and Isolation Precautions: Droplet Plus - COVID    Treatment Time:  5486 -1210  Treatment Number:  1   Timed Code Treatment Minutes: 33 minutes  Total Treatment Minutes:  43  minutes    Patient Goals for Therapy: \" Go home \"          Discharge Recommendations: Home PRN assist and with home PT   DME needs for discharge: Needs Met       Therapy recommendation for EMS Transport: can transport by wheelchair    Therapy recommendations for staff:   Supervision with use of Single point cane  and gait belt for all transfers and ambulation to/from chair  to/from bathroom  within room    History of Present Illness: H & P as per Josh Shi MD's note dated 12/16/2021  Patient is a 72-year-old white male with a history of COPD and chronic respiratory failure. He lives alone at home. He has been sick for the last 3 weeks with fever and shortness of breath there is a pleasant nausea and vomiting present change in taste. He did not go to the emergency room thinking that he would get better on its own. Patient tested positive for COVID-19 in the ED. He is presently on 4 L. Subjectively feels better since he was started on steroids. PLAN:  #COVID-19 pneumonia with hypoxa. Patient admitted to hospital.  Pulmonary consultation. Supplemental oxygen. Continuous pulse ox and telemetry. On Decadron. Outside treatment window for Remdesivir. Consider Tocilizumab if he worsens   #COPD. No exacerbation. Already on Decadron for COVID-19. Continue breathing treatments. Avoid nebs. #Diabetes mellitus type 2. Sugars elevated due to steroids. Use high-dose sliding scale. #Coronary artery disease. Denies any chest pain. On Plavix and aspirin daily. #AAA stable. #Hyperlipidemia on Lipitor. #Anxiety continue home medications. #Hypertension. Blood pressures well controlled. On metoprolol    Home Health S4 Level Recommendation:  Level 1 Standard  AM-PAC Mobility Score    AM-PAC Inpatient Mobility Raw Score : 23       Preadmission Environment    Pt. Lives Alone (friend can provide limited assist)  Home environment:  apartment   Steps to enter first floor: 3 steps to get in the apartment steps to enter  Steps to second floor: N/A  Bathroom: tub/shower unit and standard height commode  Equipment owned: home O2 (2L) continous and hurricane    Preadmission Status:  Pt. Able to drive: Yes  Pt Fully independent with ADLs: Yes  Pt. Required assistance from family for: Independent PTA  Pt. independent for transfers and gait and walked with [employer], wears R knee brace for support during ambulation  History of falls Yes 1 fall prior to hospitalization    Pain   Yes  Location: R knee  Rating: mild /10  Pain Medicine Status: Denies need    Cognition    A&O Person, Place and Situation, needed cueing for current year   Able to follow 2 step commands    Subjective  Patient lying supine in bed with no family present. Pt agreeable to this PT eval & tx. Upper Extremity ROM/Strength  Please see OT evaluation.       Lower Extremity ROM / Strength   AROM WFL: Yes  ROM limitations: N/A    Strength Assessment (measured on a 0-5 scale):  R LE   Quad   4   Ant Tib  5   Hamstring 5   Iliopsoas 5  L LE  Quad   5   Ant Tib  5   Hamstring 5   Iliopsoas 5    Lower Extremity Sensation    WFL    Lower Extremity Proprioception:   WFL    Coordination and Tone  WFL    Balance  Sitting:  Normal; Independent  Comments:     Standing: Good ; Supervision  Comments: 5 min    Bed Mobility   Supine to Sit:    Independent  Sit to Supine:   Not Tested  Rolling:   Not Tested  Scooting in sitting: Independent  Scooting in supine:  Not Tested    Transfer Training     Sit to stand:   Independent  Stand to sit: Independent  Bed to Chair:   Supervision with use of gait belt and Single point cane     Gait gait completed as indicated below  Distance:      50 ft  Deviations (firm surface/linoleum):  decreased sulema  Assistive Device Used:    gait belt and Single point cane   Level of Assist:    Supervision  Comment:     Stair Training deferred, pt unsafe/ not appropriate to complete stairs at this time    Activity Tolerance   Pt completed therapy session with No adverse symptoms noted w/activity  SpO2: 95% on 2L/min of O2  BP: 124/80  HR: 58 bpm    After ambulation:  SpO2: 89% on 2L, improved quickly to 92% on 2L  BP: error shown over the monitor when attempted x 3 times. HR: 56 bpm    Positioning Needs   Pt up in chair, alarm set, positioned in proper neutral alignment and pressure relief provided. Call light provided and all needs within reach    Exercises Initiated  all completed bilaterally unless indicated  Ankle Pumps x 20 reps    Other  None. Patient/Family Education   Pt educated on role of inpatient PT, POC, importance of continued activity, DC recommendations, safety awareness, transfer techniques, pursed lip breathing, energy conservation, pacing activity and calling for assist with mobility. Assessment  Pt seen for Physical Therapy evaluation in acute care setting. Pt demonstrated decreased Activity tolerance, Balance, Safety and Strength as well as decreased independence with Ambulation and Transfers. Recommending Home PRN assist and with home PT upon discharge as patient functioning close to baseline level and would benefit from continued therapy services    Goals : To be met in 3 visits:  1). Independent with LE Ex x 10 reps    To be met in 6 visits:  1). Supine to/from sit: Independent  2). Sit to/from stand: Independent  3). Bed to chair: Independent  4).   Gait: Ambulate 150 ft.  with  Independent and use of LRAD (least restrictive assistive device)  5). Tolerate B LE exercises 3 sets of 10-15 reps  6). Ascend/descend 3 steps with Modified Independent with use of no handrail and LRAD (least restrictive assistive device)    Rehabilitation Potential: Good  Strengths for achieving goals include:   Pt motivated and Pt cooperative   Barriers to achieving goals include:    No Barriers    Plan    To be seen 3-5 x / week  while in acute care setting for therapeutic exercises, bed mobility, transfers, progressive gait training, balance training, and family/patient education. Signature: Briana Marie MS PT, # [id number]    If patient discharges from this facility prior to next visit, this note will serve as the Discharge Summary.

## 2021-12-16 NOTE — PLAN OF CARE
Problem: Airway Clearance - Ineffective  Goal: Achieve or maintain patent airway  34/51/6889 9301 by Lili Ladd RN  Outcome: Ongoing  12/16/2021 0618 by Celeste Hunt RN  Outcome: Ongoing     Problem: Gas Exchange - Impaired  Goal: Absence of hypoxia  30/99/3619 0866 by Lili Ladd RN  Outcome: Ongoing  12/16/2021 0618 by Celeste Hunt RN  Outcome: Ongoing  Goal: Promote optimal lung function  01/36/5274 6895 by Lili Ladd RN  Outcome: Ongoing  12/16/2021 0618 by Celeste Hunt RN  Outcome: Ongoing     Problem: Breathing Pattern - Ineffective  Goal: Ability to achieve and maintain a regular respiratory rate  86/14/9556 2681 by Lili Ladd RN  Outcome: Ongoing  12/16/2021 0618 by Celeste Hunt RN  Outcome: Ongoing

## 2021-12-16 NOTE — PROGRESS NOTES
Am meds given for Christina Castro RN primary nurse. Plavix, lovenox, aspirin not given r/t platelets 83. Primary nurse aware.

## 2021-12-16 NOTE — PROGRESS NOTES
Handoff report and transfer of care given at bedside to Brandon Mota. Patient in stable condition, denies needs/concerns at this time. Call light within reach.

## 2021-12-16 NOTE — FLOWSHEET NOTE
12/16/21 1031   Vital Signs   Temp 96.5 °F (35.8 °C)   Temp Source Oral   Pulse 60   Heart Rate Source Monitor   Resp 16   /87   BP Location Right upper arm   Patient Position Sitting   Level of Consciousness Alert (0)   MEWS Score 1   Patient Currently in Pain Denies   Oxygen Therapy   SpO2 92 %   O2 Device Nasal cannula   O2 Flow Rate (L/min) 2 L/min       Shift assessment complete. See flow sheet. Scheduled meds given by Oralia Weinstein RN, Plavix, lovenox, aspirin were held due to platelets being 83 See MAR. Patients head-toe complete, VS are logged, and active bowel sound noted in all four quadrants. Patient currently needs 2L O2 at this time. Denies any pain. No further needs  noted at this time. Call light and bedside table are within reach. The bed is locked and is in the lowest position. Lencho Johnson, SEB

## 2021-12-16 NOTE — PROGRESS NOTES
Inpatient Occupational Therapy  Evaluation and Treatment    Unit: [facility]  Date:  12/16/2021  Patient Name:    Dorita Morales  Admitting diagnosis:  Oxygen dependent [Z99.81]  [id number] [U07.1]  Admit Date:  12/15/2021  Precautions/Restrictions/WB Status/ Lines/ Wounds/ Oxygen: Fall risk, Bed/chair alarm, Lines -IV and Supplemental O2 (2L), Telemetry, Continuous pulse oximetry and Isolation Precautions: Droplet Plus - COVID    Treatment Time:  3074-0135  Treatment Number: 1   Timed code treatment minutes 30 minutes   Total Treatment minutes:   40   minutes    Patient Goals for Therapy:  \" go home \"      Discharge Recommendations: Home PRN assist, with home PT and with home OT   DME needs for discharge: Shower Chair       Therapy recommendations for staff:   Stand by assist with use of hurry-cane for all transfers and ambulation within room    History of Present Illness: H & P as per Kiko Keith MD's note dated 12/16/2021  \"Patient is a 68-year-old white male with a history of COPD and chronic respiratory failure.  He lives alone at home.  He has been sick for the last 3 weeks with fever and shortness of breath there is a pleasant nausea and vomiting present change in taste.  He did not go to the emergency room thinking that he would get better on its own. Miriam Henderson tested positive for COVID-19 in the ED. Jonathan Mendenhall is presently on 4 L.  Subjectively feels better since he was started on steroids. PLAN:  #COVID-19 pneumonia with hypoxa.  Patient admitted to hospital.  Pulmonary consultation.  Supplemental oxygen.  Continuous pulse ox and telemetry. On Decadron. Outside treatment window for Remdesivir.  Consider Tocilizumab if he worsens   #COPD.  No exacerbation.  Already on Decadron for COVID-19.  Continue breathing treatments.  Avoid nebs. #Diabetes mellitus type 2.  Sugars elevated due to steroids.  Use high-dose sliding scale.   #Coronary artery disease.  Denies any chest pain.  On Plavix and aspirin daily. #AAA stable.   #Hyperlipidemia on Lipitor. #Anxiety continue home medications. #Hypertension.  Blood pressures well controlled.  On metoprolol\"    Home Health S4 Level Recommendation:  Level 1 Standard  AM-PAC Score: AM-PAC Inpatient Daily Activity Raw Score: 21    Preadmission Environment    Pt. Lives Alone (friend can provide limited assist)  Home environment:    apartment   Steps to enter first floor: 3 steps to get in the apartment steps to enter  Steps to second floor: N/A  Bathroom: tub/shower unit and standard height commode  Equipment owned: home O2 (2L) continous, R knee brace and hurry-cane     Preadmission Status:  Pt. Able to drive: Yes  Pt Fully independent with ADLs: Yes  Pt. Required assistance from family for: Independent PTA  Pt. independent for transfers and gait and walked with Tech urSelf, wears R knee brace for support during ambulation  History of falls Yes 1 fall prior to hospitalization    Pain  Yes  Rating:mild  Location:R knee  Pain Medicine Status: No request made      Cognition    A&O Person, Place, Time and Situation   Able to follow 1 step commands    Subjective  Patient lying supine in bed with no family present. Pt agreeable to this OT eval & tx.      Upper Extremity ROM:    WFL    Upper Extremity Strength:    Strength Assessment (measured on a 0-5 scale):   4/5 overall bilaterally  Except B intrinsics 3/5      Upper Extremity Sensation    WFL    Upper Extremity Proprioception:  WFL    Coordination and Tone  WFL    Balance  Functional Sitting Balance:  WFL  Functional Standing Balance:Impaired SBA with cane    Bed mobility:    Supine to sit:   Supervision  Sit to supine:   Not Tested  Rolling:    Not Tested  Scooting in sitting:  Supervision  Scooting to head of bed:   Not Tested    Bridging:   Not Tested    Transfers:    Sit to stand:  SBA  Stand to sit:  SBA  Bed to chair:   SBA  Standard toilet: Not Tested  Bed to [facility]:  Not Tested     Functional mobility in room with SBA with Tyler Mishra, OTR/L 9413            If patient discharges from this facility prior to next visit, this note will serve as the Discharge Summary

## 2021-12-16 NOTE — CARE COORDINATION
Case Management Assessment  Initial Evaluation      Patient Name: Carline Elmore  YOB: 1960  Diagnosis: Oxygen dependent [Z99.81]  [id number] [U07.1]  Date / Time: 12/15/2021 10:50 AM    Admission status/Date:12/15/21 inpt  Chart Reviewed: Yes      Patient Interviewed: Yes   Family Interviewed:  No      Hospitalization in the last 30 days:  No      Health Care Decision Maker :   Primary Decision Maker: SrikanthLena - Other - [phone]    (CM - must 1st enter selection under Navigator - emergency contact- [place] Relationship and pick relationship)   Who do you trust or have selected to make healthcare decisions for you      Met with:  patient  Interview conducted  (bedside/phone):    Current PCP:  Matthew    Financial  Commercial  Precert required for SNF : Y          3 night stay required -  [employer]  Support Systems/Care Needs:    Transportation: self    Meal Preparation: Self/S.O. Housing  Living Arrangements:  Lives 1st fl apt  Steps: 2  Intent for return to present living arrangements: Yes  Identified Issues:     [address] with [address] : No Agency:(Services)     Passport/Waiver : No  :                      Phone Number:    Passport/Waiver Services: N/A          Durable Medical Equiptment   DME Provider:   Equipment:   Walker___Cane_x__RTS___ BSC___Shower Chair___Hospital Bed___W/C____Other________  02 at ____Liter(s)---wears(frequency)_______ HHN ___ CPAP___ BiPap___   N/A____      Home O2 Use :  No    If No for home O2---if presently on O2 during hospitalization:  Yes  if yes CM to follow for potential DC O2 need  Informed of need for care provider to bring portable home O2 tank on day of discharge for nursing to connect prior to leaving:   Not Indicated  Verbalized agreement/Understanding:   Not Indicated    Community Service Affiliation  Dialysis:  No    · Agency:  · Location:  · Dialysis Schedule:  · Phone:   · Fax:     Other Community Services: (ex:PT/OT,Mental Health,Wound Clinic, Cardio/Pul [address]) None    DISCHARGE PLAN: Explained Case Management role/services. Reviewed chart and spoke with pt via com system. Role of CM explained. Stateyoung lives home with S.O. and plan is to return. States IPTA with care. Denies any in or outpt services. Will follow for poss home O2/HHC needs at [place].

## 2021-12-17 VITALS
HEIGHT: 69 IN | SYSTOLIC BLOOD PRESSURE: 110 MMHG | RESPIRATION RATE: 16 BRPM | HEART RATE: 66 BPM | BODY MASS INDEX: 22.22 KG/M2 | OXYGEN SATURATION: 96 % | WEIGHT: 150 LBS | TEMPERATURE: 97.6 F | DIASTOLIC BLOOD PRESSURE: 80 MMHG

## 2021-12-17 LAB
C-REACTIVE PROTEIN: 47.5 MG/L (ref 0–5.1)
D DIMER: 540 NG/ML DDU (ref 0–229)
ESTIMATED AVERAGE GLUCOSE: 148.5 MG/DL
GLUCOSE BLD-MCNC: 102 MG/DL (ref 70–99)
GLUCOSE BLD-MCNC: 123 MG/DL (ref 70–99)
GLUCOSE BLD-MCNC: 124 MG/DL (ref 70–99)
HBA1C MFR BLD: 6.8 %
PERFORMED ON: ABNORMAL

## 2021-12-17 PROCEDURE — 86140 C-REACTIVE PROTEIN: CPT

## 2021-12-17 PROCEDURE — 94761 N-INVAS EAR/PLS OXIMETRY MLT: CPT

## 2021-12-17 PROCEDURE — 6370000000 HC RX 637 (ALT 250 FOR IP): Performed by: INTERNAL MEDICINE

## 2021-12-17 PROCEDURE — 36415 COLL VENOUS BLD VENIPUNCTURE: CPT

## 2021-12-17 PROCEDURE — 2700000000 HC OXYGEN THERAPY PER DAY

## 2021-12-17 PROCEDURE — 99239 HOSP IP/OBS DSCHRG MGMT >30: CPT | Performed by: INTERNAL MEDICINE

## 2021-12-17 PROCEDURE — 94640 AIRWAY INHALATION TREATMENT: CPT

## 2021-12-17 PROCEDURE — 85379 FIBRIN DEGRADATION QUANT: CPT

## 2021-12-17 PROCEDURE — 97110 THERAPEUTIC EXERCISES: CPT

## 2021-12-17 PROCEDURE — 97116 GAIT TRAINING THERAPY: CPT

## 2021-12-17 RX ORDER — DEXAMETHASONE 6 MG/1
6 TABLET ORAL
Qty: 8 TABLET | Refills: 0 | Status: SHIPPED | OUTPATIENT
Start: 2021-12-17 | End: 2021-12-25

## 2021-12-17 RX ADMIN — ACETAMINOPHEN 650 MG: 325 TABLET ORAL at 07:41

## 2021-12-17 RX ADMIN — IPRATROPIUM BROMIDE AND ALBUTEROL 1 PUFF: 20; 100 SPRAY, METERED RESPIRATORY (INHALATION) at 14:19

## 2021-12-17 RX ADMIN — PANTOPRAZOLE SODIUM 40 MG: 40 TABLET, DELAYED RELEASE ORAL at 06:36

## 2021-12-17 RX ADMIN — TAMSULOSIN HYDROCHLORIDE 0.4 MG: 0.4 CAPSULE ORAL at 10:54

## 2021-12-17 RX ADMIN — CETIRIZINE HYDROCHLORIDE 10 MG: 10 TABLET ORAL at 10:53

## 2021-12-17 RX ADMIN — ATORVASTATIN CALCIUM 40 MG: 40 TABLET, FILM COATED ORAL at 10:54

## 2021-12-17 RX ADMIN — IPRATROPIUM BROMIDE AND ALBUTEROL 1 PUFF: 20; 100 SPRAY, METERED RESPIRATORY (INHALATION) at 07:46

## 2021-12-17 RX ADMIN — GABAPENTIN 400 MG: 400 CAPSULE ORAL at 10:53

## 2021-12-17 RX ADMIN — DIVALPROEX SODIUM 500 MG: 500 TABLET, DELAYED RELEASE ORAL at 10:54

## 2021-12-17 RX ADMIN — Medication 2000 UNITS: at 10:53

## 2021-12-17 ASSESSMENT — PAIN SCALES - GENERAL: PAINLEVEL_OUTOF10: 8

## 2021-12-17 NOTE — PLAN OF CARE
Problem: Airway Clearance - Ineffective  Goal: Achieve or maintain patent airway  12/17/2021 0113 by Mikal Saul RN  Outcome: Ongoing  12/17/2021 0113 by Mikal Saul RN  Outcome: Ongoing  46/84/6883 0423 by Gentyr Li RN  Outcome: Ongoing     Problem: Gas Exchange - Impaired  Goal: Absence of hypoxia  12/17/2021 0113 by Mikal Saul RN  Outcome: Ongoing  12/17/2021 0113 by Mikal Saul RN  Outcome: Ongoing  20/00/8686 5913 by Gentry Li RN  Outcome: Ongoing  Goal: Promote optimal lung function  12/17/2021 0113 by Mikal Saul RN  Outcome: Ongoing  12/17/2021 0113 by Mikal Saul RN  Outcome: Ongoing  37/89/0060 7817 by Gentry Li RN  Outcome: Ongoing     Problem: Breathing Pattern - Ineffective  Goal: Ability to achieve and maintain a regular respiratory rate  12/17/2021 0113 by Mikal Saul RN  Outcome: Ongoing  12/17/2021 0113 by Mikal Saul RN  Outcome: Ongoing  13/87/7577 8330 by Gentry Li RN  Outcome: Ongoing     Problem:  Body Temperature -  Risk of, Imbalanced  Goal: Ability to maintain a body temperature within defined limits  12/17/2021 0113 by Mikal Saul RN  Outcome: Ongoing  12/17/2021 0113 by Mikal Saul RN  Outcome: Ongoing  25/17/8618 6628 by Gentry Li RN  Outcome: Ongoing  Goal: Will regain or maintain usual level of consciousness  12/17/2021 0113 by Mikal Saul RN  Outcome: Ongoing  12/17/2021 0113 by Mikal Saul RN  Outcome: Ongoing  57/29/3227 1396 by Gentry Li RN  Outcome: Ongoing  Goal: Complications related to the disease process, condition or treatment will be avoided or minimized  12/17/2021 0113 by Mikal Saul RN  Outcome: Ongoing  12/17/2021 0113 by Mikal Saul RN  Outcome: Ongoing  43/10/9570 9070 by Gentry Li RN  Outcome: Ongoing     Problem: Isolation Precautions - Risk of Spread of Infection  Goal: Prevent transmission of infection  12/17/2021 0113 by Nichol Lawler RN  Outcome: Ongoing  12/17/2021 0113 by Nichol Lawler RN  Outcome: Ongoing  07/62/8878 9856 by Tomeka Short RN  Outcome: Ongoing     Problem: Nutrition Deficits  Goal: Optimize nutritional status  12/17/2021 0113 by Nichol Lawler RN  Outcome: Ongoing  12/17/2021 0113 by Nichol Lawler RN  Outcome: Ongoing  27/16/2423 3305 by Tomeka Short RN  Outcome: Ongoing     Problem: Risk for Fluid Volume Deficit  Goal: Maintain normal heart rhythm  12/17/2021 0113 by Nichol Lawler RN  Outcome: Ongoing  12/17/2021 0113 by Nichol Lawler RN  Outcome: Ongoing  95/27/4993 6240 by Tomeka Short RN  Outcome: Ongoing  Goal: Maintain absence of muscle cramping  12/17/2021 0113 by Nichol Lawler RN  Outcome: Ongoing  12/17/2021 0113 by Nichol Lawler RN  Outcome: Ongoing  53/72/9644 8865 by Tomeka Short RN  Outcome: Ongoing  Goal: Maintain normal serum potassium, sodium, calcium, phosphorus, and pH  12/17/2021 0113 by Nichol Lawler RN  Outcome: Ongoing  12/17/2021 0113 by Nichol Lawler RN  Outcome: Ongoing  98/38/0589 2152 by Tomeka Short RN  Outcome: Ongoing     Problem: Loneliness or Risk for Loneliness  Goal: Demonstrate positive use of time alone when socialization is not possible  12/17/2021 0113 by Nichol Lawler RN  Outcome: Ongoing  12/17/2021 0113 by Nichol Lawler RN  Outcome: Ongoing  90/56/1481 3228 by Tomeka Short RN  Outcome: Ongoing     Problem: Fatigue  Goal: Verbalize increase energy and improved vitality  12/17/2021 0113 by Nichol Lawler RN  Outcome: Ongoing  12/17/2021 0113 by Nichol Lawler RN  Outcome: Ongoing  43/35/3537 4691 by Tomeka Short RN  Outcome: Ongoing     Problem: Patient Education: Go to Patient Education Activity  Goal: Patient/Family Education  12/17/2021 0113 by Nichol Lawler RN  Outcome: Ongoing  12/17/2021 0113 by Nichol Lawler, RN  Outcome: Ongoing  78/27/7032 5164 by Etta Edwards RN  Outcome: Ongoing     Problem: Falls - Risk of:  Goal: Will remain free from falls  Description: Will remain free from falls  12/17/2021 0113 by Shannan Lehman RN  Outcome: Ongoing  12/17/2021 0113 by Shannan Lehman RN  Outcome: Ongoing  60/25/2791 7971 by Etta Edwards RN  Outcome: Ongoing  Goal: Absence of physical injury  Description: Absence of physical injury  12/17/2021 0113 by Shannan Lehman RN  Outcome: Ongoing  12/17/2021 0113 by Shannan Lehman RN  Outcome: Ongoing  56/29/4863 1616 by Etta Edwards RN  Outcome: Ongoing     Problem: Infection:  Goal: Will remain free from infection  Description: Will remain free from infection  12/17/2021 0113 by Shannan Lehman RN  Outcome: Ongoing  12/17/2021 0113 by Shannan Lehman RN  Outcome: Ongoing  55/29/3750 4868 by Etta Edwards RN  Outcome: Ongoing     Problem: Safety:  Goal: Free from accidental physical injury  Description: Free from accidental physical injury  12/17/2021 0113 by Shannan Lehman RN  Outcome: Ongoing  12/17/2021 0113 by Shannan Lehman RN  Outcome: Ongoing  83/37/6750 1077 by Etta Edwards RN  Outcome: Ongoing  Goal: Free from intentional harm  Description: Free from intentional harm  12/17/2021 0113 by Shannan Lehman RN  Outcome: Ongoing  12/17/2021 0113 by Shannan Lehman RN  Outcome: Ongoing  78/91/2651 8850 by Etta Edwards RN  Outcome: Ongoing     Problem: Daily Care:  Goal: Daily care needs are met  Description: Daily care needs are met  12/17/2021 0113 by Shannan Lehman RN  Outcome: Ongoing  12/17/2021 0113 by Shannan Lehman RN  Outcome: Ongoing  81/67/2214 8576 by Etta Edwards RN  Outcome: Ongoing     Problem: Pain:  Goal: Patient's pain/discomfort is manageable  Description: Patient's pain/discomfort is manageable  12/17/2021 0113 by Shannan Lehman RN  Outcome: Ongoing  12/17/2021 0113 by Sigifredo Velasquez Jignesh Preston RN  Outcome: Ongoing  67/29/3694 4454 by Lilian Vaz RN  Outcome: Ongoing     Problem: Skin Integrity:  Goal: Skin integrity will stabilize  Description: Skin integrity will stabilize  12/17/2021 0113 by Saumya Schmitz RN  Outcome: Ongoing  12/17/2021 0113 by Saumya Schmitz RN  Outcome: Ongoing  34/80/6100 9117 by Lilian Vaz RN  Outcome: Ongoing     Problem: Discharge Planning:  Goal: Patients continuum of care needs are met  Description: Patients continuum of care needs are met  12/17/2021 0113 by Saumya Schmitz RN  Outcome: Ongoing  12/17/2021 0113 by Saumya Schmitz RN  Outcome: Ongoing  01/85/6547 2708 by Lilian Vaz RN  Outcome: Ongoing

## 2021-12-17 NOTE — FLOWSHEET NOTE
12/17/21 0907   Vital Signs   Temp 97.6 °F (36.4 °C)   Temp Source Oral   Pulse 66   Heart Rate Source Monitor   Resp 18   /80   BP Location Right upper arm   Patient Position Semi fowlers   Level of Consciousness Alert (0)   MEWS Score 1   Patient Currently in Pain Denies   Oxygen Therapy   SpO2 93 %   O2 Device Nasal cannula   O2 Flow Rate (L/min) 2 L/min       Shift assessment complete. See flow sheet. Scheduled meds given. See MAR. Patients head-toe complete, VS are logged, and active bowel sound noted in all four quadrants. Patient's aspirin, plavix, and lovenox due to platelets of 83. Patients was given a pulse oximeter. Patient is expected to d/c today. No further needs  noted at this time. Call light and bedside table are within reach. The bed is locked and is in the lowest position. Nora Augustin RN

## 2021-12-17 NOTE — CARE COORDINATION
DISCHARGE ORDER  Date/Time 2021 12:49 PM  Completed by: Amarjit Villar RN, Case Management    Patient Name: Carline Elmore      : 1960  Admitting Diagnosis: Oxygen dependent [Z99.81]  COVID-19 [U07.1]      Admit order Date and Status: 12/15/21 inpt  (verify MD's last order for status of admission)      Noted discharge order. If applicable PT/OT recommendation at Discharge: Home PRN assist and with home PT   DME recommendation by PT/OT:Needs Met  Confirmed discharge plan  : Yes  with whom patient  If pt confirmed DC plan does family need to be contacted by CM No     Discharge Plan:  Order for dc noted. Spoke with pt who cont plan for home. Discussed need for home O2 and pt now states home O2 at 3L NC continuous thru [facility]. Portable tannk provided for transport as pt does not have one with him. Spoke with Brian Gonzalez who verifies pt is active with them for home O2. Chart reviewed and no other dc needs identified. Reviewed chart. Role of discharge planner explained and patient verbalized understanding. Discharge order is noted. Has Home O2 in place on admit:  Yes  Informed of need to bring portable home O2 tank on day of discharge for nursing to connect prior to leaving:   Yes  Verbalized agreement/Understanding:   Yes  Pt is being d/c'd to home today. Pt's O2 sats are 93% on 2 L NC    Discharge timeout done with  Nsg, CM and pt. All discharge needs and concerns addressed.

## 2021-12-17 NOTE — PLAN OF CARE
Problem: Airway Clearance - Ineffective  Goal: Achieve or maintain patent airway  31/88/5787 5366 by Tomeka Short RN  Outcome: Ongoing  12/17/2021 0113 by Nichol Lawler RN  Outcome: Ongoing  12/17/2021 0113 by Nichol Lawler RN  Outcome: Ongoing     Problem: Gas Exchange - Impaired  Goal: Absence of hypoxia  79/37/1059 3488 by Tomeka Short RN  Outcome: Ongoing  12/17/2021 0113 by Nichol Lawler RN  Outcome: Ongoing  12/17/2021 0113 by Nichol Lawler RN  Outcome: Ongoing  Goal: Promote optimal lung function  08/62/2677 9613 by Tomeka Short RN  Outcome: Ongoing  12/17/2021 0113 by Nichol Lawler RN  Outcome: Ongoing  12/17/2021 0113 by Nichol Lawler RN  Outcome: Ongoing     Problem: Breathing Pattern - Ineffective  Goal: Ability to achieve and maintain a regular respiratory rate  43/59/0550 8603 by Tomeka Short RN  Outcome: Ongoing  12/17/2021 0113 by Nichol Lawler RN  Outcome: Ongoing  12/17/2021 0113 by Nichol Lawler RN  Outcome: Ongoing

## 2021-12-17 NOTE — PROGRESS NOTES
IV removed and discharge instructions completed. All questions were answered to patients satisfaction. Request for transport placed, all personal belongs packed. No further needs noted. Patient given travel O2 left facility on 3L NC saturation of 94%. Meds-Beds delivered. Cab here to .

## 2021-12-17 NOTE — PROGRESS NOTES
bronchodilator order(s) to equivalent RT Bronchodilator order with Frequency of every 4 hours PRN for wheezing or increased work of breathing using Per Protocol order mode. 4-6 - enter or revise RT Bronchodilator order(s) to two equivalent RT bronchodilator orders with one order with BID Frequency and one order with Frequency of every 4 hours PRN wheezing or increased work of breathing using Per Protocol order mode. 7-10 - enter or revise RT Bronchodilator order(s) to two equivalent RT bronchodilator orders with one order with TID Frequency and one order with Frequency of every 4 hours PRN wheezing or increased work of breathing using Per Protocol order mode. 11-13 - enter or revise RT Bronchodilator order(s) to one equivalent RT bronchodilator order with QID Frequency and an Albuterol order with Frequency of every 4 hours PRN wheezing or increased work of breathing using Per Protocol order mode. Greater than 13 - enter or revise RT Bronchodilator order(s) to one equivalent RT bronchodilator order with every 4 hours Frequency and an Albuterol order with Frequency of every 2 hours PRN wheezing or increased work of breathing using Per Protocol order mode.        Electronically signed by Sulaiman Garcia RCP on 12/16/2021 at 8:48 PM

## 2021-12-17 NOTE — PROGRESS NOTES
O2 Sat at rest on room air is 86 %. O2 Sat with activity on room air is NA %. O2 Sat at rest with oxygen @ 2  lpm is  94%. O2 Sat with activity with oxygen @ 3 lpm is 92%.

## 2021-12-17 NOTE — DISCHARGE SUMMARY
Name:  Georges George  Room:  7695/3501-78  MRN:    [id number]    Discharge Summary      This discharge summary is in conjunction with a complete physical exam done on the day of discharge. Discharging Physician: Nkechi Moyer MD      Admit: 12/15/2021  Discharge:   12/17/2021     Diagnoses this Admission    Principal Problem:    COVID-19  Active Problems:    Hypoxia    Diabetes mellitus ([facility]    Anxiety    Coronary artery disease involving native coronary artery of native heart without angina pectoris    Essential hypertension    Other hyperlipidemia    AAA (abdominal aortic aneurysm) ([facility]    Oxygen dependent  Resolved Problems:    * No resolved hospital problems. *        Procedures (Please Review Full Report for Details)  none    Consults    None      HPI:    Patient is a 37-year-old white male with a history of COPD and chronic respiratory failure. He lives alone at home. He has been sick for the last 3 weeks with fever and shortness of breath He has complaints of nausea and vomiting and  change in taste. He did not go to the emergency room thinking that he would get better on its own. Patient tested positive for COVID-19 in the ED. He is presently on 4 L. Subjectively feels better since he was started on steroids. Physical Exam at Discharge:  /80   Pulse 66   Temp 97.6 °F (36.4 °C) (Oral)   Resp 18   Ht 5' 9\" (1.753 m)   Wt 150 lb (68 kg)   SpO2 93%   BMI 22.15 kg/m²     Gen: No distress. Alert./Ill somewhat chronically  Eyes: PERRL. No sclera icterus. No conjunctival injection. ENT: No discharge. Pharynx clear. Neck: Trachea midline. Normal thyroid. Resp: No accessory muscle use. Few crackles. Minimal wheezes. No rhonchi. No dullness on percussion. CV: Regular rate. Regular rhythm. No murmur or rub. No edema. GI: Non-tender. Non-distended. No masses. No organomegaly. Normal bowel sounds. No hernia. Skin: Warm and dry. No nodule on exposed extremities.  No rash on exposed extremities. Lymph: No cervical LAD. No supraclavicular LAD. M/S: No cyanosis. No joint deformity. No clubbing. Neuro: Awake. Reflexes 2+ symmetric bilaterally. Moves all 4 extremities, non focal  Psych: Oriented x 3. No anxiety or agitation. Hospital Course  #COVID-19 pneumonia with hypoxa. Patient admitted to hospital.  Pulmonary consultation. Supplemental oxygen. Continuous pulse ox and telemetry. On Decadron. Outside treatment window for Remdesivir. Considered Tocilizumab if he worsens. Sent home on Decadron.      #COPD. No exacerbation. Already on Decadron for COVID-19. Continue breathing treatments. Avoid nebs.     #Diabetes mellitus type 2. Sugars elevated due to steroids. Use high-dose sliding scale.     #Coronary artery disease. Denies any chest pain. On Plavix and aspirin daily.     #AAA stable.      #Hyperlipidemia on Lipitor.     #Anxiety continue home medications.     #Hypertension. Blood pressures well controlled. On metoprolol     Lovenox bid.         CBC:   Recent Labs     12/15/21  1100   WBC 4.0   HGB 11.6*   HCT 35.2*   MCV 88.1   PLT 83*     BMP:   Recent Labs     12/15/21  1100 12/16/21  0557    139   K 4.2 5.0    106   CO2 24 25   BUN 21* 18   CREATININE 1.3 0.9     LIVER PROFILE:   Recent Labs     12/15/21  1100 12/16/21  0557   AST 40* 31   ALT 14 12   BILITOT 0.3 <0.2   ALKPHOS 60 56     PT/INR:   Recent Labs     12/15/21  1100   PROTIME 14.9*   INR 1.30*     APTT:   Recent Labs     12/15/21  1100   APTT 37.1           XR CHEST PORTABLE   Final Result   1. Bibasilar airspace disease either due to atelectasis or developing   pneumonia.                 Discharge Medications     Medication List      START taking these medications    dexamethasone 6 MG tablet  Commonly known as: Decadron  Take 1 tablet by mouth daily (with breakfast) for 8 days        CONTINUE taking these medications    albuterol sulfate  (90 Base) MCG/ACT inhaler aspirin 81 MG tablet     atorvastatin 40 MG tablet  Commonly known as: LIPITOR     cetirizine 10 MG tablet  Commonly known as: ZYRTEC     clopidogrel 75 MG tablet  Commonly known as: PLAVIX     DAILY-CRISTINE PO     dicyclomine 10 MG capsule  Commonly known as: Bentyl  Take 1 capsule by mouth 4 times daily as needed (abdominal pain/cramping)     divalproex 500 MG DR tablet  Commonly known as: DEPAKOTE     fluticasone 50 MCG/ACT nasal spray  Commonly known as: FLONASE     fluticasone-vilanterol 100-25 MCG/INH Aepb inhaler  Commonly known as: Breo Ellipta  Inhale 1 puff into the lungs daily     gabapentin 300 MG capsule  Commonly known as: NEURONTIN     hydrOXYzine 25 MG capsule  Commonly known as: VISTARIL     ipratropium-albuterol 0.5-2.5 (3) MG/3ML Soln nebulizer solution  Commonly known as: DUONEB  Take 3 mLs by nebulization every 4 hours     metoprolol succinate 25 MG extended release tablet  Commonly known as: TOPROL XL  TAKE 1/2 TABLET BY MOUTH EVERY DAY     midodrine 2.5 MG tablet  Commonly known as: PROAMATINE     montelukast 10 MG tablet  Commonly known as: SINGULAIR     nitroGLYCERIN 0.4 MG/HR  Commonly known as: NITRODUR     omeprazole 20 MG delayed release capsule  Commonly known as: PRILOSEC     pantoprazole sodium 40 MG Pack packet  Commonly known as: PROTONIX     QUEtiapine 300 MG tablet  Commonly known as: SEROQUEL     tamsulosin 0.4 MG capsule  Commonly known as: FLOMAX     tiZANidine 4 MG tablet  Commonly known as: ZANAFLEX  TAKE 1 TABLET BY MOUTH THREE TIMES A DAY        STOP taking these medications    cyclobenzaprine 5 MG tablet  Commonly known as: FLEXERIL     simvastatin 40 MG tablet  Commonly known as: ZOCOR           Where to Get Your Medications      These medications were sent to [id number] [address], [address] [address]    Phone: [phone]   · dexamethasone 6 MG tablet Discharge Condition/Location: Stable    Follow Up: Follow up with PCP.     More than 30 mts spent    [facility], MD 12/17/2021 9:47 AM

## 2021-12-17 NOTE — PROGRESS NOTES
Physician Progress Note      PATIENTJetta Phalen  CSN #:                  [id number]  :                       1960  ADMIT DATE:       12/15/2021 10:50 AM  DISCH DATE:  RESPONDING  PROVIDER #:        Krystin Sosa MD          QUERY TEXT:    Pt admitted with covid pneumonia. Pt noted to copd and oxygen dependent. Arrived on 5L oxygen and now weaned down to 2. If possible, please document   in the progress notes and discharge summary if you are evaluating and/or   treating any of the following: The medical record reflects the following:  Risk Factors: severe copd, oxygen dependent  Clinical Indicators: increased shortness of breath, Resp-26, per h/p   assessment: Resp: No accessory muscle use. Few crackles. Minimal wheezes. No   rhonchi. No dullness on percussion. Treatment: oxygen 5L now on 2L, decadron, Continue breathing treatments    Thank you for your assistance,  Brittny Cortez RN,BSN,CCDS,CRCR  Options provided:  -- Chronic respiratory failure with hypoxia  -- Acute on chronic respiratory failure with hypoxia  -- Other - I will add my own diagnosis  -- Disagree - Not applicable / Not valid  -- Disagree - Clinically unable to determine / Unknown  -- Refer to Clinical Documentation Reviewer    PROVIDER RESPONSE TEXT:    This patient is in acute on chronic respiratory failure with hypoxia.     Query created by: Mitzy Carcamo on 2021 1:03 PM      Electronically signed by:  Krystin Sosa MD 2021 9:01 AM

## 2021-12-17 NOTE — FLOWSHEET NOTE
Shift assessment complete; see flowsheets. PM medications administered; see MAR. Respirations even and unlabored. Pt AOx4 resting in bed. Pt denies any further needs or pain at this time. Call light in reach, bed locked in lowest position.

## 2021-12-17 NOTE — PROGRESS NOTES
Handoff report and transfer of care given at bedside to [facility]. Patient in stable condition, denies needs/concerns at this time. Call light within reach.

## 2021-12-17 NOTE — PROGRESS NOTES
Inpatient Physical Therapy Daily Treatment Note    Unit: [address]  Date:  12/17/2021  Patient Name:    Consuelo Jean  Admitting diagnosis:  Oxygen dependent [Z99.81]  ESDIW-80 [U07.1]  Admit Date:  12/15/2021  Precautions/Restrictions:  Fall risk, Bed/chair alarm, Lines -IV and Supplemental O2 (2L/min), Telemetry, Continuous pulse oximetry and Isolation Precautions: Droplet Plus - COVID      Discharge Recommendations: Home PRN assist and with home PT   DME needs for discharge: Needs Met       Therapy recommendation for EMS Transport: can transport by wheelchair    Therapy recommendations for staff: Independent with use of No AD for all transfers and ambulation to/from chair  to/from bathroom  within room    History of Present Illness: H & P as per Shannan Lehman MD's note dated 12/16/2021  Patient is a 31-year-old white male with a history of COPD and chronic respiratory failure.  He lives alone at [facility] has been sick for the last 3 weeks with fever and shortness of breath there is a pleasant nausea and vomiting present change in taste.  He did not go to the emergency room thinking that he would get better on its own. Dante Maldonado tested positive for COVID-19 in the ED. Lina Mcgowan is presently on 4 L.  Subjectively feels better since he was started on steroids. PLAN:  #COVID-19 pneumonia with hypoxa.  Patient admitted to hospital.  Pulmonary consultation.  Supplemental oxygen.  Continuous pulse ox and telemetry. On Decadron. Outside treatment window for Remdesivir.  Consider Tocilizumab if he worsens   #COPD.  No exacerbation.  Already on Decadron for COVID-19.  Continue breathing treatments.  Avoid nebs. #Diabetes mellitus type 2.  Sugars elevated due to steroids.  Use high-dose sliding scale. #Coronary artery disease.  Denies any chest pain.  On Plavix and aspirin daily. #AAA stable.   #Hyperlipidemia on Lipitor. #Anxiety continue home medications.   #Hypertension.  Blood pressures well controlled.  On metoprolol    Home Health S4 Level Recommendation: Level 1 Standard  AM-PAC Mobility Score   AM-PAC Inpatient Mobility Raw Score : 23       Treatment Time:  1800 - 9804  Treatment number: 2  Timed Code Treatment Minutes: 25 minutes  Total Treatment Minutes:  25  minutes    Cognition    A&O x4   Able to follow 2 step commands    Subjective  Patient lying supine in bed with no family present   Pt agreeable to this PT tx. Pain   No  Location: N/A  Rating:    NA/10  Pain Medicine Status: Denies need     Bed Mobility   Supine to Sit:    Independent  Sit to Supine:   Not Tested  Rolling:   Not Tested  Scooting:   Independent    Transfer Training     Sit to stand:   Independent  Stand to sit: Independent  Bed to Chair:   Independent with use of No AD and gait belt    Gait Training gait completed as indicated below  Distance:      100 ft  Deviations (firm surface/linoleum):  decreased sulema  Assistive Device Used:    No AD and gait belt  Level of Assist:    Modified Independent  Comment: Patient was limited in walking distance due to fatigue and SOB. Stair Training stairs completed as indicated below  # of Steps:   1  Level of Assist:  Modified Independent  UE Support:  unilateral on right  Assistive Device:  No AD  Pattern:   N/A  Comments:     Therapeutic Exercise all completed bilaterally unless indicated  Ankle Pumps x 20 reps  Thoracic expansion and mobility exercises in EOB sitting and standing x 10 reps    Balance  Sitting:  Normal; Independent  Comments:     Standing: Normal; Independent  Comments: 7 min    Patient Education      Role of PT, POC, Discharge recommendations, DC recommendations, safety awareness, transfer techniques, pursed lip breathing, energy conservation, pacing activity and calling for assist with mobility. Positioning Needs       Pt up in chair, no alarm needed, positioned in proper neutral alignment and pressure relief provided.    Call light provided and all needs within reach    ROM Measurements N/A  Knee Flexion:  Knee Extension:     Activity Tolerance   Pt completed therapy session with SOB noted with ambulation. SpO2: at rest 91% on 2L/min of O2, with ambulation 88% and needed 3L/min of O2 since unable to recover to 90% within 3 min. Patient recovered to 91% while on 3L/min of O2  RN notified about the treatment response. Other  N/A    Assessment :  Patient tolerated the session well with regular rest breaks. Recommending Home PRN assist and with home PT upon discharge as patient functioning close to baseline level and would benefit from continued therapy services    Goals (all goals ongoing unless otherwise indicated)  To be met in 3 visits:  1). Independent with LE Ex x 10 reps     To be met in 6 visits:  1). Supine to/from sit: Independent  2). Sit to/from stand: Independent  3). Bed to chair: Independent  4). Gait: Ambulate 150 ft.  with  Independent and use of LRAD (least restrictive assistive device)  5). Tolerate B LE exercises 3 sets of 10-15 reps  6). Ascend/descend 3 steps with Modified Independent with use of no handrail and LRAD (least restrictive assistive device)    Plan   Continue with plan of care. Signature: Tashia Angeles, MS PT, # [phone]    If patient discharges from this facility prior to next visit, this note will serve as the Discharge Summary.

## 2021-12-17 NOTE — PROGRESS NOTES
Per patient he will need a cab voucher, and he has no way of getting his home O2 tank. Therefore he will need a tank at discharge. Case Management notified.

## 2021-12-20 ENCOUNTER — CARE COORDINATION (OUTPATIENT)
Dept: CASE MANAGEMENT | Age: 61
End: 2021-12-20

## 2021-12-21 ENCOUNTER — CARE COORDINATION (OUTPATIENT)
Dept: CASE MANAGEMENT | Age: 61
End: 2021-12-21

## 2021-12-21 NOTE — CARE COORDINATION
Patient contacted regarding COVID-19 diagnosis. COVID-19 Detected on 12/15/2021. Call within 2 business days of discharge: Yes  Discharge Date: 12/17/21   RARS: Readmission Risk Score: 16.5 ( )    Was this an external facility discharge? No Discharge Facility: NA    2nd attempt - Unable to reach patient by phone at this time. Message left including CTN contact info for return call. No further CTN outreach scheduled at this time.      Kay Garcia RN  Care Transitions Nurse  [phone] mobile    Future Appointments   Date Time Provider Alexandro Fairbanks   12/22/2021  1:40 PM Durga Ng MD [facility]   7/12/2022 10:00 AM Julio Pickard AUDIO [facility]   7/12/2022 10:30 AM Trai Haines MD [facility]

## 2022-03-10 ENCOUNTER — TELEPHONE (OUTPATIENT)
Dept: ORTHOPEDIC SURGERY | Age: 62
End: 2022-03-10

## 2022-03-10 NOTE — TELEPHONE ENCOUNTER
Prescription Refill     Medication Name:  TIZANIDINE  Pharmacy: [facility] -[phone]  Patient Contact Number:  [phone]

## 2022-10-13 ENCOUNTER — APPOINTMENT (OUTPATIENT)
Dept: CT IMAGING | Age: 62
End: 2022-10-13
Payer: MEDICAID

## 2022-10-13 ENCOUNTER — HOSPITAL ENCOUNTER (EMERGENCY)
Age: 62
Discharge: HOME | End: 2022-10-13
Attending: EMERGENCY MEDICINE
Payer: MEDICAID

## 2022-10-13 ENCOUNTER — APPOINTMENT (OUTPATIENT)
Dept: GENERAL RADIOLOGY | Age: 62
End: 2022-10-13
Payer: MEDICAID

## 2022-10-13 VITALS
TEMPERATURE: 98 F | OXYGEN SATURATION: 95 % | RESPIRATION RATE: 16 BRPM | HEART RATE: 74 BPM | DIASTOLIC BLOOD PRESSURE: 67 MMHG | HEIGHT: 69 IN | BODY MASS INDEX: 25.92 KG/M2 | SYSTOLIC BLOOD PRESSURE: 138 MMHG | WEIGHT: 175 LBS

## 2022-10-13 DIAGNOSIS — R07.9 INTERMITTENT CHEST PAIN: ICD-10-CM

## 2022-10-13 DIAGNOSIS — K59.00 CONSTIPATION, UNSPECIFIED CONSTIPATION TYPE: ICD-10-CM

## 2022-10-13 DIAGNOSIS — R10.9 BILATERAL FLANK PAIN: Primary | ICD-10-CM

## 2022-10-13 DIAGNOSIS — R10.84 GENERALIZED ABDOMINAL PAIN: ICD-10-CM

## 2022-10-13 DIAGNOSIS — I71.40 ABDOMINAL AORTIC ANEURYSM (AAA) WITHOUT RUPTURE, UNSPECIFIED PART: ICD-10-CM

## 2022-10-13 DIAGNOSIS — R30.0 DYSURIA: ICD-10-CM

## 2022-10-13 LAB
A/G RATIO: 1.7 (ref 1.1–2.2)
ALBUMIN SERPL-MCNC: 4 G/DL (ref 3.4–5)
ALP BLD-CCNC: 140 U/L (ref 40–129)
ALT SERPL-CCNC: <5 U/L (ref 10–40)
ANION GAP SERPL CALCULATED.3IONS-SCNC: 9 MMOL/L (ref 3–16)
AST SERPL-CCNC: 11 U/L (ref 15–37)
BASOPHILS ABSOLUTE: 0.2 K/UL (ref 0–0.2)
BASOPHILS RELATIVE PERCENT: 3.3 %
BILIRUB SERPL-MCNC: 0.3 MG/DL (ref 0–1)
BILIRUBIN URINE: NEGATIVE
BLOOD, URINE: NEGATIVE
BUN BLDV-MCNC: 10 MG/DL (ref 7–20)
CALCIUM SERPL-MCNC: 9.5 MG/DL (ref 8.3–10.6)
CHLORIDE BLD-SCNC: 108 MMOL/L (ref 99–110)
CLARITY: CLEAR
CO2: 27 MMOL/L (ref 21–32)
COLOR: YELLOW
CREAT SERPL-MCNC: 0.8 MG/DL (ref 0.8–1.3)
EOSINOPHILS ABSOLUTE: 0.1 K/UL (ref 0–0.6)
EOSINOPHILS RELATIVE PERCENT: 2.8 %
GFR AFRICAN AMERICAN: >60
GFR NON-AFRICAN AMERICAN: >60
GLUCOSE BLD-MCNC: 96 MG/DL (ref 70–99)
GLUCOSE URINE: NEGATIVE MG/DL
HCT VFR BLD CALC: 37.6 % (ref 40.5–52.5)
HEMOGLOBIN: 12.7 G/DL (ref 13.5–17.5)
KETONES, URINE: NEGATIVE MG/DL
LEUKOCYTE ESTERASE, URINE: NEGATIVE
LYMPHOCYTES ABSOLUTE: 1.2 K/UL (ref 1–5.1)
LYMPHOCYTES RELATIVE PERCENT: 24 %
MCH RBC QN AUTO: 28.8 PG (ref 26–34)
MCHC RBC AUTO-ENTMCNC: 33.7 G/DL (ref 31–36)
MCV RBC AUTO: 85.5 FL (ref 80–100)
MICROSCOPIC EXAMINATION: NORMAL
MONOCYTES ABSOLUTE: 0.3 K/UL (ref 0–1.3)
MONOCYTES RELATIVE PERCENT: 6.8 %
NEUTROPHILS ABSOLUTE: 3.2 K/UL (ref 1.7–7.7)
NEUTROPHILS RELATIVE PERCENT: 63.1 %
NITRITE, URINE: NEGATIVE
PDW BLD-RTO: 19.4 % (ref 12.4–15.4)
PH UA: 7 (ref 5–8)
PLATELET # BLD: 180 K/UL (ref 135–450)
PMV BLD AUTO: 7.4 FL (ref 5–10.5)
POTASSIUM REFLEX MAGNESIUM: 5.1 MMOL/L (ref 3.5–5.1)
PRO-BNP: 98 PG/ML (ref 0–124)
PROTEIN UA: NEGATIVE MG/DL
RBC # BLD: 4.4 M/UL (ref 4.2–5.9)
SODIUM BLD-SCNC: 144 MMOL/L (ref 136–145)
SPECIFIC GRAVITY UA: 1.01 (ref 1–1.03)
TOTAL PROTEIN: 6.3 G/DL (ref 6.4–8.2)
TROPONIN: <0.01 NG/ML
URINE REFLEX TO CULTURE: NORMAL
URINE TYPE: NORMAL
UROBILINOGEN, URINE: 0.2 E.U./DL
WBC # BLD: 5 K/UL (ref 4–11)

## 2022-10-13 PROCEDURE — 87086 URINE CULTURE/COLONY COUNT: CPT

## 2022-10-13 PROCEDURE — 81003 URINALYSIS AUTO W/O SCOPE: CPT

## 2022-10-13 PROCEDURE — 99285 EMERGENCY DEPT VISIT HI MDM: CPT

## 2022-10-13 PROCEDURE — 71045 X-RAY EXAM CHEST 1 VIEW: CPT

## 2022-10-13 PROCEDURE — 83880 ASSAY OF NATRIURETIC PEPTIDE: CPT

## 2022-10-13 PROCEDURE — 93005 ELECTROCARDIOGRAM TRACING: CPT | Performed by: EMERGENCY MEDICINE

## 2022-10-13 PROCEDURE — 80053 COMPREHEN METABOLIC PANEL: CPT

## 2022-10-13 PROCEDURE — 74176 CT ABD & PELVIS W/O CONTRAST: CPT

## 2022-10-13 PROCEDURE — 6370000000 HC RX 637 (ALT 250 FOR IP): Performed by: EMERGENCY MEDICINE

## 2022-10-13 PROCEDURE — 84484 ASSAY OF TROPONIN QUANT: CPT

## 2022-10-13 PROCEDURE — 36415 COLL VENOUS BLD VENIPUNCTURE: CPT

## 2022-10-13 PROCEDURE — 85025 COMPLETE CBC W/AUTO DIFF WBC: CPT

## 2022-10-13 RX ORDER — POLYETHYLENE GLYCOL 3350 17 G/17G
17 POWDER, FOR SOLUTION ORAL DAILY
Qty: 510 G | Refills: 0 | Status: SHIPPED | OUTPATIENT
Start: 2022-10-13 | End: 2022-10-13 | Stop reason: SDUPTHER

## 2022-10-13 RX ORDER — ACETAMINOPHEN 500 MG
1000 TABLET ORAL ONCE
Status: COMPLETED | OUTPATIENT
Start: 2022-10-13 | End: 2022-10-13

## 2022-10-13 RX ORDER — LIDOCAINE 50 MG/G
1 PATCH TOPICAL DAILY
Qty: 15 PATCH | Refills: 0 | Status: SHIPPED | OUTPATIENT
Start: 2022-10-13 | End: 2022-10-23

## 2022-10-13 RX ORDER — FERROUS SULFATE 325(65) MG
TABLET ORAL
COMMUNITY
Start: 2022-08-20

## 2022-10-13 RX ORDER — CYCLOBENZAPRINE HCL 10 MG
10 TABLET ORAL 3 TIMES DAILY PRN
Qty: 21 TABLET | Refills: 0 | Status: SHIPPED | OUTPATIENT
Start: 2022-10-13 | End: 2022-10-23

## 2022-10-13 RX ORDER — POLYETHYLENE GLYCOL 3350 17 G/17G
17 POWDER, FOR SOLUTION ORAL DAILY
Qty: 510 G | Refills: 0 | Status: SHIPPED | OUTPATIENT
Start: 2022-10-13 | End: 2022-11-12

## 2022-10-13 RX ORDER — DICYCLOMINE HCL 20 MG
20 TABLET ORAL EVERY 6 HOURS PRN
Qty: 20 TABLET | Refills: 0 | Status: SHIPPED | OUTPATIENT
Start: 2022-10-13 | End: 2022-10-13 | Stop reason: SDUPTHER

## 2022-10-13 RX ORDER — CYCLOBENZAPRINE HCL 10 MG
10 TABLET ORAL ONCE
Status: COMPLETED | OUTPATIENT
Start: 2022-10-13 | End: 2022-10-13

## 2022-10-13 RX ORDER — DICYCLOMINE HCL 20 MG
20 TABLET ORAL EVERY 6 HOURS PRN
Qty: 20 TABLET | Refills: 0 | Status: SHIPPED | OUTPATIENT
Start: 2022-10-13 | End: 2022-10-18

## 2022-10-13 RX ADMIN — CYCLOBENZAPRINE HYDROCHLORIDE 10 MG: 10 TABLET, FILM COATED ORAL at 20:13

## 2022-10-13 RX ADMIN — ACETAMINOPHEN 1000 MG: 500 TABLET ORAL at 20:13

## 2022-10-13 ASSESSMENT — PAIN DESCRIPTION - DESCRIPTORS: DESCRIPTORS: BURNING

## 2022-10-13 ASSESSMENT — PAIN DESCRIPTION - LOCATION: LOCATION: FLANK

## 2022-10-13 ASSESSMENT — PAIN DESCRIPTION - PAIN TYPE: TYPE: ACUTE PAIN

## 2022-10-13 ASSESSMENT — PAIN SCALES - GENERAL
PAINLEVEL_OUTOF10: 6
PAINLEVEL_OUTOF10: 10

## 2022-10-13 ASSESSMENT — PAIN - FUNCTIONAL ASSESSMENT: PAIN_FUNCTIONAL_ASSESSMENT: 0-10

## 2022-10-13 ASSESSMENT — PAIN DESCRIPTION - FREQUENCY: FREQUENCY: CONTINUOUS

## 2022-10-13 ASSESSMENT — PAIN DESCRIPTION - ORIENTATION: ORIENTATION: RIGHT;LEFT

## 2022-10-14 LAB
EKG ATRIAL RATE: 68 BPM
EKG DIAGNOSIS: NORMAL
EKG P AXIS: 77 DEGREES
EKG P-R INTERVAL: 126 MS
EKG Q-T INTERVAL: 408 MS
EKG QRS DURATION: 88 MS
EKG QTC CALCULATION (BAZETT): 433 MS
EKG R AXIS: 78 DEGREES
EKG T AXIS: 66 DEGREES
EKG VENTRICULAR RATE: 68 BPM

## 2022-10-14 PROCEDURE — 93010 ELECTROCARDIOGRAM REPORT: CPT | Performed by: INTERNAL MEDICINE

## 2022-10-14 NOTE — ED PROVIDER NOTES
Emergency Department Physician Note     Location: [facility] EMERGENCY DEPARTMENT  10/13/2022    CHIEF COMPLAINT  Flank Pain (Bilat flank pain, lower back pain and burning with urination x 5 days)      HISTORY OF PRESENT ILLNESS  Tracey Lobato is a 63 y.o. male presents to the ED with with bilateral flank/back pain, has not noticed one side being worse than the other, however abdominal pain, has not noticed any bowel changes, no vomiting, he does have some burning with urination, going on for about 5 days, he does have prostate enlargement, believes he has seen a urologist before but cannot recall who, no hematuria, has some frequency of urination at baseline but not new, denies concern for STDs, not sexually active, no sores or lesions of the penis, reports episode of nonexertional chest pain that occurred last night, about 24 hours ago, none today, he does get short of breath sometimes, but this is not new, no leg pain or swelling. denies fevers/chills, no IV drug use, no unexplained weight loss, no pain awakening from sleep, no saddle anesthesia, no new numbness or weakness, no direct trauma/injury, no bowel or bladder incontinence, no urinary retention, no history of cancer, no recent steroid use, no immunosuppression, no recent back surgery or injections, not on any anticoagulants. No other complaints, modifying factors or associated symptoms. I have reviewed the following from the nursing documentation.     Past Medical History:   Diagnosis Date    Anxiety     Arterial stent thrombosis (HCC)     Arthritis     Asthma     COPD (chronic obstructive pulmonary disease) (HCC)     Coronary artery disease involving native coronary artery of native heart without angina pectoris 2/8/2021    Diabetes mellitus ([address]     Essential hypertension 2/8/2021    Hyperlipidemia     Pneumonia      Past Surgical History:   Procedure Laterality Date    CARDIAC SURGERY      stent placed    CARPAL TUNNEL RELEASE CHOLECYSTECTOMY, LAPAROSCOPIC      KNEE ARTHROPLASTY      R knee x4    NASAL SINUS SURGERY      UPPER GASTROINTESTINAL ENDOSCOPY  7/14/11    UPPER GASTROINTESTINAL ENDOSCOPY N/A 4/4/2019    EGD BIOPSY performed by Chanell Turk DO at [facility]     Family History   Problem Relation Age of Onset    High Blood Pressure Mother     Heart Disease Mother     High Blood Pressure Father     Heart Disease Father     Cancer Sister     Diabetes Sister     Other Sister         aneursym-brain     Social History     Socioeconomic History    Marital status:      Spouse name: Not on file    Number of children: Not on file    Years of education: Not on file    Highest education level: Not on file   Occupational History    Not on file   Tobacco Use    Smoking status: Former     Packs/day: 2.00     Years: 35.00     Pack years: 70.00     Types: Cigarettes     Quit date: 2/4/2019     Years since quitting: 3.6    Smokeless tobacco: Former     Quit date: 10/3/2015    Tobacco comments:     per MD order for annual lung screening   Vaping Use    Vaping Use: Never used   Substance and Sexual Activity    Alcohol use: No     Alcohol/week: 0.0 standard drinks    Drug use: No    Sexual activity: Yes     Partners: Female   Other Topics Concern    Not on file   Social History Narrative    Not on file     Social Determinants of Health     Financial Resource Strain: Not on file   Food Insecurity: Not on file   Transportation Needs: Not on file   Physical Activity: Not on file   Stress: Not on file   Social Connections: Not on file   Intimate Partner Violence: Not on file   Housing Stability: Not on file     No current facility-administered medications for this encounter.      Current Outpatient Medications   Medication Sig Dispense Refill    cyclobenzaprine (FLEXERIL) 10 MG tablet Take 1 tablet by mouth 3 times daily as needed for Muscle spasms (Back pain) 21 tablet 0    polyethylene glycol (GLYCOLAX) 17 GM/SCOOP powder Take 17 g by mouth daily For constipation, stop if diarrhea, can decrease to every other day as stool softens 510 g 0    dicyclomine (BENTYL) 20 MG tablet Take 1 tablet by mouth every 6 hours as needed (Abdominal cramping) 20 tablet 0    lidocaine (LIDODERM) 5 % Place 1 patch onto the skin daily for 10 days 12 hours on, 12 hours off, apply to area of back pain. 15 patch 0    ferrous sulfate (IRON 325) 325 (65 Fe) MG tablet       atorvastatin (LIPITOR) 40 MG tablet Take 40 mg by mouth daily      midodrine (PROAMATINE) 2.5 MG tablet 2.5 mg Take 2 tablets by mouth every morning. 2 tablets at noon. And 1 tablet at 6pm.      ipratropium-albuterol (DUONEB) 0.5-2.5 (3) MG/3ML SOLN nebulizer solution Take 3 mLs by nebulization every 4 hours 360 mL 2    metoprolol succinate (TOPROL XL) 25 MG extended release tablet TAKE 1/2 TABLET BY MOUTH EVERY DAY 15 tablet 0    tiZANidine (ZANAFLEX) 4 MG tablet TAKE 1 TABLET BY MOUTH THREE TIMES A DAY 90 tablet 0    divalproex (DEPAKOTE) 500 MG DR tablet       QUEtiapine (SEROQUEL) 300 MG tablet       Multiple Vitamin (DAILY-CRISTINE PO) Take 1 tablet by mouth daily      clopidogrel (PLAVIX) 75 MG tablet Take 75 mg by mouth daily      pantoprazole sodium (PROTONIX) 40 MG PACK packet Take 40 mg by mouth 2 times daily (before meals)       nitroGLYCERIN (NITRODUR) 0.4 MG/HR Place 1 patch onto the skin daily       fluticasone-vilanterol (BREO ELLIPTA) 100-25 MCG/INH AEPB inhaler Inhale 1 puff into the lungs daily 1 each 3    tamsulosin (FLOMAX) 0.4 MG capsule Take 0.4 mg by mouth daily      gabapentin (NEURONTIN) 300 MG capsule Take 400 mg by mouth 3 times daily . aspirin 81 MG tablet Take 81 mg by mouth daily      cetirizine (ZYRTEC) 10 MG tablet Take 10 mg by mouth daily      hydrOXYzine (VISTARIL) 25 MG capsule Take 25 mg by mouth 3 times daily as needed for Itching      albuterol (PROVENTIL HFA;VENTOLIN HFA) 108 (90 BASE) MCG/ACT inhaler Inhale 1 puff into the lungs every 6 hours as needed. fluticasone (FLONASE) 50 MCG/ACT nasal spray 1 spray by Nasal route daily. Indications: EACH NOSTRIL      montelukast (SINGULAIR) 10 MG tablet Take 10 mg by mouth nightly. omeprazole (PRILOSEC) 20 MG capsule Take 40 mg by mouth 2 times daily        Allergies   Allergen Reactions    Methylphenidate      Other reaction(s): Hyperactive behavior    Oxycodone-Acetaminophen     Propoxyphene        REVIEW OF SYSTEMS  10 systems reviewed, pertinent positives per HPI otherwise noted to be negative. PHYSICAL EXAM   /67   Pulse 74   Temp 98 °F (36.7 °C)   Resp 16   Ht 5' 9\" (1.753 m)   Wt 175 lb (79.4 kg)   SpO2 95%   BMI 25.84 kg/m²   GENERAL APPEARANCE: Awake and alert. Cooperative. No acute distress  HEAD: Normocephalic. Atraumatic. No andrade's sign. EYES: PERRL. EOM's grossly intact. No scleral icterus. No drainage. No periorbital ecchymosis. ENT: Mucous membranes are moist. Airway patent. No stridor. No epistaxis. No otorrhea or rhinorrhea. NECK/back: Supple. No rigidity, trachea midline, no midline spinal tenderness, no ecchymosis/edema/erythema  HEART: RRR. No murmurs/gallups/rubs  LUNGS: Respirations unlabored, Lungs are with coarse breath sounds, but otherwise clear to ausculation bilaterally, no wheezes/crackles/rhonchi   ABDOMEN: Soft. Non-distended. Mild generalized tenderness. No guarding, no rebound tenderness, no rigidity. Normal bowel sounds. No McBurney's point tenderness, negative Rovsing's sign, negative Bustillo's sign, bilateral CVA tenderness, rotund abdomen  EXTREMITIES: No peripheral edema. Moves all extremities equally. No obvious deformities. SKIN: Warm and dry. No acute rashes. NEUROLOGICAL: Alert and oriented x4. No gross facial drooping. Strength 5/5, sensation intact. No truncal ataxia. Normal speech, steady gait  PSYCHIATRIC: Normal mood and affect. LABS  I have reviewed all labs for this visit.    Results for orders placed or performed during the hospital encounter of 10/13/22   Urinalysis with Reflex to Culture    Specimen: Urine, clean catch   Result Value Ref Range    Color, UA Yellow Straw/Yellow    Clarity, UA Clear Clear    Glucose, Ur Negative Negative mg/dL    Bilirubin Urine Negative Negative    Ketones, Urine Negative Negative mg/dL    Specific Gravity, UA 1.010 1.005 - 1.030    Blood, Urine Negative Negative    pH, UA 7.0 5.0 - 8.0    Protein, UA Negative Negative mg/dL    Urobilinogen, Urine 0.2 <2.0 E.U./dL    Nitrite, Urine Negative Negative    Leukocyte Esterase, Urine Negative Negative    Microscopic Examination Not Indicated     Urine Type NotGiven     Urine Reflex to Culture Not Indicated    CBC with Auto Differential   Result Value Ref Range    WBC 5.0 4.0 - 11.0 K/uL    RBC 4.40 4.20 - 5.90 M/uL    Hemoglobin 12.7 (L) 13.5 - 17.5 g/dL    Hematocrit 37.6 (L) 40.5 - 52.5 %    MCV 85.5 80.0 - 100.0 fL    MCH 28.8 26.0 - 34.0 pg    MCHC 33.7 31.0 - 36.0 g/dL    RDW 19.4 (H) 12.4 - 15.4 %    Platelets 076 841 - 432 K/uL    MPV 7.4 5.0 - 10.5 fL    Neutrophils % 63.1 %    Lymphocytes % 24.0 %    Monocytes % 6.8 %    Eosinophils % 2.8 %    Basophils % 3.3 %    Neutrophils Absolute 3.2 1.7 - 7.7 K/uL    Lymphocytes Absolute 1.2 1.0 - 5.1 K/uL    Monocytes Absolute 0.3 0.0 - 1.3 K/uL    Eosinophils Absolute 0.1 0.0 - 0.6 K/uL    Basophils Absolute 0.2 0.0 - 0.2 K/uL   CMP w/ Reflex to MG   Result Value Ref Range    Sodium 144 136 - 145 mmol/L    Potassium reflex Magnesium 5.1 3.5 - 5.1 mmol/L    Chloride 108 99 - 110 mmol/L    CO2 27 21 - 32 mmol/L    Anion Gap 9 3 - 16    Glucose 96 70 - 99 mg/dL    BUN 10 7 - 20 mg/dL    Creatinine 0.8 0.8 - 1.3 mg/dL    GFR Non-African American >60 >60    GFR African American >60 >60    Calcium 9.5 8.3 - 10.6 mg/dL    Total Protein 6.3 (L) 6.4 - 8.2 g/dL    Albumin 4.0 3.4 - 5.0 g/dL    Albumin/Globulin Ratio 1.7 1.1 - 2.2    Total Bilirubin 0.3 0.0 - 1.0 mg/dL    Alkaline Phosphatase 140 (H) 40 - 129 U/L    ALT <5 (L) 10 - 40 U/L    AST 11 (L) 15 - 37 U/L   Troponin   Result Value Ref Range    Troponin <0.01 <0.01 ng/mL   Brain Natriuretic Peptide   Result Value Ref Range    Pro-BNP 98 0 - 124 pg/mL   EKG 12 Lead   Result Value Ref Range    Ventricular Rate 68 BPM    Atrial Rate 68 BPM    P-R Interval 126 ms    QRS Duration 88 ms    Q-T Interval 408 ms    QTc Calculation (Bazett) 433 ms    P Axis 77 degrees    R Axis 78 degrees    T Axis 66 degrees    Diagnosis       Normal sinus rhythmNormal ECGWhen compared with ECG of 15-DEC-2021 11:31,No significant change was found         RADIOLOGY  CT ABDOMEN PELVIS WO CONTRAST Additional Contrast? None    Result Date: 10/13/2022  EXAMINATION: STONE PROTOCOL CT OF THE ABDOMEN AND PELVIS 10/13/2022 7:48 pm; 10/13/2022 7:55 pm TECHNIQUE: CT of the abdomen and pelvis was performed without the administration of intravenous contrast. Multiplanar reformatted images are provided for review. Automated exposure control, iterative reconstruction, and/or weight based adjustment of the mA/kV was utilized to reduce the radiation dose to as low as reasonably achievable. COMPARISON: October 13, 2021 HISTORY: ORDERING SYSTEM PROVIDED HISTORY: dysuria, b/l flank pain, abd pain TECHNOLOGIST PROVIDED HISTORY: Reason for exam:->dysuria, b/l flank pain, abd pain Additional Contrast?->None Decision Support Exception - unselect if not a suspected or confirmed emergency medical condition->Emergency Medical Condition (MA) Reason for Exam: bilateral flank pain ; ORDERING SYSTEM PROVIDED HISTORY: sob TECHNOLOGIST PROVIDED HISTORY: Reason for exam:->sob Reason for Exam: sob copd FINDINGS: LOWER CHEST: Visualized portion of the lower chest demonstrates no acute abnormality. Minimal atelectatic changes right base. KIDNEYS AND URINARY TRACT: No renal calculi are identified. There is no evidence for hydronephrosis. The ureters are of normal course and caliber.  ORGANS: Abdominal aortic aneurysm measures 3.3 cm compared with 3.2 cm previously. The liver appears unremarkable. Status post cholecystectomy. Pancreas and spleen, adrenals and IVC appear stable. GI/BOWEL: No bowel obstruction. No evidence of acute appendicitis. Mild diverticulosis but no acute diverticulitis. Mild constipation. PELVIS: The bladder and pelvic organs are unremarkable. PERITONEUM/RETROPERITONEUM: No lymphadenopathy is noted. Stable ventral hernia repair. BONES/SOFT TISSUES: The osseous structures demonstrate no acute abnormality. 1. No evidence of renal stones or obstructive uropathy on either side. 2. Redemonstration of abdominal aortic aneurysm which measures 3.3 cm. This is slightly above 3.2 cm previously. 3. Mild constipation. Minimal diverticulosis but no acute diverticulitis. RECOMMENDATIONS: For management of fusiform AAA: Note: Recommend Vascular consultation if a fusiform AAA enlarges by >0.5 cm in 6 months or >1 cm in 1 year or for a saccular AAA of any size. References: Parthaline Marion Radiol 2013; 12(51):155-954; J Vasc Surg. 2018; 67:2-77     XR CHEST 1 VIEW    Result Date: 10/13/2022  EXAMINATION: STONE PROTOCOL CT OF THE ABDOMEN AND PELVIS 10/13/2022 7:48 pm; 10/13/2022 7:55 pm TECHNIQUE: CT of the abdomen and pelvis was performed without the administration of intravenous contrast. Multiplanar reformatted images are provided for review. Automated exposure control, iterative reconstruction, and/or weight based adjustment of the mA/kV was utilized to reduce the radiation dose to as low as reasonably achievable.  COMPARISON: October 13, 2021 HISTORY: ORDERING SYSTEM PROVIDED HISTORY: dysuria, b/l flank pain, abd pain TECHNOLOGIST PROVIDED HISTORY: Reason for exam:->dysuria, b/l flank pain, abd pain Additional Contrast?->None Decision Support Exception - unselect if not a suspected or confirmed emergency medical condition->Emergency Medical Condition (MA) Reason for Exam: bilateral flank pain ; ORDERING SYSTEM PROVIDED HISTORY: sob TECHNOLOGIST PROVIDED HISTORY: Reason for exam:->sob Reason for Exam: sob copd FINDINGS: LOWER CHEST: Visualized portion of the lower chest demonstrates no acute abnormality. Minimal atelectatic changes right base. KIDNEYS AND URINARY TRACT: No renal calculi are identified. There is no evidence for hydronephrosis. The ureters are of normal course and caliber. ORGANS: Abdominal aortic aneurysm measures 3.3 cm compared with 3.2 cm previously. The liver appears unremarkable. Status post cholecystectomy. Pancreas and spleen, adrenals and IVC appear stable. GI/BOWEL: No bowel obstruction. No evidence of acute appendicitis. Mild diverticulosis but no acute diverticulitis. Mild constipation. PELVIS: The bladder and pelvic organs are unremarkable. PERITONEUM/RETROPERITONEUM: No lymphadenopathy is noted. Stable ventral hernia repair. BONES/SOFT TISSUES: The osseous structures demonstrate no acute abnormality. 1. No evidence of renal stones or obstructive uropathy on either side. 2. Redemonstration of abdominal aortic aneurysm which measures 3.3 cm. This is slightly above 3.2 cm previously. 3. Mild constipation. Minimal diverticulosis but no acute diverticulitis. RECOMMENDATIONS: For management of fusiform AAA: Note: Recommend Vascular consultation if a fusiform AAA enlarges by >0.5 cm in 6 months or >1 cm in 1 year or for a saccular AAA of any size. References: Alyson Graves Radiol 2013; 17(24):143-309; J Vasc Surg. 2018; 67:2-77      HEART SCORE:    History: +0 for low suspicion  EKG: +0 for normal EKG   Age: +1 for age 44-72 years  Risk factors (includes HLD, HTN, DM, tobacco use, obesity, and +FHx): +1 for 1-2 risk factors  Initial troponin: +0 for negative troponin    Heart score: 2. This falls under the following category: Score of 0-3, which indicates a very low risk for major adverse cardiac event and supports early discharge    [address] PE RISK:   1. Clinical S/S of DVT: +0 No   2.  PE #1 Dx: +0 No   3. HR>100: +0 No   4. Immobilization w/in past 3 days OR Surgery w/in past 3 weeks: +0 No   5. Previous PE/DVT, not on blood thinners: +0 No   6. Hemoptysis: +0 No   7. Malignancy, Tx w/in 6 months: +0 No    Score: 0 0-1: Low Risk-1.3% Risk-No Further Workup     I am the primary clinician of record. ED COURSE/MDM  Patient seen and evaluated. Old records reviewed. Labs and imaging reviewed and results discussed with patient. 63 y.o. male with multiple complaints today, no acute process on labs or imaging, no evidence of obstructive uropathy or UTI, there was some constipation noted on CT, and AAA that was not new, not significantly enlarged since previous imaging, patient informed about this finding,, encouraged vascular surgery follow-up, no current suspicion for ACS/PE/dissection with regards to the chest pain episode 24 hours ago, treating constipation, encourage MiraLAX regimen, urology follow-up for dysuria, patient stated he had not had any sexual activity for at least 6 years, was not concerned about STDs and did not think testing was necessary, alcohol better after medications here, suspect this may be musculoskeletal, vital stable, blood pressure had improved on repeat, no current suspicion for sepsis or acute surgical abdomen, no current suspicion for cauda equina/epidural abscess/epidural hematoma, encourage primary care follow-up, strict return precautions given, all questions answered, will return if any worsening symptoms or new concerns, see AVS for further discharge information, patient verbalized understanding of plan, felt comfortable going home.      Orders Placed This Encounter   Procedures    CT ABDOMEN PELVIS WO CONTRAST Additional Contrast? None    XR CHEST 1 VIEW    Urinalysis with Reflex to Culture    CBC with Auto Differential    CMP w/ Reflex to MG    Troponin    Brain Natriuretic Peptide    EKG 12 Lead    Saline lock IV     Orders Placed This Encounter   Medications    acetaminophen (TYLENOL) tablet 1,000 mg    cyclobenzaprine (FLEXERIL) tablet 10 mg    DISCONTD: magnesium citrate solution 296 mL    cyclobenzaprine (FLEXERIL) 10 MG tablet     Sig: Take 1 tablet by mouth 3 times daily as needed for Muscle spasms (Back pain)     Dispense:  21 tablet     Refill:  0    polyethylene glycol (GLYCOLAX) 17 GM/SCOOP powder     Sig: Take 17 g by mouth daily For constipation, stop if diarrhea, can decrease to every other day as stool softens     Dispense:  510 g     Refill:  0    dicyclomine (BENTYL) 20 MG tablet     Sig: Take 1 tablet by mouth every 6 hours as needed (Abdominal cramping)     Dispense:  20 tablet     Refill:  0    lidocaine (LIDODERM) 5 %     Sig: Place 1 patch onto the skin daily for 10 days 12 hours on, 12 hours off, apply to area of back pain. Dispense:  15 patch     Refill:  0     ED Course as of 10/20/22 0311   Thu Oct 13, 2022   2013 EKG 12 Lead  EKG interpretation by me: Normal sinus rhythm, rate 68, QTc 433, normal axis, no ST segment or T wave changes indicative of acute ischemia [SY]   2014 Troponin: <0.01  Normal troponin, given that the chest pain was 24 hours ago, would expect elevation of troponin, no need to repeat, and nonischemic EKG [SY]      ED Course User Index  [SY] Romana Paterson, DO       CLINICAL IMPRESSION  1. Bilateral flank pain    2. Dysuria    3. Generalized abdominal pain    4. Constipation, unspecified constipation type    5. Abdominal aortic aneurysm (AAA) without rupture, unspecified part    6. Intermittent chest pain        Blood pressure 138/67, pulse 74, temperature 98 °F (36.7 °C), resp. rate 16, height 5' 9\" (1.753 m), weight 175 lb (79.4 kg), SpO2 95 %. DISPOSITION  Yoana Fairchild was discharged to home in stable condition.                    Romana Paterson, DO  10/20/22 9701 Qbrexza Pregnancy And Lactation Text: There is no available data on Qbrexza use in pregnant women.  There is no available data on Qbrexza use in lactation.

## 2022-10-14 NOTE — DISCHARGE INSTRUCTIONS
Return to ER if any severe/worsening abdominal pain, severe back pain, new numbness or weakness, bowel or bladder accidents, difficulty urinating, fever, significant/persistent chest pain, shortness of breath, or any other concerns. Follow-up with urology, there was no signs of infection or blood in the urine, you declined concern for STDs today. We have sent your urine to culture, you will be notified if antibiotics are necessary.

## 2022-10-14 NOTE — ED NOTES
Discharge instructions and medications reviewed with patient. Patient verbalized understanding of medications and follow up. All questions answered at this time. IV removed, dressing applied, and bleeding controlled. Skin warm, pink, and dry. Patient alert and oriented x4. Pt ambulated to lobby with a stable gait. Patient discharged home with 3 prescriptions.        Monae Longo RN  10/13/22 [id number]

## 2022-10-15 LAB — URINE CULTURE, ROUTINE: NORMAL

## 2022-12-08 ENCOUNTER — OFFICE VISIT (OUTPATIENT)
Dept: ORTHOPEDIC SURGERY | Age: 62
End: 2022-12-08

## 2022-12-08 VITALS — WEIGHT: 175 LBS | HEIGHT: 69 IN | BODY MASS INDEX: 25.92 KG/M2

## 2022-12-08 DIAGNOSIS — M25.551 RIGHT HIP PAIN: ICD-10-CM

## 2022-12-08 DIAGNOSIS — S72.051A CLOSED FRACTURE OF HEAD OF RIGHT FEMUR, INITIAL ENCOUNTER (HCC): Primary | ICD-10-CM

## 2022-12-08 NOTE — PROGRESS NOTES
Chief Complaint    Hip Pain (R hip pain)       History of Present Illness:  Allan Escobedo is a 63 y.o. male presents for evaluation of right hip pain that occurred after a fall onto his right hip 4 weeks ago. Since that time has had difficulty walking and uses a walker at times at home. Walking without amatory aids today. He grades pain 10/10 and complains of cracking popping rubbing and sharp and nagging type pain for which he takes Tylenol. Medical History:  Patient's medications, allergies, past medical, surgical, social and family histories were reviewed and updated as appropriate. Review of Systems:  Well-documented patient history form dated 12/8/2022  Relevant review of systems reviewed and available in the patient's chart    Vital Signs: There were no vitals filed for this visit. General Exam:   Constitutional: Patient is adequately groomed with no evidence of malnutrition  DTRs: Deep tendon reflexes are intact  Mental Status: The patient is oriented to time, place and person. The patient's mood and affect are appropriate. Lymphatic: The lymphatic examination bilaterally reveals all areas to be without enlargement or induration. Vascular: Examination reveals no swelling or calf tenderness. Peripheral pulses are palpable and 2+. Neurological: The patient has good coordination. There is no weakness or sensory deficit. Right hip Examination:    Inspection: No visible lesions    Palpation: Tender to palpation around right hip    Range of Motion: Limited range of motion secondary to pain    Strength: Limited by pain    Special Tests:      Skin: There are no rashes, ulcerations or lesions. Gait: Extremely antalgic    Reflex diminished    Additional Comments:       Additional Examinations:         Left Lower Extremity: Examination of the left lower extremity does not show any tenderness, deformity or injury. Range of motion is unremarkable. There is no gross instability.   There are no rashes, ulcerations or lesions. Strength and tone are normal.    Radiology:     X-rays 3 views of the right hip including AP of the pelvis demonstrates an impacted fracture of the femoral head which has similarities to collapse from avascular necrosis although historically he had no pain preinjury and all of his pain has been post injury. Assessment : Right hip traumatic femoral head fracture impaction type, 4 weeks remote    Impression:  Encounter Diagnoses   Name Primary? Right hip pain     Closed fracture of head of right femur, [facility] Yes       Office Procedures:  Orders Placed This Encounter   Procedures    XR HIP RIGHT (2-3 VIEWS)     Standing Status:   Future     Number of Occurrences:   1     Standing Expiration Date:   12/5/2023    Yoni Alvarez MD, Orthopedic Surgery (Hip; Knee; Shoulder), [facility]     Referral Priority:   Routine     Referral Type:   Eval and Treat     Referral Reason:   Specialty Services Required     Referred to Provider:   Fe Nieves MD     Requested Specialty:   Orthopedic Surgery     Number of Visits Requested:   1    Aluminum Crutches     Patient was prescribed Medline Aluminum Crutches. This mobility device is required for the following reasons:    Patient has mobility limitations that significantly impair their ability to participate in one or more mobility related activities of daily living. The patient is able to safely use the mobility device. Functional mobility deficit can be sufficiently resolved with the use of this device. Verbal and written instructions for the use of and application of this item were provided. The patient was educated and fit by a healthcare professional with expert knowledge and specialization in brace application while under the direct supervision of the treating physician.  They were instructed to contact the office immediately should the equipment result in increased pain, decreased sensation, increased swelling or worsening of the condition. Treatment Plan: At this time I will place him on crutches to unload the hip, and get him into see Dr. Margy Youngblood expeditiously for consultation in regards to this right hand injury which may ultimately benefit from surgical intervention up to and including total hip replacement. Procedures    Aluminum Crutches     Patient was prescribed Medline Aluminum Crutches. This mobility device is required for the following reasons:    Patient has mobility limitations that significantly impair their ability to participate in one or more mobility related activities of daily living. The patient is able to safely use the mobility device. Functional mobility deficit can be sufficiently resolved with the use of this device. Verbal and written instructions for the use of and application of this item were provided. The patient was educated and fit by a healthcare professional with expert knowledge and specialization in brace application while under the direct supervision of the treating physician. They were instructed to contact the office immediately should the equipment result in increased pain, decreased sensation, increased swelling or worsening of the condition.

## 2022-12-09 ENCOUNTER — OFFICE VISIT (OUTPATIENT)
Dept: ORTHOPEDIC SURGERY | Age: 62
End: 2022-12-09
Payer: MEDICAID

## 2022-12-09 VITALS — HEIGHT: 69 IN | WEIGHT: 175 LBS | BODY MASS INDEX: 25.92 KG/M2

## 2022-12-09 DIAGNOSIS — S72.051A CLOSED FRACTURE OF HEAD OF RIGHT FEMUR, INITIAL ENCOUNTER (HCC): Primary | ICD-10-CM

## 2022-12-09 PROCEDURE — 1036F TOBACCO NON-USER: CPT | Performed by: ORTHOPAEDIC SURGERY

## 2022-12-09 PROCEDURE — G8484 FLU IMMUNIZE NO ADMIN: HCPCS | Performed by: ORTHOPAEDIC SURGERY

## 2022-12-09 PROCEDURE — G8427 DOCREV CUR MEDS BY ELIG CLIN: HCPCS | Performed by: ORTHOPAEDIC SURGERY

## 2022-12-09 PROCEDURE — 99214 OFFICE O/P EST MOD 30 MIN: CPT | Performed by: ORTHOPAEDIC SURGERY

## 2022-12-09 PROCEDURE — 3017F COLORECTAL CA SCREEN DOC REV: CPT | Performed by: ORTHOPAEDIC SURGERY

## 2022-12-09 PROCEDURE — G8419 CALC BMI OUT NRM PARAM NOF/U: HCPCS | Performed by: ORTHOPAEDIC SURGERY

## 2022-12-09 NOTE — LETTER
[facility]   DR. MAI TRINIDAD     TODAY'S DATE: 22    PATIENT'S NAME: Janie Gilliland    PATIENT'S NUMBER: [id number]    : 1960    PREFERRED PHONE NUMBER: [phone]      WORK PHONE NUMBER: There is no work phone number on file. DIAGNOSIS:   No diagnosis found.     DATE OF SURGERY:     PROCEDURE: RIGHT TOTAL HIP ARTHROPLASTY    SURGEON: Dr. Orr Iron: [facility]    HOSPITAL: [address] [place] [address]: Inpatient    ANESTHESIA: General   Pre-Op Block requested  Yes    LENGTH OF SURGERY: 2.5 hours    EQUIPMENT REQUESTED: [employer]      X-RAYS REQUIRED: C-ARM      PCP: Bonny Bellamy PA-C    H&P TO BE DONE BY THE PCP      CARDIAC CLEARANCE NEEDED: No     ALLERGIES:   Allergies   Allergen Reactions    Methylphenidate      Other reaction(s): Hyperactive behavior    Oxycodone-Acetaminophen     Propoxyphene        DME: none    POST-OP VISIT: 14 Days    OTHER INSTRUCTIONS/REMARKS: Desires discharge to nursing facility     INSURANCE INFORMATION: _________________________ CARD IN MEDIA IN EPIC___  CARD FAXED___    PRE-CERTIFICATION REQUIRED: YES   NO   PER _______________________    SURGERY SCHEDULED BY: ____________________________________    PATIENT CALLED AND CONFIRMED DATE & TIME: ______________________

## 2022-12-12 NOTE — PROGRESS NOTES
ORTHOPAEDIC SURGERY INITIAL EVALUATION NOTE  Chief Complaint   Patient presents with    Hip Pain     R HIP PN. FEMUR FX      HISTORY OF PRESENT ILLNESS:  62-year-old male presents for evaluation of right hip pain. He sustained a mechanical fall approximately 4 weeks ago. He has had difficulty with walking. He has been walking with a limp. He saw Dr. Darya Blank yesterday, who noted that he had collapse of the superior portion of the femoral head. He was provided with crutches. He indicates there is severe pain with any movement of the hip. He has trouble ambulating. He rates his pain a 10 out of 10. He has been managing this with Tylenol and activity modification. He was referred for evaluation. Past Medical History:   Diagnosis Date    Anxiety     Arterial stent thrombosis (HCC)     Arthritis     Asthma     COPD (chronic obstructive pulmonary disease) ([facility])     Coronary artery disease involving native coronary artery of native heart without angina pectoris 2/8/2021    Diabetes mellitus ([facility]     Essential hypertension 2/8/2021    Hyperlipidemia     Pneumonia        Current Outpatient Medications   Medication Sig Dispense Refill    ferrous sulfate (IRON 325) 325 (65 Fe) MG tablet       atorvastatin (LIPITOR) 40 MG tablet Take 40 mg by mouth daily      midodrine (PROAMATINE) 2.5 MG tablet 2.5 mg Take 2 tablets by mouth every morning. 2 tablets at noon.  And 1 tablet at 6pm.      ipratropium-albuterol (DUONEB) 0.5-2.5 (3) MG/3ML SOLN nebulizer solution Take 3 mLs by nebulization every 4 hours 360 mL 2    metoprolol succinate (TOPROL XL) 25 MG extended release tablet TAKE 1/2 TABLET BY MOUTH EVERY DAY 15 tablet 0    tiZANidine (ZANAFLEX) 4 MG tablet TAKE 1 TABLET BY MOUTH THREE TIMES A DAY 90 tablet 0    divalproex (DEPAKOTE) 500 MG DR tablet       QUEtiapine (SEROQUEL) 300 MG tablet       Multiple Vitamin (DAILY-CRISTINE PO) Take 1 tablet by mouth daily      clopidogrel (PLAVIX) 75 MG tablet Take 75 mg by mouth daily      pantoprazole sodium (PROTONIX) 40 MG PACK packet Take 40 mg by mouth 2 times daily (before meals)       nitroGLYCERIN (NITRODUR) 0.4 MG/HR Place 1 patch onto the skin daily       fluticasone-vilanterol (BREO ELLIPTA) 100-25 MCG/INH AEPB inhaler Inhale 1 puff into the lungs daily 1 each 3    tamsulosin (FLOMAX) 0.4 MG capsule Take 0.4 mg by mouth daily      gabapentin (NEURONTIN) 300 MG capsule Take 400 mg by mouth 3 times daily . aspirin 81 MG tablet Take 81 mg by mouth daily      cetirizine (ZYRTEC) 10 MG tablet Take 10 mg by mouth daily      hydrOXYzine (VISTARIL) 25 MG capsule Take 25 mg by mouth 3 times daily as needed for Itching      albuterol (PROVENTIL HFA;VENTOLIN HFA) 108 (90 BASE) MCG/ACT inhaler Inhale 1 puff into the lungs every 6 hours as needed. fluticasone (FLONASE) 50 MCG/ACT nasal spray 1 spray by Nasal route daily. Indications: EACH NOSTRIL      montelukast (SINGULAIR) 10 MG tablet Take 10 mg by mouth nightly. omeprazole (PRILOSEC) 20 MG capsule Take 40 mg by mouth 2 times daily       dicyclomine (BENTYL) 20 MG tablet Take 1 tablet by mouth every 6 hours as needed (Abdominal cramping) 20 tablet 0     No current facility-administered medications for this visit.         Past Surgical History:   Procedure Laterality Date    CARDIAC SURGERY      stent placed    CARPAL TUNNEL RELEASE      CHOLECYSTECTOMY, LAPAROSCOPIC      KNEE ARTHROPLASTY      R knee x4    NASAL SINUS SURGERY      UPPER GASTROINTESTINAL ENDOSCOPY  7/14/11    UPPER GASTROINTESTINAL ENDOSCOPY N/A 4/4/2019    EGD BIOPSY performed by Devendra Monson DO at [facility]       Allergies   Allergen Reactions    Methylphenidate      Other reaction(s): Hyperactive behavior    Oxycodone-Acetaminophen     Propoxyphene        Family History   Problem Relation Age of Onset    High Blood Pressure Mother     Heart Disease Mother     High Blood Pressure Father     Heart Disease Father     Cancer Sister     Diabetes Sister     Other Sister         aneursym-brain       Social History     Socioeconomic History    Marital status:      Spouse name: Not on file    Number of children: Not on file    Years of education: Not on file    Highest education level: Not on file   Occupational History    Not on file   Tobacco Use    Smoking status: Former     Packs/day: 2.00     Years: 35.00     Pack years: 70.00     Types: Cigarettes     Quit date: 2/4/2019     Years since quitting: 3.8    Smokeless tobacco: Former     Quit date: 10/3/2015    Tobacco comments:     per MD order for annual lung screening   Vaping Use    Vaping Use: Never used   Substance and Sexual Activity    Alcohol use: No     Alcohol/week: 0.0 standard drinks    Drug use: No    Sexual activity: Yes     Partners: Female   Other Topics Concern    Not on file   Social History Narrative    Not on file     Social Determinants of Health     Financial Resource Strain: Not on file   Food Insecurity: Not on file   Transportation Needs: Not on file   Physical Activity: Not on file   Stress: Not on file   Social Connections: Not on file   Intimate Partner Violence: Not on file   Housing Stability: Not on file     Review of Systems: Please see today's intake form for complete review of systems. PHYSICAL EXAM  Gen: awake, alert, oriented  HEENT: atraumatic, normocephalic  Neck: supple  Resp: equal chest rise bilaterally, no audible wheezes, no accessory muscle use. CV: Lower extremities warm and well perfused. Abd: soft, nontender   Focused examination of the right lower extremity: No cuts, open wounds, or abrasions overlying the lateral hip. There is minimal tenderness to palpation in this area. There is pain with any movement of the hip including a logroll. There is pain with passive straight leg raise. There is pain with flexion to greater than 90 degrees of flexion. There is severe pain with gentle internal/external rotation and 90 degrees of flexion. There are no cuts, open wounds, or abrasions. Sensation is intact to light touch in deep peroneal, superficial peroneal, tibial, sural, and saphenous nerve distributions. Motor function is intact to EHL, FHL, tibialis anterior, and gastroc. There is brisk capillary refill to the toes and a strong palpable dorsalis pedis pulse. Compartments are soft and compressible. There is no calf tenderness and a negative Homans' sign. LABS  Lab Results   Component Value Date/Time    LABA1C 6.8 12/16/2021 02:22 AM    CRP 47.5 (H) 12/17/2021 05:20 AM    LABALBU 4.0 10/13/2022 06:18 PM    INR 1.30 (H) 12/15/2021 11:00 AM      RADIOGRAPHIC EXAM:  An AP pelvis as well as 2 AP images of the right hip demonstrate incongruity of the superior weightbearing surface of the femoral head. There is sclerosis in this area. This is concerning for impaction fracture versus collapsed avascular necrosis region of the femoral head. There is moderate anterior acetabular spurring. No evidence of extension to the femoral neck. ASSESSEMENT AND PLAN    63 y.o. male with the following orthopaedic surgery problems:    Right hip femoral head impaction fracture, possible collapsed avascular necrosis region    I reviewed the x-rays in detail with the patient. I recommended surgical treatment in the form of total hip arthroplasty to maintain his ambulatory ability. I advised him that it is likely his femoral head fracture will continue to settle and collapse with worsening pain and deformity. I offered him surgical intervention on a semiurgent basis. I discussed the risks, benefits, alternatives, and standard postoperative course. The patient is likely to need a stay at a rehabilitation unit postoperatively given his lack of family support/resources. The patient declined moving forward with more urgent surgery, indicating he would like to move forward with surgery in January given his social circumstances.   I recommended more urgent surgical intervention in order to preserve his ambulatory function, however he declined. I recommended that he proceed with a preoperative history and physical exam by his primary care provider to include labs and an EKG in preparation for surgery. He would like to proceed with total hip arthroplasty at [facility] in January. All questions answered.     Lashell Keller MD

## 2022-12-20 ENCOUNTER — TELEPHONE (OUTPATIENT)
Dept: ORTHOPEDIC SURGERY | Age: 62
End: 2022-12-20

## 2022-12-20 NOTE — TELEPHONE ENCOUNTER
General Question     Subject: Surgery paperwork and 12/23 appointment  Patient  Request: Kashif Tanvir Number: [phone]    Patient is req a return call to verify appointment for [email or web address], if this may be an error. Patient also states he has not received his surgery paperwork yet. Please return call to the above number.

## 2022-12-21 NOTE — TELEPHONE ENCOUNTER
Patient does not need to come in on 12/23 for apt, spoke to patient, will re-mail out his surgical packet today.  KB

## 2022-12-22 ENCOUNTER — TELEPHONE (OUTPATIENT)
Dept: ORTHOPEDIC SURGERY | Age: 62
End: 2022-12-22

## 2022-12-22 NOTE — TELEPHONE ENCOUNTER
LVM FOR PATIENT TO CALL BACK TO  VERIFY IF CURRENT INSURANCE IS CHANGING OR REMAINING THE SAME FOR THE NEW YEAR REQUIRED FOR SURGERY

## 2023-01-04 NOTE — TELEPHONE ENCOUNTER
General Question     Subject: POST OP PAPERWORK   Patient and /or Facility Request: Heather Pacheco  Contact Number: [phone]     Pt STATES THAT HE STILL HASN'T RCV'D HIS PAPERWORK.  Pt ASKING FOR A CALL SO HE CAN GET HIS INSTRUCTIONS ON POST SX.

## 2023-01-05 ENCOUNTER — TELEPHONE (OUTPATIENT)
Dept: ORTHOPEDIC SURGERY | Age: 63
End: 2023-01-05

## 2023-01-06 NOTE — TELEPHONE ENCOUNTER
Auth: # [id number]  Date: 01/23/23  Type of SX:  INPATIENT WAS DENIED/CHANGED TO OBSERVATION FOR [employer]  [place] [facility] 83487 APPROVED   DX Code: M16.11  SX area: R HIP  Insurance: [employer]  SPOKE WITH MARIAH KIDD

## 2023-01-09 NOTE — TELEPHONE ENCOUNTER
Called patient, left message, I can either e-mail, re mail packet again or leave it at the  at one of our offices. I did apologize for the inconvenience and also said when he calls I will go over post op instructions and answer any questions. Surgery is scheduled for 01/23/23.  KB

## 2023-01-10 ENCOUNTER — TELEPHONE (OUTPATIENT)
Dept: ORTHOPEDIC SURGERY | Age: 63
End: 2023-01-10

## 2023-01-10 NOTE — TELEPHONE ENCOUNTER
Called and spoke to patient. Answered his questions and will call him back after talking to dr. Omid Gaming about the others.  KB

## 2023-01-10 NOTE — TELEPHONE ENCOUNTER
Called patient to confirm if he received his packet in the mail yet and if he hasn't what options we have to ensure he receives the information needed.  KB

## 2023-01-10 NOTE — TELEPHONE ENCOUNTER
General Question     Subject: SURGERY  Patient and /or Facility Request: Ismaelluigi Irizarry  Contact Number: [phone]      THE PT WANTS SOMEONE TO CALL HIM TO LET HIM KNOW WHEN HIS SX IS, WHEN HE'S SUPPOSED TO STOP HIS MEDS, HE HASN'T RECEIVED ANY PACKETS ABOUT THE SX YET.   HE CAN BE REACHED AT THE ABOVE PHONE #

## 2023-01-13 ENCOUNTER — TELEPHONE (OUTPATIENT)
Dept: ORTHOPEDIC SURGERY | Age: 63
End: 2023-01-13

## 2023-01-13 NOTE — TELEPHONE ENCOUNTER
Called patient explained to him about [facility] and that we have to move his surgery to [facility] on 01/25. He was okay with it. I did mail out another packet with the new information in it for the patient.  KB

## 2023-01-13 NOTE — PROGRESS NOTES
Zuleika Cake    Age 63 y.o.    male    1960    MRN [id number]    1/25/2023  Arrival Time_____________  OR Time____________[address]     Procedure(s):  RIGHT TOTAL HIP ARTHROPLASTY              DURHAM & NEPHEW NOTE:  BLOCK                      General   Surgeon(s):  Juan José Sandoval, MD      DAY ADMIT ___  SDS/OP ___  OUTPT IN BED ___        Phone [phone] (home)     PCP _____________________ Phone_________________ Epic ( ) Epic CE ( ) Appt ________    ADDITIONAL INFO __________________________________ Cardio/Consult _____________    NOTES _____________________________________________________________________    ____________________________________________________________________________    PAT APPT DATE:________ TIME: ________  FAXED QAD: _______  (__) H&P w/ Hospitalist  __________________________________________________________________________  Preop Nurse phone screen complete: _____________  (__) CBC     (__) W/ DIFF ___________     (__) Hgb A1C    ___________  (__) CHEST X RAY   __________  (__) LIPID PROFILE  ___________  (__) EKG   __________  (__) PT/PTT   ___________  (__) PFT's   __________  (__) BMP   ___________  (__) CAROTIDS  __________  (__) CMP   ___________  (__) VEIN MAPPING  __________  (__) U/A   ___________  (__) HISTORY & PHYSICAL __________  (__) URINE C & S  ___________  (__) CARDIAC CLEARANCE __________  (__) U/A W/ FLEX  ___________  (__) PULM.  CLEARANCE __________  (__) SERUM PREGNANCY ___________  (__) Check Epic DOS orders __________  (__) TYPE & SCREEN __________repeat ( ) (__)  __________________ __________  (__) ALBUMIN   ___________  (__)  __________________ __________  (__) TRANSFERRIN  ___________  (__)  __________________ __________  (__) LIVER PROFILE  ___________  (__)  __________________ __________  (__) MRSA NASAL SWAB ___________  (__) URINE PREG DOS __________  (__) SED RATE  ___________  (__) BLOOD SUGAR DOS __________  (__) C-REACTIVE PROTEIN ___________    (__) VITAMIN D HYDROXY ___________  (__) BLOOD THINNERS __________    (__) ACE/ ARBS: _____________________     (__) BETABLOCKERS __________________

## 2023-01-13 NOTE — TELEPHONE ENCOUNTER
Auth: # [id number]    Date: 01/25/23  Type of SX:  INPATIENT DENIED/OUTPATIENT  Location: [facility]  CPT: 21202   DX Code: M16.11  SX area: [facility]  Insurance: [place] [id number]

## 2023-01-20 NOTE — PROGRESS NOTES
1. Do not eat or drink anything after 12 midnight prior to surgery. This includes no water, chewing gum mints, or ice chips. You may brush your teeth and gargle the day of surgery but DO NOT SWALLOW THE WATER. 2. Please see your family doctor/pediatrician for a history and physical and/or concerning medications. Bring any test results/reports from your physician's office. If you are under the care of a heart doctor or specialist please be aware that you may be asked to see him or her for clearance. 3. You may be asked to stop blood thinners such as Coumadin, Plavix, Fragmin, and Lovenox or Anti-inflammatories such as Aspirin, Ibuprofen, Advil, and Naproxen prior to your surgery. Please check with your doctor before stopping these or any other medications. 4. Do not smoke, and do not drink any alcoholic beverages 24 hours prior to surgery. 5. You MUST make arrangements for a responsible adult to take you home after your surgery. For your safety, you will not be allowed to leave alone or drive yourself home. Your surgery will be cancelled if you do not have a ride home. Also for your safety, it is strongly suggested someone stay with you the first 24 hrs after your surgery. 6. A parent/legal guardian must accompany a child scheduled for surgery and plan to stay at the hospital until the child is discharged. Please do not bring other children with you. 7. For your comfort,please wear simple, loose fitting clothing to the hospital.  Please do not bring valuables (money, credit cards, checkbooks, etc.) Do not wear any makeup (including no eye makeup) or nail polish on your fingers or toes. 8. For your safety, please DO NOT wear any jewelry or piercings on day of surgery. All body piercing jewelry must be removed. 9. If you have dentures, they will be removed before going to the OR; for your convenience we will provide you with a container.   If you wear contact lenses or glasses, they will be removed, they will be removed, please bring a case for them. 10. If appicable,Please see your family doctor/pediatrician for a history & physical and/or concerning medications. Bring any test results/reports from your physician's office. 11. Remember to bring Blood Bank bracelet to the hospital on the day of surgery. 12. If you have a Living Will and Durable Power of  for Healthcare, please bring in a copy. 15. Notify your Surgeon if you develop any illness between now and surgery  time, cough, cold, fever, sore throat, nausea, vomiting, etc.  Please notify your surgeon if you experience dizziness, shortness of breath or blurred vision between now & the time of your surgery   14. DO NOT shave your operative site 96 hours prior to surgery. For face & neck surgery, men may use an electric razor 48 hours prior to surgery. 15. Shower the night before surgery with __X_Antibacterial soap ___Hibiclens   16. To provide excellent care visitors will be limited to one in the room at any given time. 17.  Please bring picture ID and insurance card. 18.  Visit our web site for additional information:  [email or web address]. INSTRUCTED TO TAKE METOPROLOL DOS AND DEPAKOTE.  PT TO STOP ASA & PLAVIX (LAST DOSE 1/20/23) OK PER CARDIOLOLGIST

## 2023-01-23 ENCOUNTER — TELEPHONE (OUTPATIENT)
Dept: ORTHOPEDIC SURGERY | Age: 63
End: 2023-01-23

## 2023-01-23 NOTE — TELEPHONE ENCOUNTER
ORTHOPAEDIC NURSE NAVIGATOR SUMMARY NOTE  Pt states he has enlarged prostate and has difficulty voiding after surgery. Pt is requesting a wharton catheter. Pt also requesting to see his bone after the surgery- RN told pt to discuss this with Dr. Shawn Granados. Anticipated Date of Surgery: 1/25/23    Recieved Pre-Op Education: no   In person class:No  Pt used educational link:No   Pt completed pre and post test to measure learning:No    If pt did not complete either, why not? No email      PCP: Mai Ortiz PA-C   Phone #: [phone]    Date of PCP Visit for H&P: 1/18/23    Any Noted Concerns from PCP prior to surgery:  No   If Yes, what concerns?:    IS THE PATIENT IN A PAIN MANAGEMENT PROGRAM?:   No     Review of Past Medical History Reveals History of:      Critical Lab Values:   Hgb/Hct:   Hemoglobin (g/dL)   Date Value   10/13/2022 12.7 (L)   /  Hematocrit (%)   Date Value   10/13/2022 37.6 (L)      HgbA1C:    Lab Results   Component Value Date    LABA1C 6.8 12/16/2021    LABA1C 5.9 01/28/2021      Albumin:    Lab Results   Component Value Date    LABALBU 4.0 10/13/2022      BUN/Cr:   BUN (mg/dL)   Date Value   10/13/2022 10   /  Creatinine (mg/dL)   Date Value   10/13/2022 0.8      BMI:    BMI Readings from Last 1 Encounters:   12/09/22 25.84 kg/m²        Coronary Artery Disease/HTN/CHF History: Yes- CAD, CHF, HTN, AAA      Cardiologist:     Cardiac Clearance Necessary: Yes    Date of Cardiac Clearance Appt: On Plavix? Yes,  If YES, when will they stop taking?     Final Cardiac Recommendations:Cleared for surgery    -On any anticoagulation-ASA 81, plavix- to hold for 1 week       Diabetes History: Yes    Most Recent HgbA1C: N/A    PCP or Endocrine Recommendations: N/A    Nutritionist/Dietician Consult Scheduled: N/A    Final Plan For Diabetic Control: N/A   Pulmonary: COPD/Emphysema/ Use of home oxygen: COPD     Alcohol use:        DVT Risk Stratification:  Low      Vascular Consult Ordered:  NA    Date of Vascular Appt:     Hematology/Oncology Consult Ordered:  NA    Date of Hematology/Oncology Appt:     Final Recommendation For DVT Prophylaxis:   -Smoking history or use of estrogen-         BMI Greater than 40 at time of scheduling?: No    Has Surgeon been notified of BMI concern? Not Applicable    Weight Loss Clinic Consult Ordered: Not Applicable    Date of Wt Loss Clinic Appt:     BMI at time of surgery (if went through [facility]): Additional Medical Concerns:         Discharge Disposition Information:     Attended Pre-Hab Program: no     Anticipated Discharge Disposition:  Home with Hemanth Parker    Who will be with patient at home following discharge? Lives alone- states no family or friends to help. States cab will be providing rides for the patient. His insurance covers the cost of cabs.        Equipment pt already has:  walker   Bedroom on first or second floor: first   Bathroom on first or second floor: first   Weight bearing status: full   Pre-op ambulatory status: crutches   Number of entry steps: 1 step    Caregiver assistance: won't have full time help    Pt plan to DC same day: no- states he was told he would stay up to 3 days   Hemanth Parker preference: No preference      Henok Márquez RN  1/23/2023

## 2023-01-24 ENCOUNTER — ANESTHESIA EVENT (OUTPATIENT)
Dept: OPERATING ROOM | Age: 63
End: 2023-01-24
Payer: MEDICAID

## 2023-01-25 ENCOUNTER — APPOINTMENT (OUTPATIENT)
Dept: GENERAL RADIOLOGY | Age: 63
DRG: 323 | End: 2023-01-25
Attending: ORTHOPAEDIC SURGERY
Payer: MEDICAID

## 2023-01-25 ENCOUNTER — ANESTHESIA (OUTPATIENT)
Dept: OPERATING ROOM | Age: 63
End: 2023-01-25
Payer: MEDICAID

## 2023-01-25 ENCOUNTER — HOSPITAL ENCOUNTER (INPATIENT)
Age: 63
LOS: 2 days | Discharge: HOME | DRG: 323 | End: 2023-01-27
Attending: ORTHOPAEDIC SURGERY | Admitting: ORTHOPAEDIC SURGERY
Payer: MEDICAID

## 2023-01-25 DIAGNOSIS — Z96.641 STATUS POST TOTAL REPLACEMENT OF RIGHT HIP: Primary | ICD-10-CM

## 2023-01-25 PROBLEM — G89.18 POST-OPERATIVE PAIN: Status: ACTIVE | Noted: 2023-01-25

## 2023-01-25 LAB
ABO/RH: NORMAL
ANTIBODY SCREEN: NORMAL
GLUCOSE BLD-MCNC: 123 MG/DL (ref 70–99)
GLUCOSE BLD-MCNC: 85 MG/DL (ref 70–99)
PERFORMED ON: ABNORMAL
PERFORMED ON: NORMAL

## 2023-01-25 PROCEDURE — 6360000002 HC RX W HCPCS: Performed by: ORTHOPAEDIC SURGERY

## 2023-01-25 PROCEDURE — G0378 HOSPITAL OBSERVATION PER HR: HCPCS

## 2023-01-25 PROCEDURE — 73502 X-RAY EXAM HIP UNI 2-3 VIEWS: CPT

## 2023-01-25 PROCEDURE — 1200000000 HC SEMI PRIVATE

## 2023-01-25 PROCEDURE — 3700000000 HC ANESTHESIA ATTENDED CARE: Performed by: ORTHOPAEDIC SURGERY

## 2023-01-25 PROCEDURE — 3209999900 FLUORO FOR SURGICAL PROCEDURES

## 2023-01-25 PROCEDURE — 0SR90JZ REPLACEMENT OF RIGHT HIP JOINT WITH SYNTHETIC SUBSTITUTE, OPEN APPROACH: ICD-10-PCS

## 2023-01-25 PROCEDURE — 6370000000 HC RX 637 (ALT 250 FOR IP): Performed by: ORTHOPAEDIC SURGERY

## 2023-01-25 PROCEDURE — 6360000002 HC RX W HCPCS: Performed by: ANESTHESIOLOGY

## 2023-01-25 PROCEDURE — 6360000002 HC RX W HCPCS: Performed by: NURSE ANESTHETIST, CERTIFIED REGISTERED

## 2023-01-25 PROCEDURE — C1776 JOINT DEVICE (IMPLANTABLE): HCPCS | Performed by: ORTHOPAEDIC SURGERY

## 2023-01-25 PROCEDURE — 6370000000 HC RX 637 (ALT 250 FOR IP): Performed by: ANESTHESIOLOGY

## 2023-01-25 PROCEDURE — 86900 BLOOD TYPING SEROLOGIC ABO: CPT

## 2023-01-25 PROCEDURE — 2500000003 HC RX 250 WO HCPCS: Performed by: ORTHOPAEDIC SURGERY

## 2023-01-25 PROCEDURE — 2709999900 HC NON-CHARGEABLE SUPPLY: Performed by: ORTHOPAEDIC SURGERY

## 2023-01-25 PROCEDURE — 64450 NJX AA&/STRD OTHER PN/BRANCH: CPT | Performed by: ANESTHESIOLOGY

## 2023-01-25 PROCEDURE — 3600000004 HC SURGERY LEVEL 4 BASE: Performed by: ORTHOPAEDIC SURGERY

## 2023-01-25 PROCEDURE — C1713 ANCHOR/SCREW BN/BN,TIS/BN: HCPCS | Performed by: ORTHOPAEDIC SURGERY

## 2023-01-25 PROCEDURE — 2580000003 HC RX 258: Performed by: NURSE ANESTHETIST, CERTIFIED REGISTERED

## 2023-01-25 PROCEDURE — 6370000000 HC RX 637 (ALT 250 FOR IP)

## 2023-01-25 PROCEDURE — 3700000001 HC ADD 15 MINUTES (ANESTHESIA): Performed by: ORTHOPAEDIC SURGERY

## 2023-01-25 PROCEDURE — 86850 RBC ANTIBODY SCREEN: CPT

## 2023-01-25 PROCEDURE — 2720000010 HC SURG SUPPLY STERILE: Performed by: ORTHOPAEDIC SURGERY

## 2023-01-25 PROCEDURE — 86901 BLOOD TYPING SEROLOGIC RH(D): CPT

## 2023-01-25 PROCEDURE — 3600000014 HC SURGERY LEVEL 4 ADDTL 15MIN: Performed by: ORTHOPAEDIC SURGERY

## 2023-01-25 PROCEDURE — 27130 TOTAL HIP ARTHROPLASTY: CPT | Performed by: ORTHOPAEDIC SURGERY

## 2023-01-25 PROCEDURE — 2580000003 HC RX 258: Performed by: ORTHOPAEDIC SURGERY

## 2023-01-25 PROCEDURE — 2500000003 HC RX 250 WO HCPCS: Performed by: NURSE ANESTHETIST, CERTIFIED REGISTERED

## 2023-01-25 PROCEDURE — 7100000000 HC PACU RECOVERY - FIRST 15 MIN: Performed by: ORTHOPAEDIC SURGERY

## 2023-01-25 PROCEDURE — 2700000000 HC OXYGEN THERAPY PER DAY

## 2023-01-25 PROCEDURE — 7100000001 HC PACU RECOVERY - ADDTL 15 MIN: Performed by: ORTHOPAEDIC SURGERY

## 2023-01-25 DEVICE — R3 3 HOLE ACETABULAR SHELL 56MM
Type: IMPLANTABLE DEVICE | Site: HIP | Status: FUNCTIONAL
Brand: R3 ACETABULAR

## 2023-01-25 DEVICE — TITANIUM MODULAR HEAD SLEEVE 12/14                                    TAPER +4: Type: IMPLANTABLE DEVICE | Site: HIP | Status: FUNCTIONAL

## 2023-01-25 DEVICE — R3 0 DEGREE XLPE ACETABULAR LINER                                    40MM INNER DIAMETER X 56MM OUTER DIAMETER
Type: IMPLANTABLE DEVICE | Site: HIP | Status: FUNCTIONAL
Brand: R3

## 2023-01-25 DEVICE — REFLECTION SPHERICAL HEAD SCREW 25MM
Type: IMPLANTABLE DEVICE | Site: HIP | Status: FUNCTIONAL
Brand: REFLECTION

## 2023-01-25 DEVICE — SYNERGY POROUS HIGH OFFSET FEMORAL                                    COMPONENT SZ 14
Type: IMPLANTABLE DEVICE | Site: HIP | Status: FUNCTIONAL
Brand: SYNERGY

## 2023-01-25 DEVICE — 40MM OXINIUM MODULAR FEMORAL HEAD
Type: IMPLANTABLE DEVICE | Site: HIP | Status: FUNCTIONAL
Brand: OXINIUM

## 2023-01-25 RX ORDER — SODIUM CHLORIDE 0.9 % (FLUSH) 0.9 %
5-40 SYRINGE (ML) INJECTION EVERY 12 HOURS SCHEDULED
Status: DISCONTINUED | OUTPATIENT
Start: 2023-01-25 | End: 2023-01-27 | Stop reason: HOSPADM

## 2023-01-25 RX ORDER — MAGNESIUM SULFATE IN WATER 40 MG/ML
2000 INJECTION, SOLUTION INTRAVENOUS ONCE
Status: COMPLETED | OUTPATIENT
Start: 2023-01-25 | End: 2023-01-25

## 2023-01-25 RX ORDER — CLOPIDOGREL BISULFATE 75 MG/1
75 TABLET ORAL DAILY
Status: DISCONTINUED | OUTPATIENT
Start: 2023-01-26 | End: 2023-01-27 | Stop reason: HOSPADM

## 2023-01-25 RX ORDER — DICYCLOMINE HCL 20 MG
20 TABLET ORAL EVERY 6 HOURS PRN
Status: DISCONTINUED | OUTPATIENT
Start: 2023-01-25 | End: 2023-01-27 | Stop reason: HOSPADM

## 2023-01-25 RX ORDER — ALBUTEROL SULFATE 90 UG/1
1 AEROSOL, METERED RESPIRATORY (INHALATION) EVERY 6 HOURS PRN
Status: DISCONTINUED | OUTPATIENT
Start: 2023-01-25 | End: 2023-01-27 | Stop reason: HOSPADM

## 2023-01-25 RX ORDER — HYDROCODONE BITARTRATE AND ACETAMINOPHEN 5; 325 MG/1; MG/1
1 TABLET ORAL EVERY 4 HOURS PRN
Status: DISCONTINUED | OUTPATIENT
Start: 2023-01-25 | End: 2023-01-26

## 2023-01-25 RX ORDER — LABETALOL HYDROCHLORIDE 5 MG/ML
5 INJECTION, SOLUTION INTRAVENOUS EVERY 10 MIN PRN
Status: DISCONTINUED | OUTPATIENT
Start: 2023-01-25 | End: 2023-01-25 | Stop reason: HOSPADM

## 2023-01-25 RX ORDER — FENTANYL CITRATE 50 UG/ML
INJECTION, SOLUTION INTRAMUSCULAR; INTRAVENOUS PRN
Status: DISCONTINUED | OUTPATIENT
Start: 2023-01-25 | End: 2023-01-25 | Stop reason: SDUPTHER

## 2023-01-25 RX ORDER — LIDOCAINE HYDROCHLORIDE 10 MG/ML
1 INJECTION, SOLUTION EPIDURAL; INFILTRATION; INTRACAUDAL; PERINEURAL
Status: DISCONTINUED | OUTPATIENT
Start: 2023-01-25 | End: 2023-01-25 | Stop reason: HOSPADM

## 2023-01-25 RX ORDER — CETIRIZINE HYDROCHLORIDE 10 MG/1
10 TABLET ORAL DAILY
Status: DISCONTINUED | OUTPATIENT
Start: 2023-01-26 | End: 2023-01-27 | Stop reason: HOSPADM

## 2023-01-25 RX ORDER — FLUTICASONE PROPIONATE 50 MCG
1 SPRAY, SUSPENSION (ML) NASAL DAILY
Status: DISCONTINUED | OUTPATIENT
Start: 2023-01-26 | End: 2023-01-27 | Stop reason: HOSPADM

## 2023-01-25 RX ORDER — DIVALPROEX SODIUM 250 MG/1
500 TABLET, DELAYED RELEASE ORAL DAILY
Status: DISCONTINUED | OUTPATIENT
Start: 2023-01-25 | End: 2023-01-27 | Stop reason: HOSPADM

## 2023-01-25 RX ORDER — ACETAMINOPHEN 325 MG/1
TABLET ORAL
Status: COMPLETED
Start: 2023-01-25 | End: 2023-01-25

## 2023-01-25 RX ORDER — DIPHENHYDRAMINE HYDROCHLORIDE 50 MG/ML
12.5 INJECTION INTRAMUSCULAR; INTRAVENOUS
Status: DISCONTINUED | OUTPATIENT
Start: 2023-01-25 | End: 2023-01-25 | Stop reason: HOSPADM

## 2023-01-25 RX ORDER — ONDANSETRON 2 MG/ML
4 INJECTION INTRAMUSCULAR; INTRAVENOUS
Status: COMPLETED | OUTPATIENT
Start: 2023-01-25 | End: 2023-01-25

## 2023-01-25 RX ORDER — ATORVASTATIN CALCIUM 40 MG/1
40 TABLET, FILM COATED ORAL DAILY
Status: DISCONTINUED | OUTPATIENT
Start: 2023-01-26 | End: 2023-01-27 | Stop reason: HOSPADM

## 2023-01-25 RX ORDER — SODIUM CHLORIDE 0.9 % (FLUSH) 0.9 %
5-40 SYRINGE (ML) INJECTION EVERY 12 HOURS SCHEDULED
Status: DISCONTINUED | OUTPATIENT
Start: 2023-01-25 | End: 2023-01-25 | Stop reason: HOSPADM

## 2023-01-25 RX ORDER — QUETIAPINE FUMARATE 100 MG/1
300 TABLET, FILM COATED ORAL NIGHTLY
Status: DISCONTINUED | OUTPATIENT
Start: 2023-01-25 | End: 2023-01-27 | Stop reason: HOSPADM

## 2023-01-25 RX ORDER — MONTELUKAST SODIUM 10 MG/1
10 TABLET ORAL NIGHTLY
Status: DISCONTINUED | OUTPATIENT
Start: 2023-01-25 | End: 2023-01-27 | Stop reason: HOSPADM

## 2023-01-25 RX ORDER — LIDOCAINE HYDROCHLORIDE 20 MG/ML
INJECTION, SOLUTION INFILTRATION; PERINEURAL PRN
Status: DISCONTINUED | OUTPATIENT
Start: 2023-01-25 | End: 2023-01-25 | Stop reason: SDUPTHER

## 2023-01-25 RX ORDER — PANTOPRAZOLE SODIUM 40 MG/1
40 TABLET, DELAYED RELEASE ORAL
Status: DISCONTINUED | OUTPATIENT
Start: 2023-01-26 | End: 2023-01-27 | Stop reason: HOSPADM

## 2023-01-25 RX ORDER — TRAMADOL HYDROCHLORIDE 50 MG/1
50 TABLET ORAL PRN
Status: COMPLETED | OUTPATIENT
Start: 2023-01-25 | End: 2023-01-25

## 2023-01-25 RX ORDER — MEPERIDINE HYDROCHLORIDE 50 MG/ML
12.5 INJECTION INTRAMUSCULAR; INTRAVENOUS; SUBCUTANEOUS EVERY 5 MIN PRN
Status: DISCONTINUED | OUTPATIENT
Start: 2023-01-25 | End: 2023-01-25 | Stop reason: HOSPADM

## 2023-01-25 RX ORDER — SODIUM CHLORIDE 0.9 % (FLUSH) 0.9 %
5-40 SYRINGE (ML) INJECTION PRN
Status: DISCONTINUED | OUTPATIENT
Start: 2023-01-25 | End: 2023-01-27 | Stop reason: HOSPADM

## 2023-01-25 RX ORDER — HYDROCODONE BITARTRATE AND ACETAMINOPHEN 5; 325 MG/1; MG/1
2 TABLET ORAL EVERY 4 HOURS PRN
Status: DISCONTINUED | OUTPATIENT
Start: 2023-01-25 | End: 2023-01-26

## 2023-01-25 RX ORDER — IPRATROPIUM BROMIDE AND ALBUTEROL SULFATE 2.5; .5 MG/3ML; MG/3ML
1 SOLUTION RESPIRATORY (INHALATION)
Status: DISCONTINUED | OUTPATIENT
Start: 2023-01-26 | End: 2023-01-27 | Stop reason: HOSPADM

## 2023-01-25 RX ORDER — SODIUM CHLORIDE, SODIUM LACTATE, POTASSIUM CHLORIDE, CALCIUM CHLORIDE 600; 310; 30; 20 MG/100ML; MG/100ML; MG/100ML; MG/100ML
INJECTION, SOLUTION INTRAVENOUS CONTINUOUS
Status: DISCONTINUED | OUTPATIENT
Start: 2023-01-25 | End: 2023-01-25 | Stop reason: HOSPADM

## 2023-01-25 RX ORDER — VANCOMYCIN HYDROCHLORIDE 1 G/20ML
INJECTION, POWDER, LYOPHILIZED, FOR SOLUTION INTRAVENOUS PRN
Status: DISCONTINUED | OUTPATIENT
Start: 2023-01-25 | End: 2023-01-25 | Stop reason: HOSPADM

## 2023-01-25 RX ORDER — MIDAZOLAM HYDROCHLORIDE 1 MG/ML
INJECTION INTRAMUSCULAR; INTRAVENOUS PRN
Status: DISCONTINUED | OUTPATIENT
Start: 2023-01-25 | End: 2023-01-25 | Stop reason: SDUPTHER

## 2023-01-25 RX ORDER — FERROUS SULFATE 325(65) MG
325 TABLET ORAL
Status: DISCONTINUED | OUTPATIENT
Start: 2023-01-26 | End: 2023-01-27 | Stop reason: HOSPADM

## 2023-01-25 RX ORDER — PROPOFOL 10 MG/ML
INJECTION, EMULSION INTRAVENOUS PRN
Status: DISCONTINUED | OUTPATIENT
Start: 2023-01-25 | End: 2023-01-25 | Stop reason: SDUPTHER

## 2023-01-25 RX ORDER — DEXAMETHASONE SODIUM PHOSPHATE 4 MG/ML
INJECTION, SOLUTION INTRA-ARTICULAR; INTRALESIONAL; INTRAMUSCULAR; INTRAVENOUS; SOFT TISSUE PRN
Status: DISCONTINUED | OUTPATIENT
Start: 2023-01-25 | End: 2023-01-25 | Stop reason: SDUPTHER

## 2023-01-25 RX ORDER — IPRATROPIUM BROMIDE AND ALBUTEROL SULFATE 2.5; .5 MG/3ML; MG/3ML
1 SOLUTION RESPIRATORY (INHALATION) EVERY 4 HOURS
Status: DISCONTINUED | OUTPATIENT
Start: 2023-01-25 | End: 2023-01-25

## 2023-01-25 RX ORDER — SODIUM CHLORIDE 9 MG/ML
INJECTION, SOLUTION INTRAVENOUS PRN
Status: DISCONTINUED | OUTPATIENT
Start: 2023-01-25 | End: 2023-01-25 | Stop reason: HOSPADM

## 2023-01-25 RX ORDER — EPHEDRINE SULFATE 50 MG/ML
INJECTION INTRAVENOUS PRN
Status: DISCONTINUED | OUTPATIENT
Start: 2023-01-25 | End: 2023-01-25 | Stop reason: SDUPTHER

## 2023-01-25 RX ORDER — CELECOXIB 100 MG/1
400 CAPSULE ORAL ONCE
Status: COMPLETED | OUTPATIENT
Start: 2023-01-25 | End: 2023-01-25

## 2023-01-25 RX ORDER — GLYCOPYRROLATE 0.2 MG/ML
INJECTION INTRAMUSCULAR; INTRAVENOUS PRN
Status: DISCONTINUED | OUTPATIENT
Start: 2023-01-25 | End: 2023-01-25 | Stop reason: SDUPTHER

## 2023-01-25 RX ORDER — ACETAMINOPHEN 500 MG
1000 TABLET ORAL ONCE
Status: COMPLETED | OUTPATIENT
Start: 2023-01-25 | End: 2023-01-25

## 2023-01-25 RX ORDER — SODIUM CHLORIDE 9 MG/ML
INJECTION, SOLUTION INTRAVENOUS PRN
Status: DISCONTINUED | OUTPATIENT
Start: 2023-01-25 | End: 2023-01-27 | Stop reason: HOSPADM

## 2023-01-25 RX ORDER — IPRATROPIUM BROMIDE AND ALBUTEROL SULFATE 2.5; .5 MG/3ML; MG/3ML
1 SOLUTION RESPIRATORY (INHALATION) ONCE
Status: COMPLETED | OUTPATIENT
Start: 2023-01-25 | End: 2023-01-25

## 2023-01-25 RX ORDER — ROCURONIUM BROMIDE 10 MG/ML
INJECTION, SOLUTION INTRAVENOUS PRN
Status: DISCONTINUED | OUTPATIENT
Start: 2023-01-25 | End: 2023-01-25 | Stop reason: SDUPTHER

## 2023-01-25 RX ORDER — ONDANSETRON 2 MG/ML
INJECTION INTRAMUSCULAR; INTRAVENOUS PRN
Status: DISCONTINUED | OUTPATIENT
Start: 2023-01-25 | End: 2023-01-25 | Stop reason: SDUPTHER

## 2023-01-25 RX ORDER — SODIUM CHLORIDE 0.9 % (FLUSH) 0.9 %
5-40 SYRINGE (ML) INJECTION PRN
Status: DISCONTINUED | OUTPATIENT
Start: 2023-01-25 | End: 2023-01-25 | Stop reason: HOSPADM

## 2023-01-25 RX ORDER — GABAPENTIN 400 MG/1
400 CAPSULE ORAL 3 TIMES DAILY
Status: DISCONTINUED | OUTPATIENT
Start: 2023-01-25 | End: 2023-01-27 | Stop reason: HOSPADM

## 2023-01-25 RX ORDER — TRAMADOL HYDROCHLORIDE 50 MG/1
100 TABLET ORAL PRN
Status: COMPLETED | OUTPATIENT
Start: 2023-01-25 | End: 2023-01-25

## 2023-01-25 RX ORDER — ACETAMINOPHEN 325 MG/1
650 TABLET ORAL EVERY 6 HOURS
Status: DISCONTINUED | OUTPATIENT
Start: 2023-01-25 | End: 2023-01-27 | Stop reason: HOSPADM

## 2023-01-25 RX ORDER — KETOROLAC TROMETHAMINE 30 MG/ML
INJECTION, SOLUTION INTRAMUSCULAR; INTRAVENOUS PRN
Status: DISCONTINUED | OUTPATIENT
Start: 2023-01-25 | End: 2023-01-25 | Stop reason: SDUPTHER

## 2023-01-25 RX ORDER — TAMSULOSIN HYDROCHLORIDE 0.4 MG/1
0.4 CAPSULE ORAL DAILY
Status: DISCONTINUED | OUTPATIENT
Start: 2023-01-25 | End: 2023-01-27 | Stop reason: HOSPADM

## 2023-01-25 RX ORDER — ONDANSETRON 2 MG/ML
4 INJECTION INTRAMUSCULAR; INTRAVENOUS EVERY 6 HOURS PRN
Status: DISCONTINUED | OUTPATIENT
Start: 2023-01-25 | End: 2023-01-27 | Stop reason: HOSPADM

## 2023-01-25 RX ORDER — TIZANIDINE 4 MG/1
4 TABLET ORAL EVERY 8 HOURS PRN
Status: DISCONTINUED | OUTPATIENT
Start: 2023-01-25 | End: 2023-01-27 | Stop reason: HOSPADM

## 2023-01-25 RX ORDER — SODIUM CHLORIDE, SODIUM LACTATE, POTASSIUM CHLORIDE, CALCIUM CHLORIDE 600; 310; 30; 20 MG/100ML; MG/100ML; MG/100ML; MG/100ML
INJECTION, SOLUTION INTRAVENOUS CONTINUOUS PRN
Status: DISCONTINUED | OUTPATIENT
Start: 2023-01-25 | End: 2023-01-25 | Stop reason: SDUPTHER

## 2023-01-25 RX ORDER — ONDANSETRON 2 MG/ML
INJECTION INTRAMUSCULAR; INTRAVENOUS
Status: DISPENSED
Start: 2023-01-25 | End: 2023-01-26

## 2023-01-25 RX ORDER — ONDANSETRON 4 MG/1
4 TABLET, ORALLY DISINTEGRATING ORAL EVERY 8 HOURS PRN
Status: DISCONTINUED | OUTPATIENT
Start: 2023-01-25 | End: 2023-01-27 | Stop reason: HOSPADM

## 2023-01-25 RX ADMIN — HYDROMORPHONE HYDROCHLORIDE 0.5 MG: 0.5 INJECTION, SOLUTION INTRAMUSCULAR; INTRAVENOUS; SUBCUTANEOUS at 12:53

## 2023-01-25 RX ADMIN — ROCURONIUM BROMIDE 20 MG: 10 SOLUTION INTRAVENOUS at 10:30

## 2023-01-25 RX ADMIN — EPHEDRINE SULFATE 5 MG: 50 INJECTION INTRAVENOUS at 11:30

## 2023-01-25 RX ADMIN — ONDANSETRON 4 MG: 2 INJECTION INTRAMUSCULAR; INTRAVENOUS at 19:07

## 2023-01-25 RX ADMIN — SODIUM CHLORIDE 25 ML: 9 INJECTION, SOLUTION INTRAVENOUS at 20:05

## 2023-01-25 RX ADMIN — MAGNESIUM SULFATE IN WATER 2000 MG: 40 INJECTION, SOLUTION INTRAVENOUS at 10:17

## 2023-01-25 RX ADMIN — SODIUM CHLORIDE, SODIUM LACTATE, POTASSIUM CHLORIDE, AND CALCIUM CHLORIDE: .6; .31; .03; .02 INJECTION, SOLUTION INTRAVENOUS at 09:54

## 2023-01-25 RX ADMIN — PROPOFOL 140 MG: 10 INJECTION, EMULSION INTRAVENOUS at 10:01

## 2023-01-25 RX ADMIN — HYDROMORPHONE HYDROCHLORIDE 0.5 MG: 0.5 INJECTION, SOLUTION INTRAMUSCULAR; INTRAVENOUS; SUBCUTANEOUS at 12:43

## 2023-01-25 RX ADMIN — ONDANSETRON 4 MG: 2 INJECTION INTRAMUSCULAR; INTRAVENOUS at 11:33

## 2023-01-25 RX ADMIN — ONDANSETRON 4 MG: 2 INJECTION INTRAMUSCULAR; INTRAVENOUS at 15:21

## 2023-01-25 RX ADMIN — FENTANYL CITRATE 50 MCG: 50 INJECTION INTRAMUSCULAR; INTRAVENOUS at 10:17

## 2023-01-25 RX ADMIN — EPHEDRINE SULFATE 5 MG: 50 INJECTION INTRAVENOUS at 11:19

## 2023-01-25 RX ADMIN — ROCURONIUM BROMIDE 50 MG: 10 SOLUTION INTRAVENOUS at 10:01

## 2023-01-25 RX ADMIN — ACETAMINOPHEN 1000 MG: 500 TABLET ORAL at 08:26

## 2023-01-25 RX ADMIN — CEFAZOLIN 2000 MG: 10 INJECTION, POWDER, FOR SOLUTION INTRAVENOUS at 20:06

## 2023-01-25 RX ADMIN — FENTANYL CITRATE 100 MCG: 50 INJECTION INTRAMUSCULAR; INTRAVENOUS at 09:01

## 2023-01-25 RX ADMIN — IPRATROPIUM BROMIDE AND ALBUTEROL SULFATE 1 AMPULE: .5; 2.5 SOLUTION RESPIRATORY (INHALATION) at 08:31

## 2023-01-25 RX ADMIN — GABAPENTIN 400 MG: 400 CAPSULE ORAL at 23:10

## 2023-01-25 RX ADMIN — HYDROMORPHONE HYDROCHLORIDE 0.5 MG: 0.5 INJECTION, SOLUTION INTRAMUSCULAR; INTRAVENOUS; SUBCUTANEOUS at 12:19

## 2023-01-25 RX ADMIN — EPHEDRINE SULFATE 10 MG: 50 INJECTION INTRAVENOUS at 11:49

## 2023-01-25 RX ADMIN — FENTANYL CITRATE 50 MCG: 50 INJECTION INTRAMUSCULAR; INTRAVENOUS at 10:11

## 2023-01-25 RX ADMIN — HYDROMORPHONE HYDROCHLORIDE 0.5 MG: 0.5 INJECTION, SOLUTION INTRAMUSCULAR; INTRAVENOUS; SUBCUTANEOUS at 13:10

## 2023-01-25 RX ADMIN — SUGAMMADEX 200 MG: 100 INJECTION, SOLUTION INTRAVENOUS at 11:47

## 2023-01-25 RX ADMIN — DEXAMETHASONE SODIUM PHOSPHATE 8 MG: 4 INJECTION, SOLUTION INTRAMUSCULAR; INTRAVENOUS at 10:12

## 2023-01-25 RX ADMIN — ACETAMINOPHEN 650 MG: 325 TABLET ORAL at 17:23

## 2023-01-25 RX ADMIN — EPHEDRINE SULFATE 5 MG: 50 INJECTION INTRAVENOUS at 10:49

## 2023-01-25 RX ADMIN — EPHEDRINE SULFATE 10 MG: 50 INJECTION INTRAVENOUS at 11:38

## 2023-01-25 RX ADMIN — CELECOXIB 400 MG: 100 CAPSULE ORAL at 08:27

## 2023-01-25 RX ADMIN — HYDROCODONE BITARTRATE AND ACETAMINOPHEN 2 TABLET: 5; 325 TABLET ORAL at 20:04

## 2023-01-25 RX ADMIN — MONTELUKAST SODIUM 10 MG: 10 TABLET ORAL at 23:09

## 2023-01-25 RX ADMIN — ACETAMINOPHEN 325MG 650 MG: 325 TABLET ORAL at 23:10

## 2023-01-25 RX ADMIN — SODIUM CHLORIDE, SODIUM LACTATE, POTASSIUM CHLORIDE, AND CALCIUM CHLORIDE: .6; .31; .03; .02 INJECTION, SOLUTION INTRAVENOUS at 11:27

## 2023-01-25 RX ADMIN — TAMSULOSIN HYDROCHLORIDE 0.4 MG: 0.4 CAPSULE ORAL at 23:09

## 2023-01-25 RX ADMIN — DIVALPROEX SODIUM 500 MG: 250 TABLET, DELAYED RELEASE ORAL at 23:09

## 2023-01-25 RX ADMIN — CEFAZOLIN 2000 MG: 10 INJECTION, POWDER, FOR SOLUTION INTRAVENOUS at 10:00

## 2023-01-25 RX ADMIN — TIZANIDINE 4 MG: 4 TABLET ORAL at 23:09

## 2023-01-25 RX ADMIN — GLYCOPYRROLATE 0.4 MG: 0.2 INJECTION, SOLUTION INTRAMUSCULAR; INTRAVENOUS at 10:31

## 2023-01-25 RX ADMIN — TRAMADOL HYDROCHLORIDE 100 MG: 50 TABLET ORAL at 14:13

## 2023-01-25 RX ADMIN — LIDOCAINE HYDROCHLORIDE 60 MG: 20 INJECTION, SOLUTION INFILTRATION; PERINEURAL at 10:00

## 2023-01-25 RX ADMIN — EPHEDRINE SULFATE 5 MG: 50 INJECTION INTRAVENOUS at 10:26

## 2023-01-25 RX ADMIN — ONDANSETRON 4 MG: 2 INJECTION INTRAMUSCULAR; INTRAVENOUS at 17:22

## 2023-01-25 RX ADMIN — KETOROLAC TROMETHAMINE 30 MG: 30 INJECTION, SOLUTION INTRAMUSCULAR; INTRAVENOUS at 11:33

## 2023-01-25 RX ADMIN — QUETIAPINE FUMARATE 300 MG: 100 TABLET ORAL at 23:14

## 2023-01-25 RX ADMIN — SODIUM CHLORIDE, PRESERVATIVE FREE 10 ML: 5 INJECTION INTRAVENOUS at 20:04

## 2023-01-25 RX ADMIN — MIDAZOLAM HYDROCHLORIDE 2 MG: 2 INJECTION, SOLUTION INTRAMUSCULAR; INTRAVENOUS at 09:01

## 2023-01-25 ASSESSMENT — PAIN DESCRIPTION - PAIN TYPE
TYPE: ACUTE PAIN;SURGICAL PAIN
TYPE: ACUTE PAIN
TYPE: ACUTE PAIN;SURGICAL PAIN
TYPE: ACUTE PAIN;SURGICAL PAIN
TYPE: ACUTE PAIN
TYPE: ACUTE PAIN;SURGICAL PAIN
TYPE: ACUTE PAIN;SURGICAL PAIN

## 2023-01-25 ASSESSMENT — PAIN - FUNCTIONAL ASSESSMENT
PAIN_FUNCTIONAL_ASSESSMENT: ACTIVITIES ARE NOT PREVENTED
PAIN_FUNCTIONAL_ASSESSMENT: ACTIVITIES ARE NOT PREVENTED
PAIN_FUNCTIONAL_ASSESSMENT: 0-10
PAIN_FUNCTIONAL_ASSESSMENT: ACTIVITIES ARE NOT PREVENTED

## 2023-01-25 ASSESSMENT — PAIN DESCRIPTION - LOCATION
LOCATION: HIP
LOCATION: HEAD
LOCATION: HIP

## 2023-01-25 ASSESSMENT — PAIN DESCRIPTION - DESCRIPTORS
DESCRIPTORS: ACHING

## 2023-01-25 ASSESSMENT — PAIN SCALES - GENERAL
PAINLEVEL_OUTOF10: 8
PAINLEVEL_OUTOF10: 10
PAINLEVEL_OUTOF10: 5
PAINLEVEL_OUTOF10: 6
PAINLEVEL_OUTOF10: 10
PAINLEVEL_OUTOF10: 7
PAINLEVEL_OUTOF10: 10
PAINLEVEL_OUTOF10: 7
PAINLEVEL_OUTOF10: 5
PAINLEVEL_OUTOF10: 6
PAINLEVEL_OUTOF10: 7
PAINLEVEL_OUTOF10: 6
PAINLEVEL_OUTOF10: 10
PAINLEVEL_OUTOF10: 6

## 2023-01-25 ASSESSMENT — PAIN DESCRIPTION - FREQUENCY
FREQUENCY: CONTINUOUS
FREQUENCY: INTERMITTENT
FREQUENCY: CONTINUOUS

## 2023-01-25 ASSESSMENT — PAIN DESCRIPTION - ORIENTATION
ORIENTATION: RIGHT

## 2023-01-25 ASSESSMENT — PAIN DESCRIPTION - ONSET
ONSET: GRADUAL
ONSET: ON-GOING
ONSET: GRADUAL
ONSET: ON-GOING
ONSET: AWAKENED FROM SLEEP
ONSET: AWAKENED FROM SLEEP
ONSET: GRADUAL

## 2023-01-25 NOTE — PROGRESS NOTES
D: Patient continues to C/O of pain in right hip, 7/10, patient is drinking diet lemon-lime soda without difficulty.

## 2023-01-25 NOTE — ANESTHESIA POSTPROCEDURE EVALUATION
Department of Anesthesiology  Postprocedure Note    Patient: Scot Hernandez  MRN: [id number]  YOB: 1960  Date of evaluation: 1/25/2023      Procedure Summary     Date: 01/25/23 Room / Location: [address]    Anesthesia Start: 0954 Anesthesia Stop: 1204    Procedure: RIGHT TOTAL HIP ARTHROPLASTY  (Right: Hip) Diagnosis:       Primary osteoarthritis of right hip      (OSTEOARTHRITIS OF RIGHT HIP)    Surgeons: Angel Lopez MD Responsible Provider: Jil Syed MD    Anesthesia Type: general ASA Status: 3          Anesthesia Type: No value filed.    Kadeem Phase I: Kadeem Score: 9    Kadeem Phase II:        Anesthesia Post Evaluation    Patient location during evaluation: PACU  Patient participation: complete - patient participated  Level of consciousness: awake and alert  Airway patency: patent  Nausea & Vomiting: no nausea and nausea  Complications: no  Cardiovascular status: blood pressure returned to baseline  Respiratory status: acceptable  Hydration status: euvolemic  Comments: VSS on transfer to phase 2 recovery. Difficulty voiding.  Catheterized. No anesthetic complications.

## 2023-01-25 NOTE — PROGRESS NOTES
D: Patient states he is unable to \"pee\" even with the external catheter in place. A: Performed bladder scan which showed 419 cc's.

## 2023-01-25 NOTE — INTERVAL H&P NOTE
Update History & Physical    The patient's History and Physical of January 18, 2023 was reviewed with the patient and I examined the patient. There was no change. The surgical site was confirmed by the patient and me. Plan: The risks, benefits, expected outcome, and alternative to the recommended procedure have been discussed with the patient. Patient understands and wants to proceed with the procedure.      Electronically signed by Racheal Moise MD on 1/25/2023 at 8:49 AM

## 2023-01-25 NOTE — DISCHARGE INSTRUCTIONS
*** Please contact [address] with any questions or concerns after your discharge. *** Mon- Fri 9am- 5pm [phone]. If you have any issues or concerns after 5pm or on the weekend please call your surgeon's office. I will be contacting you in a few days to follow up. If you need a pain medication refill please contact your surgeon's office. Total Hip Replacement  Discharge Instructions    To prevent Clot formation, you have been placed on the following medication:  Aspirin 325mg daily for 6 weeks post op  Surgical Site Care:  Dressing change every 5-7 days with mepilex dressing. Can be changed at home until incision healed  If you have staples or sutures, these will be removed on or around post-operative day 10-12 at the office or by your care team and steri-strips applied  If you have glue, this will be removed in the office or gradually wear off as your incision heals  Showering is permitted if waterproof mepilex dressing is applied or when staples are removed and all areas of incision are healed. Physical Therapy:  Weight Bearing Status:     Weight bearing as tolerated  Continue your Physical Therapy activities as instructed. If any questions contact your outpatient physical therapy office, your home health care physical therapist (if you have this arranged), or contact your Nurse Navigator to help you in reaching the appropriate person. Precautions  Per Physical Therapy handout  Anterolateral hip precautions- no active abduction, no extension past midline, no external rotation.  Do not bring the leg out to the side, back behind the body, or turn the toes outward  Pain Medications  You were given hydrocodone/acetaminophen (Hycet, Lorcet, Lortab, Norco, Vicodin)  Wean off pain medications as you deem appropriate as long as pain is under control  Be sure to drink plenty of fluids (recommend water) while taking narcotic pain medications to prevent constipation  You may take an over the counter laxative or stool softener as needed to prevent/treat constipation as well, we recommend Senokot S OTC. We recommend that you consider taking these medications the entire time you are taking pain medication. Cold packs/Ice packs/Machine  May be used as much as necessary to reduce swelling/inflammation/soreness  Be sure to have a barrier (cloth, clothing, towel) between your skin/incision and the ice pack to prevent frostbite  Contact [facility]'s office or your Nurse Navigator if:  Increased redness, swelling, drainage of any kind, and/or pain to surgery site. As well as new onset fevers and or chills. These could signify an infection. Calf or thigh tenderness to touch as well as increased swelling or redness. This could signify a clot formation. Numbness or tingling to an area around the incision site or below the incision site (toes). Any rash appears, increased  or new onset nausea/vomiting occur. This may indicate a reaction to a medication. Phone # [phone] - [phone] [facility]. Follow up with Surgeon at scheduled appointment time.    I acknowledge that I have received guido hose and understand the instructions on how and when to wear them   __________________________________  Discharging RN who has gone over instructions and acknowledges guido hose have been received   ____________________________________________  Please remove Blue [employer] wrist band on Post Operative Day #4

## 2023-01-25 NOTE — ANESTHESIA PROCEDURE NOTES
Peripheral Block    Patient location during procedure: pre-op  Reason for block: post-op pain management and at surgeon's request  Start time: 1/25/2023 9:03 AM  Staffing  Performed: anesthesiologist   Anesthesiologist: Fletcher Mares MD  Preanesthetic Checklist  Completed: patient identified, IV checked, site marked, risks and benefits discussed, surgical/procedural consents, equipment checked, pre-op evaluation, timeout performed, anesthesia consent given, oxygen available and monitors applied/VS acknowledged  Peripheral Block   Patient position: supine  Prep: ChloraPrep  Provider prep: mask and sterile gloves  Patient monitoring: cardiac monitor, continuous pulse ox, frequent blood pressure checks and IV access  Block type: Fascia iliaca  Laterality: right  Injection technique: single-shot  Guidance: ultrasound guided  Local infiltration: lidocaine  Infiltration strength: 1 %  Local infiltration: lidocaine  Dose: 3 mL    Needle   Needle gauge: 21 G  Needle localization: ultrasound guidance  Needle length: 10 cm  Assessment   Injection assessment: negative aspiration for heme, no paresthesia on injection and local visualized surrounding nerve on ultrasound  Paresthesia pain: none  Slow fractionated injection: yes  Hemodynamics: stable  Real-time US image taken/store: yes  Outcomes: uncomplicated    Additional Notes  Immediately prior to procedure a \"time out\" was called to verify the correct patient, allergies, laterality, procedure and equipment. Time out performed with  RN    Local Anesthetic: 0.125 %  Bupivacaine   Amount: 30 ml  in 5 ml increments after negative aspiration each time.     Iliopsoas Muscle and Fascia Iliaca, Femoral artery (Deep artery to the thigh take off), Femoral Vein and Femoral Nerve are identified

## 2023-01-25 NOTE — OP NOTE
Operative Note      Patient: Thomas Ochoa  YOB: 1960  MRN: [id number]    Date of Procedure: 1/25/2023    Pre-Op Diagnosis: RIGHT FEMORAL HEAD IMPACTION FRACTURE    Post-Op Diagnosis: Same       Procedure(s):  RIGHT TOTAL HIP ARTHROPLASTY     Surgeon(s):  Al Coley MD    Assistant:   Surgical Assistant: Hakeem Parham    Anesthesia: General    Estimated Blood Loss (mL): 977    Complications: None    Specimens:   * No specimens in log *    Implants:  Implant Name Type Inv. Item Serial No.  Lot No. LRB No. Used Action   SCREW BNE L25MM DIA6.5MM CANC HIP SPHR HD FOR ACET SYS - [id number]  SCREW BNE L25MM DIA6.5MM CANC HIP SPHR HD FOR ACET SYS  [facility] [id number] Right 1 Implanted   SHELL ACET [id number] 3 H STD HIP POLY POR PRESSFIT R3 - [id number]  SHELL ACET [id number] 3 H STD HIP POLY POR PRESSFIT R3  [facility] [id number] Right 1 Implanted   LINER ACET OD56MM ID40MM [id number] 0DEG LOAD [employer]R - [id number]  LINER ACET OD56MM ID40MM [id number] 0DEG LOAD BEAR AND TAPR  [facility] [id number] Right 1 Implanted   STEM FEM SZ 14 L160MM HIP TI HI OFFSET POR CEMENTLESS - [id number]  STEM FEM SZ 14 L160MM HIP TI HI OFFSET POR CEMENTLESS  [facility] [id number] Right 1 Implanted   SLEEVE FEM NK L+4MM HIP TI 12 14 TAPR MOD HD ECHELON - [id number]  SLEEVE FEM NK L+4MM HIP TI 12 14 TAPR MOD HD [facility] [id number] Right 1 Implanted   HEAD FEM [id number] HIP OXINIUM MOD HAIDER ANTHOLOGY - [id number]  HEAD FEM [id number] HIP OXINIUM MOD HAIDER ANTHOLOGY  [facility] [employer] [id number] Right 1 Implanted         Drains: * No LDAs found *    Findings:  flattening of femoral head     Operative Technique    The patient was positively identified in the preoperative holding area by the attending surgeon. Informed consent was verified and placed on the chart.   His right lower extremity was marked appropriately. The patient was then taken to the operating room by the anesthesia team.  A general anesthesia was administered. The patient was then positioned in the left lateral decubitus position using a Wixson to stabilize the pelvis. An axillary roll was placed in the left axilla. At this point the hip was examined. Leg lengths were noted. At this point the right lower extremity was prepped and draped in a standard sterile fashion. Timeout was held to verify the correct patient, operative site, laterality and procedure. Everyone in the room was in agreement. At this point a standard anterolateral Brito-Velazquez approach was utilized to the hip. A longitudinal incision was made centered over the tip of the greater trochanter in line with the axis of the femur. Dissection was carried sharply through the skin and subcutaneous tissues down to the level of the iliotibial band. Electrocautery was used to maintain hemostasis. Next, a Coleman elevator was used to bluntly clear away the soft tissues for adequate visualization. Next, the iliotibial band was split in line with its fibers. This was extended both proximally and distally using curved Carcamo scissors. At this point, the gluteus medius insertion onto the greater trochanter was identified. A slip of the anterior third was taken down from the greater trochanter using electrocautery, tagged for later repair, and reflected anteriorly. At this point, an anterior acetabular retractor was placed. At this point, the hip capsule was visualized. A T-shaped capsulotomy was created in line with the axis of the femoral neck and along the intertrochanteric ridge. Once this was done, a bone hook was placed around the femoral neck and the hip was dislocated. The limb was placed into a sterile bag at the edge of the table. At this point, a femoral neck osteotomy was made 1 cm proximal to the lesser trochanter.   The femoral head was examined. It was noted to be flattened superiorly with moderate osteoarthritic wear. At this point, attention was turned to the acetabulum. Retractors were placed around the periphery of the acetabulum. A deep scalpel was used to excise the labrum from around the periphery. At this point, the fovea was identified and ligamentum teres was excised. A size 52 mm diameter reamer was used to medialize down to the medial wall. Subsequent reamers of increasing diameters were utilized to ream to a size 56 outer diameter. This gave appropriate fit and fill while also generating cancellous bleeding bone. The 56 mm diameter trial fit nicely and was able to obtain excellent rim fit. Being satisfied with this, the final 56 outer diameter shell was impacted into place, taking great care to reproduce appropriate anteversion and abduction angle consistent with preoperative templating and also using external guides. At this point, the anterior-inferior screw hole of the acetabular shell was drilled and a 25-mm screw was inserted. This gave excellent fixation in the pelvis. Next, the final acetabular liner was impacted into place and ensured to be well seated. Attention was turned to the femoral side. A box osteotome was used to gain access to the intramedullary canal.  This was followed by the canal finder. Subsequent reamers of increasing diameter were used to a size 14. Next, the femur was also broached to a size 14. The broach was able to be sunk to the appropriate level without difficulty and was stable to rotational stresses. It came to rest flush with the neck cut. Being satisfied with this, the C-arm fluoroscopy was brought in to evaluate this to assess the cup position as well as femoral neck and stem fill. Being satisfied with this, the final size 14 femoral stem with high offset was inserted. Trialing was once again carried out. Ultimately, the +4 neck length was felt to be appropriate.   The limb was examined for stability throughout range of motion including at extremes as well as physiologic abductor tensioning and the ability to repair the capsule back to its origin. Being satisfied with this, the final +4, 40 mm diameter  femoral head was impacted in place, and the hip was reduced one final time. The wound was copiously irrigated with sterile saline solution via pulse lavage. A gram of vancomycin powder was placed deep within the wound. The capsule was closed in a running fashion using 0 Vicryl. A slip of the gluteus medius tendon was repaired using #2 Juarez X-Braid with modified Shon-Ryder type suture via transosseous tunnels. The repair was reinforced with 0 Vicryl. Next, a pericapsular injection mixture was distributed about the soft tissues of the hip. At this point, a layered closure of the wound was carried out using 0 Vicryl in a figure-of-eight interrupted fashion for the IT band, 2-0 vicryl and a subcuticular 3-0 Monocryl. Dermabond glue was used for the skin. Next, a sterile Mepilex dressing was applied. The patient was then awakened from anesthesia and transitioned back to the hospital bed. He was taken to the postoperative recovery area in apparent stable condition. There were no immediate complications. All sponge and needle counts were correct. Postoperative Plan    The patient will receive IV Ancef for 24 hours postoperatively. He will be allowed weightbearing as tolerated with anterolateral hip precautions and mobilized with physical therapy. He will be provided with oral and IV pain medications. He will be started on aspirin full dose daily for deep venous thrombosis prevention. Anticipate discharge pending progress with therapy, medical stability, potential need for placement.     Electronically signed by Gerson Vicente MD on 1/25/2023 at 12:03 PM

## 2023-01-25 NOTE — PROGRESS NOTES
D: Patient here from OR via bed, taken to bay 1 in PACU, all current drips, treatments, skin issues, and plan of care were reviewed by both RN's, patient transferred in stable condition, patient is awake and alert x 4, C/O of 10/10 pain in right hip. A: Medicated per doctors order, see MAR, assessment completed and documented, call light is in reach, discussed plan of care with patient who agreed.

## 2023-01-25 NOTE — PROGRESS NOTES
Patient admitted to [facility] bed 9 in preparation for surgery, VSS. Consent confirmed. IV inserted into left hand, LR infusing. Belongings bagged and labeled. NPO since 2130 on 1-25-23.

## 2023-01-25 NOTE — ANESTHESIA PRE PROCEDURE
Department of Anesthesiology  Preprocedure Note       Name:  Suzanne Syed   Age:  63 y.o.  :  1960                                          MRN:  [id number]         Date:  2023      Surgeon: Carroll Zambrano):  Valery Officer, MD    Procedure: Procedure(s):  RIGHT TOTAL HIP ARTHROPLASTY              DURHAM & NEPHEW NOTE:  BLOCK    Medications prior to admission:   Prior to Admission medications    Medication Sig Start Date End Date Taking?  Authorizing Provider   ferrous sulfate (IRON 325) 325 (65 Fe) MG tablet Take 325 mg by mouth daily (with breakfast) 22   Historical Provider, MD   dicyclomine (BENTYL) 20 MG tablet Take 1 tablet by mouth every 6 hours as needed (Abdominal cramping) 10/13/22 10/18/22  Alondra Valencia DO   atorvastatin (LIPITOR) 40 MG tablet Take 40 mg by mouth daily    Historical Provider, MD   midodrine (PROAMATINE) 2.5 MG tablet Take 5 mg by mouth 3 times daily    Historical Provider, MD   ipratropium-albuterol (DUONEB) 0.5-2.5 (3) MG/3ML SOLN nebulizer solution Take 3 mLs by nebulization every 4 hours 21   Long Saunders MD   metoprolol succinate (TOPROL XL) 25 MG extended release tablet TAKE 1/2 TABLET BY MOUTH EVERY DAY  Patient not taking: Reported on 2023   SHANTHI Cook - CNP   tiZANidine (ZANAFLEX) 4 MG tablet TAKE 1 TABLET BY MOUTH THREE TIMES A DAY  Patient taking differently: Take 4 mg by mouth every 8 hours as needed 21   Wolfgang Delgado MD   divalproex (DEPAKOTE) 500 MG DR tablet Take 500 mg by mouth 500 mg  in am & 1000 mg afternoon 4 pm 21   Historical Provider, MD   QUEtiapine (SEROQUEL) 300 MG tablet Take 300 mg by mouth nightly 21   Historical Provider, MD   Multiple Vitamin (DAILY-CRISTINE PO) Take 1 tablet by mouth daily    Historical Provider, MD   clopidogrel (PLAVIX) 75 MG tablet Take 75 mg by mouth daily    Historical Provider, MD   pantoprazole sodium (PROTONIX) 40 MG PACK packet Take 40 mg by mouth every morning (before breakfast)    Historical Provider, MD   nitroGLYCERIN (NITRODUR) 0.4 MG/HR Place 1 patch onto the skin daily     Historical Provider, MD   fluticasone-vilanterol (BREO ELLIPTA) 100-25 MCG/INH AEPB inhaler Inhale 1 puff into the lungs daily 5/31/17   Gerard Hawkins MD   tamsulosin (FLOMAX) 0.4 MG capsule Take 0.4 mg by mouth daily    Historical Provider, MD   gabapentin (NEURONTIN) 300 MG capsule Take 400 mg by mouth 3 times daily . Historical Provider, MD   aspirin 81 MG tablet Take 81 mg by mouth daily    Historical Provider, MD   cetirizine (ZYRTEC) 10 MG tablet Take 10 mg by mouth daily    Historical Provider, MD   hydrOXYzine (VISTARIL) 25 MG capsule Take 25 mg by mouth 3 times daily as needed for Itching  Patient not taking: Reported on 1/25/2023    Historical Provider, MD   albuterol (PROVENTIL HFA;VENTOLIN HFA) 108 (90 BASE) MCG/ACT inhaler Inhale 1 puff into the lungs every 6 hours as needed. Historical Provider, MD   fluticasone (FLONASE) 50 MCG/ACT nasal spray 1 spray by Nasal route daily. Indications: EACH NOSTRIL    Historical Provider, MD   montelukast (SINGULAIR) 10 MG tablet Take 10 mg by mouth nightly.     Historical Provider, MD       Current medications:    Current Facility-Administered Medications   Medication Dose Route Frequency Provider Last Rate Last Admin    ceFAZolin (ANCEF) 2000 mg in dextrose 5 % 100 mL IVPB  2,000 mg IntraVENous On Call to [address] [place] [address] MD Kofi        lidocaine PF 1 % injection 1 mL  1 mL IntraDERmal Once PRN Gita Hanson MD        lactated ringers IV soln infusion   IntraVENous Continuous Gita Hanson MD        sodium chloride flush 0.9 % injection 5-40 mL  5-40 mL IntraVENous 2 times per day Gita Hanson MD        sodium chloride flush 0.9 % injection 5-40 mL  5-40 mL IntraVENous PRN Gita Hanson MD        0.9 % sodium chloride infusion   IntraVENous PRN Gita Hanson MD        magnesium sulfate 2000 mg in 50 mL IVPB premix  2,000 mg IntraVENous Once Darrian Barbosa MD        ipratropium-albuterol (DUONEB) nebulizer solution 1 ampule  1 ampule Inhalation Once Tiffanie Diana MD           Allergies: Allergies   Allergen Reactions    Methylphenidate      Other reaction(s): Hyperactive behavior    Oxycodone-Acetaminophen     Propoxyphene        Problem List:    Patient Active Problem List   Diagnosis Code    Acute exacerbation of chronic obstructive pulmonary disease (COPD) ([facility] J44.1    COPD with exacerbation ([facility]) J44.1    Hypoxia R09.02    SOB (shortness of breath) R06.02    Centrilobular emphysema ([facility]) J43.2    Right anterior knee pain M25.561    Primary osteoarthritis of right knee M17.11    Chest pain R07.9    Diabetes mellitus ([facility] E11.9    Anxiety F41.9    Abnormal nuclear cardiac imaging test R93.1    Abnormal stress test R94.39    Coronary artery disease involving native coronary artery of native heart without angina pectoris I25.10    Essential hypertension I10    Other hyperlipidemia E78.49    Traumatic seroma of right lower leg ([facility]) T79. 2XXA    Mass of knee, right R22.41    AAA (abdominal aortic aneurysm) I71.40    Unstable angina ([facility]) I20.0    Bilateral carpal tunnel syndrome G56.03    Lateral epicondylitis of left elbow M77.12    Atrophy of quadriceps femoris muscle M62.559    COVID-19 U07.1    Oxygen dependent Z99.81    Closed fracture of head of right femur ([facility] S72.051A    Right hip pain M25.551       Past Medical History:        Diagnosis Date    AAA (abdominal aortic aneurysm) 2018    3.0 cm    Anxiety     Arterial stent thrombosis (HCC)     Arthritis     Asthma     Bipolar 1 disorder ([facility])     COPD (chronic obstructive pulmonary disease) ([facility]     Coronary artery disease involving native coronary artery of native heart without angina pectoris 02/08/2021    Diabetes mellitus ([facility]     Essential hypertension 02/08/2021    Hyperlipidemia     Pneumonia        Past Surgical History:        Procedure Laterality Date    CARDIAC SURGERY      stent placed    CARPAL TUNNEL RELEASE      CHOLECYSTECTOMY, LAPAROSCOPIC      KNEE ARTHROPLASTY      R knee x4    NASAL SINUS SURGERY      UPPER GASTROINTESTINAL ENDOSCOPY  7/14/11    UPPER GASTROINTESTINAL ENDOSCOPY N/A 4/4/2019    EGD BIOPSY performed by Luther Ayala DO at [facility]       Social History:    Social History     Tobacco Use    Smoking status: Former     Packs/day: 2.00     Years: 35.00     Pack years: 70.00     Types: Cigarettes     Quit date: 2/4/2019     Years since quitting: 3.9    Smokeless tobacco: Former     Quit date: 10/3/2015    Tobacco comments:     per MD order for annual lung screening   Substance Use Topics    Alcohol use: No     Alcohol/week: 0.0 standard drinks                                Counseling given: Not Answered  Tobacco comments: per MD order for annual lung screening      Vital Signs (Current):   Vitals:    01/19/23 0929   Weight: 175 lb (79.4 kg)   Height: 5' 9\" (1.753 m)                                              BP Readings from Last 3 Encounters:   10/13/22 138/67   12/17/21 110/80   09/27/21 139/89       NPO Status: Time of last liquid consumption: 2130                        Time of last solid consumption: 2130                        Date of last liquid consumption: 01/24/23                        Date of last solid food consumption: 01/24/23    BMI:   Wt Readings from Last 3 Encounters:   01/19/23 175 lb (79.4 kg)   12/09/22 175 lb (79.4 kg)   12/08/22 175 lb (79.4 kg)     Body mass index is 25.84 kg/m².     CBC:   Lab Results   Component Value Date/Time    WBC 5.0 10/13/2022 06:18 PM    RBC 4.40 10/13/2022 06:18 PM    HGB 12.7 10/13/2022 06:18 PM    HCT 37.6 10/13/2022 06:18 PM    MCV 85.5 10/13/2022 06:18 PM    RDW 19.4 10/13/2022 06:18 PM     10/13/2022 06:18 PM       CMP:   Lab Results   Component Value Date/Time  10/13/2022 06:18 PM    K 5.1 10/13/2022 06:18 PM     10/13/2022 06:18 PM    CO2 27 10/13/2022 06:18 PM    BUN 10 10/13/2022 06:18 PM    CREATININE 0.8 10/13/2022 06:18 PM    GFRAA >60 10/13/2022 06:18 PM    AGRATIO 1.7 10/13/2022 06:18 PM    LABGLOM >60 10/13/2022 06:18 PM    GLUCOSE 96 10/13/2022 06:18 PM    PROT 6.3 10/13/2022 06:18 PM    CALCIUM 9.5 10/13/2022 06:18 PM    BILITOT 0.3 10/13/2022 06:18 PM    ALKPHOS 140 10/13/2022 06:18 PM    AST 11 10/13/2022 06:18 PM    ALT <5 10/13/2022 06:18 PM       POC Tests:   Recent Labs     01/25/23  0810   POCGLU 85       Coags:   Lab Results   Component Value Date/Time    PROTIME 14.9 12/15/2021 11:00 AM    INR 1.30 12/15/2021 11:00 AM    APTT 37.1 12/15/2021 11:00 AM       HCG (If Applicable): No results found for: PREGTESTUR, PREGSERUM, HCG, HCGQUANT     ABGs: No results found for: PHART, PO2ART, GCH4UTY, PZL0VPN, BEART, A3QNKQMG     Type & Screen (If Applicable):  No results found for: LABABO, LABRH    Drug/Infectious Status (If Applicable):  No results found for: HIV, HEPCAB    COVID-19 Screening (If Applicable):   Lab Results   Component Value Date/Time    COVID19 DETECTED 12/15/2021 06:37 PM           Anesthesia Evaluation  Patient summary reviewed no history of anesthetic complications:   Airway: Mallampati: II  TM distance: >3 FB   Neck ROM: full  Mouth opening: > = 3 FB   Dental: normal exam         Pulmonary:normal exam  breath sounds clear to auscultation  (+) COPD:  asthma:     (-) sleep apnea                           Cardiovascular:    (+) hypertension:, CAD: obstructive, CABG/stent: no interval change,     (-)  angina and  ZAMARRIPA      Rhythm: regular  Rate: normal                    Neuro/Psych:   (+) neuromuscular disease:, psychiatric history:   (-) seizures and TIA           GI/Hepatic/Renal:        (-) GERD, liver disease and no renal disease       Endo/Other:    (+) Diabetes, .                  Abdominal:             Vascular: negative vascular ROS. Other Findings:           Anesthesia Plan      general     ASA 3     (I discussed with the patient the risks and benefits of regional anesthesia (inlcuding infection, bleeding, damage to nerves and surrounding structures) PIV, General, IV Narcotics, PACU. All questions were answered the patient agrees with the plan and wishes to proceed.)  Induction: intravenous.                             Anette Carter MD   1/25/2023

## 2023-01-26 PROBLEM — Z96.641 STATUS POST TOTAL REPLACEMENT OF RIGHT HIP: Status: ACTIVE | Noted: 2023-01-26

## 2023-01-26 LAB
ANION GAP SERPL CALCULATED.3IONS-SCNC: 5 MMOL/L (ref 3–16)
BUN BLDV-MCNC: 18 MG/DL (ref 7–20)
CALCIUM SERPL-MCNC: 8.5 MG/DL (ref 8.3–10.6)
CHLORIDE BLD-SCNC: 100 MMOL/L (ref 99–110)
CO2: 26 MMOL/L (ref 21–32)
CREAT SERPL-MCNC: 1.1 MG/DL (ref 0.8–1.3)
GFR SERPL CREATININE-BSD FRML MDRD: >60 ML/MIN/{1.73_M2}
GLUCOSE BLD-MCNC: 127 MG/DL (ref 70–99)
GLUCOSE BLD-MCNC: 127 MG/DL (ref 70–99)
HCT VFR BLD CALC: 30.7 % (ref 40.5–52.5)
HEMOGLOBIN: 10.4 G/DL (ref 13.5–17.5)
MCH RBC QN AUTO: 29.9 PG (ref 26–34)
MCHC RBC AUTO-ENTMCNC: 33.8 G/DL (ref 31–36)
MCV RBC AUTO: 88.4 FL (ref 80–100)
PDW BLD-RTO: 16.2 % (ref 12.4–15.4)
PERFORMED ON: ABNORMAL
PLATELET # BLD: 175 K/UL (ref 135–450)
PMV BLD AUTO: 7.4 FL (ref 5–10.5)
POTASSIUM SERPL-SCNC: 5.3 MMOL/L (ref 3.5–5.1)
RBC # BLD: 3.47 M/UL (ref 4.2–5.9)
SODIUM BLD-SCNC: 131 MMOL/L (ref 136–145)
WBC # BLD: 8.6 K/UL (ref 4–11)

## 2023-01-26 PROCEDURE — 1200000000 HC SEMI PRIVATE

## 2023-01-26 PROCEDURE — 97116 GAIT TRAINING THERAPY: CPT

## 2023-01-26 PROCEDURE — 85027 COMPLETE CBC AUTOMATED: CPT

## 2023-01-26 PROCEDURE — 97530 THERAPEUTIC ACTIVITIES: CPT

## 2023-01-26 PROCEDURE — 6370000000 HC RX 637 (ALT 250 FOR IP): Performed by: ORTHOPAEDIC SURGERY

## 2023-01-26 PROCEDURE — 36415 COLL VENOUS BLD VENIPUNCTURE: CPT

## 2023-01-26 PROCEDURE — 97166 OT EVAL MOD COMPLEX 45 MIN: CPT

## 2023-01-26 PROCEDURE — 97162 PT EVAL MOD COMPLEX 30 MIN: CPT

## 2023-01-26 PROCEDURE — 94640 AIRWAY INHALATION TREATMENT: CPT

## 2023-01-26 PROCEDURE — 2700000000 HC OXYGEN THERAPY PER DAY

## 2023-01-26 PROCEDURE — 97110 THERAPEUTIC EXERCISES: CPT

## 2023-01-26 PROCEDURE — 94761 N-INVAS EAR/PLS OXIMETRY MLT: CPT

## 2023-01-26 PROCEDURE — 80048 BASIC METABOLIC PNL TOTAL CA: CPT

## 2023-01-26 PROCEDURE — APPNB60 APP NON BILLABLE TIME 46-60 MINS: Performed by: PHYSICIAN ASSISTANT

## 2023-01-26 PROCEDURE — 6360000002 HC RX W HCPCS: Performed by: ORTHOPAEDIC SURGERY

## 2023-01-26 PROCEDURE — 6370000000 HC RX 637 (ALT 250 FOR IP): Performed by: SPECIALIST/TECHNOLOGIST

## 2023-01-26 PROCEDURE — G0378 HOSPITAL OBSERVATION PER HR: HCPCS

## 2023-01-26 PROCEDURE — 97535 SELF CARE MNGMENT TRAINING: CPT

## 2023-01-26 PROCEDURE — 2580000003 HC RX 258: Performed by: ORTHOPAEDIC SURGERY

## 2023-01-26 RX ORDER — POLYETHYLENE GLYCOL 3350 17 G/17G
17 POWDER, FOR SOLUTION ORAL DAILY
Qty: 10 EACH | Refills: 0 | Status: SHIPPED | OUTPATIENT
Start: 2023-01-26 | End: 2023-02-05

## 2023-01-26 RX ORDER — OXYCODONE HYDROCHLORIDE 5 MG/1
5 TABLET ORAL EVERY 4 HOURS PRN
Status: DISCONTINUED | OUTPATIENT
Start: 2023-01-26 | End: 2023-01-27 | Stop reason: HOSPADM

## 2023-01-26 RX ORDER — POLYETHYLENE GLYCOL 3350 17 G/17G
17 POWDER, FOR SOLUTION ORAL DAILY
Status: DISCONTINUED | OUTPATIENT
Start: 2023-01-26 | End: 2023-01-27 | Stop reason: HOSPADM

## 2023-01-26 RX ORDER — OXYCODONE HYDROCHLORIDE 5 MG/1
5 TABLET ORAL EVERY 4 HOURS PRN
Qty: 42 TABLET | Refills: 0 | Status: SHIPPED | OUTPATIENT
Start: 2023-01-26 | End: 2023-02-02

## 2023-01-26 RX ORDER — OXYCODONE HYDROCHLORIDE 5 MG/1
10 TABLET ORAL EVERY 4 HOURS PRN
Status: DISCONTINUED | OUTPATIENT
Start: 2023-01-26 | End: 2023-01-27 | Stop reason: HOSPADM

## 2023-01-26 RX ORDER — ACETAMINOPHEN 325 MG/1
650 TABLET ORAL EVERY 6 HOURS
Qty: 120 TABLET | Refills: 3 | Status: SHIPPED | OUTPATIENT
Start: 2023-01-26 | End: 2023-02-25

## 2023-01-26 RX ORDER — ONDANSETRON 4 MG/1
4 TABLET, ORALLY DISINTEGRATING ORAL EVERY 8 HOURS PRN
Qty: 21 TABLET | Refills: 0 | Status: SHIPPED | OUTPATIENT
Start: 2023-01-26 | End: 2023-02-02

## 2023-01-26 RX ADMIN — IPRATROPIUM BROMIDE AND ALBUTEROL SULFATE 3 ML: .5; 2.5 SOLUTION RESPIRATORY (INHALATION) at 07:27

## 2023-01-26 RX ADMIN — ACETAMINOPHEN 325MG 650 MG: 325 TABLET ORAL at 23:25

## 2023-01-26 RX ADMIN — IPRATROPIUM BROMIDE AND ALBUTEROL SULFATE 3 ML: .5; 2.5 SOLUTION RESPIRATORY (INHALATION) at 19:48

## 2023-01-26 RX ADMIN — CEFAZOLIN 2000 MG: 10 INJECTION, POWDER, FOR SOLUTION INTRAVENOUS at 03:17

## 2023-01-26 RX ADMIN — PANTOPRAZOLE SODIUM 40 MG: 40 TABLET, DELAYED RELEASE ORAL at 06:30

## 2023-01-26 RX ADMIN — FERROUS SULFATE TAB 325 MG (65 MG ELEMENTAL FE) 325 MG: 325 (65 FE) TAB at 08:56

## 2023-01-26 RX ADMIN — GABAPENTIN 400 MG: 400 CAPSULE ORAL at 20:54

## 2023-01-26 RX ADMIN — FLUTICASONE PROPIONATE 1 SPRAY: 50 SPRAY, METERED NASAL at 09:04

## 2023-01-26 RX ADMIN — CETIRIZINE HYDROCHLORIDE 10 MG: 10 TABLET, FILM COATED ORAL at 08:56

## 2023-01-26 RX ADMIN — QUETIAPINE FUMARATE 300 MG: 100 TABLET ORAL at 20:54

## 2023-01-26 RX ADMIN — DIVALPROEX SODIUM 500 MG: 250 TABLET, DELAYED RELEASE ORAL at 08:56

## 2023-01-26 RX ADMIN — ACETAMINOPHEN 325MG 650 MG: 325 TABLET ORAL at 16:52

## 2023-01-26 RX ADMIN — SODIUM CHLORIDE, PRESERVATIVE FREE 10 ML: 5 INJECTION INTRAVENOUS at 08:58

## 2023-01-26 RX ADMIN — MONTELUKAST SODIUM 10 MG: 10 TABLET ORAL at 20:54

## 2023-01-26 RX ADMIN — IPRATROPIUM BROMIDE AND ALBUTEROL SULFATE 3 ML: .5; 2.5 SOLUTION RESPIRATORY (INHALATION) at 15:36

## 2023-01-26 RX ADMIN — ACETAMINOPHEN 325MG 650 MG: 325 TABLET ORAL at 06:29

## 2023-01-26 RX ADMIN — TAMSULOSIN HYDROCHLORIDE 0.4 MG: 0.4 CAPSULE ORAL at 08:56

## 2023-01-26 RX ADMIN — SODIUM CHLORIDE, PRESERVATIVE FREE 10 ML: 5 INJECTION INTRAVENOUS at 20:54

## 2023-01-26 RX ADMIN — GABAPENTIN 400 MG: 400 CAPSULE ORAL at 14:17

## 2023-01-26 RX ADMIN — ACETAMINOPHEN 325MG 650 MG: 325 TABLET ORAL at 12:03

## 2023-01-26 RX ADMIN — CLOPIDOGREL BISULFATE 75 MG: 75 TABLET ORAL at 08:56

## 2023-01-26 RX ADMIN — IPRATROPIUM BROMIDE AND ALBUTEROL SULFATE 3 ML: .5; 2.5 SOLUTION RESPIRATORY (INHALATION) at 11:47

## 2023-01-26 RX ADMIN — OXYCODONE HYDROCHLORIDE 10 MG: 5 TABLET ORAL at 23:25

## 2023-01-26 RX ADMIN — ASPIRIN 325 MG: 325 TABLET, COATED ORAL at 08:56

## 2023-01-26 RX ADMIN — ATORVASTATIN CALCIUM 40 MG: 40 TABLET, FILM COATED ORAL at 08:56

## 2023-01-26 RX ADMIN — GABAPENTIN 400 MG: 400 CAPSULE ORAL at 09:03

## 2023-01-26 RX ADMIN — OXYCODONE HYDROCHLORIDE 10 MG: 5 TABLET ORAL at 17:44

## 2023-01-26 ASSESSMENT — PAIN - FUNCTIONAL ASSESSMENT
PAIN_FUNCTIONAL_ASSESSMENT: PREVENTS OR INTERFERES SOME ACTIVE ACTIVITIES AND ADLS
PAIN_FUNCTIONAL_ASSESSMENT: ACTIVITIES ARE NOT PREVENTED
PAIN_FUNCTIONAL_ASSESSMENT: ACTIVITIES ARE NOT PREVENTED

## 2023-01-26 ASSESSMENT — PAIN DESCRIPTION - DESCRIPTORS
DESCRIPTORS: ACHING
DESCRIPTORS: ACHING;BURNING
DESCRIPTORS: ACHING
DESCRIPTORS: ACHING;BURNING

## 2023-01-26 ASSESSMENT — PAIN DESCRIPTION - LOCATION
LOCATION: HEAD;HIP
LOCATION: HIP

## 2023-01-26 ASSESSMENT — PAIN SCALES - GENERAL
PAINLEVEL_OUTOF10: 5
PAINLEVEL_OUTOF10: 8
PAINLEVEL_OUTOF10: 5
PAINLEVEL_OUTOF10: 7
PAINLEVEL_OUTOF10: 8
PAINLEVEL_OUTOF10: 7

## 2023-01-26 ASSESSMENT — PAIN DESCRIPTION - ORIENTATION
ORIENTATION: RIGHT

## 2023-01-26 NOTE — DISCHARGE INSTR - COC
Continuity of Care Form    Patient Name: Margie Garcia   :  1960  MRN:  [id number]    Admit date:  2023  Discharge date:  23    Code Status Order: Prior   Advance Directives:   [place] Directive Type of Healthcare Directive Copy in [address] Agent's Name Healthcare Agent's Phone Number    [phone] No, patient does not have an advance directive for healthcare treatment -- -- -- -- --            Admitting Physician:  Faby Frederick MD  PCP: Eulalio Haney PA-C    Discharging Nurse: [facility] Unit/Room#: 0523/0523-01  Discharging Unit Phone Number: ***    Emergency Contact:   Extended Emergency Contact Information  Primary Emergency Contact: Lena Duke  Address: [address]  [place] [address] [place]  Phone: [phone]  Mobile Phone: [phone]  Relation: Other  Preferred language: English   needed?  No    Past Surgical History:  Past Surgical History:   Procedure Laterality Date    CARDIAC SURGERY      stent placed    CARPAL TUNNEL RELEASE      CHOLECYSTECTOMY, LAPAROSCOPIC      KNEE ARTHROPLASTY      R knee x4    NASAL SINUS SURGERY      UPPER GASTROINTESTINAL ENDOSCOPY  11    UPPER GASTROINTESTINAL ENDOSCOPY N/A 2019    EGD BIOPSY performed by Naomie Caputo DO at [address]       Immunization History:   Immunization History   Administered Date(s) Administered    COVID-19, MODERNA BLUE border, Primary or Immunocompromised, (age 12y+), IM, 100 mcg/0.5mL 2021, 2021    COVID-19, PFIZER Bivalent BOOSTER, DO NOT Dilute, (age 12y+), IM, 30 mcg/0.3 mL 2022    COVID-19, PFIZER GRAY top, DO NOT Dilute, (age 15 y+), IM, 30 mcg/0.3 mL 2022    Influenza Virus Vaccine 10/05/2015    Pneumococcal Polysaccharide (Dvjlqkzut21) 10/03/2014       Active Problems:  Patient Active Problem List   Diagnosis Code    Acute exacerbation of chronic obstructive pulmonary disease (COPD) ([facility] J44.1    COPD with exacerbation ([facility]) J44.1    Hypoxia R09.02    SOB (shortness of breath) R06.02    Centrilobular emphysema ([facility]) J43.2    Right anterior knee pain M25.561    Primary osteoarthritis of right knee M17.11    Chest pain R07.9    Diabetes mellitus ([facility]) E11.9    Anxiety F41.9    Abnormal nuclear cardiac imaging test R93.1    Abnormal stress test R94.39    Coronary artery disease involving native coronary artery of native heart without angina pectoris I25.10    Essential hypertension I10    Other hyperlipidemia E78.49    Traumatic seroma of right lower leg ([facility]) T79. 2XXA    Mass of knee, right R22.41    AAA (abdominal aortic aneurysm) I71.40    Unstable angina ([facility]) I20.0    Bilateral carpal tunnel syndrome G56.03    Lateral epicondylitis of left elbow M77.12    Atrophy of quadriceps femoris muscle M62.559    COVID-19 U07.1    Oxygen dependent Z99.81    Closed fracture of head of right femur ([facility]) S72.051A    Right hip pain M25.551    Post-operative pain G89.18       Isolation/Infection:   Isolation            No Isolation          Patient Infection Status       Infection Onset Added Last Indicated Last Indicated By Review Planned Expiration Resolved Resolved By    None active    Resolved    COVID-19 12/15/21 12/15/21 12/15/21 COVID-19, Rapid   21     COVID-19 (Rule Out) 12/15/21 12/15/21 12/15/21 COVID-19, Rapid (Ordered)   12/15/21 Rule-Out Test Resulted    COVID-19 (Rule Out) 21 COVID-19 (Ordered)   21 Rule-Out Test Resulted            Nurse Assessment:  Last Vital Signs: BP (!) 141/88   Pulse 55   Temp 97.2 °F (36.2 °C) (Axillary)   Resp 12   Ht 5' 9\" (1.753 m)   Wt 175 lb (79.4 kg)   SpO2 92%   BMI 25.84 kg/m²     Last documented pain score (0-10 scale): Pain Level: 5  Last Weight:   Wt Readings from Last 1 Encounters:   23 175 lb (79.4 kg)     Mental Status:  {IP PT MENTAL STATUS:}    IV Access:  { CELESTINE IV ACCESS:039945215}    Nursing Mobility/ADLs:  Walking   {P DME FAAF:947549087}  Transfer  {CHP DME LJT}  Bathing  {CHP DME NRAM:902034565}  Dressing  {CHP DME YCES:634833642}  Toileting  {CHP DME PGXZ:373462657}  Feeding  {[facility] DME OPGA:570487231}  Med Admin  {P DME ACVV:194562446}  Med Delivery   { CELESTINE MED Delivery:319956182}    Wound Care Documentation and Therapy:  Incision 23 Hip Anterior;Right (Active)   Dressing Status Clean;Dry; Intact 23 141   Dressing/Treatment Other (comment) 23 1415   Number of days: 0        Elimination:  Continence: Bowel: {YES / IO:99231}  Bladder: {YES / XB:70172}  Urinary Catheter: {Urinary Catheter:098620004}   Colostomy/Ileostomy/Ileal Conduit: {YES / EM:95047}       Date of Last BM: ***    Intake/Output Summary (Last 24 hours) at 2023 1929  Last data filed at 2023 1654  Gross per 24 hour   Intake 1300 ml   Output 1400 ml   Net -100 ml     I/O last 3 completed shifts:   In: 1300 [I.V.:1200; IV Piggyback:100]  Out: 1400 [Urine:1400]    Safety Concerns:     [facility] Safety Concerns:620808689}    Impairments/Disabilities:      { CELESTINE Impairments/Disabilities:968380224}    Nutrition Therapy:  Current Nutrition Therapy:   { CELESTINE Diet List:857922309}    Routes of Feeding: {[facility] DME Other Feedings:238782372}  Liquids: {Slp liquid thickness:97158}  Daily Fluid Restriction: {CHP DME Yes amt example:785771425}  Last Modified Barium Swallow with Video (Video Swallowing Test): {Done Not Done FOFP:798516561}    Treatments at the Time of Hospital Discharge:   Respiratory Treatments: ***  Oxygen Therapy:  {Therapy; copd oxygen:55978}  Ventilator:    { CC Vent MAJY:839630497}    Rehab Therapies: {THERAPEUTIC INTERVENTION:5853124090}  Weight Bearing Status/Restrictions: { CC Weight Bearin}  Other Medical Equipment (for information only, NOT a DME order):  {EQUIPMENT:436072558}  Other Treatments: HOME HEALTH CARE: LEVEL 1 STANDARD    Home health agency to establish plan of care for patient over 60 day period   Nursing  Initial home SN evaluation visit to occur within 24-48 hours for:  1)  medication management  2)  VS and clinical assessment  3)  S&S chronic disease exacerbation education + when to contact MD/NP  4)  care coordination  Medication Reconciliation during 1st SN visit    PT/OT   Evaluate with goal of regaining prior level of functioning   OT to evaluate if patient has [address] needs for personal care    PCP Visit scheduled within 7 days of hospital discharge   Telehealth-Homecare Vitals(If patient is agreeable and meets guidelines)      Patient's personal belongings (please select all that are sent with patient):  {CHP DME Belongings:297777285}    RN SIGNATURE:  {Esignature:333531726}    CASE MANAGEMENT/SOCIAL WORK SECTION    Inpatient Status Date: 1/25/23    Readmission Risk Assessment Score:  Readmission Risk              Risk of Unplanned Readmission:  11           Discharging to Facility/ Agency   Name:  [facility]    Address: [address] [place] [address], [address] [place] [address]  Phone: [phone]  Fax: [phone]    / signature: Electronically signed by Donna Spencer RN on 1/27/23 at 2:07 PM EST    PHYSICIAN SECTION    Prognosis: Good    Condition at Discharge: Stable    Rehab Potential (if transferring to Rehab): Good    Recommended Follow-up, Labs or Other Treatments After Discharge:    PT/OT- WBAT to the RLE with assistive device. Anterolateral hip precautions- no active abduction, no extension past midline, no external rotation. Aspirin 325mg daily for 6 weeks post op as DVT prophylaxis  Follow up with Dr Faby Frederick in 2 weeks, or as scheduled.  Call [employer] at [phone] with any questions or to make an appointment    -HRS if patient agreeable         Physician Certification: I certify the above information and transfer of Carline Elmore  is necessary for the continuing treatment of the diagnosis listed and that he requires [address] for greater 30 days.      Update Admission H&P: No change in H&P    PHYSICIAN SIGNATURE:  Electronically signed by PRESTON Cruz on 1/26/23 at 9:20 AM EST

## 2023-01-26 NOTE — PROGRESS NOTES
Physical Therapy  Facility/Department: [facility] C5 - MED SURG/ORTHO  Daily Treatment Note  NAME: Zuleika Velasquez  : 1960  MRN: [id number]    Date of Service: 2023    Discharge Recommendations:  Outpatient PT, Home with Home health PT   PT Equipment Recommendations  Equipment Needed: Yes  Mobility Devices: Jernigan Martin: Rolling  Other: Pt would benefit from a rolling walker at this time due to the weight bearing restrictions on his RLE, pain and to enhance safety with ambulation and transfers. [facility] 6 Clicks Inpatient Mobility:  AM-PAC Mobility Inpatient   How much difficulty turning over in bed?: None  How much difficulty sitting down on / standing up from a chair with arms?: None  How much difficulty moving from lying on back to sitting on side of bed?: None  How much help from another person moving to and from a bed to a chair?: None  How much help from another person needed to walk in hospital room?: None  How much help from another person for climbing 3-5 steps with a railing?: A Little  AM-[facility] Inpatient Mobility Raw Score : 23  AM-PAC Inpatient T-Scale Score : 56.93  Mobility Inpatient CMS 0-100% Score: 11.2  Mobility Inpatient CMS G-Code Modifier : CI     Patient Diagnosis(es): The encounter diagnosis was Status post total replacement of right hip. Assessment   Assessment: Pt tolerated the therapy session well. Pt was able to perform all bed mobility and transfer with a RW at supervision. Pt ambulated 50 ft with RW and SBA and successfully and safely negotiated 1 curb step with a RW and SBA. Pt would continue to benefit from skilled therapy in order to enhance independence. Recommend HHPT or outpatient PT upon d/c. Activity Tolerance: Patient tolerated evaluation without incident;Patient limited by pain; Patient limited by endurance  Equipment Needed: Yes  Mobility Devices: Akash Wallace  Other: Pt would benefit from a rolling walker at this time due to the weight bearing restrictions on his RLE, pain and to enhance safety with ambulation and transfers. Plan    Physcial Therapy Plan  General Plan: 2 times a day 7 days a week  Current Treatment Recommendations: Strengthening;ROM;Balance training;Functional mobility training;Transfer training; Endurance training;Gait training;Stair training; Safety education & training;Home exercise program;Therapeutic activities     Restrictions  Restrictions/Precautions  Restrictions/Precautions: Weight Bearing, Fall Risk  Lower Extremity Weight Bearing Restrictions  Right Lower Extremity Weight Bearing: Weight Bearing As Tolerated  Position Activity Restriction  Hip Precautions: Anterior hip precautions  Other position/activity restrictions: no SLR     Subjective    Subjective  Subjective: Arrived and pt was supine in bed wanting to take a nap but agreed to participate in therapy. Pain: Pt reports R hip pain 7/10  Orientation  Overall Orientation Status: Within Normal Limits  Orientation Level: Oriented X4  Cognition  Overall Cognitive Status: WFL     Objective   Vitals  Heart Rate: 60  Heart Rate Source: Monitor  BP: 98/64  BP Location: Left upper arm  BP Method: Automatic  Patient Position: Semi fowlers  MAP (Calculated): 75  SpO2: 93 %  O2 Device: Nasal cannula  Comment: 1.5 L  Bed Mobility Training  Bed Mobility Training: Yes  Overall Level of Assistance: Supervision; Additional time  Interventions: Safety awareness training;Verbal cues  Supine to Sit: Supervision; Additional time (with HOB raised, use of handrail)  Sit to Supine: Supervision; Additional time  Balance  Sitting: Intact  Standing: Impaired  Standing - Static: Constant support;Good  Standing - Dynamic: Constant support;Good  Transfer Training  Transfer Training: Yes  Overall Level of Assistance: Supervision; Adaptive equipment; Additional time  Interventions: Safety awareness training;Verbal cues  Sit to Stand: Supervision; Adaptive equipment; Additional time (with RW, VC on hand placement)  Stand to Sit: Supervision; Adaptive equipment; Additional time (with RW, VC on hand placement)  Toilet Transfer: Supervision; Adaptive equipment; Additional time (with RW)  Gait Training  Gait Training: Yes  Right Side Weight Bearing: As tolerated  Gait  Overall Level of Assistance: Stand-by assistance; Adaptive equipment; Additional time;Assist X1 (with RW, reminders on sequencing)  Interventions: Safety awareness training;Verbal cues  Base of Support: Narrowed  Speed/Ana: Slow  Step Length: Right shortened  Stance: Right decreased  Gait Abnormalities: Antalgic  Distance (ft): 50 Feet  Assistive Device: Walker, rolling  Stairs - Level of Assistance: Stand-by assistance; Adaptive equipment; Additional time;Assist X1 (with RW, VC on sequencing/technique)  Number of Stairs Trained: 1     PT Exercises  Exercise Treatment: Pt performed supine ankle pumps, quad sets, glute sets, heel slides 2x10 reps RLE and sitting LAQ 2x10 reps RLE     Safety Devices  Type of Devices: Call light within reach;Gait belt;Nurse notified; Left in chair;Patient at risk for falls; All fall risk precautions in place; Bed alarm in place; Left in bed  Restraints  Restraints Initially in Place: No     Goals  Short Term Goals  Time Frame for Short Term Goals: 3 days (1/29)  Short Term Goal 1: Pt will perform all transfers with RW and supervision-- 1/26 GOAL MET  Short Term Goal 2: Pt will ambulate 50 ft with RW and SBA-- 1/26 GOAL MET  Short Term Goal 3: Pt will negotiate 3 curb steps with RW and SBA-- 1/26 GOAL MET  Patient Goals   Patient Goals : \"to walk with my walker with a little help\"    Education  Patient Education  Education Given To: Patient  Education Provided: Role of Therapy;Plan of Care;Home Exercise Program;Precautions;Transfer Training  Education Method: Verbal  Barriers to Learning: None  Education Outcome: Verbalized understanding;Demonstrated understanding    Therapy Time   Individual Concurrent Group Co-treatment   Time In 1337         Time Out 1416 Minutes 39         Timed Code Treatment Minutes: 39 Minutes     If pt is unable to be seen after this session, please let this note serve as discharge summary. Please see case management note for discharge disposition. Thank you.     GRACIA Walsh [place]

## 2023-01-26 NOTE — PROGRESS NOTES
Department of Orthopedic Surgery  Physician Assistant   Progress Note    Subjective:     Post-Operative Day: 1 Status Post right Total Hip Arthroplasty  Systemic or Specific Complaints:Pain Control and has felt a little shaky when getting up today.     Objective:     Patient Vitals for the past 24 hrs:   BP Temp Temp src Pulse Resp SpO2   01/26/23 0917 98/64 -- -- 60 -- 94 %   01/26/23 0812 (!) 86/51 -- -- 62 -- 92 %   01/26/23 0745 97/60 97.8 °F (36.6 °C) Oral 60 18 92 %   01/26/23 0734 -- -- -- -- -- 100 %   01/26/23 0732 -- -- -- 72 20 100 %   01/26/23 0324 103/70 97.8 °F (36.6 °C) Oral 68 20 90 %   01/26/23 0025 111/68 97.9 °F (36.6 °C) Oral 75 17 94 %   01/25/23 2307 122/70 97.6 °F (36.4 °C) Axillary 64 17 93 %   01/25/23 2101 133/87 97.5 °F (36.4 °C) Axillary 70 16 94 %   01/25/23 2034 -- -- -- -- 17 --   01/25/23 1952 129/63 97.6 °F (36.4 °C) Oral 69 17 92 %   01/25/23 1845 (!) 141/88 97.2 °F (36.2 °C) Axillary 55 12 92 %   01/25/23 1800 114/68 -- -- 61 11 96 %   01/25/23 1735 -- -- -- 63 27 97 %   01/25/23 1730 -- -- -- 63 17 96 %   01/25/23 1725 -- -- -- 64 19 97 %   01/25/23 1720 -- -- -- 66 16 97 %   01/25/23 1715 -- -- -- 58 10 96 %   01/25/23 1710 -- -- -- 57 10 97 %   01/25/23 1705 (!) 131/55 -- -- 63 23 95 %   01/25/23 1700 117/69 -- -- 72 16 95 %   01/25/23 1655 126/85 -- -- 62 16 95 %   01/25/23 1650 (!) 86/51 -- -- 75 (!) 34 94 %   01/25/23 1645 137/84 -- -- 68 22 96 %   01/25/23 1630 (!) 148/81 -- -- 64 19 96 %   01/25/23 1625 -- -- -- 65 21 95 %   01/25/23 1620 (!) 171/118 -- -- 70 13 94 %   01/25/23 1615 (!) 163/70 -- -- 73 18 92 %   01/25/23 1610 (!) 141/59 -- -- 71 20 92 %   01/25/23 1605 121/73 -- -- 72 21 92 %   01/25/23 1600 -- -- -- 70 18 93 %   01/25/23 1555 128/66 -- -- 57 (!) 9 94 %   01/25/23 1550 136/72 -- -- 66 (!) 36 94 %   01/25/23 1545 123/81 -- -- 60 14 94 %   01/25/23 1540 135/70 -- -- 65 28 94 %   01/25/23 1535 -- -- -- 68 18 91 %   01/25/23 1525 (!) 146/94 -- -- 70 18 93 % 01/25/23 1520 135/87 -- -- 72 (!) 48 93 %   01/25/23 1515 (!) 143/97 -- -- 70 17 92 %   01/25/23 1510 90/73 -- -- 73 22 94 %   01/25/23 1505 114/70 -- -- 61 14 96 %   01/25/23 1500 124/74 -- -- 64 16 96 %   01/25/23 1455 122/75 -- -- 58 (!) 9 95 %   01/25/23 1450 119/71 -- -- 62 10 95 %   01/25/23 1445 123/74 -- -- 61 14 96 %   01/25/23 1440 131/66 -- -- 57 10 94 %   01/25/23 1435 (!) 142/83 -- -- 75 (!) 34 92 %   01/25/23 1430 (!) 140/113 -- -- 69 25 90 %   01/25/23 1425 (!) 161/143 -- -- 71 19 94 %   01/25/23 1420 128/78 -- -- 71 21 93 %   01/25/23 1415 111/70 -- -- 60 11 93 %   01/25/23 1410 113/74 -- -- 60 15 94 %   01/25/23 1405 114/76 -- -- 63 11 93 %   01/25/23 1400 125/72 -- -- 65 28 94 %   01/25/23 1355 116/75 -- -- 60 (!) 8 92 %   01/25/23 1350 127/68 -- -- 56 (!) 8 92 %       General: alert, appears stated age, and cooperative   Wound: Wound clean and dry no evidence of infection. , No Drainage, Wound Intact, and Positive for Edema   Motion: Painful range of Motion   DVT Exam: No evidence of DVT seen on physical exam.       NVI in lower extremity. Thigh swollen but soft. Moving foot and ankle. Mepilex clean dry and intact  Thigh compartments soft and compressible  Rotational alignment of left leg at neutral  DP and PT pulse 2+, foot warm and well perfused  EHL, FHL, gastroc, anterior tib motor intact    Data Review  CBC:   Lab Results   Component Value Date/Time    WBC 8.6 01/26/2023 06:17 AM    RBC 3.47 01/26/2023 06:17 AM    HGB 10.4 01/26/2023 06:17 AM    HCT 30.7 01/26/2023 06:17 AM     01/26/2023 06:17 AM       Assessment:     Status Post right Total Hip Arthroplasty. Doing well postoperatively. Plan:      1: Continues current post-op course : ice only for swelling/pain - no heat packs. Continue with PT/OT for ambulation and ADLs as he lives alone and will not have help upon leaving the hospital.  Will need to keep him here today. Continue pain control.   2:  Continue Deep venous thrombosis prophylaxis  3:  Continue physical therapy  4:  Continue Pain Control

## 2023-01-26 NOTE — PLAN OF CARE
Problem: Chronic Conditions and Co-morbidities  Goal: Patient's chronic conditions and co-morbidity symptoms are monitored and maintained or improved  Outcome: Progressing     Problem: Discharge Planning  Goal: Discharge to home or other facility with appropriate resources  Outcome: Progressing     Problem: Safety - Adult  Goal: Free from fall injury  Outcome: Progressing     Problem: Pain  Goal: Verbalizes/displays adequate comfort level or baseline comfort level  Outcome: Progressing     Problem: ABCDS Injury Assessment  Goal: Absence of physical injury  Outcome: Progressing

## 2023-01-26 NOTE — PROGRESS NOTES
01/26/23 0732   Oxygen Therapy/Pulse Ox   O2 Therapy Oxygen   $Oxygen $Daily Charge   O2 Device Nasal cannula   O2 Flow Rate (L/min) 3 L/min   Heart Rate 72   Resp 20   SpO2 100 %   $Pulse Oximeter $Spot check (multiple/continuous)

## 2023-01-26 NOTE — PROGRESS NOTES
Occupational Therapy  Facility/Department: [place] [address] - MED SURG/ORTHO  Occupational Therapy Initial Assessment and Treatment Note    Name: Ilene Heredia  : 1960  MRN: [id number]  Date of Service: 2023    Discharge Recommendations:  Home with assist PRN, Home with Home health OT  OT Equipment Recommendations  Other: Tub transfer bench would promote safety when bathing and reduce risk of falls during tub transfer. Pt is unable to lift RLE over tub edge when standing in order to safely transfer. Patient Diagnosis(es): The encounter diagnosis was Status post total replacement of right hip. Past Medical History:  has a past medical history of AAA (abdominal aortic aneurysm), Anxiety, Arterial stent thrombosis ([facility] [address] Arthritis, Asthma, Bipolar 1 disorder ([facility] [address] COPD (chronic obstructive pulmonary disease) ([facility] [address] Coronary artery disease involving native coronary artery of native heart without angina pectoris, Diabetes mellitus ([facility] [address] Essential hypertension, Hyperlipidemia, and Pneumonia. Past Surgical History:  has a past surgical history that includes Knee Arthroplasty; Cholecystectomy, laparoscopic; Carpal tunnel release; Nasal sinus surgery; Upper gastrointestinal endoscopy (11); Cardiac surgery; and Upper gastrointestinal endoscopy (N/A, 2019). Assessment   Performance deficits / Impairments: Decreased functional mobility ; Decreased ADL status  After evaluation, pt found to be presenting with the above mentioned occupational performance deficits which are affecting participation in daily living skills. Pt would benefit from continued skilled occupational therapy to address ADLs, functional mobility, and safety while in acute care.     Prognosis: Good  Decision Making: Medium Complexity  REQUIRES OT FOLLOW-UP: Yes  Activity Tolerance  Activity Tolerance: Treatment limited secondary to medical complications (free text)  Activity Tolerance Comments: decrease in BP --pt returned to bed to rest and RN notified. Plan   Occupational Therapy Plan  Times Per Week: 4-6x  Current Treatment Recommendations: Self-Care / ADL, Equipment evaluation, education, & procurement, Patient/Caregiver education & training, Functional mobility training, Safety education & training     Restrictions  Restrictions/Precautions  Restrictions/Precautions: Weight Bearing, Fall Risk  Lower Extremity Weight Bearing Restrictions  Right Lower Extremity Weight Bearing: Weight Bearing As Tolerated  Position Activity Restriction  Hip Precautions: No hip external rotation, No hip extension, No ADduction  Other position/activity restrictions: anterolateral precautions    Subjective   General  Chart Reviewed: Yes, Orders, Progress Notes, Operative Notes  Patient assessed for rehabilitation services?: Yes  Family / Caregiver Present: No  Referring Practitioner: GREGORIO Lopez  Diagnosis: R OA s/p R THR anterior approach 1/25/23  Subjective  Subjective: Pt agreeable to OT. Reports 8/10 pain in R hip. Pt able to participate in therapy. General Comment  Comments: RN approved therapy  Pt reports R hip pain 7/10  Social/Functional History  Social/Functional History  Lives With: Alone  Type of Home: Apartment  Home Layout: One level  Home Access: Stairs to enter without rails  Entrance Stairs - Number of Steps: 1+1+1 (distance between steps) Pt walks on grass to get to his apartment.   Bathroom Shower/Tub: Tub/Shower unit  Bathroom Toilet: Standard  Bathroom Equipment:  (none)  Home Equipment: Rollator (hurrycane)  Has the patient had two or more falls in the past year or any fall with injury in the past year?: Yes Pierre Sifuenteseduardo out of bed 2 months ago)  ADL Assistance: Independent  Homemaking Assistance: Independent  Ambulation Assistance: Independent (Uses rollator occassionally)  Transfer Assistance: Independent  Active : Yes  Occupation: On disability  Additional Comments: does not have family or friends to help at home     Objective   Heart Rate: 60  Heart Rate Source: Monitor  BP: 98/64  BP Location: Left upper arm  BP Method: Automatic  Patient Position: Semi fowlers  MAP (Calculated): 75  Resp: 18  SpO2: 93 %  O2 Device: Nasal cannula  Comment: 1.5 L           Safety Devices  Type of Devices: Call light within reach;Gait belt;Nurse notified; Left in chair;Patient at risk for falls; All fall risk precautions in place; Bed alarm in place; Left in bed  Restraints  Restraints Initially in Place: No        ADL  Feeding: Independent  Grooming: Supervision;Setup  Grooming Skilled Clinical Factors: seated  UE Dressing: Stand by assistance  UE Dressing Skilled Clinical Factors: for gown  LE Dressing: Moderate assistance  LE Dressing Skilled Clinical Factors: R sock  Toileting: Setup  Toileting Skilled Clinical Factors: urinal     Activity Tolerance  Activity Tolerance: Patient tolerated evaluation without incident;Patient limited by pain; Patient limited by endurance  Bed mobility  Supine to Sit: Contact guard assistance (to guide RLE to side of bed)  Sit to Supine: Minimal assistance (to lift RLE onto bed)  Bed Mobility Comments: Educated on no SLR during bed mobility  Transfers  Stand Pivot Transfers: Contact guard assistance (RW)  Sit to stand: Stand by assistance  Stand to sit: Stand by assistance  Transfer Comments: Pt ambulated to doorway of bathroom with RW and SBA/CGA. He returned to bed. Pt declined ambulation to toilet d/t fatigue. Cognition  Overall Cognitive Status: WFL  Orientation  Overall Orientation Status: Within Normal Limits  Orientation Level: Oriented X4      Education Given To: Patient  Education Provided: Role of Therapy;Transfer Training;Equipment;Plan of Care;Precautions; ADL Adaptive Strategies; Fall Prevention Strategies  Education Method: Verbal  Education Outcome: Verbalized understanding;Continued education needed   Disease Specific Education: Pt educated on weight bearing status, post-op precautions, appropriate DME, and safe mobility with AD. Pt verbalized understanding  Patient Educated in safety with car transfers and  dispensed instruction on car transfers with use of assistive device with patient demonstrating:  [x] Verbalizing understanding of appropriate technique, maintaining any ordered precautions for car transfer training s/p TJR  [] [place] with simulated car transfer with walker  [x] He/she requires CGA and will have someone at discharge to help with transfers with patient verbalizing understanding of any ordered TJR precautions employing appropriate technique. [] Other: Educated patient in written car transfer instruction with patient verbalizing understanding of appropriate techniques. AM-PAC Score      AM-PAC Inpatient Daily Activity Raw Score: 19 (01/26/23 1207)  AM-PAC Inpatient ADL T-Scale Score : 40.22 (01/26/23 1207)  ADL Inpatient CMS 0-100% Score: 42.8 (01/26/23 1207)  ADL Inpatient CMS G-Code Modifier : CK (01/26/23 1207)     Goals  Short Term Goals  Time Frame for Short Term Goals: 1 week (2/2) unless noted  Short Term Goal 1: Perform bathroom mobility with SBA and RW  Short Term Goal 2: Perform toilet transfer with SBA and RW  Short Term Goal 3: Perform LE dressing with min A by 1/28  Short Term Goal 4: Report understanding of safe car transfer technique after instruction-goal met 1/26  Patient Goals   Patient goals : \"Be able to walk to the door with the walker. \"---goal met for walking to doorway of bathroom with SBA/CGA and RW 1/26     Therapy Time   Individual Concurrent Group Co-treatment   Time In 0804         Time Out 0844         Minutes 40         Timed Code Treatment Minutes: 30 Minutes (10 min eval)   If pt is discharged prior to next OT session, this note will serve as the discharge summary.   Padmini Bustamante OT

## 2023-01-26 NOTE — CARE COORDINATION
[facility]    Referral received from  to follow for home care services. I will follow for needs, and speak with patient to verify demos. Former[facility]    DC order noted, all docs needed have been faxed to [facility] for home care services.     Home care to see patient within 24-48 hrs    Juli Barr RN, BSN CTN  [facility] [phone]

## 2023-01-26 NOTE — PLAN OF CARE
Problem: Chronic Conditions and Co-morbidities  Goal: Patient's chronic conditions and co-morbidity symptoms are monitored and maintained or improved  1/26/2023 1421 by Suzanne Evans RN  Outcome: Progressing  Flowsheets (Taken 1/26/2023 2914)  Care Plan - Patient's Chronic Conditions and Co-Morbidity Symptoms are Monitored and Maintained or Improved: Monitor and assess patient's chronic conditions and comorbid symptoms for stability, deterioration, or improvement  1/26/2023 0204 by Kiko Latif RN  Outcome: Progressing     Problem: Discharge Planning  Goal: Discharge to home or other facility with appropriate resources  1/26/2023 1421 by Suzanne Evans RN  Outcome: Progressing  Flowsheets (Taken 1/26/2023 5121)  Discharge to home or other facility with appropriate resources: Identify barriers to discharge with patient and caregiver       Problem: Safety - Adult  Goal: Free from fall injury  1/26/2023 1421 by Suzanne Evans RN  Outcome: Progressing       Problem: Pain  Goal: Verbalizes/displays adequate comfort level or baseline comfort level  1/26/2023 1421 by Suzanne Evans RN  Outcome: Progressing  Flowsheets (Taken 1/26/2023 9544)  Verbalizes/displays adequate comfort level or baseline comfort level: Assess pain using appropriate pain scale

## 2023-01-26 NOTE — DISCHARGE SUMMARY
Department of Orthopedic Surgery  Physician Assistant   Discharge Summary      The Fariha Moore is a 63 y.o. male underwent total hip replacement procedure without complication. Fariha Moore was admitted to the floor following their recovery in the PACU.      Discharge Diagnosis  right Hip Replacement    Current Inpatient Medications    Current Facility-Administered Medications: ipratropium-albuterol (DUONEB) nebulizer solution 1 ampule, 1 ampule, Inhalation, Q4H PRN  methocarbamol (ROBAXIN) tablet 500 mg, 500 mg, Oral, 4x Daily  oxyCODONE (ROXICODONE) immediate release tablet 5 mg, 5 mg, Oral, Q4H PRN **OR** oxyCODONE (ROXICODONE) immediate release tablet 10 mg, 10 mg, Oral, Q4H PRN  polyethylene glycol (GLYCOLAX) packet 17 g, 17 g, Oral, Daily  sodium chloride flush 0.9 % injection 5-40 mL, 5-40 mL, IntraVENous, 2 times per day  sodium chloride flush 0.9 % injection 5-40 mL, 5-40 mL, IntraVENous, PRN  0.9 % sodium chloride infusion, , IntraVENous, PRN  acetaminophen (TYLENOL) tablet 650 mg, 650 mg, Oral, Q6H  ondansetron (ZOFRAN-ODT) disintegrating tablet 4 mg, 4 mg, Oral, Q8H PRN **OR** ondansetron (ZOFRAN) injection 4 mg, 4 mg, IntraVENous, Q6H PRN  aspirin EC tablet 325 mg, 325 mg, Oral, Daily  albuterol sulfate HFA (PROVENTIL;VENTOLIN;PROAIR) 108 (90 Base) MCG/ACT inhaler 1 puff, 1 puff, Inhalation, Q6H PRN  atorvastatin (LIPITOR) tablet 40 mg, 40 mg, Oral, Daily  cetirizine (ZYRTEC) tablet 10 mg, 10 mg, Oral, Daily  clopidogrel (PLAVIX) tablet 75 mg, 75 mg, Oral, Daily  dicyclomine (BENTYL) tablet 20 mg, 20 mg, Oral, Q6H PRN  divalproex (DEPAKOTE) DR tablet 500 mg, 500 mg, Oral, Daily  ferrous sulfate (IRON 325) tablet 325 mg, 325 mg, Oral, Daily with breakfast  fluticasone (FLONASE) 50 MCG/ACT nasal spray 1 spray, 1 spray, Nasal, Daily  gabapentin (NEURONTIN) capsule 400 mg, 400 mg, Oral, TID  montelukast (SINGULAIR) tablet 10 mg, 10 mg, Oral, Nightly  pantoprazole (PROTONIX) tablet 40 mg, 40 mg, Oral, QAM AC  QUEtiapine (SEROQUEL) tablet 300 mg, 300 mg, Oral, Nightly  tamsulosin (FLOMAX) capsule 0.4 mg, 0.4 mg, Oral, Daily  tiZANidine (ZANAFLEX) tablet 4 mg, 4 mg, Oral, Q8H PRN  ipratropium-albuterol (DUONEB) nebulizer solution 3 mL, 1 vial, Nebulization, Q4H WA    Post-operatively the patients diet was advanced as tolerated and their incision was checked on POD #1. The incision is dressing in place, clean, dry, and intact with no signs of infection. The patient remained neurovascularly intact in the lower extremity and had intact pulses distally. Patients calf remained soft and showed no evidence of DVT. The patient was able to move their leg and ankle/foot without any problems post-operatively. Physical therapy and occupational therapy were consulted and began working with the patient post-operatively. The patient progressed with PT/OT as would be expected and continued to improve through their stay. The patients pain was initially controlled with IV medications but we were able to transition to oral pain medications soon after arrival to the floor and their pain remained under good control through their hospital stay. From a medical standpoint the patient remained stable and continued to have the medicine team follow throughout their stay. The patients dressing was changed/incision was checked on day of d/c. The patient will be discharged at this time to Home  with their current diet restrictions and will continue to follow the hip precautions outlined to them by us and PT/OT. Condition on Discharge: Stable    Plan  Return visit in 2 weeks. .  Patient was instructed on the use of pain medications, the signs and symptoms of infection, and was given our number to call should they have any questions or concerns following discharge. For opioid prescriptions given at discharge the following statement is provided for compliance with [employer] rules.   Patient being given increased dosage/quantity of opoid pain medication in excess of [employer] guidelines which noted a 30 MED daily of opioids due to the fact that he/she has undergone major orthopaedic surgery as outlined in rule 4731-11-13. Dosages and further duration of the pain medication will be re-evaluated at her post op visit in 2 weeks. Patient was educated on dosing expectations and limits of prescribing as a result of the new [facility] Board rules enacted August 31, 2017. Please also note that this is not the initial opoid prescription issued to this patient but a continuation of medication utilized during the hospital admission as noted in the medical record. [place]RS report has also been utilized to screen for any abuse history or suspicious activity as outlined in [place]. All efforts have been taken to prevent abuse potential and misuse of opioid medications including education, screening, and close clinical follow up.

## 2023-01-26 NOTE — PROGRESS NOTES
01/26/23 0734   Oxygen Therapy/Pulse Ox   O2 Therapy Room air   O2 Device None (Room air)   SpO2 100 %

## 2023-01-26 NOTE — PROGRESS NOTES
Physical Therapy  Facility/Department: [place] [id number] - MED SURG/ORTHO  Physical Therapy Initial Assessment/Treatment    Name: Maryam Echevarria  : 1960  MRN: [id number]  Date of Service: 2023    Discharge Recommendations:  Outpatient PT, Home with Home health PT   PT Equipment Recommendations  Equipment Needed: Yes  Mobility Devices: V[place] Don[place]: Rolling      Patient Diagnosis(es): The encounter diagnosis was Status post total replacement of right hip. Past Medical History:  has a past medical history of AAA (abdominal aortic aneurysm), Anxiety, Arterial stent thrombosis ([facility] [address] Arthritis, Asthma, Bipolar 1 disorder ([facility] [address] COPD (chronic obstructive pulmonary disease) ([facility] [address] Coronary artery disease involving native coronary artery of native heart without angina pectoris, Diabetes mellitus ([facility] [address] Essential hypertension, Hyperlipidemia, and Pneumonia. Past Surgical History:  has a past surgical history that includes Knee Arthroplasty; Cholecystectomy, laparoscopic; Carpal tunnel release; Nasal sinus surgery; Upper gastrointestinal endoscopy (11); Cardiac surgery; and Upper gastrointestinal endoscopy (N/A, 2019). Assessment   Body Structures, Functions, Activity Limitations Requiring Skilled Therapeutic Intervention: Decreased functional mobility ; Decreased endurance; Increased pain;Decreased safe awareness  Assessment: Pt [facility] to [facility] with R MI. Pt reports that he lives alone in an apartment with 3 curb steps to enter and was independent with all ADLs, tranfers and ambulated with a rollator occassionally. Pt is currently performing all bed mobility with supervision, transfers with SBA and was able to ambulate 20 ft with RW and CGA. He complained of lightheadedness with position changes and when he was ambulating but his BP remained stable. He was unable to negotiate curb steps due to complaints of lightheadedness and would need to be able to do so in order to go home safely.  Pt would benefit from continued skilled therapy in order to address functional deficits and enhance his independence. Recommend home with prn A and HHPT upon d/c  Treatment Diagnosis: impaired functional mobility  Therapy Prognosis: Good  Decision Making: Low Complexity  Requires PT Follow-Up: Yes  Activity Tolerance  Activity Tolerance: Patient tolerated evaluation without incident     Plan   Physcial Therapy Plan  General Plan: 2 times a day 7 days a week  Current Treatment Recommendations: Strengthening, ROM, Balance training, Functional mobility training, Transfer training, Endurance training, Gait training, Stair training, Safety education & training, Home exercise program, Therapeutic activities  Safety Devices  Type of Devices: Call light within reach, Chair alarm in place, Gait belt, Nurse notified, Left in chair, Patient at risk for falls  Restraints  Restraints Initially in Place: No     Restrictions  Restrictions/Precautions  Restrictions/Precautions: Weight Bearing, Fall Risk  Lower Extremity Weight Bearing Restrictions  Right Lower Extremity Weight Bearing: Weight Bearing As Tolerated  Position Activity Restriction  Hip Precautions: Anterior hip precautions     Subjective   Pain: Pt reports R hip pain 6/10 at rest  General  Chart Reviewed: Yes  Patient assessed for rehabilitation services?: Yes  Family / Caregiver Present: No  Referring Practitioner: Faby Frederick MD  Referral Date : 01/25/23  Diagnosis: R MI  Follows Commands: Within Functional Limits  General Comment  Comments: RN cleared pt for PT eval  Subjective  Subjective: Pt was supine in bed when arrived. Agreed to participate in PT evaluation. Social/Functional History  Social/Functional History  Lives With: Alone  Type of Home: Apartment  Home Layout: One level  Home Access: Stairs to enter without rails  Entrance Stairs - Number of Steps: 1+1+1 (distance between steps) Pt walks on grass to get to his apartment.   Bathroom Shower/Tub: Tub/Shower unit  Bathroom Toilet: Standard  Bathroom Equipment:  (none)  Home Equipment: Rollator (hurrycane)  Has the patient had two or more falls in the past year or any fall with injury in the past year?: Yes Jose Finley out of bed 2 months ago)  ADL Assistance: Independent  Homemaking Assistance: Independent  Ambulation Assistance: Independent (Uses rollator occassionally)  Transfer Assistance: Independent  Active : Yes  Occupation: On disability  Additional Comments: does not have family or friends to help at home    Cognition   Orientation  Overall Orientation Status: Within Normal Limits  Orientation Level: Oriented X4  Cognition  Overall Cognitive Status: WFL     Objective   Heart Rate: 60  Heart Rate Source: Monitor  BP: 98/64  BP Location: Left upper arm  BP Method: Automatic  Patient Position: Semi fowlers  MAP (Calculated): 75  Resp: 18  SpO2: 94 % 2L  O2 Device: Nasal cannula  Sitting: BP: 101/68, HR: 84, complained of lightheadedness  After ambulation: BP: 97/61, HR: 68, complained of lightheadedness with ambulation     Gross Assessment  AROM: Generally decreased, functional (RLE limited due to pain)  Strength: Generally decreased, functional (RLE grossly limited due to pain)  Sensation: Intact     Bed Mobility Training  Bed Mobility Training: Yes  Overall Level of Assistance: Supervision; Additional time  Interventions: Safety awareness training;Verbal cues  Supine to Sit: Supervision; Additional time (with HOB raised, use of handrail)  Balance  Sitting: Intact  Standing: Impaired  Standing - Static: Constant support; Fair  Standing - Dynamic: Constant support; Fair  Transfer Training  Transfer Training: Yes  Interventions: Safety awareness training;Verbal cues  Sit to Stand: Stand-by assistance; Additional time; Adaptive equipment;Assist X1 (with RW, VC on hand placement)  Stand to Sit: Adaptive equipment;Stand-by assistance; Additional time;Assist X1 (with RW, VC on hand placement)  Gait Training  Gait Training: Yes  Right Side Weight Bearing: As tolerated  Gait  Overall Level of Assistance: Contact-guard assistance;Stand-by assistance; Additional time; Adaptive equipment;Assist X1 (with RW, VC on sequencing)  Interventions: Verbal cues; Safety awareness training  Base of Support: Narrowed  Speed/Ana: Slow  Step Length: Right shortened  Stance: Right decreased  Gait Abnormalities: Antalgic; Step to gait  Distance (ft): 20 Feet  Assistive Device: Walker, rolling     Exercise Treatment: Pt performed supine ankle pumps, quad sets, glute sets, heel slides 10 reps RLE and sitting LAQ x10 reps RLE    AM-PAC Score  AM-PAC Inpatient Mobility Raw Score : 18 (01/26/23 1011)  AM-PAC Inpatient T-Scale Score : 43.63 (01/26/23 1011)  Mobility Inpatient CMS 0-100% Score: 46.58 (01/26/23 1011)  Mobility Inpatient CMS G-Code Modifier : CK (01/26/23 1011)    Goals  Short Term Goals  Time Frame for Short Term Goals: 3 days (1/29)  Short Term Goal 1: Pt will perform all transfers with RW and supervision  Short Term Goal 2: Pt will ambulate 50 ft with RW and SBA  Short Term Goal 3: Pt will negotiate 3 curb steps with RW and SBA  Patient Goals   Patient Goals : \"to walk with my walker with a little help\"     Education  Patient Education  Education Given To: Patient  Education Provided: Role of Therapy;Plan of Care;Home Exercise Program;Precautions;Transfer Training  Education Method: Verbal  Barriers to Learning: None  Education Outcome: Verbalized understanding;Demonstrated understanding    Patient Educated in safety with car transfers and  dispensed instruction on car transfers with use of assistive device with patient demonstrating:  [x] Verbalizing understanding of appropriate technique, maintaining any ordered precautions for car transfer training s/p TJR  [] [place] with simulated car transfer with walker  [] He/she requires assist and will have someone at discharge to help with transfers with patient verbalizing understanding of any ordered TJR precautions employing appropriate technique. [] Other: Educated patient in written car transfer instruction with patient verbalizing understanding of appropriate techniques. Therapy Time   Individual Concurrent Group Co-treatment   Time In 0907         Time Out 0948         Minutes 41         Timed Code Treatment Minutes: 31 Minutes (10 min eval)     If pt is unable to be seen after this session, please let this note serve as discharge summary. Please see case management note for discharge disposition. Thank you.     Real Dill, PT   [facility] [place]

## 2023-01-26 NOTE — PROGRESS NOTES
Patient ambulatory x1 with roller walker and gait belt. Instructed on how to maintain proper hip precautions to avoid injury of the surgical site. Able to ambulate to and from bathroom and tolerated well.

## 2023-01-26 NOTE — RT PROTOCOL NOTE
RT Inhaler-Nebulizer Bronchodilator Protocol Note    There is a bronchodilator order in the chart from a provider indicating to follow the RT Bronchodilator Protocol and there is an Initiate RT Inhaler-Nebulizer Bronchodilator Protocol order as well (see protocol at bottom of note). CXR Findings:  No results found. The findings from the last RT Protocol Assessment were as follows:   History Pulmonary Disease: Chronic pulmonary disease  Respiratory Pattern: Regular pattern and RR 12-20 bpm  Breath Sounds: Slightly diminished and/or crackles  Cough: Strong, spontaneous, non-productive  Indication for Bronchodilator Therapy:    Bronchodilator Assessment Score: 4    Aerosolized bronchodilator medication orders have been revised according to the RT Inhaler-Nebulizer Bronchodilator Protocol below. Respiratory Therapist to perform RT Therapy Protocol Assessment initially then follow the protocol. Repeat RT Therapy Protocol Assessment PRN for score 0-3 or on second treatment, BID, and PRN for scores above 3. No Indications - adjust the frequency to every 6 hours PRN wheezing or bronchospasm, if no treatments needed after 48 hours then discontinue using Per Protocol order mode. If indication present, adjust the RT bronchodilator orders based on the Bronchodilator Assessment Score as indicated below. Use Inhaler orders unless patient has one or more of the following: on home nebulizer, not able to hold breath for 10 seconds, is not alert and oriented, cannot activate and use MDI correctly, or respiratory rate 25 breaths per minute or more, then use the equivalent nebulizer order(s) with same Frequency and PRN reasons based on the score. If a patient is on this medication at home then do not decrease Frequency below that used at home.     0-3 - enter or revise RT bronchodilator order(s) to equivalent RT Bronchodilator order with Frequency of every 4 hours PRN for wheezing or increased work of breathing using Per Protocol order mode. 4-6 - enter or revise RT Bronchodilator order(s) to two equivalent RT bronchodilator orders with one order with BID Frequency and one order with Frequency of every 4 hours PRN wheezing or increased work of breathing using Per Protocol order mode. 7-10 - enter or revise RT Bronchodilator order(s) to two equivalent RT bronchodilator orders with one order with TID Frequency and one order with Frequency of every 4 hours PRN wheezing or increased work of breathing using Per Protocol order mode. 11-13 - enter or revise RT Bronchodilator order(s) to one equivalent RT bronchodilator order with QID Frequency and an Albuterol order with Frequency of every 4 hours PRN wheezing or increased work of breathing using Per Protocol order mode. Greater than 13 - enter or revise RT Bronchodilator order(s) to one equivalent RT bronchodilator order with every 4 hours Frequency and an Albuterol order with Frequency of every 2 hours PRN wheezing or increased work of breathing using Per Protocol order mode. RT to enter RT Home Evaluation for COPD & MDI Assessment order using Per Protocol order mode.     Electronically signed by Edgar Farias RCP on 1/25/2023 at 10:33 PM

## 2023-01-26 NOTE — CARE COORDINATION
Writer huddled with primary RN and Kate/Sammy PA, plan for home with home care, will need cab home. Writer attempted to assess pt, sleeping soundly, did not wake to name. Writer gave referral to [facility], they can service for home care. ABISAI Nuñez     2160 Addendum:  Therapy working with pt now, they had previously recommended tub transfer bench and rolling walker. Writer gave referral to [facility], DME ordered. ABISAI Nuñez     9334 Addendum:  Writer met with pt at bedside while working with Jennifer/PT, he states he is leaving tomorrow and would like cab home. Pt has rollator at bedside, but not safe at this time per PT, they explained need for rolling walker with 2wheels only. Pt has no agency preferences, agrees with referrals to [facility] and [facility].   ABISAI Nuñez

## 2023-01-27 VITALS
HEART RATE: 78 BPM | WEIGHT: 175 LBS | TEMPERATURE: 97.9 F | SYSTOLIC BLOOD PRESSURE: 105 MMHG | HEIGHT: 69 IN | OXYGEN SATURATION: 93 % | BODY MASS INDEX: 25.92 KG/M2 | DIASTOLIC BLOOD PRESSURE: 51 MMHG | RESPIRATION RATE: 18 BRPM

## 2023-01-27 PROCEDURE — 6370000000 HC RX 637 (ALT 250 FOR IP): Performed by: ORTHOPAEDIC SURGERY

## 2023-01-27 PROCEDURE — 97530 THERAPEUTIC ACTIVITIES: CPT

## 2023-01-27 PROCEDURE — 6370000000 HC RX 637 (ALT 250 FOR IP): Performed by: SPECIALIST/TECHNOLOGIST

## 2023-01-27 PROCEDURE — 97535 SELF CARE MNGMENT TRAINING: CPT

## 2023-01-27 PROCEDURE — 94761 N-INVAS EAR/PLS OXIMETRY MLT: CPT

## 2023-01-27 PROCEDURE — 97110 THERAPEUTIC EXERCISES: CPT

## 2023-01-27 PROCEDURE — 94640 AIRWAY INHALATION TREATMENT: CPT

## 2023-01-27 PROCEDURE — G0378 HOSPITAL OBSERVATION PER HR: HCPCS

## 2023-01-27 PROCEDURE — 2700000000 HC OXYGEN THERAPY PER DAY

## 2023-01-27 PROCEDURE — 99024 POSTOP FOLLOW-UP VISIT: CPT | Performed by: SPECIALIST/TECHNOLOGIST

## 2023-01-27 PROCEDURE — APPNB60 APP NON BILLABLE TIME 46-60 MINS: Performed by: SPECIALIST/TECHNOLOGIST

## 2023-01-27 PROCEDURE — 97116 GAIT TRAINING THERAPY: CPT

## 2023-01-27 PROCEDURE — 2580000003 HC RX 258: Performed by: ORTHOPAEDIC SURGERY

## 2023-01-27 RX ORDER — METHOCARBAMOL 500 MG/1
500 TABLET, FILM COATED ORAL 4 TIMES DAILY
Status: DISCONTINUED | OUTPATIENT
Start: 2023-01-27 | End: 2023-01-27 | Stop reason: HOSPADM

## 2023-01-27 RX ORDER — METHOCARBAMOL 500 MG/1
500 TABLET, FILM COATED ORAL 4 TIMES DAILY
Qty: 40 TABLET | Refills: 0 | Status: SHIPPED | OUTPATIENT
Start: 2023-01-27 | End: 2023-02-06

## 2023-01-27 RX ORDER — IPRATROPIUM BROMIDE AND ALBUTEROL SULFATE 2.5; .5 MG/3ML; MG/3ML
1 SOLUTION RESPIRATORY (INHALATION) EVERY 4 HOURS PRN
Status: DISCONTINUED | OUTPATIENT
Start: 2023-01-27 | End: 2023-01-27 | Stop reason: HOSPADM

## 2023-01-27 RX ADMIN — GABAPENTIN 400 MG: 400 CAPSULE ORAL at 09:16

## 2023-01-27 RX ADMIN — FERROUS SULFATE TAB 325 MG (65 MG ELEMENTAL FE) 325 MG: 325 (65 FE) TAB at 09:16

## 2023-01-27 RX ADMIN — TAMSULOSIN HYDROCHLORIDE 0.4 MG: 0.4 CAPSULE ORAL at 09:16

## 2023-01-27 RX ADMIN — ASPIRIN 325 MG: 325 TABLET, COATED ORAL at 09:16

## 2023-01-27 RX ADMIN — IPRATROPIUM BROMIDE AND ALBUTEROL SULFATE 1 AMPULE: .5; 2.5 SOLUTION RESPIRATORY (INHALATION) at 00:30

## 2023-01-27 RX ADMIN — GABAPENTIN 400 MG: 400 CAPSULE ORAL at 14:28

## 2023-01-27 RX ADMIN — IPRATROPIUM BROMIDE AND ALBUTEROL SULFATE 3 ML: .5; 2.5 SOLUTION RESPIRATORY (INHALATION) at 07:41

## 2023-01-27 RX ADMIN — DIVALPROEX SODIUM 500 MG: 250 TABLET, DELAYED RELEASE ORAL at 09:15

## 2023-01-27 RX ADMIN — ACETAMINOPHEN 325MG 650 MG: 325 TABLET ORAL at 11:25

## 2023-01-27 RX ADMIN — PANTOPRAZOLE SODIUM 40 MG: 40 TABLET, DELAYED RELEASE ORAL at 06:10

## 2023-01-27 RX ADMIN — ATORVASTATIN CALCIUM 40 MG: 40 TABLET, FILM COATED ORAL at 09:16

## 2023-01-27 RX ADMIN — CLOPIDOGREL BISULFATE 75 MG: 75 TABLET ORAL at 09:16

## 2023-01-27 RX ADMIN — CETIRIZINE HYDROCHLORIDE 10 MG: 10 TABLET, FILM COATED ORAL at 09:15

## 2023-01-27 RX ADMIN — METHOCARBAMOL 500 MG: 500 TABLET ORAL at 16:52

## 2023-01-27 RX ADMIN — ACETAMINOPHEN 325MG 650 MG: 325 TABLET ORAL at 16:52

## 2023-01-27 RX ADMIN — OXYCODONE HYDROCHLORIDE 10 MG: 5 TABLET ORAL at 11:25

## 2023-01-27 RX ADMIN — IPRATROPIUM BROMIDE AND ALBUTEROL SULFATE 3 ML: .5; 2.5 SOLUTION RESPIRATORY (INHALATION) at 12:39

## 2023-01-27 RX ADMIN — ACETAMINOPHEN 325MG 650 MG: 325 TABLET ORAL at 06:10

## 2023-01-27 RX ADMIN — IPRATROPIUM BROMIDE AND ALBUTEROL SULFATE 3 ML: .5; 2.5 SOLUTION RESPIRATORY (INHALATION) at 16:41

## 2023-01-27 RX ADMIN — FLUTICASONE PROPIONATE 1 SPRAY: 50 SPRAY, METERED NASAL at 09:16

## 2023-01-27 ASSESSMENT — PAIN SCALES - GENERAL
PAINLEVEL_OUTOF10: 8
PAINLEVEL_OUTOF10: 7

## 2023-01-27 NOTE — PROGRESS NOTES
Occupational Therapy  Facility/Department: [place] [address] - MED SURG/ORTHO  Occupational Therapy Daily Treatment Note and Discharge Summary    Name: Consuelo Jean  : 1960  MRN: [id number]  Date of Service: 2023    Discharge Recommendations:  Home with assist PRN, Home with Home health OT  OT Equipment Recommendations  Other: Tub transfer bench would promote safety when bathing and reduce risk of falls during tub transfer. Pt unable to lift RLE over tub edge in order to safely transfer in/out. Patient Diagnosis(es): The encounter diagnosis was Status post total replacement of right hip. Past Medical History:  has a past medical history of AAA (abdominal aortic aneurysm), Anxiety, Arterial stent thrombosis ([facility] [address] Arthritis, Asthma, Bipolar 1 disorder ([facility] [address] COPD (chronic obstructive pulmonary disease) ([facility] [address] Coronary artery disease involving native coronary artery of native heart without angina pectoris, Diabetes mellitus ([facility] [address] Essential hypertension, Hyperlipidemia, and Pneumonia. Past Surgical History:  has a past surgical history that includes Knee Arthroplasty; Cholecystectomy, laparoscopic; Carpal tunnel release; Nasal sinus surgery; Upper gastrointestinal endoscopy (11); Cardiac surgery; Upper gastrointestinal endoscopy (N/A, 2019); and Total hip arthroplasty (Right, 2023). Assessment   Assessment: Pt has met OT goals for acute care. He is Mod I for functional transfers to toilet/tub with tub bench and chair with RW. Tub transfer bench recommended for home. Pt instructed on LE dressing with adapt equip (reacher and sock aid). Pt able to don LE clothing with SBA with adapt equip. He planned to obtain these items on his own. Recommend HHOT for skilled OT to improve ADLs and mobility in home environment.   Activity Tolerance  Activity Tolerance: Patient Tolerated treatment well        Plan   Occupational Therapy Plan  Times Per Week: no further acute care OT  Current Treatment Recommendations: Self-Care / ADL, Equipment evaluation, education, & procurement, Patient/Caregiver education & training, Functional mobility training, Safety education & training     Restrictions  Restrictions/Precautions  Restrictions/Precautions: Weight Bearing, Fall Risk  Lower Extremity Weight Bearing Restrictions  Right Lower Extremity Weight Bearing: Weight Bearing As Tolerated  Position Activity Restriction  Hip Precautions: No hip external rotation, No hip extension, No ADduction  Other position/activity restrictions: anterolateral precautions    Subjective   General  Chart Reviewed: Yes, Orders, Progress Notes, Operative Notes  Patient assessed for rehabilitation services?: Yes  Family / Caregiver Present: No  Referring Practitioner: GREGORIO Lopez  Diagnosis: R OA s/p R THR anterior approach 1/25/23  Subjective  Subjective: Pt agreeable to OT. Reports 4/10 pain in R hip. Pt able to participate in therapy. General Comment  Comments: RN approved therapy      Objective   Heart Rate: 93  Heart Rate Source: Monitor  BP: 102/65  BP Location: Left upper arm  BP Method: Automatic  Patient Position: Semi fowlers  MAP (Calculated): 77  Resp: 18  SpO2: 93 %  O2 Device: None (Room air)         Safety Devices  Type of Devices: Nurse notified;Call light within reach; All fall risk precautions in place;Gait belt;Bed alarm in place; Left in bed    Toilet Transfers  Toilet - Technique: Ambulating (rw)  Equipment Used: Grab bars  Toilet Transfer: Modified independent  Tub Transfers  Tub - Transfer From: Wheelchair  Tub - Transfer Type: To and From  Tub - Transfer To: Transfer tub bench  Tub - Technique: Ambulating (rw)  Tub Transfers: Modified independence  Tub Transfers Comments: Tub transfer bench recommended for home as pt is unable to lift RLE over tub edge. He does not have grab bars at home.      ADL  Feeding: Independent  Grooming: Modified independent   Grooming Skilled Clinical Factors: stood at sink with RW for 3 grooming tasks  UE Bathing: Independent  LE Bathing: Stand by assistance  LE Bathing Skilled Clinical Factors: sponge bathing  UE Dressing: Independent  LE Dressing: Stand by assistance  LE Dressing Skilled Clinical Factors: Pt able to don bottoms and socks with sock aid and reacher. Pt instructed on alternate method of donning pants with use of cane to extend reach in case he is unable to obtain reacher. Information provided to pt on how to obtain reacher, sock aid and lh sponge. Pt planned to obtain these items on his own. Toileting: Independent  Toileting Skilled Clinical Factors: managed pericare and clothing     Activity Tolerance  Activity Tolerance: Patient limited by pain; Patient limited by endurance; Patient tolerated treatment well  Bed mobility  Bed Mobility Comments: Pt was OOB at time of tx  Transfers  Stand Pivot Transfers: Modified independent (RW)  Sit to stand: Modified independent  Stand to sit: Modified independent  Transfer Comments: Mod I for BR mobility with RW     Cognition  Overall Cognitive Status: WFL  Orientation  Overall Orientation Status: Within Functional Limits      Education Given To: Patient  Education Provided: Role of Therapy;Transfer Training;Equipment;Plan of Care;IADL Safety; Fall Prevention Strategies; ADL Adaptive Strategies;Precautions  Education Method: Verbal;Demonstration;Printed Information/Hand-outs  Education Outcome: Verbalized understanding;Demonstrated understanding   Disease Specific Education: Pt educated on weight bearing status, post-op precautions, appropriate DME, and safe mobility with AD. Pt verbalized understanding      Car Transfers  Car Transfers: Education provided on safe car transfer technique. Pt reports understanding of instructions.    Patient Educated in safety with car transfers and  dispensed instruction on car transfers with use of assistive device with patient demonstrating:  [x] Verbalizing understanding of appropriate technique, maintaining any ordered precautions for car transfer training s/p TJR  [x] [place] with simulated car transfer with walker  [x] He/she requires  Mod I/Supv and will have someone at discharge to help with transfers with patient verbalizing understanding of any ordered TJR precautions employing appropriate technique. [x] Other: Educated patient in written car transfer instruction with patient verbalizing understanding of appropriate techniques. AM-PAC Score      AM-PAC Inpatient Daily Activity Raw Score: 22 (01/27/23 1222)  AM-PAC Inpatient ADL T-Scale Score : 47.1 (01/27/23 1222)  ADL Inpatient CMS 0-100% Score: 25.8 (01/27/23 1222)  ADL Inpatient CMS G-Code Modifier : CJ (01/27/23 1222)    Goals  Short Term Goals  Time Frame for Short Term Goals: 1 week (2/2) unless noted  Short Term Goal 1: Perform bathroom mobility with SBA and RW-goal met and exceeded 1/27  Short Term Goal 2: Perform toilet transfer with SBA and RW-goal met and exceeded 1/27  Short Term Goal 3: Perform LE dressing with min A by 1/28-goal met 1/27  Short Term Goal 4: Report understanding of safe car transfer technique after instruction-goal met 1/26 and 1/27  Short Term Goal 5: Perform tub transfers with tub transfer bench with Mod I--goal met 1/27  Patient Goals   Patient goals : \"Be able to walk to the door with the walker. \"---goal met 1/27     Therapy Time   Individual Concurrent Group Co-treatment   Time In 0954         Time Out 1032         Minutes 38         Timed Code Treatment Minutes: 45 Minutes   If pt is discharged prior to next OT session, this note will serve as the discharge summary.   Randal Alejandro OT

## 2023-01-27 NOTE — PROGRESS NOTES
Physical Therapy  Facility/Department: [facility] - MED SURG/ORTHO  Daily Treatment Note  NAME: Carmen Head  : 1960  MRN: [id number]    Date of Service: 2023    Discharge Recommendations:  Outpatient PT, Home with Home health PT   PT Equipment Recommendations  Equipment Needed: Yes  Mobility Devices: Tamy Organ: Rolling  Other: Pt would benefit from a rolling walker at this time due to the weight bearing restrictions on his RLE, pain and to enhance safety with ambulation and transfers. [facility] 6 Clicks Inpatient Mobility:  AM-PAC Mobility Inpatient   How much difficulty turning over in bed?: None  How much difficulty sitting down on / standing up from a chair with arms?: None  How much difficulty moving from lying on back to sitting on side of bed?: None  How much help from another person moving to and from a bed to a chair?: None  How much help from another person needed to walk in hospital room?: None  How much help from another person for climbing 3-5 steps with a railing?: A Little  AM-[facility] Inpatient Mobility Raw Score : 23  AM-PAC Inpatient T-Scale Score : 56.93  Mobility Inpatient CMS 0-100% Score: 11.2  Mobility Inpatient CMS G-Code Modifier : CI    Patient Diagnosis(es): The encounter diagnosis was Status post total replacement of right hip. Assessment   Assessment: Pt demonstrating good improvement with transfers and ambulation this date. Pt grossly supervision for all mobility. Pt able to negotiate curb step x2 trials with RW and supervision. Pt safe to return home with therapy. Activity Tolerance: Patient limited by pain; Patient limited by endurance; Patient tolerated treatment well  Equipment Needed: Yes  Mobility Devices: [place]  Other: Pt would benefit from a rolling walker at this time due to the weight bearing restrictions on his RLE, pain and to enhance safety with ambulation and transfers.      Plan    Physcial Therapy Plan  General Plan: 2 times a day 7 days a week  Current Treatment Recommendations: Strengthening;ROM;Balance training;Functional mobility training;Transfer training; Endurance training;Gait training;Stair training; Safety education & training;Home exercise program;Therapeutic activities     Restrictions  Restrictions/Precautions  Restrictions/Precautions: Weight Bearing, Fall Risk  Lower Extremity Weight Bearing Restrictions  Right Lower Extremity Weight Bearing: Weight Bearing As Tolerated  Position Activity Restriction  Hip Precautions: No hip external rotation, No hip extension, No ADduction  Other position/activity restrictions: anterolateral precautions     Subjective    Subjective  Subjective: Pt agreeable to therapy. Pain: Reports 7/10 pain in R hip     Objective   Vitals  Heart Rate: 93  BP: 102/65  MAP (Calculated): 77  SpO2: 93 %  Bed Mobility Training  Bed Mobility Training: Yes  Overall Level of Assistance: Supervision  Supine to Sit: Supervision  Balance  Sitting: Intact  Standing: Impaired  Standing - Static: Constant support;Good  Standing - Dynamic: Constant support;Good  Transfer Training  Transfer Training: Yes  Overall Level of Assistance: Supervision  Sit to Stand: Supervision  Stand to Sit: Supervision  Gait Training  Gait Training: Yes  Right Side Weight Bearing: As tolerated  Gait  Overall Level of Assistance: Stand-by assistance;Assist X1  Base of Support: Narrowed  Speed/Ana: Slow  Step Length: Right shortened  Stance: Right decreased  Gait Abnormalities: Antalgic  Distance (ft): 50 Feet (and short distances in room and gym)  Assistive Device: Walker, rolling  Stairs - Level of Assistance: Stand-by assistance; Adaptive equipment; Additional time;Assist X1 (with RW and cues for sequencing on 1st step with good carryover for sequencing)  Number of Stairs Trained: 1     PT Exercises  Exercise Treatment: Supine ther ex: ankle pumps, quad sets, glute sets, heel slides 2x10 RLE and sitting LAQ x10 RLE     Safety Devices  Type of Devices: Gait belt (left with OT in gym)       Goals  Short Term Goals  Time Frame for Short Term Goals: 3 days (1/29)  Short Term Goal 1: Pt will perform all transfers with RW and supervision-- 1/26 GOAL MET  Short Term Goal 2: Pt will ambulate 50 ft with RW and SBA-- 1/26 GOAL MET  Short Term Goal 3: Pt will negotiate 3 curb steps with RW and SBA-- 1/26 GOAL MET  Patient Goals   Patient Goals : \"to walk with my walker with a little help\"    Education  Patient Education  Education Given To: Patient  Education Provided: Role of Therapy;Plan of Care;Home Exercise Program;Precautions;Transfer Training  Education Provided Comments: safety with stair training  Education Method: Verbal  Barriers to Learning: None  Education Outcome: Verbalized understanding;Demonstrated understanding    Therapy Time   Individual Concurrent Group Co-treatment   Time In 0915         Time Out 0953         Minutes 38         Timed Code Treatment Minutes: [address] [facility],   [facility]

## 2023-01-27 NOTE — PLAN OF CARE
Problem: Chronic Conditions and Co-morbidities  Goal: Patient's chronic conditions and co-morbidity symptoms are monitored and maintained or improved  Outcome: Completed     Problem: Discharge Planning  Goal: Discharge to home or other facility with appropriate resources  Outcome: Completed     Problem: Safety - Adult  Goal: Free from fall injury  Outcome: Completed     Problem: Pain  Goal: Verbalizes/displays adequate comfort level or baseline comfort level  Outcome: Completed     Problem: ABCDS Injury Assessment  Goal: Absence of physical injury  Outcome: Completed

## 2023-01-27 NOTE — PROGRESS NOTES
Department of Orthopedic Surgery  Physician Assistant   Progress Note    Subjective:       Systemic or Specific Complaints:Pain Control, patient states that they are still experiencing moderate pain after the surgery but has been feeling better as time continues to pass. Objective:     Patient Vitals for the past 24 hrs:   BP Temp Temp src Pulse Resp SpO2   01/27/23 0743 -- -- -- -- -- 100 %   01/26/23 2350 114/71 98.5 °F (36.9 °C) Oral 79 18 93 %   01/26/23 1950 -- -- -- -- -- 93 %   01/26/23 1945 106/70 97.8 °F (36.6 °C) Oral 74 18 92 %   01/26/23 1744 -- -- -- -- 16 --   01/26/23 1652 114/68 97.9 °F (36.6 °C) Oral 66 19 91 %   01/26/23 1422 -- -- -- -- -- 93 %   01/26/23 1342 104/70 98.6 °F (37 °C) Oral 67 -- 94 %   01/26/23 0917 98/64 -- -- 60 -- 94 %       General: alert, appears stated age, cooperative, and no distress   Wound: No Erythema, No Edema, and No Drainage   Motion: Painful range of Motion in affected extremity   DVT Exam: No signs of DVT on physical exam     Additional exam: on 2L O2 NC, pt states he has O2 at home but does not regularly wear it during the day, uses nebulizer tx. The patient was seen lying upright in bed with his legs extended and the affect extremity resting in external rotation. The patient's dressing was clean, dry, and without drainage. The skin was dry and warm, the patient demonstrated tenderness upon palpation of the thigh and calf of the affected extremity. The patient noted and neurosensory deficit on the affected extremity that he felt was different than baseline but improving with time since surgery. The patient demonstrated full ROM at the ankle. The patient's DP and PT pulses were 1+. The patient demonstrated extreme discomfort when the provider passively rotated the affected extremity into neutral position with the toes pointed upward.  The patient was educated on the importance of working toward keeping his affected extremity in this neutral position and was able to actively bring the leg to neutral alignment at the end of the exam    Data Review  CBC:   Lab Results   Component Value Date/Time    WBC 8.6 01/26/2023 06:17 AM    RBC 3.47 01/26/2023 06:17 AM    HGB 10.4 01/26/2023 06:17 AM    HCT 30.7 01/26/2023 06:17 AM     01/26/2023 06:17 AM       Renal:   Lab Results   Component Value Date/Time     01/26/2023 06:17 AM    K 5.3 01/26/2023 06:17 AM    K 5.1 10/13/2022 06:18 PM     01/26/2023 06:17 AM    CO2 26 01/26/2023 06:17 AM    BUN 18 01/26/2023 06:17 AM    CREATININE 1.1 01/26/2023 06:17 AM    GLUCOSE 127 01/26/2023 06:17 AM    CALCIUM 8.5 01/26/2023 06:17 AM      EBL:400 mL      Assessment:      Status post right total hip arthroplasty, POD2. DOS 01/25/2023 performed by Dr. Bakari Sahu:      1:  The patient is weight bearing as tolerated with anterolateral hip precautions: no extension past midline, no external rotation, no abduction. 2:  Continue Deep venous thrombosis prophylaxis - aspirin 325 mg PO daily for 4 weeks  3:  Continue Pain Control - oxycodone PO q4h PRN  4. Two doses of ANCEF completed post-operatively  5. PT/OT recommends home with home health PT/OT with a rolling walker for mobility.    6.Patient is ready for discharge from an orthopedic standpoint, pending medical stability and home health approval.     [facility] PA-S1    Andria Meier PA-C

## 2023-01-27 NOTE — CARE COORDINATION
[facility]    DC order noted, all docs needed have been faxed to [facility] for home care services.     Home care to see patient within 24-48 hrs    Mary Maradiaga RN, BSN CTN  [facility] [phone]

## 2023-01-27 NOTE — CARE COORDINATION
CASE MANAGEMENT DISCHARGE SUMMARY    Discharge to: Home with [facility] Durable Medical Equipment ordered/agency: rolling walker brought to bedside by [facility], they do not have tub transfer bench/shower chair. Pt already established with [employer] for home O2 which he states is PRN. Pt wants [employer] to provide shower chair. Writer spoke with [employer], they are familiar with pt and agreed to deliver bench to home, writer faxed order and notes. Transportation: Pt wants cab home after supper, pt declines wanting ambulance home, will take rollator and new RW with him. Confirmed discharge plan with:   Patient: yes     Family:  no     Facility/Agency, name:  [facility] liaison to pull from [employer] directly. RN, name: Taj Whelan    Pt agrees with above plan, denies any other needs.   MEÑO Rahman-RN

## 2023-01-31 ENCOUNTER — TELEPHONE (OUTPATIENT)
Dept: ORTHOPEDIC SURGERY | Age: 63
End: 2023-01-31

## 2023-01-31 NOTE — TELEPHONE ENCOUNTER
Spoke with pt, doing well at home. States getting     Incision status: No drainage or redness    Edema/Swelling/Teds: Was pretty swollen but seems to have gone down. Pain level and status: Managing really well. Use of pain medications: Using pain meds as needed. Blood thinner: Pt back on plavix    Bowels: Taking senna, and bowels are moving. Home Care Agency active: [facility] active, Someone checked dressing area yesterday and stated it looked good. Outpatient therapy: NA    Do you have all of your medications: Yes    Changes in medications: no    Instructed pt to call Nurse Navigator or surgeon's office with any questions or concerns.      Follow up appointments:    Future Appointments   Date Time Provider Alexandro Fairbanks   2/10/2023 10:15 AM Raiza Kim MD [facility]

## 2023-02-08 ENCOUNTER — TELEPHONE (OUTPATIENT)
Dept: ORTHOPEDIC SURGERY | Age: 63
End: 2023-02-08

## 2023-02-08 NOTE — TELEPHONE ENCOUNTER
Spoke with pt states, he has a blister that popped on the bottom of right foot. States it is red, not draining, but is swollen. States it really hurts. States no one is around to get medication for patient. Did discuss with pt that [facility] services will be out today around 11:30. RN advised pt to have PT or RN take a look at foot and call Dr. Zoë Covarrubias if medication is possibly needed. RN discussed possibly seeing Dr. Zoë Covarrubias sooner and pt stated that wouldn't be possible since he already has cab ordered to take him to office on 2/10. RN called [facility]- received information about [facility] due out to visit patient. RN spoke with them, instructed to check out pt R foot and call RN or Dr. Leelee Love office if foot is concerning. Incision status: No drainage or redness    Edema/Swelling/Teds: Swollen in right foot. Pain level and status: States hip is fine but foot hurts. Use of pain medications:     Blood thinner: Still taking ASA 325mg daily    Bowels: Moving fine now. Home Care Agency active: Active     Outpatient therapy: NA    Do you have all of your medications: Yes    Changes in medications: no    Instructed pt to call Nurse Navigator or surgeon's office with any questions or concerns.      Follow up appointments:    Future Appointments   Date Time Provider Alexandro Fairbanks   2/10/2023 10:15 AM Shanel Tam MD [facility]

## 2023-02-09 ENCOUNTER — TELEPHONE (OUTPATIENT)
Dept: ORTHOPEDIC SURGERY | Age: 63
End: 2023-02-09

## 2023-02-09 RX ORDER — DOXYCYCLINE HYCLATE 100 MG
100 TABLET ORAL 2 TIMES DAILY
Qty: 20 TABLET | Refills: 0 | Status: SHIPPED | OUTPATIENT
Start: 2023-02-09 | End: 2023-02-19

## 2023-02-09 NOTE — TELEPHONE ENCOUNTER
She is at the patients [place] and he has new open wound on dorsal side of his right foot. (Top)   Just a small area. Pretty red around it. He is a diabetic and has neuropathy in his feet. Possible antibiotics needed. She is going call his primary care also. Please call [place] back about this patient today. Would like to get something done about this asap.

## 2023-02-09 NOTE — TELEPHONE ENCOUNTER
Spoke to Darren Christian. Indicates there is an ulceration over the dorsal foot with concerns for infection. Patient has appointment to see me in office tomorrow. In the interim, a prescription was sent to [facility] [address] for doxycycline to take.

## 2023-02-10 ENCOUNTER — OFFICE VISIT (OUTPATIENT)
Dept: ORTHOPEDIC SURGERY | Age: 63
End: 2023-02-10

## 2023-02-10 VITALS — HEIGHT: 69 IN | BODY MASS INDEX: 25.92 KG/M2 | WEIGHT: 175 LBS

## 2023-02-10 DIAGNOSIS — S72.051A CLOSED FRACTURE OF HEAD OF RIGHT FEMUR, INITIAL ENCOUNTER (HCC): ICD-10-CM

## 2023-02-10 DIAGNOSIS — Z47.89 ORTHOPEDIC AFTERCARE: Primary | ICD-10-CM

## 2023-02-10 PROCEDURE — 99024 POSTOP FOLLOW-UP VISIT: CPT | Performed by: ORTHOPAEDIC SURGERY

## 2023-02-12 RX ORDER — OXYCODONE HYDROCHLORIDE AND ACETAMINOPHEN 5; 325 MG/1; MG/1
1 TABLET ORAL EVERY 4 HOURS PRN
Qty: 42 TABLET | Refills: 0 | Status: SHIPPED | OUTPATIENT
Start: 2023-02-12 | End: 2023-02-19

## 2023-02-13 NOTE — PROGRESS NOTES
ORTHOPAEDIC SURGERY FOLLOWUP VISIT    CHIEF COMPLAINT:  Follow-up right hip    DATE OF INJURY: N/A  DIAGNOSIS: Right femoral head impaction fracture  DATE OF SURGERY: 1/25/2023, right total hip arthroplasty    HISTORY OF PRESENT ILLNESS:  63-year-old male presents 2.5 weeks status post right primary total hip arthroplasty. This was done in the setting of a impaction fracture, subacute. Overall, he has mobilized well postsurgery. He denies significant issues with his hip. He is ambulatory with the use of a cane. He denies fever, chills, or night sweats. He denies wound healing issues. He has not had any numbness or tingling. He has had some ulceration over the dorsal foot related to irritation from his shoe wear. The visiting home health nursing raised some concerns related to the erythema surrounding his ulceration. He was placed on oral antibiotics which were called in via telephone. Overall, he indicates this has improved slightly. He presents for his first postoperative visit. PHYSICAL EXAM:  General: Well-appearing male of stated age. No acute distress. Right lower extremity: Mepilex dressing overlying the right hip is clean, dry, and intact. There is no drainage or saturation. This is removed in order to examine the incision. Incision is pristinely approximated with Dermabond glue. There is no signs of dehiscence. No open areas. No surrounding erythema. Minimal tenderness to palpation. No appreciable swelling in this area. Gentle hip range of motion does not reproduce significant groin pain. There are no cuts, open wounds, or abrasions. Sensation is intact to light touch in deep peroneal, superficial peroneal, tibial, sural, and saphenous nerve distributions. Motor function is intact to EHL, FHL, tibialis anterior, and gastroc. There is brisk capillary refill to the toes and a strong palpable dorsalis pedis pulse. Compartments are soft and compressible.   There is no calf tenderness and a negative Homans' sign. Ulceration overlying the dorsal/lateral MTP joints is approximately 1 cm in diameter. There is no significant surrounding erythema. Per report this is improved with oral antibiotics. RADIOGRAPHIC EXAM:  AP pelvis as well as AP and frog lateral views of the right hip demonstrate total hip arthroplasty in expected position. There is no change in position from immediate postoperative imaging. No evidence of femoral stem subsidence. No change in acetabular position. No dislocation. No features of heterotopic ossification. No jennie-implant fractures. ASSESSMENT AND PLAN:  2.5 weeks status post right primary total hip arthroplasty. Weightbearing as tolerated right lower extremity  Anterolateral hip precautions  Pain control: Repeat prescription sent to pharmacy. DVT prophylaxis: Full dose aspirin daily x6 weeks post  Follow-up wound care: May be open to air at this point. May shower but not soak or submerge incisions. Left dorsal foot ulceration. Complete antibiotic course as prescribed, local wound care. Likely to heal uneventfully. Follow-up in clinic in 1 month for repeat x-rays, sooner if there are problems.     Valery Tom MD

## 2023-02-21 ENCOUNTER — TELEPHONE (OUTPATIENT)
Dept: ORTHOPEDIC SURGERY | Age: 63
End: 2023-02-21

## 2023-02-21 DIAGNOSIS — Z96.641 S/P TOTAL RIGHT HIP ARTHROPLASTY: Primary | ICD-10-CM

## 2023-02-21 NOTE — TELEPHONE ENCOUNTER
[facility] called requesting a PT order be faxed to them for Carson Pock. Order has been placed and faxed to [phone].

## 2023-02-27 ENCOUNTER — HOSPITAL ENCOUNTER (EMERGENCY)
Age: 63
Discharge: HOME | End: 2023-02-27
Attending: EMERGENCY MEDICINE
Payer: MEDICAID

## 2023-02-27 ENCOUNTER — APPOINTMENT (OUTPATIENT)
Dept: CT IMAGING | Age: 63
End: 2023-02-27
Payer: MEDICAID

## 2023-02-27 VITALS
HEART RATE: 71 BPM | OXYGEN SATURATION: 94 % | HEIGHT: 68 IN | WEIGHT: 172 LBS | TEMPERATURE: 97.9 F | RESPIRATION RATE: 16 BRPM | SYSTOLIC BLOOD PRESSURE: 103 MMHG | DIASTOLIC BLOOD PRESSURE: 66 MMHG | BODY MASS INDEX: 26.07 KG/M2

## 2023-02-27 DIAGNOSIS — R11.0 NAUSEA: Primary | ICD-10-CM

## 2023-02-27 DIAGNOSIS — R20.2 PARESTHESIA OF LEFT UPPER AND LOWER EXTREMITY: ICD-10-CM

## 2023-02-27 DIAGNOSIS — R51.9 ACUTE NONINTRACTABLE HEADACHE, UNSPECIFIED HEADACHE TYPE: ICD-10-CM

## 2023-02-27 DIAGNOSIS — R07.9 LEFT-SIDED CHEST PAIN: ICD-10-CM

## 2023-02-27 DIAGNOSIS — Z87.09 HISTORY OF COPD: ICD-10-CM

## 2023-02-27 LAB
A/G RATIO: 1.5 (ref 1.1–2.2)
ALBUMIN SERPL-MCNC: 4 G/DL (ref 3.4–5)
ALP BLD-CCNC: 153 U/L (ref 40–129)
ALT SERPL-CCNC: 7 U/L (ref 10–40)
AMPHETAMINE SCREEN, URINE: ABNORMAL
ANION GAP SERPL CALCULATED.3IONS-SCNC: 11 MMOL/L (ref 3–16)
APTT: 32.2 SEC (ref 23–34.3)
AST SERPL-CCNC: 14 U/L (ref 15–37)
BARBITURATE SCREEN URINE: ABNORMAL
BASE EXCESS VENOUS: -3.7 MMOL/L (ref -3–3)
BASOPHILS ABSOLUTE: 0 K/UL (ref 0–0.2)
BASOPHILS RELATIVE PERCENT: 0.7 %
BENZODIAZEPINE SCREEN, URINE: ABNORMAL
BILIRUB SERPL-MCNC: <0.2 MG/DL (ref 0–1)
BILIRUBIN URINE: NEGATIVE
BLOOD, URINE: NEGATIVE
BUN BLDV-MCNC: 11 MG/DL (ref 7–20)
CALCIUM SERPL-MCNC: 9.6 MG/DL (ref 8.3–10.6)
CANNABINOID SCREEN URINE: ABNORMAL
CARBOXYHEMOGLOBIN: 1.4 % (ref 0–1.5)
CHLORIDE BLD-SCNC: 104 MMOL/L (ref 99–110)
CLARITY: CLEAR
CO2: 26 MMOL/L (ref 21–32)
COCAINE METABOLITE SCREEN URINE: ABNORMAL
COLOR: YELLOW
CREAT SERPL-MCNC: 0.8 MG/DL (ref 0.8–1.3)
EKG ATRIAL RATE: 82 BPM
EKG DIAGNOSIS: NORMAL
EKG P AXIS: 76 DEGREES
EKG P-R INTERVAL: 130 MS
EKG Q-T INTERVAL: 384 MS
EKG QRS DURATION: 92 MS
EKG QTC CALCULATION (BAZETT): 448 MS
EKG R AXIS: 80 DEGREES
EKG T AXIS: 60 DEGREES
EKG VENTRICULAR RATE: 82 BPM
EOSINOPHILS ABSOLUTE: 0.2 K/UL (ref 0–0.6)
EOSINOPHILS RELATIVE PERCENT: 4.7 %
ETHANOL: NORMAL MG/DL (ref 0–0.08)
FENTANYL SCREEN, URINE: ABNORMAL
GFR SERPL CREATININE-BSD FRML MDRD: >60 ML/MIN/{1.73_M2}
GLUCOSE BLD-MCNC: 148 MG/DL (ref 70–99)
GLUCOSE URINE: NEGATIVE MG/DL
HCO3 VENOUS: 21.8 MMOL/L (ref 23–29)
HCT VFR BLD CALC: 35.1 % (ref 40.5–52.5)
HEMOGLOBIN: 11.7 G/DL (ref 13.5–17.5)
INR BLD: 1 (ref 0.87–1.14)
KETONES, URINE: NEGATIVE MG/DL
LACTIC ACID: 2.3 MMOL/L (ref 0.4–2)
LEUKOCYTE ESTERASE, URINE: NEGATIVE
LIPASE: 23 U/L (ref 13–60)
LYMPHOCYTES ABSOLUTE: 1 K/UL (ref 1–5.1)
LYMPHOCYTES RELATIVE PERCENT: 19.5 %
Lab: ABNORMAL
MCH RBC QN AUTO: 29.3 PG (ref 26–34)
MCHC RBC AUTO-ENTMCNC: 33.2 G/DL (ref 31–36)
MCV RBC AUTO: 88.1 FL (ref 80–100)
METHADONE SCREEN, URINE: ABNORMAL
METHEMOGLOBIN VENOUS: 0.1 %
MICROSCOPIC EXAMINATION: NORMAL
MONOCYTES ABSOLUTE: 0.5 K/UL (ref 0–1.3)
MONOCYTES RELATIVE PERCENT: 9.6 %
NEUTROPHILS ABSOLUTE: 3.3 K/UL (ref 1.7–7.7)
NEUTROPHILS RELATIVE PERCENT: 65.5 %
NITRITE, URINE: NEGATIVE
O2 SAT, VEN: 82 %
O2 THERAPY: ABNORMAL
OPIATE SCREEN URINE: ABNORMAL
OXYCODONE URINE: POSITIVE
PCO2, VEN: 41.3 MMHG (ref 40–50)
PDW BLD-RTO: 18.5 % (ref 12.4–15.4)
PH UA: 5.5
PH UA: 5.5 (ref 5–8)
PH VENOUS: 7.34 (ref 7.35–7.45)
PHENCYCLIDINE SCREEN URINE: ABNORMAL
PLATELET # BLD: 205 K/UL (ref 135–450)
PMV BLD AUTO: 7.2 FL (ref 5–10.5)
PO2, VEN: 48.1 MMHG (ref 25–40)
POTASSIUM REFLEX MAGNESIUM: 3.8 MMOL/L (ref 3.5–5.1)
PROTEIN UA: NEGATIVE MG/DL
PROTHROMBIN TIME: 13.1 SEC (ref 11.7–14.5)
RBC # BLD: 3.98 M/UL (ref 4.2–5.9)
SODIUM BLD-SCNC: 141 MMOL/L (ref 136–145)
SPECIFIC GRAVITY UA: <=1.005 (ref 1–1.03)
TCO2 CALC VENOUS: 23 MMOL/L
TOTAL PROTEIN: 6.7 G/DL (ref 6.4–8.2)
TROPONIN: <0.01 NG/ML
TROPONIN: <0.01 NG/ML
URINE REFLEX TO CULTURE: NORMAL
URINE TYPE: NORMAL
UROBILINOGEN, URINE: 0.2 E.U./DL
WBC # BLD: 5 K/UL (ref 4–11)

## 2023-02-27 PROCEDURE — 81003 URINALYSIS AUTO W/O SCOPE: CPT

## 2023-02-27 PROCEDURE — 80307 DRUG TEST PRSMV CHEM ANLYZR: CPT

## 2023-02-27 PROCEDURE — 99285 EMERGENCY DEPT VISIT HI MDM: CPT

## 2023-02-27 PROCEDURE — 83690 ASSAY OF LIPASE: CPT

## 2023-02-27 PROCEDURE — 84484 ASSAY OF TROPONIN QUANT: CPT

## 2023-02-27 PROCEDURE — 6360000004 HC RX CONTRAST MEDICATION: Performed by: EMERGENCY MEDICINE

## 2023-02-27 PROCEDURE — 6360000002 HC RX W HCPCS: Performed by: EMERGENCY MEDICINE

## 2023-02-27 PROCEDURE — 96374 THER/PROPH/DIAG INJ IV PUSH: CPT

## 2023-02-27 PROCEDURE — 6370000000 HC RX 637 (ALT 250 FOR IP): Performed by: EMERGENCY MEDICINE

## 2023-02-27 PROCEDURE — 85025 COMPLETE CBC W/AUTO DIFF WBC: CPT

## 2023-02-27 PROCEDURE — 82077 ASSAY SPEC XCP UR&BREATH IA: CPT

## 2023-02-27 PROCEDURE — 36415 COLL VENOUS BLD VENIPUNCTURE: CPT

## 2023-02-27 PROCEDURE — 82803 BLOOD GASES ANY COMBINATION: CPT

## 2023-02-27 PROCEDURE — 70496 CT ANGIOGRAPHY HEAD: CPT

## 2023-02-27 PROCEDURE — 71260 CT THORAX DX C+: CPT | Performed by: EMERGENCY MEDICINE

## 2023-02-27 PROCEDURE — 93005 ELECTROCARDIOGRAM TRACING: CPT

## 2023-02-27 PROCEDURE — 85610 PROTHROMBIN TIME: CPT

## 2023-02-27 PROCEDURE — 80053 COMPREHEN METABOLIC PANEL: CPT

## 2023-02-27 PROCEDURE — 2580000003 HC RX 258: Performed by: EMERGENCY MEDICINE

## 2023-02-27 PROCEDURE — 70450 CT HEAD/BRAIN W/O DYE: CPT

## 2023-02-27 PROCEDURE — 85730 THROMBOPLASTIN TIME PARTIAL: CPT

## 2023-02-27 PROCEDURE — 93010 ELECTROCARDIOGRAM REPORT: CPT | Performed by: INTERNAL MEDICINE

## 2023-02-27 PROCEDURE — 83605 ASSAY OF LACTIC ACID: CPT

## 2023-02-27 RX ORDER — MECLIZINE HYDROCHLORIDE 25 MG/1
25 TABLET ORAL 3 TIMES DAILY PRN
Qty: 10 TABLET | Refills: 0 | Status: SHIPPED | OUTPATIENT
Start: 2023-02-27 | End: 2023-03-09

## 2023-02-27 RX ORDER — 0.9 % SODIUM CHLORIDE 0.9 %
1000 INTRAVENOUS SOLUTION INTRAVENOUS ONCE
Status: COMPLETED | OUTPATIENT
Start: 2023-02-27 | End: 2023-02-27

## 2023-02-27 RX ORDER — ASPIRIN 81 MG/1
324 TABLET, CHEWABLE ORAL ONCE
Status: COMPLETED | OUTPATIENT
Start: 2023-02-27 | End: 2023-02-27

## 2023-02-27 RX ORDER — ONDANSETRON 2 MG/ML
4 INJECTION INTRAMUSCULAR; INTRAVENOUS ONCE
Status: COMPLETED | OUTPATIENT
Start: 2023-02-27 | End: 2023-02-27

## 2023-02-27 RX ORDER — IPRATROPIUM BROMIDE AND ALBUTEROL SULFATE 2.5; .5 MG/3ML; MG/3ML
1 SOLUTION RESPIRATORY (INHALATION) ONCE
Status: COMPLETED | OUTPATIENT
Start: 2023-02-27 | End: 2023-02-27

## 2023-02-27 RX ORDER — MECLIZINE HYDROCHLORIDE 25 MG/1
25 TABLET ORAL ONCE
Status: COMPLETED | OUTPATIENT
Start: 2023-02-27 | End: 2023-02-27

## 2023-02-27 RX ORDER — ONDANSETRON 4 MG/1
4 TABLET, FILM COATED ORAL 3 TIMES DAILY PRN
Qty: 10 TABLET | Refills: 0 | Status: SHIPPED | OUTPATIENT
Start: 2023-02-27

## 2023-02-27 RX ADMIN — IOPAMIDOL 75 ML: 755 INJECTION, SOLUTION INTRAVENOUS at 04:56

## 2023-02-27 RX ADMIN — ASPIRIN 324 MG: 81 TABLET, CHEWABLE ORAL at 04:04

## 2023-02-27 RX ADMIN — ONDANSETRON 4 MG: 2 INJECTION INTRAMUSCULAR; INTRAVENOUS at 04:04

## 2023-02-27 RX ADMIN — IPRATROPIUM BROMIDE AND ALBUTEROL SULFATE 1 AMPULE: .5; 2.5 SOLUTION RESPIRATORY (INHALATION) at 06:51

## 2023-02-27 RX ADMIN — MECLIZINE HYDROCHLORIDE 25 MG: 25 TABLET ORAL at 04:04

## 2023-02-27 RX ADMIN — SODIUM CHLORIDE 1000 ML: 9 INJECTION, SOLUTION INTRAVENOUS at 04:04

## 2023-02-27 RX ADMIN — IOPAMIDOL 75 ML: 755 INJECTION, SOLUTION INTRAVENOUS at 04:57

## 2023-02-27 ASSESSMENT — PAIN - FUNCTIONAL ASSESSMENT
PAIN_FUNCTIONAL_ASSESSMENT: NONE - DENIES PAIN
PAIN_FUNCTIONAL_ASSESSMENT: NONE - DENIES PAIN

## 2023-02-27 NOTE — DISCHARGE INSTRUCTIONS
Return the ER if any new or worsening symptoms, significant chest pain, shortness of breath, new numbness or weakness, speech changes or facial droop, vision changes, or any other concern. I recommend getting the carotid ultrasound follow-up. Call [address] [place] [address] to schedule. Use the minimum required oxycodone to treat your pain.

## 2023-02-27 NOTE — ED PROVIDER NOTES
Emergency Department Physician Note     Location: [facility] EMERGENCY DEPARTMENT  2/27/2023    CHIEF COMPLAINT  Nausea (Pt sts \"started get nauseated, not feeling well, followed by chest chain, headache, dizziness, bouncing\")      HISTORY OF PRESENT ILLNESS  Lili Jimenez is a 63 y.o. male presents to the ED with nausea, brought in by EMS, started around 10 PM tonight, he did vomit, nonbloody nonbilious, started having left-sided chest pain, described as stabbing/shooting, then he started to get headache, frontal, dizziness/spinning sensation, felt like bouncing in his head, sometimes feels like his heart is fluttering, does feel short of breath at times, which is typical for him, history of COPD, some lower abdominal pain that he attributes to constipation issues, no melena/hematochezia, no dysuria/hematuria, has some prostate problems though, no known fever, has had some cough minimal productive sputum, typical for him as well, he does have history of CAD, no known stroke in the past, but he does report some decreased sensation/tingling of left upper and lower extremity, he reports he normally uses a walker. Feels like his legs are both weak, no known history of vertigo, feels like his vision is blurry at times as well, no other complaints, modifying factors or associated symptoms. Did recently have hip surgery about a month ago on the right, swelling has improved. No neck stiffness. Does take a baby aspirin daily, as well as Plavix. No dysphagia, no speech changes or facial droop. I have reviewed the following from the nursing documentation.     Past Medical History:   Diagnosis Date    AAA (abdominal aortic aneurysm) 2018    3.0 cm    Anxiety     Arterial stent thrombosis (HCC)     Arthritis     Asthma     Bipolar 1 disorder (HCC)     COPD (chronic obstructive pulmonary disease) (HCC)     Coronary artery disease involving native coronary artery of native heart without angina pectoris 02/08/2021 Diabetes mellitus ([facility] [address]     Essential hypertension 2021    Hyperlipidemia     Pneumonia      Past Surgical History:   Procedure Laterality Date    CARDIAC SURGERY      stent placed    CARPAL TUNNEL RELEASE      CHOLECYSTECTOMY, LAPAROSCOPIC      KNEE ARTHROPLASTY      R knee x4    NASAL SINUS SURGERY      TOTAL HIP ARTHROPLASTY Right 2023    RIGHT TOTAL HIP ARTHROPLASTY  performed by Darleen White MD at [address]  [address]    UPPER GASTROINTESTINAL ENDOSCOPY N/A 2019    EGD BIOPSY performed by Ernst Lino DO at [facility]     Family History   Problem Relation Age of Onset    High Blood Pressure Mother     Heart Disease Mother     High Blood Pressure Father     Heart Disease Father     Cancer Sister     Diabetes Sister     Other Sister         aneursym-brain     Social History     Socioeconomic History    Marital status:       Spouse name: Not on file    Number of children: Not on file    Years of education: Not on file    Highest education level: Not on file   Occupational History    Not on file   Tobacco Use    Smoking status: Former     Packs/day: 2.00     Years: 35.00     Pack years: 70.00     Types: Cigarettes     Quit date: 2019     Years since quittin.0    Smokeless tobacco: Former     Quit date: 10/3/2015    Tobacco comments:     per MD order for annual lung screening   Vaping Use    Vaping Use: Never used   Substance and Sexual Activity    Alcohol use: No     Alcohol/week: 0.0 standard drinks    Drug use: No    Sexual activity: Yes     Partners: Female   Other Topics Concern    Not on file   Social History Narrative    Not on file     Social Determinants of Health     Financial Resource Strain: Not on file   Food Insecurity: Not on file   Transportation Needs: Not on file   Physical Activity: Not on file   Stress: Not on file   Social Connections: Not on file   Intimate Partner Violence: Not on file   Housing Stability: Not on file Current Facility-Administered Medications   Medication Dose Route Frequency Provider Last Rate Last Admin    ipratropium-albuterol (DUONEB) nebulizer solution 1 ampule  1 ampule Inhalation Once Anjelcia Johnson DO         Current Outpatient Medications   Medication Sig Dispense Refill    aspirin 325 MG EC tablet Take 1 tablet by mouth daily (Patient taking differently: Take 81 mg by mouth daily) 30 tablet 0    acetaminophen (TYLENOL) 325 MG tablet Take 2 tablets by mouth in the morning and 2 tablets at noon and 2 tablets in the evening and 2 tablets before bedtime.  120 tablet 3    ferrous sulfate (IRON 325) 325 (65 Fe) MG tablet Take 325 mg by mouth daily (with breakfast)      dicyclomine (BENTYL) 20 MG tablet Take 1 tablet by mouth every 6 hours as needed (Abdominal cramping) 20 tablet 0    atorvastatin (LIPITOR) 40 MG tablet Take 40 mg by mouth daily      midodrine (PROAMATINE) 2.5 MG tablet Take 5 mg by mouth 3 times daily      ipratropium-albuterol (DUONEB) 0.5-2.5 (3) MG/3ML SOLN nebulizer solution Take 3 mLs by nebulization every 4 hours 360 mL 2    metoprolol succinate (TOPROL XL) 25 MG extended release tablet TAKE 1/2 TABLET BY MOUTH EVERY DAY (Patient not taking: No sig reported) 15 tablet 0    divalproex (DEPAKOTE) 500 MG DR tablet Take 500 mg by mouth 500 mg  in am & 1000 mg afternoon 4 pm      QUEtiapine (SEROQUEL) 300 MG tablet Take 300 mg by mouth nightly      Multiple Vitamin (DAILY-CRISTINE PO) Take 1 tablet by mouth daily      clopidogrel (PLAVIX) 75 MG tablet Take 75 mg by mouth daily      pantoprazole sodium (PROTONIX) 40 MG PACK packet Take 40 mg by mouth every morning (before breakfast)      nitroGLYCERIN (NITRODUR) 0.4 MG/HR Place 1 patch onto the skin daily  (Patient not taking: No sig reported)      fluticasone-vilanterol (BREO ELLIPTA) 100-25 MCG/INH AEPB inhaler Inhale 1 puff into the lungs daily 1 each 3    tamsulosin (FLOMAX) 0.4 MG capsule Take 0.4 mg by mouth daily      gabapentin (NEURONTIN) 300 MG capsule Take 400 mg by mouth 3 times daily . cetirizine (ZYRTEC) 10 MG tablet Take 10 mg by mouth daily      hydrOXYzine (VISTARIL) 25 MG capsule Take 25 mg by mouth 3 times daily as needed for Itching (Patient not taking: No sig reported)      albuterol (PROVENTIL HFA;VENTOLIN HFA) 108 (90 BASE) MCG/ACT inhaler Inhale 1 puff into the lungs every 6 hours as needed. fluticasone (FLONASE) 50 MCG/ACT nasal spray 1 spray by Nasal route daily. Indications: EACH NOSTRIL      montelukast (SINGULAIR) 10 MG tablet Take 10 mg by mouth nightly. Allergies   Allergen Reactions    Methylphenidate      Other reaction(s): Hyperactive behavior    Propoxyphene        REVIEW OF SYSTEMS  10 systems reviewed, pertinent positives per HPI otherwise noted to be negative. PHYSICAL EXAM   /66   Pulse 71   Temp 97.9 °F (36.6 °C) (Oral)   Resp 16   Ht 5' 8\" (1.727 m)   Wt 172 lb (78 kg)   SpO2 94%   BMI 26.15 kg/m²   GENERAL APPEARANCE: Awake and alert. Cooperative. No acute distress  HEAD: Normocephalic. Atraumatic. No andrade's sign. EYES: PERRL. EOM's grossly intact. No scleral icterus. No drainage. No periorbital ecchymosis. Edentulous, dentures in place  ENT: Mucous membranes are tacky, airway patent. No stridor. No epistaxis. No otorrhea or rhinorrhea. NECK: Supple. No rigidity, trachea midline  HEART: RRR. No murmurs/gallups/rubs  LUNGS: Respirations unlabored, Lungs are clear to auscultation bilaterally, no wheezes/crackles/rhonchi   ABDOMEN: Soft. Non-distended. Non-tender. Rotund abdomen, no guarding, no rebound tenderness, no rigidity. Normal bowel sounds. No McBurney's point tenderness, negative Rovsing's sign, negative Bustillo's sign  EXTREMITIES: No peripheral edema. No calf tenderness, moves all extremities equally. No obvious deformities. SKIN: Warm and dry. No acute rashes. NEUROLOGICAL: Alert and oriented x4. No gross facial drooping.  Strength 4/5 bilateral lower extremities, sensation intact, he does report diminished sensation subjectively in the left lower extremity. No truncal ataxia. Normal Speech, slow gait with walker, unsteady on his feet. speech impediment, but no significant dysarthria/dysphagia  PSYCHIATRIC: Normal mood and affect. NIH Stroke Scale      Interval: Baseline  [id number]  Person Administering Scale: Allyn Emerson, DO    Administer stroke scale items in the order listed. Record performance in each category after each subscale exam. Do not go back and change scores. Follow directions provided for each exam technique. Scores should reflect what the patient does, not what the clinician thinks the patient can do. The clinician should record answers while administering the exam and work quickly. Except where indicated, the patient should not be coached (i.e., repeated requests to patient to make a special effort). 1a  Level of consciousness: 0=alert; keenly responsive   1b. LOC questions:  0=Performs both tasks correctly   1c. LOC commands: 0=Performs both tasks correctly   2. Best Gaze: 0=normal   3. Visual: 0=No visual loss   4. Facial Palsy: 0=Normal symmetric movement   5a. Motor left arm: 0=No drift, limb holds 90 (or 45) degrees for full 10 seconds   5b. Motor right arm: 0=No drift, limb holds 90 (or 45) degrees for full 10 seconds   6a. motor left le=Drift, limb holds 90 (or 45) degrees but drifts down before full 10 seconds: does not hit bed   6b  Motor right le=Drift, limb holds 90 (or 45) degrees but drifts down before full 10 seconds: does not hit bed   7. Limb Ataxia: 0=Absent   8. Sensory: 1=Mild to moderate sensory loss; patient feels pinprick is less sharp or is dull on the affected side; there is a loss of superficial pain with pinprick but patient is aware He is being touched   9. Best Language:  0=No aphasia, normal   10. Dysarthria: 0=Normal   11. Extinction and Inattention: 0=No abnormality   12.  Distal motor function: 0=Normal    Total:   3     LABS  I have reviewed all labs for this visit.    Results for orders placed or performed during the hospital encounter of 02/27/23   CBC with Auto Differential   Result Value Ref Range    WBC 5.0 4.0 - 11.0 K/uL    RBC 3.98 (L) 4.20 - 5.90 M/uL    Hemoglobin 11.7 (L) 13.5 - 17.5 g/dL    Hematocrit 35.1 (L) 40.5 - 52.5 %    MCV 88.1 80.0 - 100.0 fL    MCH 29.3 26.0 - 34.0 pg    MCHC 33.2 31.0 - 36.0 g/dL    RDW 18.5 (H) 12.4 - 15.4 %    Platelets 990 272 - 972 K/uL    MPV 7.2 5.0 - 10.5 fL    Neutrophils % 65.5 %    Lymphocytes % 19.5 %    Monocytes % 9.6 %    Eosinophils % 4.7 %    Basophils % 0.7 %    Neutrophils Absolute 3.3 1.7 - 7.7 K/uL    Lymphocytes Absolute 1.0 1.0 - 5.1 K/uL    Monocytes Absolute 0.5 0.0 - 1.3 K/uL    Eosinophils Absolute 0.2 0.0 - 0.6 K/uL    Basophils Absolute 0.0 0.0 - 0.2 K/uL   CMP w/ Reflex to MG   Result Value Ref Range    Sodium 141 136 - 145 mmol/L    Potassium reflex Magnesium 3.8 3.5 - 5.1 mmol/L    Chloride 104 99 - 110 mmol/L    CO2 26 21 - 32 mmol/L    Anion Gap 11 3 - 16    Glucose 148 (H) 70 - 99 mg/dL    BUN 11 7 - 20 mg/dL    Creatinine 0.8 0.8 - 1.3 mg/dL    Est, Glom Filt Rate >60 >60    Calcium 9.6 8.3 - 10.6 mg/dL    Total Protein 6.7 6.4 - 8.2 g/dL    Albumin 4.0 3.4 - 5.0 g/dL    Albumin/Globulin Ratio 1.5 1.1 - 2.2    Total Bilirubin <0.2 0.0 - 1.0 mg/dL    Alkaline Phosphatase 153 (H) 40 - 129 U/L    ALT 7 (L) 10 - 40 U/L    AST 14 (L) 15 - 37 U/L   Lipase   Result Value Ref Range    Lipase 23.0 13.0 - 60.0 U/L   Troponin   Result Value Ref Range    Troponin <0.01 <0.01 ng/mL   Urinalysis with Reflex to Culture    Specimen: Urine   Result Value Ref Range    Color, UA Yellow Straw/Yellow    Clarity, UA Clear Clear    Glucose, Ur Negative Negative mg/dL    Bilirubin Urine Negative Negative    Ketones, Urine Negative Negative mg/dL    Specific Gravity, UA <=1.005 1.005 - 1.030    Blood, Urine Negative Negative    pH, UA 5.5 5.0 - 8.0    Protein, UA Negative Negative mg/dL    Urobilinogen, Urine 0.2 <2.0 E.U./dL    Nitrite, Urine Negative Negative    Leukocyte Esterase, Urine Negative Negative    Microscopic Examination Not Indicated     Urine Type NotGiven     Urine Reflex to Culture Not Indicated    Lactic Acid   Result Value Ref Range    Lactic Acid 2.3 (H) 0.4 - 2.0 mmol/L   ETOH   Result Value Ref Range    Ethanol Lvl None Detected mg/dL   Drug screen multi urine   Result Value Ref Range    Amphetamine Screen, Urine Neg Negative <1000ng/mL    Barbiturate Screen, Ur Neg Negative <200 ng/mL    Benzodiazepine Screen, Urine Neg Negative <200 ng/mL    Cannabinoid Scrn, Ur Neg Negative <50 ng/mL    Cocaine Metabolite Screen, Urine Neg Negative <300 ng/mL    Opiate Scrn, Ur Neg Negative <300 ng/mL    PCP Screen, Urine Neg Negative <25 ng/mL    Methadone Screen, Urine Neg Negative <300 ng/mL    Oxycodone Urine POSITIVE (A) Negative <100 ng/ml    FENTANYL SCREEN, URINE Neg Negative <5 ng/mL    pH, UA 5.5     Drug Screen Comment: see below    Blood Gas, Venous   Result Value Ref Range    pH, [place] 7.341 (L) 7.350 - 7.450    pCO2, [place] 41.3 40.0 - 50.0 mmHg    pO2, [place] 48.1 (H) 25.0 - 40.0 mmHg    HCO3, Venous 21.8 (L) 23.0 - 29.0 mmol/L    Base Excess, [place] -3.7 (L) -3.0 - 3.0 mmol/L    O2 Sat, [place] 82 Not Established %    Carboxyhemoglobin 1.4 0.0 - 1.5 %    MetHgb, Galindo 0.1 <1.5 %    TC02 (Calc), [place] 23 Not Established mmol/L    O2 Therapy Unknown    Protime-INR   Result Value Ref Range    Protime 13.1 11.7 - 14.5 sec    INR 1.00 0.87 - 1.14   APTT   Result Value Ref Range    aPTT 32.2 23.0 - 34.3 sec   Troponin   Result Value Ref Range    Troponin <0.01 <0.01 ng/mL       RADIOLOGY  CT HEAD WO CONTRAST    Result Date: 2/27/2023  1. No acute intracranial abnormality. 2. 50% stenoses are noted in the proximal subclavian arteries. . 3. Fibrocalcific plaque is noted in the carotid bulb/proximal internal carotid arteries bilaterally without evidence of a flow-limiting stenosis. 4. Unremarkable CTA of the brain. 5. Emphysema.     CT CHEST PULMONARY EMBOLISM W CONTRAST    Result Date: 2/27/2023  1. No pulmonary emboli or parenchymal lung infiltrate. 2. Moderate-severe emphysema.     CTA HEAD NECK W CONTRAST    Result Date: 2/27/2023  1. No acute intracranial abnormality. 2. 50% stenoses are noted in the proximal subclavian arteries.. 3. Fibrocalcific plaque is noted in the carotid bulb/proximal internal carotid arteries bilaterally without evidence of a flow-limiting stenosis. 4. Unremarkable CTA of the brain. 5. Emphysema.      No results found.    PROCEDURES  -If applicable    I am the primary clinician of record.    ED COURSE/MDM  Patient seen and evaluated. Old records reviewed. Labs and imaging reviewed and results discussed with patient.         62 y.o. male with multiple complaints today, nausea and dizziness, due to the chest pain, and new reported paresthesias, evaluated for multiple potential problems, considered CVA/ICH, consider ACS/PE/dissection, obtained CT PE study since he did have hip surgery about a month ago, no evidence of these found, labs without life-threatening/concerning abnormalities, no significant stenosis on CTA with exception of the 50% stenoses in the proximal subclavian arteries, due to the fibrocalcific plaque at the carotid bulb, I did go ahead and order him outpatient carotid Dopplers, but I do not feel he requires admission at this time, he was describing vertiginous symptoms, and symptoms have improved after fluids, and Antivert/Zofran, given aspirin initially as well, Antivert and Zofran prescribed for home, he did request a neb treatment prior to discharge, history of COPD, strict return precautions given, all questions answered, will return if any worsening symptoms or new concerns, see AVS for further discharge information, patient verbalized understanding of plan, felt comfortable going home.  He was discharged to the [facility],  will wait for a ride, he has to call his Medicaid insurance since he was brought in by EMS.     Orders Placed This Encounter   Procedures    CT CHEST PULMONARY EMBOLISM W CONTRAST    CT HEAD WO CONTRAST    CTA HEAD NECK W CONTRAST    VL DUP CAROTID BILATERAL    CBC with Auto Differential    CMP w/ Reflex to MG    Lipase    Troponin    Urinalysis with Reflex to Culture    Lactic Acid    ETOH    Drug screen multi urine    Blood Gas, Venous    Protime-INR    APTT    Troponin     Orders Placed This Encounter   Medications    ondansetron (ZOFRAN) injection 4 mg    meclizine (ANTIVERT) tablet 25 mg    0.9 % sodium chloride bolus    aspirin chewable tablet 324 mg    iopamidol (ISOVUE-370) 76 % injection 75 mL    iopamidol (ISOVUE-370) 76 % injection 75 mL    ipratropium-albuterol (DUONEB) nebulizer solution 1 ampule     Order Specific Question:   Initiate RT Bronchodilator Protocol     Answer:   No     ED Course as of 02/27/23 0647   Mon Feb 27, 2023   0412 Hemoglobin Quant(!): 11.7  Chronic anemia [SY]   0532 EKG 12 Lead  EKG interpretation by me: Normal sinus rhythm, nonspecific ST/T wave changes, rate 82, QTc 448, baseline wander, normal axis, no significant ST elevation or depression [SY]   0623 Lactic Acid(!): 2.3  Normal white count, afebrile, low suspicion for sepsis at this time, no evidence of infectious process, I suspect he may be somewhat hemolyzed since it was a prolonged time before the sample was run after drawing [SY]   0632 P.o. challenge, he is up walking around the ER now with a walker, he reports he uses a walker at home [SY]   7030 Symptoms resolved, no longer having spinning sensation, tolerated p.o. with water, no longer having chest pain, he reports he does have nitro at home if needed, but I explained to him the concern about dropping blood pressure with this, he is already on midodrine [SY]      ED Course User Index  [SY] Irina Garza DO     HEART SCORE:    History: +0 for low suspicion  EKG: +1 for nonspecific changes   Age: +1 for age 44-72 years  Risk factors (includes HLD, HTN, DM, tobacco use, obesity, and +FHx): +2 for known CAD or 3+ risk factors  Initial troponin: +0 for negative troponin    Heart score: 4. This falls under the following category: Score of 4-6, which indicates low/moderate risk for major adverse cardiac event and supports observation with repeated troponins and/or non-invasive testing    Is this patient to be included in the SEP-1 Core Measure due to severe sepsis or septic shock? No   Exclusion criteria - the patient is NOT to be included for SEP-1 Core Measure due to:  2+ SIRS criteria are not met    The total critical care time spent while evaluating and treating this patient was 35 minutes. This excludes time spent doing separately billable procedures. This includes time at the bedside, data interpretation, medication management, obtaining critical history from collateral sources if the patient is unable to provide it directly, and physician consultation. Specifics of interventions taken and potentially life-threatening diagnostic considerations are listed in the medical decision making. CLINICAL IMPRESSION  1. Nausea    2. Left-sided chest pain    3. Acute nonintractable headache, unspecified headache type    4. Paresthesia of left upper and lower extremity    5. History of COPD          DISPOSITION  Mendoza Malone was discharged to home in stable condition.                        Nancy Lagos DO  02/27/23 1016

## 2023-03-11 ENCOUNTER — APPOINTMENT (OUTPATIENT)
Dept: CT IMAGING | Age: 63
End: 2023-03-11
Payer: MEDICAID

## 2023-03-11 ENCOUNTER — HOSPITAL ENCOUNTER (EMERGENCY)
Age: 63
Discharge: HOME | End: 2023-03-11
Attending: EMERGENCY MEDICINE
Payer: MEDICAID

## 2023-03-11 ENCOUNTER — APPOINTMENT (OUTPATIENT)
Dept: GENERAL RADIOLOGY | Age: 63
End: 2023-03-11
Payer: MEDICAID

## 2023-03-11 VITALS
DIASTOLIC BLOOD PRESSURE: 98 MMHG | RESPIRATION RATE: 14 BRPM | OXYGEN SATURATION: 100 % | HEART RATE: 66 BPM | WEIGHT: 170 LBS | BODY MASS INDEX: 25.76 KG/M2 | SYSTOLIC BLOOD PRESSURE: 165 MMHG | TEMPERATURE: 98 F | HEIGHT: 68 IN

## 2023-03-11 DIAGNOSIS — K52.9 COLITIS: Primary | ICD-10-CM

## 2023-03-11 LAB
A/G RATIO: 1.3 (ref 1.1–2.2)
ALBUMIN SERPL-MCNC: 4.3 G/DL (ref 3.4–5)
ALP BLD-CCNC: 136 U/L (ref 40–129)
ALT SERPL-CCNC: <5 U/L (ref 10–40)
ANION GAP SERPL CALCULATED.3IONS-SCNC: 13 MMOL/L (ref 3–16)
AST SERPL-CCNC: 11 U/L (ref 15–37)
BACTERIA: ABNORMAL /HPF
BASOPHILS ABSOLUTE: 0 K/UL (ref 0–0.2)
BASOPHILS RELATIVE PERCENT: 0.3 %
BILIRUB SERPL-MCNC: 0.4 MG/DL (ref 0–1)
BILIRUBIN URINE: NEGATIVE
BLOOD, URINE: ABNORMAL
BUN BLDV-MCNC: 13 MG/DL (ref 7–20)
CALCIUM SERPL-MCNC: 9.5 MG/DL (ref 8.3–10.6)
CHLORIDE BLD-SCNC: 103 MMOL/L (ref 99–110)
CLARITY: CLEAR
CO2: 25 MMOL/L (ref 21–32)
COLOR: YELLOW
CREAT SERPL-MCNC: 0.7 MG/DL (ref 0.8–1.3)
EOSINOPHILS ABSOLUTE: 0 K/UL (ref 0–0.6)
EOSINOPHILS RELATIVE PERCENT: 0.6 %
EPITHELIAL CELLS, UA: ABNORMAL /HPF (ref 0–5)
GFR SERPL CREATININE-BSD FRML MDRD: >60 ML/MIN/{1.73_M2}
GLUCOSE BLD-MCNC: 98 MG/DL (ref 70–99)
GLUCOSE URINE: NEGATIVE MG/DL
HCT VFR BLD CALC: 43.7 % (ref 40.5–52.5)
HEMOGLOBIN: 14.4 G/DL (ref 13.5–17.5)
KETONES, URINE: ABNORMAL MG/DL
LEUKOCYTE ESTERASE, URINE: NEGATIVE
LYMPHOCYTES ABSOLUTE: 1 K/UL (ref 1–5.1)
LYMPHOCYTES RELATIVE PERCENT: 13.6 %
MCH RBC QN AUTO: 29.2 PG (ref 26–34)
MCHC RBC AUTO-ENTMCNC: 32.9 G/DL (ref 31–36)
MCV RBC AUTO: 88.8 FL (ref 80–100)
MICROSCOPIC EXAMINATION: YES
MONOCYTES ABSOLUTE: 0.9 K/UL (ref 0–1.3)
MONOCYTES RELATIVE PERCENT: 12.4 %
MUCUS: ABNORMAL /LPF
NEUTROPHILS ABSOLUTE: 5.4 K/UL (ref 1.7–7.7)
NEUTROPHILS RELATIVE PERCENT: 73.1 %
NITRITE, URINE: NEGATIVE
OCCULT BLOOD DIAGNOSTIC: ABNORMAL
PDW BLD-RTO: 17.6 % (ref 12.4–15.4)
PH UA: 6 (ref 5–8)
PLATELET # BLD: 234 K/UL (ref 135–450)
PMV BLD AUTO: 7.6 FL (ref 5–10.5)
POTASSIUM REFLEX MAGNESIUM: 3.9 MMOL/L (ref 3.5–5.1)
PROTEIN UA: NEGATIVE MG/DL
RBC # BLD: 4.92 M/UL (ref 4.2–5.9)
RBC UA: ABNORMAL /HPF (ref 0–4)
SODIUM BLD-SCNC: 141 MMOL/L (ref 136–145)
SPECIFIC GRAVITY UA: 1.02 (ref 1–1.03)
TOTAL PROTEIN: 7.5 G/DL (ref 6.4–8.2)
URINE TYPE: ABNORMAL
UROBILINOGEN, URINE: 0.2 E.U./DL
WBC # BLD: 7.4 K/UL (ref 4–11)
WBC UA: ABNORMAL /HPF (ref 0–5)

## 2023-03-11 PROCEDURE — 36415 COLL VENOUS BLD VENIPUNCTURE: CPT

## 2023-03-11 PROCEDURE — 6360000004 HC RX CONTRAST MEDICATION: Performed by: EMERGENCY MEDICINE

## 2023-03-11 PROCEDURE — 82270 OCCULT BLOOD FECES: CPT

## 2023-03-11 PROCEDURE — 85025 COMPLETE CBC W/AUTO DIFF WBC: CPT

## 2023-03-11 PROCEDURE — 74177 CT ABD & PELVIS W/CONTRAST: CPT

## 2023-03-11 PROCEDURE — 99285 EMERGENCY DEPT VISIT HI MDM: CPT

## 2023-03-11 PROCEDURE — 6370000000 HC RX 637 (ALT 250 FOR IP): Performed by: EMERGENCY MEDICINE

## 2023-03-11 PROCEDURE — 71045 X-RAY EXAM CHEST 1 VIEW: CPT

## 2023-03-11 PROCEDURE — 81001 URINALYSIS AUTO W/SCOPE: CPT

## 2023-03-11 PROCEDURE — 80053 COMPREHEN METABOLIC PANEL: CPT

## 2023-03-11 RX ORDER — MIDODRINE HYDROCHLORIDE 5 MG/1
TABLET ORAL
COMMUNITY
Start: 2023-02-15

## 2023-03-11 RX ORDER — PANTOPRAZOLE SODIUM 40 MG/1
TABLET, DELAYED RELEASE ORAL
COMMUNITY
Start: 2022-12-14

## 2023-03-11 RX ORDER — FINASTERIDE 5 MG/1
TABLET, FILM COATED ORAL
COMMUNITY
Start: 2022-12-29

## 2023-03-11 RX ORDER — DICYCLOMINE HYDROCHLORIDE 10 MG/1
10 CAPSULE ORAL 4 TIMES DAILY
Qty: 20 CAPSULE | Refills: 0 | Status: SHIPPED | OUTPATIENT
Start: 2023-03-11 | End: 2023-03-16

## 2023-03-11 RX ORDER — IPRATROPIUM BROMIDE AND ALBUTEROL SULFATE 2.5; .5 MG/3ML; MG/3ML
1 SOLUTION RESPIRATORY (INHALATION) ONCE
Status: COMPLETED | OUTPATIENT
Start: 2023-03-11 | End: 2023-03-11

## 2023-03-11 RX ORDER — CIPROFLOXACIN 500 MG/1
500 TABLET, FILM COATED ORAL 2 TIMES DAILY
Qty: 20 TABLET | Refills: 0 | Status: SHIPPED | OUTPATIENT
Start: 2023-03-11 | End: 2023-03-21

## 2023-03-11 RX ORDER — CALCIUM CITRATE/VITAMIN D3 200MG-6.25
TABLET ORAL
COMMUNITY
Start: 2022-12-23

## 2023-03-11 RX ORDER — CIPROFLOXACIN 500 MG/1
500 TABLET, FILM COATED ORAL ONCE
Status: COMPLETED | OUTPATIENT
Start: 2023-03-11 | End: 2023-03-11

## 2023-03-11 RX ORDER — CYCLOBENZAPRINE HCL 5 MG
TABLET ORAL
COMMUNITY
Start: 2023-01-30

## 2023-03-11 RX ORDER — METRONIDAZOLE 500 MG/1
500 TABLET ORAL 3 TIMES DAILY
Qty: 30 TABLET | Refills: 0 | Status: SHIPPED | OUTPATIENT
Start: 2023-03-11 | End: 2023-03-21

## 2023-03-11 RX ORDER — POLYETHYLENE GLYCOL 3350 17 G/17G
POWDER, FOR SOLUTION ORAL
COMMUNITY
Start: 2023-01-26

## 2023-03-11 RX ORDER — GABAPENTIN 400 MG/1
CAPSULE ORAL
COMMUNITY
Start: 2023-01-31

## 2023-03-11 RX ORDER — ASPIRIN 81 MG/1
TABLET, COATED ORAL
COMMUNITY
Start: 2023-01-30

## 2023-03-11 RX ORDER — MULTIVITAMIN
TABLET ORAL
COMMUNITY
Start: 2023-02-15

## 2023-03-11 RX ORDER — DICYCLOMINE HYDROCHLORIDE 10 MG/1
20 CAPSULE ORAL ONCE
Status: COMPLETED | OUTPATIENT
Start: 2023-03-11 | End: 2023-03-11

## 2023-03-11 RX ORDER — METRONIDAZOLE 250 MG/1
500 TABLET ORAL ONCE
Status: COMPLETED | OUTPATIENT
Start: 2023-03-11 | End: 2023-03-11

## 2023-03-11 RX ORDER — POLYVINYL ALCOHOL 14 MG/ML
SOLUTION/ DROPS OPHTHALMIC
COMMUNITY
Start: 2023-02-25

## 2023-03-11 RX ORDER — BUDESONIDE AND FORMOTEROL FUMARATE DIHYDRATE 160; 4.5 UG/1; UG/1
AEROSOL RESPIRATORY (INHALATION)
COMMUNITY
Start: 2021-11-07

## 2023-03-11 RX ADMIN — CIPROFLOXACIN 500 MG: 500 TABLET, FILM COATED ORAL at 19:51

## 2023-03-11 RX ADMIN — DICYCLOMINE HYDROCHLORIDE 20 MG: 10 CAPSULE ORAL at 19:51

## 2023-03-11 RX ADMIN — METRONIDAZOLE 500 MG: 250 TABLET ORAL at 19:51

## 2023-03-11 RX ADMIN — IOPAMIDOL 75 ML: 755 INJECTION, SOLUTION INTRAVENOUS at 17:08

## 2023-03-11 RX ADMIN — IPRATROPIUM BROMIDE AND ALBUTEROL SULFATE 1 AMPULE: 2.5; .5 SOLUTION RESPIRATORY (INHALATION) at 17:53

## 2023-03-11 ASSESSMENT — ENCOUNTER SYMPTOMS
VOMITING: 0
CHEST TIGHTNESS: 0
ABDOMINAL DISTENTION: 0
CONSTIPATION: 0
PHOTOPHOBIA: 0
NAUSEA: 0
DIARRHEA: 0
COUGH: 0
RECTAL PAIN: 0
ABDOMINAL PAIN: 0
SHORTNESS OF BREATH: 0

## 2023-03-11 ASSESSMENT — PAIN - FUNCTIONAL ASSESSMENT: PAIN_FUNCTIONAL_ASSESSMENT: 0-10

## 2023-03-11 ASSESSMENT — PAIN SCALES - GENERAL: PAINLEVEL_OUTOF10: 8

## 2023-03-11 NOTE — ED NOTES
BM collected, large amount of blood present in stool. MD aware.       Toribio King RN  03/11/23 2479

## 2023-03-12 NOTE — ED NOTES
Pt AVS, rx x3 and follow up reviewed. Pt expressed understanding and d/c at this time.       Emily Baeza RN  03/11/23 1956

## 2023-03-12 NOTE — DISCHARGE INSTRUCTIONS
Take both antibiotics Cipro and Flagyl till gone  Take Bentyl for cramping  Follow-up with the GI doctor I have referred you to  Return if any worsening or go directly to [facility]

## 2023-03-12 NOTE — ED PROVIDER NOTES
[facility]      Pt Name: Carmen Head  MRN: [id number]  [place] 1960  Date of evaluation: 3/11/2023  Provider: Mita Sands MD    [address] [place] [address]       Chief Complaint   Patient presents with    Diarrhea     Pt c/o diarrhea, abd pain, nausea, and blood in his stool since 11 pm yesterday. HISTORY OF PRESENT ILLNESS   (Location/Symptom, Timing/Onset, Context/Setting, Quality, Duration, Modifying Factors, Severity)  Note limiting factors. Carmen Head is a 66 y.o. male who presents to the emergency department     Shari Ayala with bloody stools. He denies any vomiting hematemesis denies known ulcers blood is on the toilet paper and is described as bright red blood has had some either hemorrhoids or tears before he cannot exactly discern it from epic and it looks like he was seen even before epic fully got started    The history is provided by the patient. Nursing Notes were reviewed. REVIEW OF SYSTEMS    (2-9 systems for level 4, 10 or more for level 5)     Review of Systems   Constitutional:  Positive for activity change. Eyes:  Negative for photophobia. Respiratory:  Negative for cough, chest tightness and shortness of breath. Gastrointestinal:  Negative for abdominal distention, abdominal pain, constipation, diarrhea, nausea, rectal pain and vomiting. Genitourinary:  Negative for hematuria. All other systems reviewed and are negative. Except as noted above the remainder of the review of systems was reviewed and negative.        PAST MEDICAL HISTORY     Past Medical History:   Diagnosis Date    AAA (abdominal aortic aneurysm) 2018    3.0 cm    Anxiety     Arterial stent thrombosis (HCC)     Arthritis     Asthma     Bipolar 1 disorder (HCC)     COPD (chronic obstructive pulmonary disease) (HCC)     Coronary artery disease involving native coronary artery of native heart without angina pectoris 02/08/2021    Diabetes mellitus ([address]     Essential hypertension 02/08/2021    Hyperlipidemia     Pneumonia          SURGICAL HISTORY       Past Surgical History:   Procedure Laterality Date    CARDIAC SURGERY      stent placed    CARPAL TUNNEL RELEASE      CHOLECYSTECTOMY, LAPAROSCOPIC      KNEE ARTHROPLASTY      R knee x4    NASAL SINUS SURGERY      TOTAL HIP ARTHROPLASTY Right 1/25/2023    RIGHT TOTAL HIP ARTHROPLASTY  performed by Miquel Espino MD at [facility]  7/14/11    UPPER GASTROINTESTINAL ENDOSCOPY N/A 4/4/2019    EGD BIOPSY performed by Moises Bains DO at [address]       Previous Medications    ACETAMINOPHEN (TYLENOL) 325 MG TABLET    Take 2 tablets by mouth in the morning and 2 tablets at noon and 2 tablets in the evening and 2 tablets before bedtime. ALBUTEROL (PROVENTIL HFA;VENTOLIN HFA) 108 (90 BASE) MCG/ACT INHALER    Inhale 1 puff into the lungs every 6 hours as needed. ASPIRIN LOW DOSE 81 MG EC TABLET        BUDESONIDE-FORMOTEROL (SYMBICORT) 160-4.5 MCG/ACT AERO    TAKE 2 PUFFS BY MOUTH TWICE A DAY IN THE MORNING AND IN THE EVENING    CETIRIZINE (ZYRTEC) 10 MG TABLET    Take 10 mg by mouth daily    CLOPIDOGREL (PLAVIX) 75 MG TABLET    Take 75 mg by mouth daily    CYCLOBENZAPRINE (FLEXERIL) 5 MG TABLET        DICLOFENAC SODIUM (VOLTAREN) 1 % GEL        DIVALPROEX (DEPAKOTE) 500 MG DR TABLET    Take 500 mg by mouth 500 mg  in am & 1000 mg afternoon 4 pm    FERROUS SULFATE (IRON 325) 325 (65 FE) MG TABLET    Take 325 mg by mouth daily (with breakfast)    FINASTERIDE (PROSCAR) 5 MG TABLET        FLUTICASONE (FLONASE) 50 MCG/ACT NASAL SPRAY    1 spray by Nasal route daily.  Indications: EACH NOSTRIL    GABAPENTIN (NEURONTIN) 400 MG CAPSULE        IPRATROPIUM-ALBUTEROL (DUONEB) 0.5-2.5 (3) MG/3ML SOLN NEBULIZER SOLUTION    Take 3 mLs by nebulization every 4 hours    MIDODRINE (PROAMATINE) 5 MG TABLET        MONTELUKAST (SINGULAIR) 10 MG TABLET Take 10 mg by mouth nightly. MULTIPLE VITAMIN (MULTIVITAMIN) TABS        ONDANSETRON (ZOFRAN) 4 MG TABLET    Take 1 tablet by mouth 3 times daily as needed for Nausea or Vomiting    PANTOPRAZOLE (PROTONIX) 40 MG TABLET        POLYETHYLENE GLYCOL (GLYCOLAX) 17 GM/SCOOP POWDER        POLYVINYL ALCOHOL (LIQUIFILM TEARS) 1.4 % OPHTHALMIC SOLUTION        QUETIAPINE (SEROQUEL) 300 MG TABLET    Take 300 mg by mouth nightly    TAMSULOSIN (FLOMAX) 0.4 MG CAPSULE    Take 0.4 mg by mouth daily    TRUE METRIX BLOOD GLUCOSE TEST STRIP           ALLERGIES     Methylphenidate and Propoxyphene    FAMILY HISTORY       Family History   Problem Relation Age of Onset    High Blood Pressure Mother     Heart Disease Mother     High Blood Pressure Father     Heart Disease Father     Cancer Sister     Diabetes Sister     Other Sister         aneursym-brain          SOCIAL HISTORY       Social History     Socioeconomic History    Marital status:       Spouse name: None    Number of children: None    Years of education: None    Highest education level: None   Tobacco Use    Smoking status: Former     Packs/day: 2.00     Years: 35.00     Pack years: 70.00     Types: Cigarettes     Quit date: 2019     Years since quittin.0    Smokeless tobacco: Former     Quit date: 10/3/2015    Tobacco comments:     per MD order for annual lung screening   Vaping Use    Vaping Use: Never used   Substance and Sexual Activity    Alcohol use: No     Alcohol/week: 0.0 standard drinks    Drug use: No    Sexual activity: Yes     Partners: Female       SCREENINGS    Ange Coma Scale  Eye Opening: Spontaneous  Best Verbal Response: Oriented  Best Motor Response: Obeys commands  Ange Coma Scale Score: 15          PHYSICAL EXAM    (up to 7 for level 4, 8 or more for level 5)     ED Triage Vitals [23 1459]   BP Temp Temp Source Heart Rate Resp SpO2 Height Weight   (!) 153/100 98 °F (36.7 °C) Oral 73 16 95 % 5' 8\" (1.727 m) 170 lb (77.1 kg) Physical Exam  Vitals and nursing note reviewed. HENT:      Head: Normocephalic and atraumatic. Nose: Nose normal.   Cardiovascular:      Rate and Rhythm: Normal rate. Pulses: Normal pulses. Heart sounds: Normal heart sounds. Pulmonary:      Effort: Pulmonary effort is normal.   Abdominal:      General: Abdomen is flat. Bowel sounds are normal.      Palpations: Abdomen is soft. Musculoskeletal:         General: Normal range of motion. Cervical back: Normal range of motion. Neurological:      Mental Status: He is alert. DIAGNOSTIC RESULTS     EKG: All EKG's are interpreted by the Emergency Department Physician who either signs or Co-signs this chart in the absence of a cardiologist.        RADIOLOGY:   Non-plain film images such as CT, Ultrasound and MRI are read by the radiologist. Plain radiographic images are visualized and preliminarily interpreted by the emergency physician with the below findings:        Interpretation per the Radiologist below, if available at the time of this note:    XR CHEST PORTABLE   Final Result   Unremarkable portable chest radiograph. CT ABDOMEN PELVIS W IV CONTRAST Additional Contrast? None   Final Result   1. Thickening, increased mucosal enhancement and adjacent inflammatory   changes involving the descending and sigmoid portions of the colon consistent   with colitis. This could be related to an infectious or inflammatory process   or ischemia. 2. The bladder appears thickened. Hypertrophy is favored over cystitis. 3. 3.4 cm infrarenal abdominal aortic aneurysm which measures 1 mm larger   than before. 3 year follow-up is recommended.       RECOMMENDATIONS:   AAA RECOMMENDATION      2.6-2.9 CM: EVERY 5 YEARS      3.0-3.4 CM: EVERY 3 YEARS      3.5-3.9 CM: EVERY 12 MONTHS      4.0-4.4 CM: EVERY 12 MONTHS, VASCULAR CONSULTATION RECOMMENDED      4.5-5.4 CM: EVERY 6 MONTHS, VASCULAR CONSULTATION RECOMMENED      >5.5 CM: REFERRAL TO VASCULAR SURGEON RECOMMENDED      SACCULAR ANEURYSM ANY SIZE: REFERRAL TO VASCULAR SURGEON RECOMMENDED                 LABS:  Results for orders placed or performed during the hospital encounter of 03/11/23   Urinalysis   Result Value Ref Range    Color, UA Yellow Straw/Yellow    Clarity, UA Clear Clear    Glucose, Ur Negative Negative mg/dL    Bilirubin Urine Negative Negative    Ketones, Urine TRACE (A) Negative mg/dL    Specific Gravity, UA 1.025 1.005 - 1.030    Blood, Urine SMALL (A) Negative    pH, UA 6.0 5.0 - 8.0    Protein, UA Negative Negative mg/dL    Urobilinogen, Urine 0.2 <2.0 E.U./dL    Nitrite, Urine Negative Negative    Leukocyte Esterase, Urine Negative Negative    Microscopic Examination YES     Urine Type NotGiven    CBC with Auto Differential   Result Value Ref Range    WBC 7.4 4.0 - 11.0 K/uL    RBC 4.92 4.20 - 5.90 M/uL    Hemoglobin 14.4 13.5 - 17.5 g/dL    Hematocrit 43.7 40.5 - 52.5 %    MCV 88.8 80.0 - 100.0 fL    MCH 29.2 26.0 - 34.0 pg    MCHC 32.9 31.0 - 36.0 g/dL    RDW 17.6 (H) 12.4 - 15.4 %    Platelets 973 829 - 276 K/uL    MPV 7.6 5.0 - 10.5 fL    Neutrophils % 73.1 %    Lymphocytes % 13.6 %    Monocytes % 12.4 %    Eosinophils % 0.6 %    Basophils % 0.3 %    Neutrophils Absolute 5.4 1.7 - 7.7 K/uL    Lymphocytes Absolute 1.0 1.0 - 5.1 K/uL    Monocytes Absolute 0.9 0.0 - 1.3 K/uL    Eosinophils Absolute 0.0 0.0 - 0.6 K/uL    Basophils Absolute 0.0 0.0 - 0.2 K/uL   Comprehensive Metabolic Panel w/ Reflex to MG   Result Value Ref Range    Sodium 141 136 - 145 mmol/L    Potassium reflex Magnesium 3.9 3.5 - 5.1 mmol/L    Chloride 103 99 - 110 mmol/L    CO2 25 21 - 32 mmol/L    Anion Gap 13 3 - 16    Glucose 98 70 - 99 mg/dL    BUN 13 7 - 20 mg/dL    Creatinine 0.7 (L) 0.8 - 1.3 mg/dL    Est, Glom Filt Rate >60 >60    Calcium 9.5 8.3 - 10.6 mg/dL    Total Protein 7.5 6.4 - 8.2 g/dL    Albumin 4.3 3.4 - 5.0 g/dL    Albumin/Globulin Ratio 1.3 1.1 - 2.2    Total Bilirubin 0.4 0.0 - 1.0 mg/dL    Alkaline Phosphatase 136 (H) 40 - 129 U/L    ALT <5 (L) 10 - 40 U/L    AST 11 (L) 15 - 37 U/L   Microscopic Urinalysis   Result Value Ref Range    Mucus, UA 2+ (A) None Seen /LPF    WBC, UA 0-2 0 - 5 /HPF    RBC, UA 0-2 0 - 4 /HPF    Epithelial Cells, UA 0-1 0 - 5 /HPF    Bacteria, UA Rare (A) None Seen /HPF   Blood Occult Stool Diagnostic   Result Value Ref Range    Occult Blood Diagnostic Result: POSITIVE  Normal range: Negative   (A)             EMERGENCY DEPARTMENT COURSE and DIFFERENTIAL DIAGNOSIS/MDM:     Vitals:    03/11/23 1459 03/11/23 1530 03/11/23 1600   BP: (!) 153/100 (!) 163/93 (!) 153/94   Pulse: 73 76 72   Resp: 16 16 14   Temp: 98 °F (36.7 °C)     TempSrc: Oral     SpO2: 95% 97% 95%   Weight: 170 lb (77.1 kg)     Height: 5' 8\" (1.727 m)             MDM    Historians: Patient only  Limitations of history: None  Medically appropriate history patient presents with some bright rectal bleeding for the last 2 days he has had no fever no severe pain. He denies any nausea vomiting. Medically appropriate physical exam vital signs are stable he is not pale looking. Lung sounds are clear abdomen is soft no focal tenderness no peritoneal rotation rectal exam I do not see any tears fissures or large hemorrhoids on high digital exam he did have some bright red blood. Differential diagnosis colitis possibly acute diverticulitis. Conditions and comorbidities include severe COPD he does have a history of stents and is on Plavix. Records were reviewed. Labs CBC was totally normal.  CMP was also normal.  X-rays advanced imaging i.e. CT revealed signs of colitis patient remained stable he had a little bit of some subsequent bleeding but nothing severe with his blood counts and vitals being stable I felt that his lower GI bleed could be worked up as an outpatient colitis as always always torn whether he should be on antibiotics or not I went ahead and started him on Cipro and Flagyl.   He is to follow-up with the GI team I did refer him to Dr. Ladarius Santana visit summary patient presents with some lower GI bleeding patient is referred to our GI team blood work was stable vitals were stable diagnosis lower GI bleed i.e. colitis further work-up pending. GI team he is advised to go to [facility] if there is any worsening until then    [address] [facility]     CONSULTS:  None      PROCEDURES:     Procedures    MEDICATIONS GIVEN THIS VISIT:  Medications   dicyclomine (BENTYL) capsule 20 mg (has no administration in time range)   metroNIDAZOLE (FLAGYL) tablet 500 mg (has no administration in time range)   ciprofloxacin (CIPRO) tablet 500 mg (has no administration in time range)   iopamidol (ISOVUE-370) 76 % injection 75 mL (75 mLs IntraVENous Given 3/11/23 1708)   ipratropium-albuterol (DUONEB) nebulizer solution 1 ampule (1 ampule Inhalation Given 3/11/23 1753)        FINAL IMPRESSION      1. Colitis            DISPOSITION/PLAN   DISPOSITION Decision To Discharge 03/11/2023 07:10:34 PM      PATIENT REFERRED TO:  Simon Yi PA-C  [address] [facility] [address]  [phone]    Schedule an appointment as soon as possible for a visit in 3 days      Miguel Waldron MD  [place] [address]  [address]    Schedule an appointment as soon as possible for a visit in 1 week        DISCHARGE MEDICATIONS:  New Prescriptions    CIPROFLOXACIN (CIPRO) 500 MG TABLET    Take 1 tablet by mouth 2 times daily for 10 days    DICYCLOMINE (BENTYL) 10 MG CAPSULE    Take 1 capsule by mouth 4 times daily for 20 doses    METRONIDAZOLE (FLAGYL) 500 MG TABLET    Take 1 tablet by mouth 3 times daily for 10 days       Controlled Substances Monitoring  No flowsheet data found.         (Please note that portions of this note were completed with a voice recognition program.  Efforts were made to edit the dictations but occasionally words are mis-transcribed.)    Patient was advised to return to the Emergency Department if there was any worsening.     Brandon Schwartz MD (electronically signed)  Attending Emergency Physician         Hipolito Mccracken MD  03/11/23 9618

## 2023-03-17 ENCOUNTER — OFFICE VISIT (OUTPATIENT)
Dept: ORTHOPEDIC SURGERY | Age: 63
End: 2023-03-17

## 2023-03-17 VITALS — WEIGHT: 170 LBS | HEIGHT: 68 IN | BODY MASS INDEX: 25.76 KG/M2

## 2023-03-17 DIAGNOSIS — Z96.641 S/P TOTAL RIGHT HIP ARTHROPLASTY: ICD-10-CM

## 2023-03-17 DIAGNOSIS — M25.561 RIGHT KNEE PAIN, UNSPECIFIED CHRONICITY: ICD-10-CM

## 2023-03-17 DIAGNOSIS — Z47.89 ORTHOPEDIC AFTERCARE: Primary | ICD-10-CM

## 2023-03-17 DIAGNOSIS — M77.12 LATERAL EPICONDYLITIS OF LEFT ELBOW: Primary | ICD-10-CM

## 2023-03-17 DIAGNOSIS — M77.12 LEFT TENNIS ELBOW: ICD-10-CM

## 2023-03-17 RX ORDER — LIDOCAINE HYDROCHLORIDE 10 MG/ML
2 INJECTION, SOLUTION INFILTRATION; PERINEURAL ONCE
Status: COMPLETED | OUTPATIENT
Start: 2023-03-17 | End: 2023-03-17

## 2023-03-17 RX ORDER — TRIAMCINOLONE ACETONIDE 40 MG/ML
40 INJECTION, SUSPENSION INTRA-ARTICULAR; INTRAMUSCULAR ONCE
Status: COMPLETED | OUTPATIENT
Start: 2023-03-17 | End: 2023-03-17

## 2023-03-17 RX ADMIN — LIDOCAINE HYDROCHLORIDE 2 ML: 10 INJECTION, SOLUTION INFILTRATION; PERINEURAL at 13:41

## 2023-03-17 RX ADMIN — TRIAMCINOLONE ACETONIDE 40 MG: 40 INJECTION, SUSPENSION INTRA-ARTICULAR; INTRAMUSCULAR at 13:42

## 2023-03-18 NOTE — PROGRESS NOTES
ORTHOPAEDIC SURGERY FOLLOWUP VISIT     CHIEF COMPLAINT:  Follow-up right hip     DATE OF INJURY: N/A  DIAGNOSIS: Right femoral head impaction fracture  DATE OF SURGERY: 1/25/2023, right total hip arthroplasty     HISTORY OF PRESENT ILLNESS:  72-year-old male presents 7.5 weeks status post right primary total hip arthroplasty. This was done in the setting of a impaction fracture, subacute. Overall, he has mobilized well postsurgery. He denies significant issues with his hip. He is ambulatory with the use of a cane. He denies fever, chills, or night sweats. He denies wound healing issues. He has not had any numbness or tingling. He has noticed more knee pain recently. PHYSICAL EXAM:  General: Well-appearing male of stated age. No acute distress. Right lower extremity:  Incision is pristinely healing. There is no signs of dehiscence. No open areas. No surrounding erythema. Minimal tenderness to palpation. No appreciable swelling in this area. Gentle hip range of motion does not reproduce significant groin pain. There are no cuts, open wounds, or abrasions. Sensation is intact to light touch in deep peroneal, superficial peroneal, tibial, sural, and saphenous nerve distributions. Motor function is intact to EHL, FHL, tibialis anterior, and gastroc. There is brisk capillary refill to the toes and a strong palpable dorsalis pedis pulse. Compartments are soft and compressible. There is no calf tenderness and a negative Homans' sign. Tenderness to palpation is present about the Charley's tubercle as well as lateral femoral condyle. There is tightness experienced at the iliotibial band. RADIOGRAPHIC EXAM:  AP pelvis as well as AP and frog lateral views of the right hip demonstrate total hip arthroplasty in expected position. There is no change in position from immediate postoperative imaging. No evidence of femoral stem subsidence. No change in acetabular position. No dislocation.   No features of heterotopic ossification. No jennie-implant fractures. 4 views of the right knee including weightbearing AP, tunnel, lateral, and sunrise x-rays demonstrate no evidence of fracture or dislocation. There is overall diffuse osteopenia. Patellofemoral tracking is appropriate without subluxation or tilt. No significant osteophytes. No effusion. ASSESSMENT AND PLAN:  7.5 weeks status post right primary total hip arthroplasty. Weightbearing as tolerated right lower extremity  Anterolateral hip precautions times an additional 2 weeks  Pain control: OTC medications  DVT prophylaxis: May discontinue  Follow-up wound care: May be open to air at this point. May shower but not soak or submerge incisions. I reviewed with the patient that likely his knee pain is related to iliotibial band syndrome. This should resolve with continued physical therapy/stretching. On his hip x-rays, there is a slight increase in offset from his native offset which may lead to an increase in tension at this location. I reassured him regarding the health of his right knee joint. He will follow-up at 1 year postop related to his right hip replacement.      Nakia Alaniz MD

## 2023-03-18 NOTE — PROGRESS NOTES
ORTHOPAEDIC SURGERY FOLLOWUP VISIT    CHIEF COMPLAINT: Left elbow pain    DATE OF INJURY: Chronic  DIAGNOSIS: Left elbow tennis elbow  DATE OF SURGERY: N/A    HISTORY OF PRESENT ILLNESS:  61-year-old male whom I am seeing postop from right total hip arthroplasty presents for evaluation of chronic left elbow pain. This is worse with elbow movements and repetitive lifting. It is worse with rotational movements of the forearm. His pain localizes to the lateral aspect of the elbow and does not radiate. He denies numbness or tingling. His pain is manageable with OTC medications. He has not undergone a course of physical therapy    PHYSICAL EXAM:  General: Well-appearing male of stated age. No acute distress  Left upper extremity: No cuts, open wounds, or abrasions overlying the left elbow. No erythema. No warmth. There is no significant swelling. There is tenderness to palpation about the lateral epicondyle as well as the proximal extent of the extensor origin. There is pain with resisted supination of the elbow. There is no limitation of range of motion in this area. Sensation is intact to light touch in median, radial, ulnar, and axillary distributions. Motor function is intact to AIN, PIN, ulnar motor nerves. There is a strong palpable radial pulse, and brisk capillary refill to the fingers. Compartments are soft and compressible    RADIOGRAPHIC EXAM:  No new x-rays obtained today. ASSESSMENT AND PLAN:  Left elbow lateral epicondylitis    I discussed the pathophysiology of lateral epicondylitis in detail. I discussed the conservative managements including OTC medications, temperature modalities, physical therapy exercises, the use of a Cho-Pat strap, and local corticosteroid injection. He would like to proceed with an injection today. I discussed the risk, benefits, and alternatives to this.   Therefore, the lateral aspect of the elbow was sterilely prepped overlying the epicondyles which is palpable. The skin was anesthetized with ethyl chloride spray. An injection mixture of Kenalog 40 mg mixed with 2 cc lidocaine 1% plain was injected to the area of maximal tenderness overlying the extensor origin. He tolerated this well. It was dressed with a Band-Aid. He was given verbal postinjection instructions. He will progress his activities as tolerated. I counseled him that likely any form of surgical intervention related to this problem is only considered after 1 year of dedicated conservative managements. He will follow-up as needed in the future for this issue.     Garrett Rodriguez MD

## 2023-04-19 ENCOUNTER — HOSPITAL ENCOUNTER (OUTPATIENT)
Age: 63
Setting detail: OUTPATIENT SURGERY
Discharge: HOME | End: 2023-04-19
Attending: INTERNAL MEDICINE | Admitting: INTERNAL MEDICINE
Payer: MEDICAID

## 2023-04-19 ENCOUNTER — ANESTHESIA EVENT (OUTPATIENT)
Dept: ENDOSCOPY | Age: 63
End: 2023-04-19
Payer: MEDICAID

## 2023-04-19 ENCOUNTER — ANESTHESIA (OUTPATIENT)
Dept: ENDOSCOPY | Age: 63
End: 2023-04-19
Payer: MEDICAID

## 2023-04-19 VITALS
BODY MASS INDEX: 26.07 KG/M2 | WEIGHT: 172 LBS | RESPIRATION RATE: 20 BRPM | HEIGHT: 68 IN | HEART RATE: 71 BPM | SYSTOLIC BLOOD PRESSURE: 151 MMHG | TEMPERATURE: 98.4 F | DIASTOLIC BLOOD PRESSURE: 95 MMHG | OXYGEN SATURATION: 97 %

## 2023-04-19 DIAGNOSIS — R19.5 POSITIVE FIT (FECAL IMMUNOCHEMICAL TEST): ICD-10-CM

## 2023-04-19 LAB
GLUCOSE BLD-MCNC: 91 MG/DL (ref 70–99)
PERFORMED ON: NORMAL

## 2023-04-19 PROCEDURE — 6360000002 HC RX W HCPCS: Performed by: NURSE ANESTHETIST, CERTIFIED REGISTERED

## 2023-04-19 PROCEDURE — 2709999900 HC NON-CHARGEABLE SUPPLY: Performed by: INTERNAL MEDICINE

## 2023-04-19 PROCEDURE — 7100000011 HC PHASE II RECOVERY - ADDTL 15 MIN: Performed by: INTERNAL MEDICINE

## 2023-04-19 PROCEDURE — 7100000010 HC PHASE II RECOVERY - FIRST 15 MIN: Performed by: INTERNAL MEDICINE

## 2023-04-19 PROCEDURE — 3609010600 HC COLONOSCOPY POLYPECTOMY SNARE/COLD BIOPSY: Performed by: INTERNAL MEDICINE

## 2023-04-19 PROCEDURE — 3700000001 HC ADD 15 MINUTES (ANESTHESIA): Performed by: INTERNAL MEDICINE

## 2023-04-19 PROCEDURE — 88305 TISSUE EXAM BY PATHOLOGIST: CPT

## 2023-04-19 PROCEDURE — 2580000003 HC RX 258: Performed by: NURSE ANESTHETIST, CERTIFIED REGISTERED

## 2023-04-19 PROCEDURE — 3700000000 HC ANESTHESIA ATTENDED CARE: Performed by: INTERNAL MEDICINE

## 2023-04-19 RX ORDER — SODIUM CHLORIDE 0.9 % (FLUSH) 0.9 %
5-40 SYRINGE (ML) INJECTION EVERY 12 HOURS SCHEDULED
Status: CANCELLED | OUTPATIENT
Start: 2023-04-19

## 2023-04-19 RX ORDER — SODIUM CHLORIDE 9 MG/ML
25 INJECTION, SOLUTION INTRAVENOUS PRN
Status: CANCELLED | OUTPATIENT
Start: 2023-04-19

## 2023-04-19 RX ORDER — DIPHENHYDRAMINE HYDROCHLORIDE 50 MG/ML
12.5 INJECTION INTRAMUSCULAR; INTRAVENOUS
Status: CANCELLED | OUTPATIENT
Start: 2023-04-19 | End: 2023-04-20

## 2023-04-19 RX ORDER — PROPOFOL 10 MG/ML
INJECTION, EMULSION INTRAVENOUS PRN
Status: DISCONTINUED | OUTPATIENT
Start: 2023-04-19 | End: 2023-04-19 | Stop reason: SDUPTHER

## 2023-04-19 RX ORDER — SODIUM CHLORIDE 0.9 % (FLUSH) 0.9 %
5-40 SYRINGE (ML) INJECTION PRN
Status: CANCELLED | OUTPATIENT
Start: 2023-04-19

## 2023-04-19 RX ORDER — LIDOCAINE HYDROCHLORIDE 10 MG/ML
0.3 INJECTION, SOLUTION EPIDURAL; INFILTRATION; INTRACAUDAL; PERINEURAL
Status: DISCONTINUED | OUTPATIENT
Start: 2023-04-19 | End: 2023-04-19 | Stop reason: HOSPADM

## 2023-04-19 RX ORDER — SODIUM CHLORIDE, SODIUM LACTATE, POTASSIUM CHLORIDE, CALCIUM CHLORIDE 600; 310; 30; 20 MG/100ML; MG/100ML; MG/100ML; MG/100ML
INJECTION, SOLUTION INTRAVENOUS CONTINUOUS
Status: DISCONTINUED | OUTPATIENT
Start: 2023-04-19 | End: 2023-04-19 | Stop reason: SDUPTHER

## 2023-04-19 RX ORDER — MIDAZOLAM HYDROCHLORIDE 1 MG/ML
1 INJECTION INTRAMUSCULAR; INTRAVENOUS EVERY 5 MIN PRN
Status: CANCELLED | OUTPATIENT
Start: 2023-04-19

## 2023-04-19 RX ORDER — HYDRALAZINE HYDROCHLORIDE 20 MG/ML
5 INJECTION INTRAMUSCULAR; INTRAVENOUS
Status: CANCELLED | OUTPATIENT
Start: 2023-04-19

## 2023-04-19 RX ORDER — SODIUM CHLORIDE 0.9 % (FLUSH) 0.9 %
5-40 SYRINGE (ML) INJECTION EVERY 12 HOURS SCHEDULED
Status: DISCONTINUED | OUTPATIENT
Start: 2023-04-19 | End: 2023-04-19 | Stop reason: HOSPADM

## 2023-04-19 RX ORDER — SODIUM CHLORIDE, SODIUM LACTATE, POTASSIUM CHLORIDE, CALCIUM CHLORIDE 600; 310; 30; 20 MG/100ML; MG/100ML; MG/100ML; MG/100ML
INJECTION, SOLUTION INTRAVENOUS CONTINUOUS PRN
Status: DISCONTINUED | OUTPATIENT
Start: 2023-04-19 | End: 2023-04-19 | Stop reason: SDUPTHER

## 2023-04-19 RX ORDER — SODIUM CHLORIDE 9 MG/ML
INJECTION, SOLUTION INTRAVENOUS PRN
Status: DISCONTINUED | OUTPATIENT
Start: 2023-04-19 | End: 2023-04-19 | Stop reason: HOSPADM

## 2023-04-19 RX ORDER — SODIUM CHLORIDE, SODIUM LACTATE, POTASSIUM CHLORIDE, CALCIUM CHLORIDE 600; 310; 30; 20 MG/100ML; MG/100ML; MG/100ML; MG/100ML
INJECTION, SOLUTION INTRAVENOUS CONTINUOUS
Status: DISCONTINUED | OUTPATIENT
Start: 2023-04-19 | End: 2023-04-19 | Stop reason: HOSPADM

## 2023-04-19 RX ORDER — OXYCODONE HYDROCHLORIDE 5 MG/1
5 TABLET ORAL
Status: CANCELLED | OUTPATIENT
Start: 2023-04-19 | End: 2023-04-20

## 2023-04-19 RX ORDER — SODIUM CHLORIDE 0.9 % (FLUSH) 0.9 %
5-40 SYRINGE (ML) INJECTION PRN
Status: DISCONTINUED | OUTPATIENT
Start: 2023-04-19 | End: 2023-04-19 | Stop reason: HOSPADM

## 2023-04-19 RX ORDER — MEPERIDINE HYDROCHLORIDE 25 MG/ML
12.5 INJECTION INTRAMUSCULAR; INTRAVENOUS; SUBCUTANEOUS EVERY 5 MIN PRN
Status: CANCELLED | OUTPATIENT
Start: 2023-04-19

## 2023-04-19 RX ORDER — ONDANSETRON 2 MG/ML
4 INJECTION INTRAMUSCULAR; INTRAVENOUS EVERY 10 MIN PRN
Status: CANCELLED | OUTPATIENT
Start: 2023-04-19

## 2023-04-19 RX ADMIN — PROPOFOL 50 MG: 10 INJECTION, EMULSION INTRAVENOUS at 12:33

## 2023-04-19 RX ADMIN — PROPOFOL 100 MG: 10 INJECTION, EMULSION INTRAVENOUS at 12:27

## 2023-04-19 RX ADMIN — PROPOFOL 50 MG: 10 INJECTION, EMULSION INTRAVENOUS at 12:39

## 2023-04-19 RX ADMIN — SODIUM CHLORIDE, POTASSIUM CHLORIDE, SODIUM LACTATE AND CALCIUM CHLORIDE: 600; 310; 30; 20 INJECTION, SOLUTION INTRAVENOUS at 12:26

## 2023-04-19 ASSESSMENT — PAIN - FUNCTIONAL ASSESSMENT: PAIN_FUNCTIONAL_ASSESSMENT: NONE - DENIES PAIN

## 2023-04-19 ASSESSMENT — ENCOUNTER SYMPTOMS: SHORTNESS OF BREATH: 1

## 2023-04-19 ASSESSMENT — COPD QUESTIONNAIRES: CAT_SEVERITY: MODERATE

## 2023-04-19 NOTE — ANESTHESIA POSTPROCEDURE EVALUATION
Department of Anesthesiology  Postprocedure Note    Patient: Bobby Cotton  MRN: [id number]  YOB: 1960  Date of evaluation: 4/19/2023      Procedure Summary     Date: 04/19/23 Room / Location: [facility] 01 / [facility]    Anesthesia Start: 1226 Anesthesia Stop: 1243    Procedure: COLONOSCOPY POLYPECTOMY SNARE/COLD BIOPSY Diagnosis:       Positive FIT (fecal immunochemical test)      (Positive FIT (fecal immunochemical test) [R19.5])    Surgeons: Shannan Ibarra MD Responsible Provider: Ravi Landry MD    Anesthesia Type: MAC ASA Status: 3          Anesthesia Type: No value filed.     Kadeem Phase I: Kadeem Score: 10    Kadeem Phase II: Kadeem Score: 10      Anesthesia Post Evaluation    Patient location during evaluation: bedside  Level of consciousness: awake  Airway patency: patent  Nausea & Vomiting: no nausea  Complications: no  Cardiovascular status: blood pressure returned to baseline  Respiratory status: acceptable  Hydration status: euvolemic

## 2023-04-19 NOTE — ANESTHESIA PRE PROCEDURE
Cigarettes     Quit date: 2019     Years since quittin.2    Smokeless tobacco: Former     Quit date: 10/3/2015    Tobacco comments:     per MD order for annual lung screening   Substance Use Topics    Alcohol use: No     Alcohol/week: 0.0 standard drinks                                Counseling given: Not Answered  Tobacco comments: per MD order for annual lung screening      Vital Signs (Current):   Vitals:    23 1518 23 1110   BP:  (!) 167/93   Pulse:  72   Resp:  16   Temp:  97.7 °F (36.5 °C)   TempSrc:  Temporal   SpO2:  96%   Weight: 172 lb (78 kg)    Height: 5' 8\" (1.727 m)                                               BP Readings from Last 3 Encounters:   23 (!) 167/93   23 (!) 165/98   23 103/66       NPO Status: Time of last liquid consumption: 2300                        Time of last solid consumption: 1900                        Date of last liquid consumption: 23                        Date of last solid food consumption: 23    BMI:   Wt Readings from Last 3 Encounters:   23 172 lb (78 kg)   23 170 lb (77.1 kg)   23 170 lb (77.1 kg)     Body mass index is 26.15 kg/m².     CBC:   Lab Results   Component Value Date/Time    WBC 7.4 2023 03:17 PM    RBC 4.92 2023 03:17 PM    HGB 14.4 2023 03:17 PM    HCT 43.7 2023 03:17 PM    MCV 88.8 2023 03:17 PM    RDW 17.6 2023 03:17 PM     2023 03:17 PM       CMP:   Lab Results   Component Value Date/Time     2023 03:17 PM    K 3.9 2023 03:17 PM     2023 03:17 PM    CO2 25 2023 03:17 PM    BUN 13 2023 03:17 PM    CREATININE 0.7 2023 03:17 PM    GFRAA >60 10/13/2022 06:18 PM    AGRATIO 1.3 2023 03:17 PM    LABGLOM >60 2023 03:17 PM    GLUCOSE 98 2023 03:17 PM    PROT 7.5 2023 03:17 PM    CALCIUM 9.5 2023 03:17 PM    BILITOT 0.4 2023 03:17 PM    ALKPHOS 136 2023

## 2023-04-19 NOTE — PROGRESS NOTES
Discharge instructions explained to pt and pt partner Zulema. Pt and Lena received copy of discharge instructions. Pt  and Zulema deny having any questions about discharge. IV removed per discharge hemostasis achieved, dressing applied, no complications noted. Patient denies further needs. Pt transported to taxi via wheel chair. Vitals per doc flow, Zulema assumes responsibility.
Pt to recovery placed on bedside monitor. SPO2 96% on RA resp e/e unlabored. ABD soft non tender. Phase II:  1. Patient is identified using name and the date of birth. 2.  The patient is free from signs and symptoms of chemical, electrical, laser, radiation, positioning, or transfer/transport injury. 3.  The patient receives appropriate medication(s), safely administered during the Perioperative period. 4.  The patient has wound/tissue perfusion consistent with or improved from baseline levels established preoperatively. 5.  The patient is at or returning to normothermia at the conclusion of the immediate postoperative period. 6.  The patient's fluid, electrolyte, and acid base balances are consistent with or improved from baseline levels established preoperatively. 7.  The patient's pulmonary function is consistent with or improved from baseline levels established preoperatively. 8.  The patient's cardiovascular status is consistent with or improved from baseline levels established preoperatively. 9.  The patient/caregiver demonstrates knowledge of nutritional management related to the operative or other invasive procedure. 10. The patient/caregiver demonstrates knowledge of medication, pain, and wound management. 11. The patient participates in the rehabilitation process as applicable. 12.  The patient/caregiver participates in decisions affection his or her Perioperative plan of care. 13.  The patient's care is consistent with the individualized Perioperative plan of care. 14.  The patient's right to privacy is maintained. 15. The patient is the recipient of competent and ethical care within legal standards of practice. 16.  The patient's value system, lifestyle, ethnicity, and culture are considered, respected, and incorporated in the Perioperative plan of care and understands special services available.   17.  The patient demonstrates and/or reports adequate pain control throughout the the
phase.  Interventions- orient the patient to the environment, especially the location of the bathroom; provide treaded socks/non-skid footwear; demonstrate and teach back use of the nurse's call system; instruct the patient to call for help before getting out of bed; lock all movable equipment before transferring patient; keep bed in lowest position possible.

## 2023-04-19 NOTE — DISCHARGE INSTRUCTIONS
PATIENT INSTRUCTIONS  POST-SEDATION    Ronald Likes          IMMEDIATELY FOLLOWING PROCEDURE:    Do not drive or operate machinery for the first twenty four hours after surgery. Do not make any important decisions for twenty four hours after surgery or while taking narcotic pain medications or sedatives. You should NOT BE LEFT UNATTENDED OR ALONE. A responsible adult should be with you for the rest of the day of your procedure and also during the night for your protection and safety. You may experience some light headedness. Rest at home with activity as tolerated. You may not need to go to bed, but it is important to rest for the next 24 hours. You should not engage in athletic sports such as basketball, volleyball, jogging, skating, or activities requiring refined motor skills for 24 hours. If you develop intractable nausea and vomiting or a severe headache please notify your doctor immediately. You are not expected to have any fever, but if you feel warm, take your temperature. If you have a fever 101 degrees or higher, call your doctor. If you have had an Endoscopy:   *Eat lightly for your first meal and gradually resume your normal / prescribed diet. DO NOT eat or drink until your gag reflex returns. *If you have a sore throat you may use lozenges, or salt water gargles. *If you have had a colonoscopy, do not expect a normal bowel movement for approximately three days due to the cleansing of the large intestine prior to colonoscopy. ONCE YOU ARE HOME, IF YOU SHOULD HAVE:  Difficulty in breathing, persistent nausea or vomiting, bleeding you feel is excessive, or pain that is unusual, increased abdominal bloating, or any swelling, fever / chills, call your physician. If you cannot contact your physician, but feel that your signs and symptoms need a physician's attention, go to the Emergency Department.       FOLLOW-UP:    Please follow up with Dr. Luz Velasquez as scheduled or

## 2023-04-19 NOTE — H&P
sodium (VOLTAREN) 1 % GEL       polyvinyl alcohol (LIQUIFILM TEARS) 1.4 % ophthalmic solution       polyethylene glycol (GLYCOLAX) 17 GM/SCOOP powder       pantoprazole (PROTONIX) 40 MG tablet Take 1 tablet by mouth daily      ondansetron (ZOFRAN) 4 MG tablet Take 1 tablet by mouth 3 times daily as needed for Nausea or Vomiting 10 tablet 0    ferrous sulfate (IRON 325) 325 (65 Fe) MG tablet Take 325 mg by mouth daily (with breakfast)      ipratropium-albuterol (DUONEB) 0.5-2.5 (3) MG/3ML SOLN nebulizer solution Take 3 mLs by nebulization every 4 hours 360 mL 2    divalproex (DEPAKOTE) 500 MG DR tablet Take 500 mg by mouth 500 mg  in am & 1000 mg afternoon 4 pm      QUEtiapine (SEROQUEL) 300 MG tablet Take 300 mg by mouth nightly      clopidogrel (PLAVIX) 75 MG tablet Take 1 tablet by mouth daily      tamsulosin (FLOMAX) 0.4 MG capsule Take 0.4 mg by mouth daily      cetirizine (ZYRTEC) 10 MG tablet Take 10 mg by mouth daily      albuterol (PROVENTIL HFA;VENTOLIN HFA) 108 (90 BASE) MCG/ACT inhaler Inhale 1 puff into the lungs every 6 hours as needed. fluticasone (FLONASE) 50 MCG/ACT nasal spray 1 spray by Nasal route daily. Indications: EACH NOSTRIL      montelukast (SINGULAIR) 10 MG tablet Take 10 mg by mouth nightly.           Allergies:  Methylphenidate and Propoxyphene      Social History:   Social History     Tobacco Use    Smoking status: Former     Packs/day: 2.00     Years: 35.00     Pack years: 70.00     Types: Cigarettes     Quit date: 2019     Years since quittin.2    Smokeless tobacco: Former     Quit date: 10/3/2015    Tobacco comments:     per MD order for annual lung screening   Substance Use Topics    Alcohol use: No     Alcohol/week: 0.0 standard drinks     Family History:   Family History   Problem Relation Age of Onset    High Blood Pressure Mother     Heart Disease Mother     High Blood Pressure Father     Heart Disease Father     Cancer Sister     Diabetes Sister     Other Sister

## 2024-01-05 ENCOUNTER — OFFICE VISIT (OUTPATIENT)
Dept: ORTHOPEDIC SURGERY | Age: 64
End: 2024-01-05

## 2024-01-05 VITALS — WEIGHT: 172 LBS | BODY MASS INDEX: 26.07 KG/M2 | HEIGHT: 68 IN

## 2024-01-05 DIAGNOSIS — M25.552 PAIN OF LEFT HIP: Primary | ICD-10-CM

## 2024-01-05 DIAGNOSIS — M76.32 IT BAND SYNDROME, LEFT: ICD-10-CM

## 2024-01-05 DIAGNOSIS — M70.62 TROCHANTERIC BURSITIS OF LEFT HIP: ICD-10-CM

## 2024-01-05 RX ORDER — LIDOCAINE HYDROCHLORIDE 10 MG/ML
3 INJECTION, SOLUTION INFILTRATION; PERINEURAL ONCE
Status: COMPLETED | OUTPATIENT
Start: 2024-01-05 | End: 2024-01-05

## 2024-01-05 RX ORDER — TRIAMCINOLONE ACETONIDE 40 MG/ML
80 INJECTION, SUSPENSION INTRA-ARTICULAR; INTRAMUSCULAR ONCE
Status: COMPLETED | OUTPATIENT
Start: 2024-01-05 | End: 2024-01-05

## 2024-01-05 RX ADMIN — TRIAMCINOLONE ACETONIDE 80 MG: 40 INJECTION, SUSPENSION INTRA-ARTICULAR; INTRAMUSCULAR at 12:23

## 2024-01-05 RX ADMIN — LIDOCAINE HYDROCHLORIDE 3 ML: 10 INJECTION, SOLUTION INFILTRATION; PERINEURAL at 12:22

## 2024-01-05 ASSESSMENT — ENCOUNTER SYMPTOMS: COLOR CHANGE: 1
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
## 2024-01-26 ENCOUNTER — OFFICE VISIT (OUTPATIENT)
Dept: ORTHOPEDIC SURGERY | Age: 64
End: 2024-01-26

## 2024-01-26 VITALS — HEIGHT: 68 IN | BODY MASS INDEX: 26.07 KG/M2 | WEIGHT: 172 LBS

## 2024-01-26 DIAGNOSIS — M75.102 TEAR OF LEFT ROTATOR CUFF, UNSPECIFIED TEAR EXTENT, UNSPECIFIED WHETHER TRAUMATIC: Primary | ICD-10-CM

## 2024-01-26 DIAGNOSIS — M25.512 BILATERAL SHOULDER PAIN, UNSPECIFIED CHRONICITY: ICD-10-CM

## 2024-01-26 DIAGNOSIS — M25.511 BILATERAL SHOULDER PAIN, UNSPECIFIED CHRONICITY: ICD-10-CM

## 2024-01-27 NOTE — PROGRESS NOTES
abrasions to the shoulder.  There is no swelling.  There is no erythema.  No obvious effusion.  There is tenderness palpation about the lateral shoulder.  Active range of motion is 0 to 160 degrees forward flexion, 0 to 160 degrees abduction, no limitation to internal or external rotation.  There is pain with Stratton exam.  There is pain with Jobes supraspinatus exam which demonstrates 4+ out of 5 strength for the left shoulder.  There is no pain with cross body adduction.  There is diffuse tenderness palpation about the AC joint.  No significant tenderness about the bicipital groove.  Negative speeds test.  Negative Yergason's test.  No significant weakness with resisted external rotation of the shoulder and 0 degrees abduction. Sensation is intact to light touch in median, radial, ulnar, and axillary distributions.  Motor function is intact to AIN, PIN, ulnar motor nerves.  There is a strong palpable radial pulse, and brisk capillary refill to the fingers.  Compartments are soft and compressible    RADIOGRAPHIC EXAM:  4 views of each shoulder including AP, Grashey, scapular Y, axillary x-rays demonstrate no evidence of fracture or dislocation.  There is minor early osteoarthritic changes involving glenohumeral joint.  Minor AC joint osteoarthritis.  No evidence of high riding humeral head.  There is sclerosis involving the greater tuberosity consistent with rotator cuff disease.    ASSESSMENT AND PLAN:  Bilateral shoulder pain left greater than right.    I reviewed with the patient that he has significant weakness and pain with stressing of rotator cuff tendons.  This is longstanding/chronic.  Given the degree of weakness, recommend an MRI to evaluate the integrity of the rotator cuff.  Will start the left shoulder.  If there is appreciable tearing of the rotator cuff, would recommend referral to one of the partners proficient in disorders of the rotator cuff.  Will contact patient after MRI results are available

## 2024-02-08 ENCOUNTER — TELEPHONE (OUTPATIENT)
Dept: ORTHOPEDIC SURGERY | Age: 64
End: 2024-02-08

## 2024-02-08 NOTE — TELEPHONE ENCOUNTER
Attempted to call patient to inform him MRI was approved, he may call [facility] to schedule when convenient for him.    There was no answer, unable to leave message.

## 2024-03-06 ENCOUNTER — HOSPITAL ENCOUNTER (OUTPATIENT)
Age: 64
Discharge: HOME | End: 2024-03-06
Payer: MEDICAID

## 2024-03-06 ENCOUNTER — HOSPITAL ENCOUNTER (OUTPATIENT)
Dept: GENERAL RADIOLOGY | Age: 64
Discharge: HOME | End: 2024-03-06
Payer: MEDICAID

## 2024-03-06 DIAGNOSIS — J44.9 CHRONIC OBSTRUCTIVE PULMONARY DISEASE, UNSPECIFIED COPD TYPE (HCC): ICD-10-CM

## 2024-03-06 PROCEDURE — 71046 X-RAY EXAM CHEST 2 VIEWS: CPT

## 2024-03-27 ENCOUNTER — ANESTHESIA EVENT (OUTPATIENT)
Dept: OPERATING ROOM | Age: 64
End: 2024-03-27
Payer: MEDICAID

## 2024-04-10 NOTE — PROGRESS NOTES

## 2024-04-17 ENCOUNTER — HOSPITAL ENCOUNTER (OUTPATIENT)
Age: 64
Setting detail: OUTPATIENT SURGERY
Discharge: HOME | End: 2024-04-17
Attending: UROLOGY | Admitting: UROLOGY
Payer: MEDICAID

## 2024-04-17 ENCOUNTER — ANESTHESIA (OUTPATIENT)
Dept: OPERATING ROOM | Age: 64
End: 2024-04-17
Payer: MEDICAID

## 2024-04-17 VITALS
OXYGEN SATURATION: 93 % | WEIGHT: 172 LBS | BODY MASS INDEX: 26.07 KG/M2 | SYSTOLIC BLOOD PRESSURE: 129 MMHG | TEMPERATURE: 97.4 F | DIASTOLIC BLOOD PRESSURE: 88 MMHG | HEART RATE: 72 BPM | HEIGHT: 68 IN | RESPIRATION RATE: 21 BRPM

## 2024-04-17 LAB
GLUCOSE BLD-MCNC: 90 MG/DL (ref 70–99)
GLUCOSE BLD-MCNC: 98 MG/DL (ref 70–99)
PERFORMED ON: NORMAL
PERFORMED ON: NORMAL

## 2024-04-17 PROCEDURE — 2580000003 HC RX 258: Performed by: UROLOGY

## 2024-04-17 PROCEDURE — 6360000002 HC RX W HCPCS: Performed by: UROLOGY

## 2024-04-17 PROCEDURE — 7100000010 HC PHASE II RECOVERY - FIRST 15 MIN: Performed by: UROLOGY

## 2024-04-17 PROCEDURE — 3600000004 HC SURGERY LEVEL 4 BASE: Performed by: UROLOGY

## 2024-04-17 PROCEDURE — 2500000003 HC RX 250 WO HCPCS: Performed by: ANESTHESIOLOGY

## 2024-04-17 PROCEDURE — 6370000000 HC RX 637 (ALT 250 FOR IP): Performed by: UROLOGY

## 2024-04-17 PROCEDURE — 7100000001 HC PACU RECOVERY - ADDTL 15 MIN: Performed by: UROLOGY

## 2024-04-17 PROCEDURE — 6360000002 HC RX W HCPCS: Performed by: NURSE ANESTHETIST, CERTIFIED REGISTERED

## 2024-04-17 PROCEDURE — 6370000000 HC RX 637 (ALT 250 FOR IP)

## 2024-04-17 PROCEDURE — 3700000001 HC ADD 15 MINUTES (ANESTHESIA): Performed by: UROLOGY

## 2024-04-17 PROCEDURE — A4216 STERILE WATER/SALINE, 10 ML: HCPCS | Performed by: ANESTHESIOLOGY

## 2024-04-17 PROCEDURE — 2500000003 HC RX 250 WO HCPCS: Performed by: NURSE ANESTHETIST, CERTIFIED REGISTERED

## 2024-04-17 PROCEDURE — 3600000014 HC SURGERY LEVEL 4 ADDTL 15MIN: Performed by: UROLOGY

## 2024-04-17 PROCEDURE — 7100000011 HC PHASE II RECOVERY - ADDTL 15 MIN: Performed by: UROLOGY

## 2024-04-17 PROCEDURE — 2709999900 HC NON-CHARGEABLE SUPPLY: Performed by: UROLOGY

## 2024-04-17 PROCEDURE — 3700000000 HC ANESTHESIA ATTENDED CARE: Performed by: UROLOGY

## 2024-04-17 PROCEDURE — 2580000003 HC RX 258: Performed by: ANESTHESIOLOGY

## 2024-04-17 PROCEDURE — 7100000000 HC PACU RECOVERY - FIRST 15 MIN: Performed by: UROLOGY

## 2024-04-17 RX ORDER — LIDOCAINE HYDROCHLORIDE 20 MG/ML
JELLY TOPICAL PRN
Status: DISCONTINUED | OUTPATIENT
Start: 2024-04-17 | End: 2024-04-17 | Stop reason: ALTCHOICE

## 2024-04-17 RX ORDER — SODIUM CHLORIDE 0.9 % (FLUSH) 0.9 %
5-40 SYRINGE (ML) INJECTION EVERY 12 HOURS SCHEDULED
Status: DISCONTINUED | OUTPATIENT
Start: 2024-04-17 | End: 2024-04-17 | Stop reason: HOSPADM

## 2024-04-17 RX ORDER — PROPOFOL 10 MG/ML
INJECTION, EMULSION INTRAVENOUS PRN
Status: DISCONTINUED | OUTPATIENT
Start: 2024-04-17 | End: 2024-04-17 | Stop reason: SDUPTHER

## 2024-04-17 RX ORDER — DIPHENHYDRAMINE HYDROCHLORIDE 50 MG/ML
12.5 INJECTION INTRAMUSCULAR; INTRAVENOUS
Status: CANCELLED | OUTPATIENT
Start: 2024-04-17 | End: 2024-04-18

## 2024-04-17 RX ORDER — NALOXONE HYDROCHLORIDE 0.4 MG/ML
INJECTION, SOLUTION INTRAMUSCULAR; INTRAVENOUS; SUBCUTANEOUS PRN
Status: CANCELLED | OUTPATIENT
Start: 2024-04-17

## 2024-04-17 RX ORDER — ONDANSETRON 2 MG/ML
4 INJECTION INTRAMUSCULAR; INTRAVENOUS
Status: CANCELLED | OUTPATIENT
Start: 2024-04-17

## 2024-04-17 RX ORDER — MEPERIDINE HYDROCHLORIDE 25 MG/ML
12.5 INJECTION INTRAMUSCULAR; INTRAVENOUS; SUBCUTANEOUS EVERY 5 MIN PRN
Status: CANCELLED | OUTPATIENT
Start: 2024-04-17

## 2024-04-17 RX ORDER — OXYCODONE HYDROCHLORIDE 5 MG/1
5 TABLET ORAL PRN
Status: CANCELLED | OUTPATIENT
Start: 2024-04-17 | End: 2024-04-17

## 2024-04-17 RX ORDER — IPRATROPIUM BROMIDE AND ALBUTEROL SULFATE 2.5; .5 MG/3ML; MG/3ML
SOLUTION RESPIRATORY (INHALATION)
Status: COMPLETED
Start: 2024-04-17 | End: 2024-04-17

## 2024-04-17 RX ORDER — SODIUM CHLORIDE 9 MG/ML
INJECTION, SOLUTION INTRAVENOUS PRN
Status: DISCONTINUED | OUTPATIENT
Start: 2024-04-17 | End: 2024-04-17 | Stop reason: HOSPADM

## 2024-04-17 RX ORDER — SODIUM CHLORIDE 0.9 % (FLUSH) 0.9 %
5-40 SYRINGE (ML) INJECTION PRN
Status: CANCELLED | OUTPATIENT
Start: 2024-04-17

## 2024-04-17 RX ORDER — IPRATROPIUM BROMIDE AND ALBUTEROL SULFATE 2.5; .5 MG/3ML; MG/3ML
1 SOLUTION RESPIRATORY (INHALATION) ONCE
Status: COMPLETED | OUTPATIENT
Start: 2024-04-17 | End: 2024-04-17

## 2024-04-17 RX ORDER — SODIUM CHLORIDE, SODIUM LACTATE, POTASSIUM CHLORIDE, CALCIUM CHLORIDE 600; 310; 30; 20 MG/100ML; MG/100ML; MG/100ML; MG/100ML
INJECTION, SOLUTION INTRAVENOUS CONTINUOUS
Status: DISCONTINUED | OUTPATIENT
Start: 2024-04-17 | End: 2024-04-17 | Stop reason: HOSPADM

## 2024-04-17 RX ORDER — SULFAMETHOXAZOLE AND TRIMETHOPRIM 400; 80 MG/1; MG/1
1 TABLET ORAL 2 TIMES DAILY
Qty: 8 TABLET | Refills: 0 | Status: SHIPPED | OUTPATIENT
Start: 2024-04-17 | End: 2024-04-21

## 2024-04-17 RX ORDER — SODIUM CHLORIDE 0.9 % (FLUSH) 0.9 %
5-40 SYRINGE (ML) INJECTION EVERY 12 HOURS SCHEDULED
Status: CANCELLED | OUTPATIENT
Start: 2024-04-17

## 2024-04-17 RX ORDER — SODIUM CHLORIDE 0.9 % (FLUSH) 0.9 %
5-40 SYRINGE (ML) INJECTION PRN
Status: DISCONTINUED | OUTPATIENT
Start: 2024-04-17 | End: 2024-04-17 | Stop reason: HOSPADM

## 2024-04-17 RX ORDER — MAGNESIUM HYDROXIDE 1200 MG/15ML
LIQUID ORAL PRN
Status: DISCONTINUED | OUTPATIENT
Start: 2024-04-17 | End: 2024-04-17 | Stop reason: ALTCHOICE

## 2024-04-17 RX ORDER — PHENAZOPYRIDINE HYDROCHLORIDE 200 MG/1
200 TABLET, FILM COATED ORAL 3 TIMES DAILY PRN
Qty: 15 TABLET | Refills: 0 | Status: SHIPPED | OUTPATIENT
Start: 2024-04-17 | End: 2024-04-22

## 2024-04-17 RX ORDER — LIDOCAINE HYDROCHLORIDE 20 MG/ML
INJECTION, SOLUTION INFILTRATION; PERINEURAL PRN
Status: DISCONTINUED | OUTPATIENT
Start: 2024-04-17 | End: 2024-04-17 | Stop reason: SDUPTHER

## 2024-04-17 RX ORDER — LABETALOL HYDROCHLORIDE 5 MG/ML
5 INJECTION, SOLUTION INTRAVENOUS EVERY 10 MIN PRN
Status: CANCELLED | OUTPATIENT
Start: 2024-04-17

## 2024-04-17 RX ORDER — SODIUM CHLORIDE 9 MG/ML
INJECTION, SOLUTION INTRAVENOUS PRN
Status: CANCELLED | OUTPATIENT
Start: 2024-04-17

## 2024-04-17 RX ORDER — OXYCODONE HYDROCHLORIDE 5 MG/1
10 TABLET ORAL PRN
Status: CANCELLED | OUTPATIENT
Start: 2024-04-17 | End: 2024-04-17

## 2024-04-17 RX ADMIN — LIDOCAINE HYDROCHLORIDE 60 MG: 20 INJECTION, SOLUTION INFILTRATION; PERINEURAL at 14:04

## 2024-04-17 RX ADMIN — IPRATROPIUM BROMIDE AND ALBUTEROL SULFATE 1 DOSE: 2.5; .5 SOLUTION RESPIRATORY (INHALATION) at 13:47

## 2024-04-17 RX ADMIN — FAMOTIDINE 20 MG: 10 INJECTION, SOLUTION INTRAVENOUS at 13:54

## 2024-04-17 RX ADMIN — SODIUM CHLORIDE 2000 MG: 900 INJECTION INTRAVENOUS at 14:05

## 2024-04-17 RX ADMIN — SODIUM CHLORIDE, POTASSIUM CHLORIDE, SODIUM LACTATE AND CALCIUM CHLORIDE: 600; 310; 30; 20 INJECTION, SOLUTION INTRAVENOUS at 13:53

## 2024-04-17 RX ADMIN — PROPOFOL 200 MG: 10 INJECTION, EMULSION INTRAVENOUS at 14:04

## 2024-04-17 ASSESSMENT — PAIN - FUNCTIONAL ASSESSMENT: PAIN_FUNCTIONAL_ASSESSMENT: 0-10

## 2024-04-17 ASSESSMENT — ENCOUNTER SYMPTOMS: SHORTNESS OF BREATH: 1

## 2024-04-17 NOTE — BRIEF OP NOTE
Brief Postoperative Note      Patient: Scot Hernandez  YOB: 1960  MRN: [id number]    Date of Procedure: 4/17/2024    Pre-Op Diagnosis Codes:     * Other retention of urine [R33.8]    Post-Op Diagnosis: Same       Procedure(s):  CYSTOSCOPY URETHRAL DILATION    Surgeon(s):  Salazar Sevilla MD    Assistant:  * No surgical staff found *    Anesthesia: Monitor Anesthesia Care    Estimated Blood Loss (mL): Minimal    Complications: None    Specimens:   * No specimens in log *    Implants:  * No implants in log *      Drains: * No LDAs found *    Findings:  Infection Present At Time Of Surgery (PATOS) (choose all levels that have infection present):  No infection present  Other Findings: Bilobar hyperplasia with a high bladder neck, diffuse trabeculation and a large capacious bladder.    PLAN:  Patient needs a TUIP    Electronically signed by Salazar Sevilla MD on 4/17/2024 at 2:14 PM

## 2024-04-17 NOTE — PROGRESS NOTES
Discharge instructions given to patient and patients friend. Both deny any questions at this time.Richelle Monroy RN

## 2024-04-17 NOTE — ANESTHESIA POSTPROCEDURE EVALUATION
Department of Anesthesiology  Postprocedure Note    Patient: Scot Hernandez  MRN: [id number]  YOB: 1960  Date of evaluation: 4/17/2024    Procedure Summary       Date: 04/17/24 Room / Location: [address]    Anesthesia Start: 1359 Anesthesia Stop: 1420    Procedure: CYSTOSCOPY URETHRAL DILATION (Bladder) Diagnosis:       Other retention of urine      (Other retention of urine [R33.8])    Surgeons: Salazar Sevilla MD Responsible Provider: Jil Syed MD    Anesthesia Type: MAC ASA Status: 4            Anesthesia Type: No value filed.    Kadeem Phase I: Kadeem Score: 4    Kadeem Phase II: Kadeem Score: 10    Anesthesia Post Evaluation    Comments: Postoperative Anesthesia Note    Name:    Scot Hernandez  MRN:      [id number]    Patient Vitals in the past 12 hrs:  04/17/24 1447, BP:129/88, Temp:97.4 °F (36.3 °C), Temp src:Temporal, Pulse:72, Resp:21, SpO2:93 %  04/17/24 1445, Pulse:66, Resp:11, SpO2:92 %  04/17/24 1440, Pulse:68, Resp:17, SpO2:93 %  04/17/24 1435, BP:113/78, Temp:97.5 °F (36.4 °C), Pulse:71, Resp:21, SpO2:90 %  04/17/24 1434, BP:113/78, Pulse:72, Resp:20, SpO2:90 %  04/17/24 1433, BP:101/74, Pulse:72, Resp:22, SpO2:90 %  04/17/24 1430, BP:101/74, Pulse:75, Resp:23, SpO2:94 %  04/17/24 1428, Temp:97.5 °F (36.4 °C), Temp src:Temporal  04/17/24 1425, BP:97/71, Pulse:70, Resp:17, SpO2:97 %  04/17/24 1420, BP:(!) 89/64, Pulse:73, Resp:17, SpO2:97 %  04/17/24 1330, BP:(!) 147/85, Temp:97.1 °F (36.2 °C), Temp src:Infrared, Pulse:93, Resp:20, SpO2:93 %, Height:1.727 m (5' 8\"), Weight:78 kg (172 lb)     LABS:    CBC  Lab Results       Component                Value               Date/Time                  WBC                      7.4                 03/11/2023 03:17 PM        HGB                      14.4                03/11/2023 03:17 PM        HCT                      43.7                03/11/2023 03:17 PM        PLT

## 2024-04-17 NOTE — DISCHARGE INSTRUCTIONS
Patient to call Dr. Sevilla's office for a follow up appointment in 3-4 weeks.  Office number to call is [phone].  ANESTHESIA DISCHARGE INSTRUCTIONS    You are under the influence of drugs- do not drink alcohol, drive a car, operate machinery(such as power tools, kitchen appliances, etc), sign legal documents, or make any important decisions for 24 hours (or while on pain medications).   Children should not ride bikes or skate boards or play on gym sets  for 24 hours after surgery.  A responsible adult should be with you for 24 hours.  Rest at home today- increase activity as tolerated.  Progress slowly to a regular diet unless your physician has instructed you otherwise. Drink plenty of water.    CALL YOUR DOCTOR IF YOU:  Have moderate to severe nausea or vomiting AND are unable to hold down fluids or prescribed medications.  Have bright red bloody drainage from your dressing that won't stop oozing.  Do not get relief with your pain medication    NORMAL (POSSIBLE) SIDE EFFECTS FROM ANESTHESIA:     Confusion, temporary memory loss, delayed reaction times in the first 24 hours  Lightheadedness, dizziness, difficulty focusing, blurred vision  Nausea/vomiting can happen  Shivering, feeling cold, sore throat, cough and muscle aches should stop within 24-48 hours  Trouble urinating - call your surgeon if it has been more than 8 hrs  Bruising or soreness at the IV site - call if it remains red, firm or there is drainage             FEMALES OF CHILDBEARING AGE WHO ARE TAKING BIRTH CONTROL PILLS:  You may have received a medication during your procedure that interferes with the   actions of birth control pills (Bridion or Emend). Use some other kind of birth control in addition to your pills, like a condom, for 1 month after your procedure to prevent unwanted pregnancy.    The following instructions are to be followed if you have a known history or diagnosis of sleep apnea:  For all sleep apnea patients:  ? Sleep on

## 2024-04-17 NOTE — ANESTHESIA PRE PROCEDURE
Department of Anesthesiology  Preprocedure Note       Name:  Scot Hernandez   Age:  64 y.o.  :  1960                                          MRN:  [id number]         Date:  2024      Surgeon: Surgeon(s):  Salazar Sevilla MD    Procedure: Procedure(s):  CYSTOSCOPY POSSIBLE URETHRAL DILATION, POSSIBLE BLADDER BIOPSY    Medications prior to admission:   Prior to Admission medications    Medication Sig Start Date End Date Taking? Authorizing Provider   Multiple Vitamin (MULTIVITAMIN) TABS  2/15/23   New Price MD   midodrine (PROAMATINE) 5 MG tablet  2/15/23   New Price MD   finasteride (PROSCAR) 5 MG tablet Take 1 tablet by mouth daily 22   New Price MD   gabapentin (NEURONTIN) 400 MG capsule Take 1 capsule by mouth 3 times daily. 23   New Price MD   cyclobenzaprine (FLEXERIL) 5 MG tablet  23   New Price MD   ASPIRIN LOW DOSE 81 MG EC tablet  23   New Price MD   budesonide-formoterol (SYMBICORT) 160-4.5 MCG/ACT AERO TAKE 2 PUFFS BY MOUTH TWICE A DAY IN THE MORNING AND IN THE EVENING 21   New Price MD   diclofenac sodium (VOLTAREN) 1 % GEL  23   New Price MD   polyvinyl alcohol (LIQUIFILM TEARS) 1.4 % ophthalmic solution  23   New Price MD   polyethylene glycol (GLYCOLAX) 17 GM/SCOOP powder  23   New Price MD   pantoprazole (PROTONIX) 40 MG tablet Take 1 tablet by mouth daily 22   New Price MD   ondansetron (ZOFRAN) 4 MG tablet Take 1 tablet by mouth 3 times daily as needed for Nausea or Vomiting 23   Rebekah Palacios DO   ferrous sulfate (IRON 325) 325 (65 Fe) MG tablet Take 325 mg by mouth daily (with breakfast) 22   New Price MD   ipratropium-albuterol (DUONEB) 0.5-2.5 (3) MG/3ML SOLN nebulizer solution Take 3 mLs by nebulization every 4 hours 21   Tariq Diez MD   divalproex

## 2024-04-17 NOTE — PROGRESS NOTES
Patient admitted from OR to PACU.  Bedside report received.  Patient hooked up to heart monitor.  Patient sedated from surgery.Richelle Monroy RN

## 2024-04-23 NOTE — OP NOTE
[address] [address]                            OPERATIVE REPORT      PATIENT NAME: CHAPIS CHEEK          : 1960  MED REC NO: [id number]                      ROOM: OR [place]  ACCOUNT NO: [id number]                       ADMIT DATE: 2024  PROVIDER: Salazar Sevilla MD      DATE OF PROCEDURE:  2024    SURGEON:  Salazar Sevilla MD    PREOPERATIVE DIAGNOSIS:  Voiding difficulty.    POSTOPERATIVE DIAGNOSES:  Voiding difficulty, urethral stenosis.    OPERATION PERFORMED:  Cystoscopy with urethral dilation.    ANESTHESIA:  General.    ESTIMATED BLOOD LOSS:  2 mL.    OPERATIVE FINDINGS:    1. Urethral stenosis.  2. High bladder neck.  3. Large capacious bladder.  4. Mild BPH.  5. Grossly trabeculated bladder consistent with chronic outlet obstruction.  6. No evidence for bladder cancer, tumors, or stones.    INDICATION FOR PROCEDURE:  A 64-year-old white male with longstanding history of voiding issues.  He had been failing medical management.  For this reason, he was scheduled for cystourethroscopy.  The risks, benefits, and expected outcomes of the procedure had been discussed.    DETAILS OF THE PROCEDURE:  After obtaining informed consent, the patient was taken to the operative suite.  He was given general anesthetic and placed on the operative table in a modified dorsal lithotomy position.  Prepping and draping was done in sterile fashion.  All pressure points were well padded.  Cystourethroscopy was then performed with both 30- and 70-degree lenses.  Stenoses of the patient's urethra and bladder neck area were encountered, as well, there was a very high bladder neck with minimal BPH and lateral lobe involvement.  Upon entering the bladder, gross trabeculation and diverticular disease was seen consistent with chronic outlet obstruction.  The bladder was also large and capacious.  Both ureteral

## 2024-05-17 ENCOUNTER — OFFICE VISIT (OUTPATIENT)
Dept: ORTHOPEDIC SURGERY | Age: 64
End: 2024-05-17

## 2024-05-17 VITALS — WEIGHT: 172 LBS | BODY MASS INDEX: 26.07 KG/M2 | HEIGHT: 68 IN

## 2024-05-17 DIAGNOSIS — S72.052A CLOSED FRACTURE OF HEAD OF LEFT FEMUR, INITIAL ENCOUNTER (HCC): ICD-10-CM

## 2024-05-17 DIAGNOSIS — M25.562 ACUTE PAIN OF LEFT KNEE: ICD-10-CM

## 2024-05-17 DIAGNOSIS — M25.552 LEFT HIP PAIN: ICD-10-CM

## 2024-05-17 DIAGNOSIS — M87.052 AVASCULAR NECROSIS OF BONE OF HIP, LEFT (HCC): Primary | ICD-10-CM

## 2024-05-18 NOTE — PROGRESS NOTES
hip demonstrate well-positioned right total hip arthroplasty without evidence of complication.  There is a mottled appearance of the femoral head consistent with probable avascular necrosis features.  There is subtle flattening of the femoral head/possible subchondral step-off consistent with possible subchondral fracture.  There is joint space narrowing.  No significant no significant osteophyte formation. The pelvic ring is symmetric.    4 views of the left knee including weightbearing AP, tunnel, lateral, and sunrise x-rays demonstrate no evidence of fracture or dislocation.  Overall mechanical axis alignment is appropriate.  No significant patellar maltracking.  No lateral subluxation or tilt.  No significant osteophytes.  There is overall diffuse osteopenia.    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  Recommend MRI to evaluate/confirm this pathology.  In the interim, patient will use OTC medications when needed for pain..  Recommended that the patient proceed with MRI on a semiurgent basis related to this problem.  Return after MRI is complete for discussion of results.    Angel Lopez MD

## 2024-05-20 ENCOUNTER — TELEPHONE (OUTPATIENT)
Dept: ORTHOPEDIC SURGERY | Age: 64
End: 2024-05-20

## 2024-05-20 NOTE — TELEPHONE ENCOUNTER
General Question     Subject: LT HIP   Patient and /or Facility Request: Scot Hernandez   Contact Number: [phone]     PATIENT CALLING REGARDING MRI ORDER PLACE , PATIENT STATED THAT DR. TRINIDAD NEEDS TO SEND OVER REF SO PATIENT SCD FOR MRI     PATIENT WASN'T SURE OF THE FACILITY HE WAS AT     PLEASE GIVE PATIET A CALL AT THE ABOVE NUMBER

## 2024-05-31 ENCOUNTER — TELEPHONE (OUTPATIENT)
Dept: ORTHOPEDIC SURGERY | Age: 64
End: 2024-05-31

## 2024-05-31 NOTE — TELEPHONE ENCOUNTER
General Question     Subject: LEFT SHOULDER MRI  Patient and /or Facility Request: Scot Hernandez   Contact Number: [phone]     THE PT NEEDS AN MRI ORDER FOR HIS [facility]

## 2024-06-17 ENCOUNTER — TELEPHONE (OUTPATIENT)
Dept: ORTHOPEDIC SURGERY | Age: 64
End: 2024-06-17

## 2024-06-17 NOTE — TELEPHONE ENCOUNTER
----- Message from Angel Lopez MD sent at 6/17/2024  7:26 AM EDT -----  MRI demonstrates avascular necrosis of femoral head with significant degenerative changes similar to opposite hip prior to joint replacement. Please inform patient. Can have office visit to review if desired, or can start the scheduling process if he would like to pursue total hip replacement.

## 2024-06-18 ENCOUNTER — TELEPHONE (OUTPATIENT)
Dept: ORTHOPEDIC SURGERY | Age: 64
End: 2024-06-18

## 2024-06-18 NOTE — TELEPHONE ENCOUNTER
Test Results     Type of Test: MRI  Date of Test: 06/12  Location of Test: [facility]  Patient Contact Number: [phone]     THE PT WOULD LIKE SOMEONE TO CALL HER WITH HER MRI RESULTS

## 2024-06-19 NOTE — TELEPHONE ENCOUNTER
Tried to call patient again. NO VM set up. Ok to schedule f/u with Dr Lopez at [facility] to go over MRI.

## 2024-07-12 ENCOUNTER — APPOINTMENT (OUTPATIENT)
Dept: GENERAL RADIOLOGY | Age: 64
End: 2024-07-12
Payer: MEDICAID

## 2024-07-12 ENCOUNTER — HOSPITAL ENCOUNTER (INPATIENT)
Age: 64
LOS: 4 days | Discharge: HOME | DRG: 192 | End: 2024-07-16
Attending: FAMILY MEDICINE | Admitting: SURGERY
Payer: MEDICAID

## 2024-07-12 ENCOUNTER — HOSPITAL ENCOUNTER (EMERGENCY)
Age: 64
Discharge: ANOTHER HOSPITAL | End: 2024-07-12
Attending: STUDENT IN AN ORGANIZED HEALTH CARE EDUCATION/TRAINING PROGRAM
Payer: MEDICAID

## 2024-07-12 ENCOUNTER — APPOINTMENT (OUTPATIENT)
Dept: CT IMAGING | Age: 64
End: 2024-07-12
Payer: MEDICAID

## 2024-07-12 VITALS
DIASTOLIC BLOOD PRESSURE: 80 MMHG | RESPIRATION RATE: 18 BRPM | HEIGHT: 68 IN | BODY MASS INDEX: 26.67 KG/M2 | SYSTOLIC BLOOD PRESSURE: 143 MMHG | WEIGHT: 176 LBS | HEART RATE: 69 BPM | OXYGEN SATURATION: 95 % | TEMPERATURE: 97.9 F

## 2024-07-12 DIAGNOSIS — I71.43 INFRARENAL ABDOMINAL AORTIC ANEURYSM (AAA) WITHOUT RUPTURE (HCC): Primary | ICD-10-CM

## 2024-07-12 DIAGNOSIS — R07.9 CHEST PAIN, UNSPECIFIED TYPE: ICD-10-CM

## 2024-07-12 DIAGNOSIS — M54.50 ACUTE MIDLINE LOW BACK PAIN WITHOUT SCIATICA: Primary | ICD-10-CM

## 2024-07-12 DIAGNOSIS — I71.40 ABDOMINAL AORTIC ANEURYSM (AAA) WITHOUT RUPTURE, UNSPECIFIED PART (HCC): ICD-10-CM

## 2024-07-12 LAB
ALBUMIN SERPL-MCNC: 4 G/DL (ref 3.4–5)
ALBUMIN/GLOB SERPL: 1.4 {RATIO} (ref 1.1–2.2)
ALP SERPL-CCNC: 133 U/L (ref 40–129)
ALT SERPL-CCNC: <5 U/L (ref 10–40)
ANION GAP SERPL CALCULATED.3IONS-SCNC: 12 MMOL/L (ref 3–16)
AST SERPL-CCNC: 12 U/L (ref 15–37)
BASE EXCESS BLDV CALC-SCNC: -0.6 MMOL/L (ref -3–3)
BASOPHILS # BLD: 0 K/UL (ref 0–0.2)
BASOPHILS NFR BLD: 0.6 %
BILIRUB SERPL-MCNC: 0.3 MG/DL (ref 0–1)
BILIRUB UR QL STRIP.AUTO: NEGATIVE
BUN SERPL-MCNC: 9 MG/DL (ref 7–20)
CALCIUM SERPL-MCNC: 9.1 MG/DL (ref 8.3–10.6)
CHLORIDE SERPL-SCNC: 103 MMOL/L (ref 99–110)
CLARITY UR: CLEAR
CO2 BLDV-SCNC: 25 MMOL/L
CO2 SERPL-SCNC: 25 MMOL/L (ref 21–32)
COHGB MFR BLDV: 1.4 % (ref 0–1.5)
COLOR UR: YELLOW
CREAT SERPL-MCNC: 0.7 MG/DL (ref 0.8–1.3)
DEPRECATED RDW RBC AUTO: 16.3 % (ref 12.4–15.4)
EKG ATRIAL RATE: 64 BPM
EKG DIAGNOSIS: NORMAL
EKG P AXIS: 84 DEGREES
EKG P-R INTERVAL: 124 MS
EKG Q-T INTERVAL: 426 MS
EKG QRS DURATION: 82 MS
EKG QTC CALCULATION (BAZETT): 439 MS
EKG R AXIS: 80 DEGREES
EKG T AXIS: 84 DEGREES
EKG VENTRICULAR RATE: 64 BPM
EOSINOPHIL # BLD: 0.2 K/UL (ref 0–0.6)
EOSINOPHIL NFR BLD: 2.9 %
GFR SERPLBLD CREATININE-BSD FMLA CKD-EPI: >90 ML/MIN/{1.73_M2}
GLUCOSE SERPL-MCNC: 95 MG/DL (ref 70–99)
GLUCOSE UR STRIP.AUTO-MCNC: NEGATIVE MG/DL
HCO3 BLDV-SCNC: 24.2 MMOL/L (ref 23–29)
HCT VFR BLD AUTO: 40.4 % (ref 40.5–52.5)
HGB BLD-MCNC: 13.3 G/DL (ref 13.5–17.5)
HGB UR QL STRIP.AUTO: NEGATIVE
KETONES UR STRIP.AUTO-MCNC: NEGATIVE MG/DL
LACTATE BLDV-SCNC: 0.6 MMOL/L (ref 0.4–2)
LEUKOCYTE ESTERASE UR QL STRIP.AUTO: NEGATIVE
LIPASE SERPL-CCNC: 20 U/L (ref 13–60)
LYMPHOCYTES # BLD: 1 K/UL (ref 1–5.1)
LYMPHOCYTES NFR BLD: 16.3 %
MCH RBC QN AUTO: 30 PG (ref 26–34)
MCHC RBC AUTO-ENTMCNC: 32.8 G/DL (ref 31–36)
MCV RBC AUTO: 91.4 FL (ref 80–100)
METHGB MFR BLDV: 0.3 %
MONOCYTES # BLD: 0.8 K/UL (ref 0–1.3)
MONOCYTES NFR BLD: 13 %
NEUTROPHILS # BLD: 4.1 K/UL (ref 1.7–7.7)
NEUTROPHILS NFR BLD: 67.2 %
NITRITE UR QL STRIP.AUTO: NEGATIVE
NT-PROBNP SERPL-MCNC: 187 PG/ML (ref 0–124)
O2 CT VFR BLDV CALC: 16 VOL %
O2 THERAPY: ABNORMAL
PCO2 BLDV: 40.6 MMHG (ref 40–50)
PH BLDV: 7.39 [PH] (ref 7.35–7.45)
PH UR STRIP.AUTO: 7.5 [PH] (ref 5–8)
PLATELET # BLD AUTO: 182 K/UL (ref 135–450)
PMV BLD AUTO: 7.6 FL (ref 5–10.5)
PO2 BLDV: 46.5 MMHG (ref 25–40)
POTASSIUM SERPL-SCNC: 4.4 MMOL/L (ref 3.5–5.1)
PROT SERPL-MCNC: 6.9 G/DL (ref 6.4–8.2)
PROT UR STRIP.AUTO-MCNC: NEGATIVE MG/DL
RBC # BLD AUTO: 4.42 M/UL (ref 4.2–5.9)
SAO2 % BLDV: 82 %
SODIUM SERPL-SCNC: 140 MMOL/L (ref 136–145)
SP GR UR STRIP.AUTO: 1.01 (ref 1–1.03)
TROPONIN, HIGH SENSITIVITY: <6 NG/L (ref 0–22)
UA COMPLETE W REFLEX CULTURE PNL UR: NORMAL
UA DIPSTICK W REFLEX MICRO PNL UR: NORMAL
URN SPEC COLLECT METH UR: NORMAL
UROBILINOGEN UR STRIP-ACNC: 1 E.U./DL
WBC # BLD AUTO: 6 K/UL (ref 4–11)

## 2024-07-12 PROCEDURE — 74177 CT ABD & PELVIS W/CONTRAST: CPT

## 2024-07-12 PROCEDURE — 81003 URINALYSIS AUTO W/O SCOPE: CPT

## 2024-07-12 PROCEDURE — 93010 ELECTROCARDIOGRAM REPORT: CPT | Performed by: INTERNAL MEDICINE

## 2024-07-12 PROCEDURE — 36415 COLL VENOUS BLD VENIPUNCTURE: CPT

## 2024-07-12 PROCEDURE — 83690 ASSAY OF LIPASE: CPT

## 2024-07-12 PROCEDURE — 93005 ELECTROCARDIOGRAM TRACING: CPT | Performed by: STUDENT IN AN ORGANIZED HEALTH CARE EDUCATION/TRAINING PROGRAM

## 2024-07-12 PROCEDURE — 82803 BLOOD GASES ANY COMBINATION: CPT

## 2024-07-12 PROCEDURE — 83605 ASSAY OF LACTIC ACID: CPT

## 2024-07-12 PROCEDURE — 99285 EMERGENCY DEPT VISIT HI MDM: CPT

## 2024-07-12 PROCEDURE — 84484 ASSAY OF TROPONIN QUANT: CPT

## 2024-07-12 PROCEDURE — 83880 ASSAY OF NATRIURETIC PEPTIDE: CPT

## 2024-07-12 PROCEDURE — 76376 3D RENDER W/INTRP POSTPROCES: CPT

## 2024-07-12 PROCEDURE — 96374 THER/PROPH/DIAG INJ IV PUSH: CPT

## 2024-07-12 PROCEDURE — 85025 COMPLETE CBC W/AUTO DIFF WBC: CPT

## 2024-07-12 PROCEDURE — 6360000002 HC RX W HCPCS

## 2024-07-12 PROCEDURE — 6360000004 HC RX CONTRAST MEDICATION: Performed by: STUDENT IN AN ORGANIZED HEALTH CARE EDUCATION/TRAINING PROGRAM

## 2024-07-12 PROCEDURE — 80053 COMPREHEN METABOLIC PANEL: CPT

## 2024-07-12 PROCEDURE — 6370000000 HC RX 637 (ALT 250 FOR IP): Performed by: STUDENT IN AN ORGANIZED HEALTH CARE EDUCATION/TRAINING PROGRAM

## 2024-07-12 PROCEDURE — 2580000003 HC RX 258: Performed by: STUDENT IN AN ORGANIZED HEALTH CARE EDUCATION/TRAINING PROGRAM

## 2024-07-12 PROCEDURE — 6360000002 HC RX W HCPCS: Performed by: STUDENT IN AN ORGANIZED HEALTH CARE EDUCATION/TRAINING PROGRAM

## 2024-07-12 PROCEDURE — 71045 X-RAY EXAM CHEST 1 VIEW: CPT

## 2024-07-12 PROCEDURE — 2060000000 HC ICU INTERMEDIATE R&B

## 2024-07-12 RX ORDER — SODIUM CHLORIDE, SODIUM LACTATE, POTASSIUM CHLORIDE, AND CALCIUM CHLORIDE .6; .31; .03; .02 G/100ML; G/100ML; G/100ML; G/100ML
1000 INJECTION, SOLUTION INTRAVENOUS ONCE
Status: COMPLETED | OUTPATIENT
Start: 2024-07-12 | End: 2024-07-12

## 2024-07-12 RX ORDER — HYDRALAZINE HYDROCHLORIDE 20 MG/ML
10 INJECTION INTRAMUSCULAR; INTRAVENOUS EVERY 6 HOURS PRN
Status: DISCONTINUED | OUTPATIENT
Start: 2024-07-12 | End: 2024-07-16

## 2024-07-12 RX ORDER — KETOROLAC TROMETHAMINE 15 MG/ML
15 INJECTION, SOLUTION INTRAMUSCULAR; INTRAVENOUS ONCE
Status: COMPLETED | OUTPATIENT
Start: 2024-07-12 | End: 2024-07-12

## 2024-07-12 RX ORDER — LABETALOL HYDROCHLORIDE 5 MG/ML
10 INJECTION, SOLUTION INTRAVENOUS EVERY 6 HOURS PRN
Status: DISCONTINUED | OUTPATIENT
Start: 2024-07-12 | End: 2024-07-16

## 2024-07-12 RX ORDER — IPRATROPIUM BROMIDE AND ALBUTEROL SULFATE 2.5; .5 MG/3ML; MG/3ML
1 SOLUTION RESPIRATORY (INHALATION) ONCE
Status: COMPLETED | OUTPATIENT
Start: 2024-07-12 | End: 2024-07-12

## 2024-07-12 RX ADMIN — KETOROLAC TROMETHAMINE 15 MG: 15 INJECTION, SOLUTION INTRAMUSCULAR; INTRAVENOUS at 14:33

## 2024-07-12 RX ADMIN — LABETALOL HYDROCHLORIDE 10 MG: 5 INJECTION, SOLUTION INTRAVENOUS at 23:52

## 2024-07-12 RX ADMIN — IPRATROPIUM BROMIDE AND ALBUTEROL SULFATE 1 DOSE: 2.5; .5 SOLUTION RESPIRATORY (INHALATION) at 15:45

## 2024-07-12 RX ADMIN — SODIUM CHLORIDE, POTASSIUM CHLORIDE, SODIUM LACTATE AND CALCIUM CHLORIDE 1000 ML: 600; 310; 30; 20 INJECTION, SOLUTION INTRAVENOUS at 14:34

## 2024-07-12 ASSESSMENT — PAIN DESCRIPTION - ORIENTATION
ORIENTATION: RIGHT;LEFT;LOWER
ORIENTATION: LOWER

## 2024-07-12 ASSESSMENT — PAIN DESCRIPTION - LOCATION
LOCATION: BACK
LOCATION: BACK

## 2024-07-12 ASSESSMENT — PAIN - FUNCTIONAL ASSESSMENT
PAIN_FUNCTIONAL_ASSESSMENT: NONE - DENIES PAIN
PAIN_FUNCTIONAL_ASSESSMENT: 0-10

## 2024-07-12 ASSESSMENT — LIFESTYLE VARIABLES
HOW MANY STANDARD DRINKS CONTAINING ALCOHOL DO YOU HAVE ON A TYPICAL DAY: PATIENT DOES NOT DRINK
HOW OFTEN DO YOU HAVE A DRINK CONTAINING ALCOHOL: NEVER

## 2024-07-12 ASSESSMENT — PAIN DESCRIPTION - PAIN TYPE
TYPE: ACUTE PAIN
TYPE: ACUTE PAIN

## 2024-07-12 ASSESSMENT — PAIN DESCRIPTION - DESCRIPTORS
DESCRIPTORS: DISCOMFORT
DESCRIPTORS: DISCOMFORT

## 2024-07-12 ASSESSMENT — PAIN DESCRIPTION - FREQUENCY: FREQUENCY: CONTINUOUS

## 2024-07-12 ASSESSMENT — PAIN SCALES - GENERAL
PAINLEVEL_OUTOF10: 8
PAINLEVEL_OUTOF10: 10

## 2024-07-12 ASSESSMENT — PAIN DESCRIPTION - ONSET: ONSET: GRADUAL

## 2024-07-12 NOTE — ED PROVIDER NOTES
Nasal route daily Indications: EACH NOSTRIL    GABAPENTIN (NEURONTIN) 400 MG CAPSULE    Take 1 capsule by mouth 3 times daily.    IPRATROPIUM-ALBUTEROL (DUONEB) 0.5-2.5 (3) MG/3ML SOLN NEBULIZER SOLUTION    Take 3 mLs by nebulization every 4 hours    MIDODRINE (PROAMATINE) 5 MG TABLET        MONTELUKAST (SINGULAIR) 10 MG TABLET    Take 1 tablet by mouth nightly    MULTIPLE VITAMIN (MULTIVITAMIN) TABS        ONDANSETRON (ZOFRAN) 4 MG TABLET    Take 1 tablet by mouth 3 times daily as needed for Nausea or Vomiting    PANTOPRAZOLE (PROTONIX) 40 MG TABLET    Take 1 tablet by mouth daily    POLYETHYLENE GLYCOL (GLYCOLAX) 17 GM/SCOOP POWDER        POLYVINYL ALCOHOL (LIQUIFILM TEARS) 1.4 % OPHTHALMIC SOLUTION        QUETIAPINE (SEROQUEL) 300 MG TABLET    Take 1 tablet by mouth nightly    TAMSULOSIN (FLOMAX) 0.4 MG CAPSULE    Take 1 capsule by mouth daily       Allergies     He is allergic to methylphenidate and propoxyphene.    Physical Exam     INITIAL VITALS: BP: 130/80, Temp: 97.9 °F (36.6 °C), Pulse: 75, Respirations: 16, SpO2: 91 %     General: alert and conversant in no distress  Skin: warm, dry and pink without noted rashes or lesions  Head: normocephalic atraumatic  Eyes: pupils equal, extra ocular movements intact  Mouth / Pharynx: moist mucus membranes  Neck: normal range of motion without pain  Chest: clear to auscultation  Heart: regular heart tones; no murmur  noted  Abdomen: soft and non tender   Extremities: warm and well perfused, no significant edema patient maria victoria amatory per baseline he has good strength and range of motion throughout the lower extremities neurovascular intact with palpable bilateral posterior tibial pulse    Additional Exams:    Back: No tenderness along the spine no CVA tenderness no signs of trauma    DiagnosticResults     ECG    I have reviewed the ECG as follows:    Sinus rhythm at a rate of 64 without ectopy.  Normal axis and intervals.  No evidence of acute ischemia.  There are U  Question:   Initiate RT Bronchodilator Protocol     Answer:   Yes - Inpatient Protocol       CONSULTS:  None      CRITICAL CARE TIME   Total Critical Care time was 0 minutes, excluding separately reportable procedures in the context of the following condition na.  There was a high probability of clinically significant/life threatening deterioration in the patient's condition which required my urgent intervention.    Clinical Impression     1. Acute midline low back pain without sciatica    2. Abdominal aortic aneurysm (AAA) without rupture, unspecified part (HCC)        Disposition     PATIENT REFERRED TO:  No follow-up provider specified.    DISCHARGE MEDICATIONS:  New Prescriptions    No medications on file       DISPOSITION Decision To Transfer 07/12/2024 05:48:06 PM      Carlos Horn MD (electronically signed)  Attending Emergency Physician    Please note this documentation has been produced using speech recognition software and may contain errors related to that system including errors in grammar, punctuation, and spelling, as well as words and phrases that may be inappropriate.  Efforts were made to edit the dictations.         Carlos Horn MD  07/12/24 0732

## 2024-07-12 NOTE — ED NOTES
Writer went to check on pt and talked to pt about admission in which pt stated he was not able to be admitted due to his crock pot on and his dog needs to be taken care of. Dr. Horn advised and will be in to speak with pt about admission.

## 2024-07-13 ENCOUNTER — HOSPITAL ENCOUNTER (INPATIENT)
Age: 64
Discharge: HOME | DRG: 192 | End: 2024-07-15
Attending: FAMILY MEDICINE
Payer: MEDICAID

## 2024-07-13 VITALS
HEIGHT: 68 IN | SYSTOLIC BLOOD PRESSURE: 159 MMHG | BODY MASS INDEX: 24.4 KG/M2 | DIASTOLIC BLOOD PRESSURE: 90 MMHG | WEIGHT: 161 LBS

## 2024-07-13 PROBLEM — Z01.811 PREOP PULMONARY/RESPIRATORY EXAM: Status: ACTIVE | Noted: 2024-07-13

## 2024-07-13 PROBLEM — I71.40 AAA (ABDOMINAL AORTIC ANEURYSM) WITHOUT RUPTURE (HCC): Status: ACTIVE | Noted: 2024-07-13

## 2024-07-13 PROBLEM — I71.40 ABDOMINAL AORTIC ANEURYSM (AAA) 3.0 CM TO 5.5 CM IN DIAMETER IN MALE (HCC): Status: ACTIVE | Noted: 2024-07-13

## 2024-07-13 PROBLEM — J44.9 COPD, SEVERITY TO BE DETERMINED (HCC): Status: ACTIVE | Noted: 2024-07-13

## 2024-07-13 LAB
ALBUMIN SERPL-MCNC: 3.6 G/DL (ref 3.4–5)
ALBUMIN SERPL-MCNC: 4.1 G/DL (ref 3.4–5)
ANION GAP SERPL CALCULATED.3IONS-SCNC: 11 MMOL/L (ref 3–16)
ANION GAP SERPL CALCULATED.3IONS-SCNC: 8 MMOL/L (ref 3–16)
BASOPHILS # BLD: 0 K/UL (ref 0–0.2)
BASOPHILS # BLD: 0 K/UL (ref 0–0.2)
BASOPHILS NFR BLD: 0.5 %
BASOPHILS NFR BLD: 0.5 %
BUN SERPL-MCNC: 8 MG/DL (ref 7–20)
BUN SERPL-MCNC: 9 MG/DL (ref 7–20)
CALCIUM SERPL-MCNC: 8.8 MG/DL (ref 8.3–10.6)
CALCIUM SERPL-MCNC: 9.6 MG/DL (ref 8.3–10.6)
CHLORIDE SERPL-SCNC: 104 MMOL/L (ref 99–110)
CHLORIDE SERPL-SCNC: 105 MMOL/L (ref 99–110)
CO2 SERPL-SCNC: 27 MMOL/L (ref 21–32)
CO2 SERPL-SCNC: 29 MMOL/L (ref 21–32)
CREAT SERPL-MCNC: 0.6 MG/DL (ref 0.8–1.3)
CREAT SERPL-MCNC: 0.7 MG/DL (ref 0.8–1.3)
CRP SERPL-MCNC: 28.5 MG/L (ref 0–5.1)
DEPRECATED RDW RBC AUTO: 16.3 % (ref 12.4–15.4)
DEPRECATED RDW RBC AUTO: 16.5 % (ref 12.4–15.4)
ECHO AO ROOT DIAM: 3.1 CM
ECHO AO ROOT INDEX: 1.67 CM/M2
ECHO AV AREA PEAK VELOCITY: 2.6 CM2
ECHO AV AREA/BSA PEAK VELOCITY: 1.4 CM2/M2
ECHO AV PEAK GRADIENT: 4 MMHG
ECHO AV PEAK VELOCITY: 1 M/S
ECHO AV VELOCITY RATIO: 0.9
ECHO BSA: 1.87 M2
ECHO LV E' LATERAL VELOCITY: 7 CM/S
ECHO LV E' SEPTAL VELOCITY: 8 CM/S
ECHO LV EDV A2C: 82 ML
ECHO LV EDV A4C: 77 ML
ECHO LV EDV INDEX A4C: 41 ML/M2
ECHO LV EDV NDEX A2C: 44 ML/M2
ECHO LV EJECTION FRACTION A2C: 36 %
ECHO LV EJECTION FRACTION A4C: 35 %
ECHO LV EJECTION FRACTION BIPLANE: 36 % (ref 55–100)
ECHO LV ESV A2C: 52 ML
ECHO LV ESV A4C: 50 ML
ECHO LV ESV INDEX A2C: 28 ML/M2
ECHO LV ESV INDEX A4C: 27 ML/M2
ECHO LV FRACTIONAL SHORTENING: 7 % (ref 28–44)
ECHO LV INTERNAL DIMENSION DIASTOLE INDEX: 2.31 CM/M2
ECHO LV INTERNAL DIMENSION DIASTOLIC: 4.3 CM (ref 4.2–5.9)
ECHO LV INTERNAL DIMENSION SYSTOLIC INDEX: 2.15 CM/M2
ECHO LV INTERNAL DIMENSION SYSTOLIC: 4 CM
ECHO LV IVSD: 0.8 CM (ref 0.6–1)
ECHO LV MASS 2D: 132.7 G (ref 88–224)
ECHO LV MASS INDEX 2D: 71.4 G/M2 (ref 49–115)
ECHO LV POSTERIOR WALL DIASTOLIC: 1.1 CM (ref 0.6–1)
ECHO LV RELATIVE WALL THICKNESS RATIO: 0.51
ECHO LVOT AREA: 3.1 CM2
ECHO LVOT DIAM: 2 CM
ECHO LVOT PEAK GRADIENT: 3 MMHG
ECHO LVOT PEAK VELOCITY: 0.9 M/S
ECHO MV A VELOCITY: 0.72 M/S
ECHO MV E DECELERATION TIME (DT): 174 MS
ECHO MV E VELOCITY: 0.56 M/S
ECHO MV E/A RATIO: 0.78
ECHO MV E/E' LATERAL: 8
ECHO MV E/E' RATIO (AVERAGED): 7.5
ECHO MV E/E' SEPTAL: 7
ECHO RV FREE WALL PEAK S': 10 CM/S
ECHO RV TAPSE: 2.1 CM (ref 1.7–?)
ECHO TV REGURGITANT MAX VELOCITY: 2.32 M/S
ECHO TV REGURGITANT PEAK GRADIENT: 22 MMHG
EOSINOPHIL # BLD: 0.1 K/UL (ref 0–0.6)
EOSINOPHIL # BLD: 0.2 K/UL (ref 0–0.6)
EOSINOPHIL NFR BLD: 2.3 %
EOSINOPHIL NFR BLD: 4 %
ERYTHROCYTE [SEDIMENTATION RATE] IN BLOOD BY WESTERGREN METHOD: 27 MM/HR (ref 0–20)
GFR SERPLBLD CREATININE-BSD FMLA CKD-EPI: >90 ML/MIN/{1.73_M2}
GFR SERPLBLD CREATININE-BSD FMLA CKD-EPI: >90 ML/MIN/{1.73_M2}
GLUCOSE SERPL-MCNC: 91 MG/DL (ref 70–99)
GLUCOSE SERPL-MCNC: 97 MG/DL (ref 70–99)
HCT VFR BLD AUTO: 37.5 % (ref 40.5–52.5)
HCT VFR BLD AUTO: 43.6 % (ref 40.5–52.5)
HGB BLD-MCNC: 12.9 G/DL (ref 13.5–17.5)
HGB BLD-MCNC: 14.7 G/DL (ref 13.5–17.5)
INR PPP: 1.01 (ref 0.85–1.15)
LYMPHOCYTES # BLD: 1 K/UL (ref 1–5.1)
LYMPHOCYTES # BLD: 1 K/UL (ref 1–5.1)
LYMPHOCYTES NFR BLD: 16.7 %
LYMPHOCYTES NFR BLD: 22.6 %
MAGNESIUM SERPL-MCNC: 2.1 MG/DL (ref 1.8–2.4)
MCH RBC QN AUTO: 30.6 PG (ref 26–34)
MCH RBC QN AUTO: 31.3 PG (ref 26–34)
MCHC RBC AUTO-ENTMCNC: 33.7 G/DL (ref 31–36)
MCHC RBC AUTO-ENTMCNC: 34.3 G/DL (ref 31–36)
MCV RBC AUTO: 91.1 FL (ref 80–100)
MCV RBC AUTO: 91.2 FL (ref 80–100)
MONOCYTES # BLD: 0.6 K/UL (ref 0–1.3)
MONOCYTES # BLD: 0.6 K/UL (ref 0–1.3)
MONOCYTES NFR BLD: 10.9 %
MONOCYTES NFR BLD: 13.1 %
NEUTROPHILS # BLD: 2.7 K/UL (ref 1.7–7.7)
NEUTROPHILS # BLD: 4.1 K/UL (ref 1.7–7.7)
NEUTROPHILS NFR BLD: 59.8 %
NEUTROPHILS NFR BLD: 69.6 %
PHOSPHATE SERPL-MCNC: 3.1 MG/DL (ref 2.5–4.9)
PHOSPHATE SERPL-MCNC: 4.5 MG/DL (ref 2.5–4.9)
PLATELET # BLD AUTO: 170 K/UL (ref 135–450)
PLATELET # BLD AUTO: 198 K/UL (ref 135–450)
PMV BLD AUTO: 7.6 FL (ref 5–10.5)
PMV BLD AUTO: 7.9 FL (ref 5–10.5)
POTASSIUM SERPL-SCNC: 4.6 MMOL/L (ref 3.5–5.1)
POTASSIUM SERPL-SCNC: 4.7 MMOL/L (ref 3.5–5.1)
PROTHROMBIN TIME: 13.5 SEC (ref 11.9–14.9)
RBC # BLD AUTO: 4.11 M/UL (ref 4.2–5.9)
RBC # BLD AUTO: 4.79 M/UL (ref 4.2–5.9)
SODIUM SERPL-SCNC: 142 MMOL/L (ref 136–145)
SODIUM SERPL-SCNC: 142 MMOL/L (ref 136–145)
WBC # BLD AUTO: 4.5 K/UL (ref 4–11)
WBC # BLD AUTO: 5.9 K/UL (ref 4–11)

## 2024-07-13 PROCEDURE — 85025 COMPLETE CBC W/AUTO DIFF WBC: CPT

## 2024-07-13 PROCEDURE — 99223 1ST HOSP IP/OBS HIGH 75: CPT | Performed by: INTERNAL MEDICINE

## 2024-07-13 PROCEDURE — 99255 IP/OBS CONSLTJ NEW/EST HI 80: CPT | Performed by: INTERNAL MEDICINE

## 2024-07-13 PROCEDURE — 6360000002 HC RX W HCPCS: Performed by: SURGERY

## 2024-07-13 PROCEDURE — 85610 PROTHROMBIN TIME: CPT

## 2024-07-13 PROCEDURE — 6360000002 HC RX W HCPCS

## 2024-07-13 PROCEDURE — 2700000000 HC OXYGEN THERAPY PER DAY

## 2024-07-13 PROCEDURE — 6370000000 HC RX 637 (ALT 250 FOR IP): Performed by: SURGERY

## 2024-07-13 PROCEDURE — 94640 AIRWAY INHALATION TREATMENT: CPT

## 2024-07-13 PROCEDURE — 80069 RENAL FUNCTION PANEL: CPT

## 2024-07-13 PROCEDURE — 6370000000 HC RX 637 (ALT 250 FOR IP)

## 2024-07-13 PROCEDURE — 94761 N-INVAS EAR/PLS OXIMETRY MLT: CPT

## 2024-07-13 PROCEDURE — 93306 TTE W/DOPPLER COMPLETE: CPT | Performed by: INTERNAL MEDICINE

## 2024-07-13 PROCEDURE — 85652 RBC SED RATE AUTOMATED: CPT

## 2024-07-13 PROCEDURE — 36415 COLL VENOUS BLD VENIPUNCTURE: CPT

## 2024-07-13 PROCEDURE — 83735 ASSAY OF MAGNESIUM: CPT

## 2024-07-13 PROCEDURE — 93306 TTE W/DOPPLER COMPLETE: CPT

## 2024-07-13 PROCEDURE — 1200000000 HC SEMI PRIVATE

## 2024-07-13 PROCEDURE — 6370000000 HC RX 637 (ALT 250 FOR IP): Performed by: INTERNAL MEDICINE

## 2024-07-13 PROCEDURE — 2580000003 HC RX 258

## 2024-07-13 PROCEDURE — 86140 C-REACTIVE PROTEIN: CPT

## 2024-07-13 RX ORDER — ALBUTEROL SULFATE 2.5 MG/3ML
2.5 SOLUTION RESPIRATORY (INHALATION)
Status: DISCONTINUED | OUTPATIENT
Start: 2024-07-13 | End: 2024-07-13 | Stop reason: SDUPTHER

## 2024-07-13 RX ORDER — SODIUM CHLORIDE 0.9 % (FLUSH) 0.9 %
10 SYRINGE (ML) INJECTION EVERY 12 HOURS SCHEDULED
Status: DISCONTINUED | OUTPATIENT
Start: 2024-07-13 | End: 2024-07-16 | Stop reason: HOSPADM

## 2024-07-13 RX ORDER — SODIUM CHLORIDE 9 MG/ML
INJECTION, SOLUTION INTRAVENOUS PRN
Status: DISCONTINUED | OUTPATIENT
Start: 2024-07-13 | End: 2024-07-16 | Stop reason: HOSPADM

## 2024-07-13 RX ORDER — ENOXAPARIN SODIUM 100 MG/ML
40 INJECTION SUBCUTANEOUS DAILY
Status: DISCONTINUED | OUTPATIENT
Start: 2024-07-13 | End: 2024-07-16 | Stop reason: HOSPADM

## 2024-07-13 RX ORDER — ACETAMINOPHEN 325 MG/1
650 TABLET ORAL EVERY 4 HOURS PRN
Status: DISCONTINUED | OUTPATIENT
Start: 2024-07-13 | End: 2024-07-15 | Stop reason: SDUPTHER

## 2024-07-13 RX ORDER — PANTOPRAZOLE SODIUM 40 MG/1
40 TABLET, DELAYED RELEASE ORAL DAILY
Status: DISCONTINUED | OUTPATIENT
Start: 2024-07-13 | End: 2024-07-16 | Stop reason: HOSPADM

## 2024-07-13 RX ORDER — IPRATROPIUM BROMIDE AND ALBUTEROL SULFATE 2.5; .5 MG/3ML; MG/3ML
1 SOLUTION RESPIRATORY (INHALATION) EVERY 4 HOURS
Status: DISCONTINUED | OUTPATIENT
Start: 2024-07-13 | End: 2024-07-14

## 2024-07-13 RX ORDER — TAMSULOSIN HYDROCHLORIDE 0.4 MG/1
0.4 CAPSULE ORAL DAILY
Status: DISCONTINUED | OUTPATIENT
Start: 2024-07-13 | End: 2024-07-16 | Stop reason: HOSPADM

## 2024-07-13 RX ORDER — ALBUTEROL SULFATE 2.5 MG/3ML
2.5 SOLUTION RESPIRATORY (INHALATION) EVERY 4 HOURS PRN
Status: DISCONTINUED | OUTPATIENT
Start: 2024-07-13 | End: 2024-07-13 | Stop reason: SDUPTHER

## 2024-07-13 RX ORDER — ONDANSETRON 4 MG/1
4 TABLET, ORALLY DISINTEGRATING ORAL EVERY 8 HOURS PRN
Status: DISCONTINUED | OUTPATIENT
Start: 2024-07-13 | End: 2024-07-16 | Stop reason: HOSPADM

## 2024-07-13 RX ORDER — DOCUSATE SODIUM 100 MG/1
100 CAPSULE, LIQUID FILLED ORAL DAILY
Status: DISCONTINUED | OUTPATIENT
Start: 2024-07-13 | End: 2024-07-16 | Stop reason: HOSPADM

## 2024-07-13 RX ORDER — OXYCODONE HYDROCHLORIDE 5 MG/1
5 TABLET ORAL EVERY 4 HOURS PRN
Status: DISCONTINUED | OUTPATIENT
Start: 2024-07-13 | End: 2024-07-16 | Stop reason: HOSPADM

## 2024-07-13 RX ORDER — SODIUM CHLORIDE, SODIUM GLUCONATE, SODIUM ACETATE, POTASSIUM CHLORIDE AND MAGNESIUM CHLORIDE 526; 502; 368; 37; 30 MG/100ML; MG/100ML; MG/100ML; MG/100ML; MG/100ML
50 INJECTION, SOLUTION INTRAVENOUS CONTINUOUS
Status: DISCONTINUED | OUTPATIENT
Start: 2024-07-13 | End: 2024-07-13

## 2024-07-13 RX ORDER — DIVALPROEX SODIUM 500 MG/1
500 TABLET, DELAYED RELEASE ORAL
Status: DISCONTINUED | OUTPATIENT
Start: 2024-07-13 | End: 2024-07-16 | Stop reason: HOSPADM

## 2024-07-13 RX ORDER — GABAPENTIN 400 MG/1
400 CAPSULE ORAL 3 TIMES DAILY
Status: DISCONTINUED | OUTPATIENT
Start: 2024-07-13 | End: 2024-07-16 | Stop reason: HOSPADM

## 2024-07-13 RX ORDER — LEVALBUTEROL 1.25 MG/.5ML
1.25 SOLUTION, CONCENTRATE RESPIRATORY (INHALATION) EVERY 6 HOURS PRN
Status: DISCONTINUED | OUTPATIENT
Start: 2024-07-13 | End: 2024-07-16 | Stop reason: HOSPADM

## 2024-07-13 RX ORDER — SODIUM CHLORIDE 0.9 % (FLUSH) 0.9 %
10 SYRINGE (ML) INJECTION PRN
Status: DISCONTINUED | OUTPATIENT
Start: 2024-07-13 | End: 2024-07-16 | Stop reason: HOSPADM

## 2024-07-13 RX ORDER — ALBUTEROL SULFATE 90 UG/1
1 AEROSOL, METERED RESPIRATORY (INHALATION)
Status: DISCONTINUED | OUTPATIENT
Start: 2024-07-13 | End: 2024-07-13 | Stop reason: SDUPTHER

## 2024-07-13 RX ORDER — DIVALPROEX SODIUM 500 MG/1
1000 TABLET, DELAYED RELEASE ORAL
Status: DISCONTINUED | OUTPATIENT
Start: 2024-07-13 | End: 2024-07-16 | Stop reason: HOSPADM

## 2024-07-13 RX ORDER — CARVEDILOL 3.12 MG/1
3.12 TABLET ORAL 2 TIMES DAILY
Status: DISCONTINUED | OUTPATIENT
Start: 2024-07-13 | End: 2024-07-15

## 2024-07-13 RX ORDER — ASPIRIN 81 MG/1
81 TABLET, CHEWABLE ORAL DAILY
Status: DISCONTINUED | OUTPATIENT
Start: 2024-07-13 | End: 2024-07-16 | Stop reason: HOSPADM

## 2024-07-13 RX ORDER — IPRATROPIUM BROMIDE AND ALBUTEROL SULFATE 2.5; .5 MG/3ML; MG/3ML
1 SOLUTION RESPIRATORY (INHALATION) EVERY 4 HOURS
Status: DISCONTINUED | OUTPATIENT
Start: 2024-07-13 | End: 2024-07-13

## 2024-07-13 RX ORDER — SODIUM CHLORIDE FOR INHALATION 0.9 %
3 VIAL, NEBULIZER (ML) INHALATION EVERY 8 HOURS PRN
Status: DISCONTINUED | OUTPATIENT
Start: 2024-07-13 | End: 2024-07-16 | Stop reason: HOSPADM

## 2024-07-13 RX ORDER — ALBUTEROL SULFATE 2.5 MG/3ML
2.5 SOLUTION RESPIRATORY (INHALATION)
Status: DISCONTINUED | OUTPATIENT
Start: 2024-07-13 | End: 2024-07-13

## 2024-07-13 RX ORDER — ONDANSETRON 2 MG/ML
4 INJECTION INTRAMUSCULAR; INTRAVENOUS EVERY 6 HOURS PRN
Status: DISCONTINUED | OUTPATIENT
Start: 2024-07-13 | End: 2024-07-16 | Stop reason: HOSPADM

## 2024-07-13 RX ADMIN — IPRATROPIUM BROMIDE AND ALBUTEROL SULFATE 1 DOSE: 2.5; .5 SOLUTION RESPIRATORY (INHALATION) at 20:42

## 2024-07-13 RX ADMIN — SODIUM CHLORIDE, SODIUM GLUCONATE, SODIUM ACETATE, POTASSIUM CHLORIDE AND MAGNESIUM CHLORIDE 50 ML/HR: 526; 502; 368; 37; 30 INJECTION, SOLUTION INTRAVENOUS at 03:49

## 2024-07-13 RX ADMIN — ASPIRIN 81 MG: 81 TABLET, CHEWABLE ORAL at 15:27

## 2024-07-13 RX ADMIN — ENOXAPARIN SODIUM 40 MG: 100 INJECTION SUBCUTANEOUS at 09:43

## 2024-07-13 RX ADMIN — ARFORMOTEROL TARTRATE: 15 SOLUTION RESPIRATORY (INHALATION) at 20:41

## 2024-07-13 RX ADMIN — IPRATROPIUM BROMIDE AND ALBUTEROL SULFATE 1 DOSE: 2.5; .5 SOLUTION RESPIRATORY (INHALATION) at 05:14

## 2024-07-13 RX ADMIN — IPRATROPIUM BROMIDE AND ALBUTEROL SULFATE 1 DOSE: 2.5; .5 SOLUTION RESPIRATORY (INHALATION) at 08:39

## 2024-07-13 RX ADMIN — DIVALPROEX SODIUM 500 MG: 500 TABLET, DELAYED RELEASE ORAL at 06:07

## 2024-07-13 RX ADMIN — IPRATROPIUM BROMIDE AND ALBUTEROL SULFATE 1 DOSE: 2.5; .5 SOLUTION RESPIRATORY (INHALATION) at 16:27

## 2024-07-13 RX ADMIN — HYDRALAZINE HYDROCHLORIDE 10 MG: 20 INJECTION INTRAMUSCULAR; INTRAVENOUS at 00:35

## 2024-07-13 RX ADMIN — DIVALPROEX SODIUM 1000 MG: 500 TABLET, DELAYED RELEASE ORAL at 15:27

## 2024-07-13 RX ADMIN — GABAPENTIN 400 MG: 400 CAPSULE ORAL at 15:27

## 2024-07-13 RX ADMIN — CARVEDILOL 3.12 MG: 3.12 TABLET, FILM COATED ORAL at 20:57

## 2024-07-13 RX ADMIN — PANTOPRAZOLE SODIUM 40 MG: 40 TABLET, DELAYED RELEASE ORAL at 09:43

## 2024-07-13 RX ADMIN — IPRATROPIUM BROMIDE AND ALBUTEROL SULFATE 1 DOSE: 2.5; .5 SOLUTION RESPIRATORY (INHALATION) at 12:12

## 2024-07-13 RX ADMIN — QUETIAPINE FUMARATE 300 MG: 200 TABLET ORAL at 20:57

## 2024-07-13 RX ADMIN — ACETAMINOPHEN 650 MG: 325 TABLET ORAL at 16:09

## 2024-07-13 RX ADMIN — TAMSULOSIN HYDROCHLORIDE 0.4 MG: 0.4 CAPSULE ORAL at 09:43

## 2024-07-13 RX ADMIN — ARFORMOTEROL TARTRATE: 15 SOLUTION RESPIRATORY (INHALATION) at 08:39

## 2024-07-13 RX ADMIN — GABAPENTIN 400 MG: 400 CAPSULE ORAL at 20:57

## 2024-07-13 RX ADMIN — ALBUTEROL SULFATE 2.5 MG: 2.5 SOLUTION RESPIRATORY (INHALATION) at 01:16

## 2024-07-13 RX ADMIN — DOCUSATE SODIUM 100 MG: 100 CAPSULE, LIQUID FILLED ORAL at 09:43

## 2024-07-13 RX ADMIN — GABAPENTIN 400 MG: 400 CAPSULE ORAL at 09:43

## 2024-07-13 RX ADMIN — CARVEDILOL 3.12 MG: 3.12 TABLET, FILM COATED ORAL at 12:14

## 2024-07-13 RX ADMIN — LABETALOL HYDROCHLORIDE 10 MG: 5 INJECTION, SOLUTION INTRAVENOUS at 09:44

## 2024-07-13 ASSESSMENT — PAIN DESCRIPTION - LOCATION: LOCATION: HEAD

## 2024-07-13 ASSESSMENT — PAIN SCALES - GENERAL: PAINLEVEL_OUTOF10: 4

## 2024-07-13 ASSESSMENT — PAIN DESCRIPTION - DESCRIPTORS: DESCRIPTORS: ACHING

## 2024-07-13 NOTE — CONSULTS
See H&P  
  Lungs/pleura: There is extensive centrilobular and paraseptal emphysema.  Calcified granulomas are noted in the lungs.     Gravity dependent atelectasis is noted in the lungs.     No focal lung infiltrate.  No pleural effusion or pneumothorax.     Upper Abdomen: Cholecystectomy clips are noted.  Atherosclerotic plaque is  noted in the aorta.  Calcified splenic granulomas are noted.     Soft Tissues/Bones: No axillary adenopathy.  No appreciable soft tissue  swelling.  Degenerative changes are noted in the spine.  No fracture or  osseous destructive lesion.     IMPRESSION:  1. No pulmonary emboli or parenchymal lung infiltrate.  2. Moderate-severe emphysema.     PFTs:  DATE OF PROCEDURE:  05/28/2019     ORDERING PROVIDER:  PRESTON Morales     GIVEN DIAGNOSIS:  COPD.     SPIROMETRY:  The FEV1 is 2.03 liters or 59% of predicted.  The FVC is  2.14 liters or 47% of predicted.  The FEV1/FVC ratio is 95%.  There is  no demonstrative response to inhaled bronchodilators.  FLOW VOLUME LOOP:  Appears to be an obstructive defect pattern.     IMPRESSION:  1.  There is no obstructive lung defect according to FEV1/FVC ratio.  2.  There is no response to inhaled bronchodilators.  3.  The patient had variable efforts with procedure, so testing may not  be completely accurate.  Clinical correlation is recommended.      Assessment       Back pain  3.7 cm AAA  COPD  Hypoxemic requiring oxygen  Chronic ZAMARRIPA     Plan    Ok to proceed to vascular surgery if needed  Give trial on RA with goal O2 sat of 88%  3. Cont Brovana/pulmicort and duoneb   4. Will not follow while in hospital - please call with any Pulm issues    (Please note that portions of this note were completed with a voice recognition program.  Efforts were made to edit the dictations but occasionally words are mis-transcribed.)      Mihir Sofia  
abnormality, atraumatic   Neck: Supple, symmetrical, trachea midline, no adenopathy, thyroid: not enlarged, symmetric, no tenderness/mass/nodules, no carotid bruit.        Lungs:   Respirations unlabored, clear to auscultation bilaterally, without any wheezes, rubs or ronchi.    Chest Wall:  No tenderness or deformity   Heart:  Regular rhythm, rate is controlled, S1, S2 normal, there is no murmur, there is no rub or gallop, cannot assess jvd, no bilateral lower extremity edema   Abdomen:   Soft, non-tender, bowel sounds active all four quadrants,  no masses, no organomegaly       Extremities: Extremities normal, atraumatic, no cyanosis.    Pulses: 2+ and symmetric   Skin: Skin color, texture, turgor normal, no rashes or lesions   Pysch: Normal mood and affect   Neurologic: Normal gross motor and sensory exam.  Cranial nerves intact        Labs:     Recent Labs     07/12/24  1419 07/13/24  0001 07/13/24  0536    142 142   K 4.4 4.7 4.6   BUN 9 9 8   CREATININE 0.7* 0.7* 0.6*    105 104   CO2 25 29 27   GLUCOSE 95 91 97   CALCIUM 9.1 8.8 9.6   MG  --   --  2.10     Recent Labs     07/12/24  1419 07/13/24  0001 07/13/24  0536   WBC 6.0 4.5 5.9   HGB 13.3* 12.9* 14.7   HCT 40.4* 37.5* 43.6    170 198   MCV 91.4 91.2 91.1     No results for input(s): \"CHOLTOT\", \"TRIG\", \"HDL\", \"CHOLHDL\", \"LDL\" in the last 72 hours.    Invalid input(s): \"LIPIDCOMM\", \"VLDCHOL\"  Recent Labs     07/13/24  0002   INR 1.01     No results for input(s): \"CKTOTAL\", \"CKMB\", \"CKMBINDEX\", \"TROPONINI\" in the last 72 hours.  No results for input(s): \"BNP\" in the last 72 hours.  No results for input(s): \"TSH\" in the last 72 hours.  No results for input(s): \"CHOL\", \"HDL\", \"TRIG\" in the last 72 hours.    Invalid input(s): \"LDLCALC\"]    Lab Results   Component Value Date    TROPONINI <0.01 02/27/2023         Assessment / Plan:     1.  New and compensated HFrEF.  It is likely ischemic in etiology.  There is no prior echo for comparison

## 2024-07-13 NOTE — PROGRESS NOTES
Vascular Surgery   Daily Progress Note  Patient: Scot Hernandez      CC: infrarenal abdominal aortic aneurysm     SUBJECTIVE:   Denies any abdominal pain, n/v. Back pain improved.     ROS:   A 14 point review of systems was conducted, significant findings as noted above. All other systems negative.    OBJECTIVE:    PHYSICAL EXAM:    Vitals:    07/13/24 0350 07/13/24 0400 07/13/24 0520 07/13/24 0601   BP: 123/77      Pulse: 79 70  75   Resp: 18      Temp: 97.8 °F (36.6 °C)      TempSrc: Axillary      SpO2: 98%  95%    Weight:       Height:           General appearance: alert, no acute distress, grooming appropriate  Eyes: PERRL, no scleral icterus  Neck: trachea midline, no JVD  Chest/Lungs: normal effort, no adventitious breathing, no accessory muscle use, on 2L via NC  Cardiovascular: RRR  Abdomen: soft, non-tender, non-distended, no guarding/rigidity, minimal soreness on deep palpation of R flank  Skin: warm and dry, no rashes, bilateral DP/PT pulses palpable  Extremities: no edema, no cyanosis  Neuro: A&Ox3, no focal deficits, sensation intact    LABS:   Recent Labs     07/13/24  0001 07/13/24  0536   WBC 4.5 5.9   HGB 12.9* 14.7   HCT 37.5* 43.6   MCV 91.2 91.1    198        Recent Labs     07/13/24  0001 07/13/24  0536    142   K 4.7 4.6    104   CO2 29 27   PHOS 4.5 3.1   BUN 9 8   CREATININE 0.7* 0.6*        Recent Labs     07/12/24  1419   AST 12*   ALT <5*   BILITOT 0.3   ALKPHOS 133*        Recent Labs     07/12/24  1419   LIPASE 20.0        Recent Labs     07/13/24  0002   INR 1.01      No results for input(s): \"CKTOTAL\", \"CKMB\", \"CKMBINDEX\", \"TROPONINI\" in the last 72 hours.      ASSESSMENT & PLAN:   This is a 64 y.o. male with Hx of abdominal aortic aneurysm, COPD, CAD s/p PCA, Hypotension on midodrine, HLD, T2DM presented with 3 days of worsening back pain. CT abdomen and pelvis showed Atherosclerotic aorta with a fusiform dilatation of the infrarenal aorta measuring 3.7 cm

## 2024-07-13 NOTE — PLAN OF CARE
4 Eyes Skin Assessment     NAME:  Scot Hernandez  YOB: 1960  MEDICAL RECORD NUMBER:  [id number]    The patient is being assessed for  Admission    I agree that at least one RN has performed a thorough Head to Toe Skin Assessment on the patient. ALL assessment sites listed below have been assessed.      Areas assessed by both nurses:    Head, Face, Ears, Shoulders, Back, Chest, Arms, Elbows, Hands, Sacrum. Buttock, Coccyx, Ischium, Legs. Feet and Heels, and Under Medical Devices         Does the Patient have a Wound? No noted wound(s)       Roscoe Prevention initiated by RN: Yes  Wound Care Orders initiated by RN: No    Pressure Injury (Stage 3,4, Unstageable, DTI, NWPT, and Complex wounds) if present, place Wound referral order by RN under : No    New Ostomies, if present place, Ostomy referral order under : No     Nurse 1 eSignature: Electronically signed by Mar Wynn RN on 7/12/24 at 11:07 PM EDT    **SHARE this note so that the co-signing nurse can place an eSignature**    Nurse 2 eSignature: Electronically signed by Sofie De La Rosa RN on 7/13/24 at 12:08 AM EDT

## 2024-07-13 NOTE — PLAN OF CARE
Problem: Discharge Planning  Goal: Discharge to home or other facility with appropriate resources  Outcome: Progressing  Flowsheets (Taken 7/12/2024 2339)  Discharge to home or other facility with appropriate resources:   Identify barriers to discharge with patient and caregiver   Arrange for needed discharge resources and transportation as appropriate   Identify discharge learning needs (meds, wound care, etc)   Arrange for interpreters to assist at discharge as needed     Problem: Safety - Adult  Goal: Free from fall injury  Outcome: Progressing  Flowsheets (Taken 7/12/2024 2339)  Free From Fall Injury:   Instruct family/caregiver on patient safety   Based on caregiver fall risk screen, instruct family/caregiver to ask for assistance with transferring infant if caregiver noted to have fall risk factors     Problem: Pain  Goal: Verbalizes/displays adequate comfort level or baseline comfort level  Outcome: Progressing  Flowsheets (Taken 7/12/2024 2339)  Verbalizes/displays adequate comfort level or baseline comfort level:   Encourage patient to monitor pain and request assistance   Assess pain using appropriate pain scale   Implement non-pharmacological measures as appropriate and evaluate response

## 2024-07-13 NOTE — H&P
Vascular Surgery   Resident Consult Note    Reason for Admission: infrarenal abdominal aortic aneurysm    History of Present Illness:   Scot Hernandez is a 64 y.o. male with Hx of abdominal aortic aneurysm, COPD, CAD s/p PCA, Hypotension on midodrine, HLD, T2DM who presents for 3 days of worsening back pain. The patient has had some of this back pain in the past. The back pain has increased in the past three days and feels like a tight band. He denies any abdominal pain, chest pain, blood in vomit, nausea, vomiting, fevers, chills, or blood in stools. He denies any pains with walking. The patient has some baseline shortness of breath with his COPD. He has a 70 pack year smoking history.     The patient transported to [facility] ED and was transported to [facility] for vascular surgery. CT abdomen and pelvis showed Atherosclerotic aorta with a fusiform dilatation of the infrarenal aorta measuring 3.7 cm which measured 3.4 cm previously some minimal soft tissue stranding anterior to the dilated aorta.    Past Medical History:        Diagnosis Date    AAA (abdominal aortic aneurysm) (HCC) 2018    3.0 cm    Anxiety     Arterial stent thrombosis (HCC)     Arthritis     Asthma     Bipolar 1 disorder (HCC)     COPD (chronic obstructive pulmonary disease) (HCC)     Coronary artery disease involving native coronary artery of native heart without angina pectoris 02/08/2021    Diabetes mellitus (HCC)     Essential hypertension 02/08/2021    Hyperlipidemia     Pneumonia        Past Surgical History:           Procedure Laterality Date    CARDIAC SURGERY      stent placed    CARPAL TUNNEL RELEASE      CHOLECYSTECTOMY, LAPAROSCOPIC      COLONOSCOPY N/A 4/19/2023    COLONOSCOPY POLYPECTOMY SNARE/COLD BIOPSY performed by Mary Pozo MD at [facility]COPY    CYSTOSCOPY N/A 4/17/2024    CYSTOSCOPY URETHRAL DILATION performed by Salazar Sevilla MD at [facility] OR    KNEE ARTHROPLASTY      R knee x4    NASAL SINUS

## 2024-07-14 ENCOUNTER — APPOINTMENT (OUTPATIENT)
Dept: CT IMAGING | Age: 64
DRG: 192 | End: 2024-07-14
Attending: FAMILY MEDICINE
Payer: MEDICAID

## 2024-07-14 LAB
ALBUMIN SERPL-MCNC: 3.6 G/DL (ref 3.4–5)
ANION GAP SERPL CALCULATED.3IONS-SCNC: 10 MMOL/L (ref 3–16)
BASOPHILS # BLD: 0 K/UL (ref 0–0.2)
BASOPHILS NFR BLD: 0.5 %
BUN SERPL-MCNC: 11 MG/DL (ref 7–20)
CALCIUM SERPL-MCNC: 8.9 MG/DL (ref 8.3–10.6)
CHLORIDE SERPL-SCNC: 109 MMOL/L (ref 99–110)
CO2 SERPL-SCNC: 26 MMOL/L (ref 21–32)
CREAT SERPL-MCNC: 0.7 MG/DL (ref 0.8–1.3)
DEPRECATED RDW RBC AUTO: 16.5 % (ref 12.4–15.4)
EOSINOPHIL # BLD: 0.2 K/UL (ref 0–0.6)
EOSINOPHIL NFR BLD: 2.9 %
GFR SERPLBLD CREATININE-BSD FMLA CKD-EPI: >90 ML/MIN/{1.73_M2}
GLUCOSE SERPL-MCNC: 93 MG/DL (ref 70–99)
HCT VFR BLD AUTO: 40.5 % (ref 40.5–52.5)
HGB BLD-MCNC: 13.8 G/DL (ref 13.5–17.5)
LYMPHOCYTES # BLD: 1.3 K/UL (ref 1–5.1)
LYMPHOCYTES NFR BLD: 25 %
MAGNESIUM SERPL-MCNC: 2 MG/DL (ref 1.8–2.4)
MCH RBC QN AUTO: 31.3 PG (ref 26–34)
MCHC RBC AUTO-ENTMCNC: 34.1 G/DL (ref 31–36)
MCV RBC AUTO: 91.8 FL (ref 80–100)
MONOCYTES # BLD: 0.6 K/UL (ref 0–1.3)
MONOCYTES NFR BLD: 10.3 %
NEUTROPHILS # BLD: 3.3 K/UL (ref 1.7–7.7)
NEUTROPHILS NFR BLD: 61.3 %
PHOSPHATE SERPL-MCNC: 4.2 MG/DL (ref 2.5–4.9)
PLATELET # BLD AUTO: 181 K/UL (ref 135–450)
PMV BLD AUTO: 7.7 FL (ref 5–10.5)
POTASSIUM SERPL-SCNC: 4.1 MMOL/L (ref 3.5–5.1)
RBC # BLD AUTO: 4.42 M/UL (ref 4.2–5.9)
SODIUM SERPL-SCNC: 145 MMOL/L (ref 136–145)
WBC # BLD AUTO: 5.4 K/UL (ref 4–11)

## 2024-07-14 PROCEDURE — 6370000000 HC RX 637 (ALT 250 FOR IP)

## 2024-07-14 PROCEDURE — 6360000004 HC RX CONTRAST MEDICATION: Performed by: STUDENT IN AN ORGANIZED HEALTH CARE EDUCATION/TRAINING PROGRAM

## 2024-07-14 PROCEDURE — 80069 RENAL FUNCTION PANEL: CPT

## 2024-07-14 PROCEDURE — 2580000003 HC RX 258

## 2024-07-14 PROCEDURE — 6370000000 HC RX 637 (ALT 250 FOR IP): Performed by: INTERNAL MEDICINE

## 2024-07-14 PROCEDURE — 2580000003 HC RX 258: Performed by: STUDENT IN AN ORGANIZED HEALTH CARE EDUCATION/TRAINING PROGRAM

## 2024-07-14 PROCEDURE — 99452 NTRPROF PH1/NTRNET/EHR RFRL: CPT | Performed by: INTERNAL MEDICINE

## 2024-07-14 PROCEDURE — 6360000002 HC RX W HCPCS

## 2024-07-14 PROCEDURE — 94761 N-INVAS EAR/PLS OXIMETRY MLT: CPT

## 2024-07-14 PROCEDURE — 36415 COLL VENOUS BLD VENIPUNCTURE: CPT

## 2024-07-14 PROCEDURE — 1200000000 HC SEMI PRIVATE

## 2024-07-14 PROCEDURE — 94640 AIRWAY INHALATION TREATMENT: CPT

## 2024-07-14 PROCEDURE — 6370000000 HC RX 637 (ALT 250 FOR IP): Performed by: STUDENT IN AN ORGANIZED HEALTH CARE EDUCATION/TRAINING PROGRAM

## 2024-07-14 PROCEDURE — 85025 COMPLETE CBC W/AUTO DIFF WBC: CPT

## 2024-07-14 PROCEDURE — 6370000000 HC RX 637 (ALT 250 FOR IP): Performed by: SURGERY

## 2024-07-14 PROCEDURE — 83735 ASSAY OF MAGNESIUM: CPT

## 2024-07-14 PROCEDURE — 2700000000 HC OXYGEN THERAPY PER DAY

## 2024-07-14 PROCEDURE — 74174 CTA ABD&PLVS W/CONTRAST: CPT

## 2024-07-14 RX ORDER — SODIUM CHLORIDE, SODIUM LACTATE, POTASSIUM CHLORIDE, CALCIUM CHLORIDE 600; 310; 30; 20 MG/100ML; MG/100ML; MG/100ML; MG/100ML
INJECTION, SOLUTION INTRAVENOUS CONTINUOUS
Status: DISCONTINUED | OUTPATIENT
Start: 2024-07-14 | End: 2024-07-14

## 2024-07-14 RX ORDER — POLYVINYL ALCOHOL 14 MG/ML
1 SOLUTION/ DROPS OPHTHALMIC PRN
Status: DISCONTINUED | OUTPATIENT
Start: 2024-07-14 | End: 2024-07-16 | Stop reason: HOSPADM

## 2024-07-14 RX ORDER — ALBUTEROL SULFATE 2.5 MG/3ML
2.5 SOLUTION RESPIRATORY (INHALATION) EVERY 4 HOURS PRN
Status: DISCONTINUED | OUTPATIENT
Start: 2024-07-14 | End: 2024-07-16 | Stop reason: HOSPADM

## 2024-07-14 RX ORDER — SODIUM CHLORIDE, SODIUM LACTATE, POTASSIUM CHLORIDE, CALCIUM CHLORIDE 600; 310; 30; 20 MG/100ML; MG/100ML; MG/100ML; MG/100ML
INJECTION, SOLUTION INTRAVENOUS CONTINUOUS
Status: ACTIVE | OUTPATIENT
Start: 2024-07-15 | End: 2024-07-15

## 2024-07-14 RX ORDER — IPRATROPIUM BROMIDE AND ALBUTEROL SULFATE 2.5; .5 MG/3ML; MG/3ML
1 SOLUTION RESPIRATORY (INHALATION)
Status: DISCONTINUED | OUTPATIENT
Start: 2024-07-14 | End: 2024-07-16 | Stop reason: HOSPADM

## 2024-07-14 RX ADMIN — IPRATROPIUM BROMIDE AND ALBUTEROL SULFATE 1 DOSE: 2.5; .5 SOLUTION RESPIRATORY (INHALATION) at 09:04

## 2024-07-14 RX ADMIN — DIVALPROEX SODIUM 500 MG: 500 TABLET, DELAYED RELEASE ORAL at 11:13

## 2024-07-14 RX ADMIN — TAMSULOSIN HYDROCHLORIDE 0.4 MG: 0.4 CAPSULE ORAL at 09:32

## 2024-07-14 RX ADMIN — IPRATROPIUM BROMIDE AND ALBUTEROL SULFATE 1 DOSE: 2.5; .5 SOLUTION RESPIRATORY (INHALATION) at 16:46

## 2024-07-14 RX ADMIN — GABAPENTIN 400 MG: 400 CAPSULE ORAL at 09:32

## 2024-07-14 RX ADMIN — CARVEDILOL 3.12 MG: 3.12 TABLET, FILM COATED ORAL at 19:59

## 2024-07-14 RX ADMIN — SODIUM CHLORIDE, POTASSIUM CHLORIDE, SODIUM LACTATE AND CALCIUM CHLORIDE: 600; 310; 30; 20 INJECTION, SOLUTION INTRAVENOUS at 14:48

## 2024-07-14 RX ADMIN — GABAPENTIN 400 MG: 400 CAPSULE ORAL at 14:45

## 2024-07-14 RX ADMIN — POLYVINYL ALCOHOL 1 DROP: 1.4 SOLUTION/ DROPS OPHTHALMIC at 14:45

## 2024-07-14 RX ADMIN — SODIUM CHLORIDE, PRESERVATIVE FREE 10 ML: 5 INJECTION INTRAVENOUS at 09:32

## 2024-07-14 RX ADMIN — IPRATROPIUM BROMIDE AND ALBUTEROL SULFATE 1 DOSE: 2.5; .5 SOLUTION RESPIRATORY (INHALATION) at 12:11

## 2024-07-14 RX ADMIN — ENOXAPARIN SODIUM 40 MG: 100 INJECTION SUBCUTANEOUS at 09:32

## 2024-07-14 RX ADMIN — LABETALOL HYDROCHLORIDE 10 MG: 5 INJECTION, SOLUTION INTRAVENOUS at 20:00

## 2024-07-14 RX ADMIN — QUETIAPINE FUMARATE 300 MG: 200 TABLET ORAL at 19:59

## 2024-07-14 RX ADMIN — PANTOPRAZOLE SODIUM 40 MG: 40 TABLET, DELAYED RELEASE ORAL at 09:32

## 2024-07-14 RX ADMIN — DIVALPROEX SODIUM 1000 MG: 500 TABLET, DELAYED RELEASE ORAL at 18:05

## 2024-07-14 RX ADMIN — GABAPENTIN 400 MG: 400 CAPSULE ORAL at 19:59

## 2024-07-14 RX ADMIN — CARVEDILOL 3.12 MG: 3.12 TABLET, FILM COATED ORAL at 09:32

## 2024-07-14 RX ADMIN — ASPIRIN 81 MG: 81 TABLET, CHEWABLE ORAL at 09:32

## 2024-07-14 RX ADMIN — ARFORMOTEROL TARTRATE: 15 SOLUTION RESPIRATORY (INHALATION) at 09:04

## 2024-07-14 RX ADMIN — IOPAMIDOL 75 ML: 755 INJECTION, SOLUTION INTRAVENOUS at 14:04

## 2024-07-14 ASSESSMENT — PAIN SCALES - GENERAL: PAINLEVEL_OUTOF10: 0

## 2024-07-14 NOTE — PLAN OF CARE
Problem: Discharge Planning  Goal: Discharge to home or other facility with appropriate resources  7/13/2024 2320 by Mar Wynn RN  Outcome: Progressing  Flowsheets (Taken 7/13/2024 2320)  Discharge to home or other facility with appropriate resources:   Identify barriers to discharge with patient and caregiver   Arrange for needed discharge resources and transportation as appropriate   Identify discharge learning needs (meds, wound care, etc)     Problem: Safety - Adult  Goal: Free from fall injury  7/13/2024 2320 by Mar Wynn RN  Outcome: Progressing  Flowsheets (Taken 7/13/2024 2320)  Free From Fall Injury:   Instruct family/caregiver on patient safety   Based on caregiver fall risk screen, instruct family/caregiver to ask for assistance with transferring infant if caregiver noted to have fall risk factors     Problem: Pain  Goal: Verbalizes/displays adequate comfort level or baseline comfort level  7/13/2024 2320 by Mar Wynn RN  Outcome: Progressing  Flowsheets (Taken 7/13/2024 2320)  Verbalizes/displays adequate comfort level or baseline comfort level:   Encourage patient to monitor pain and request assistance   Assess pain using appropriate pain scale   Administer analgesics based on type and severity of pain and evaluate response   Implement non-pharmacological measures as appropriate and evaluate response   Consider cultural and social influences on pain and pain management

## 2024-07-14 NOTE — PROGRESS NOTES
EMR was reviewed.  There were no overnight events.    Assessment / Plan:      1.  New and compensated HFrEF.  It is likely ischemic in etiology.  There is no prior echo for comparison however nuclear stress test in 2021 revealed normal LVEF greater than 60%.  Cannot exclude progression of his well-known severe CAD.  2.  AAA.  Appears to have enlarged now symptomatic.  Vascular surgery has been consulted, tentative plan for AAA repair on Monday.  There is no evidence of dissection.  3.  CAD status post inferior MI and POBA.  There is no evidence of ACS with this admission.  However I cannot exclude progression of patient's CAD as multivessel CAD or left main disease causing moderate systolic heart failure.  4.  Smoking  5.  COPD  6.  Hypertension.  Apparently patient has a history of hypotension requiring midodrine.  However since admission BP has remained elevated and patient is not on midodrine or any antihypertensive medications.           - In view of abnormal echo, patient would be high risk for AAA repair.  I discussed case with Dr. Cabello, will plan for LHC on Monday.  - Continue aspirin but no Plavix.  - Continue carvedilol 3.125 mg p.o. twice daily  - Goal SBP less than 120 mmHg  - Please keep patient on telemetry perioperatively.  - Strict I's and O's every shift and standing weights if possible, low-salt diet, daily BMP with reflex to Mg (correct lytes for goals K >4.0 and Mg > 2.0) and wean supplemental oxygen to off (or down to baseline supplemental oxygen requirements) for sats greater than 90%.      I would like to thank you for providing me the opportunity to participate in the care of your patient. If you have any questions, please do not hesitate to contact me.     Selvin Jade MD, FACC, [facility]  [facility] [address]  [address] [place] [address]  Ph: [phone]  Fax: [phone]

## 2024-07-14 NOTE — PROGRESS NOTES
Vascular Surgery   Daily Progress Note  Patient: Scot Hernandez      CC: infrarenal abdominal aortic aneurysm     SUBJECTIVE:   Denies any abdominal pain, n/v. No abdominal pain.  Reports social needs that he would like to take care of.    ROS:   A 14 point review of systems was conducted, significant findings as noted above. All other systems negative.    OBJECTIVE:    PHYSICAL EXAM:    Vitals:    07/14/24 0200 07/14/24 0400 07/14/24 0445 07/14/24 0446   BP:    99/67   Pulse: 75 70  63   Resp:    18   Temp:    98 °F (36.7 °C)   TempSrc:    Axillary   SpO2:    95%   Weight:   73.3 kg (161 lb 9.6 oz)    Height:           General appearance: alert, no acute distress, grooming appropriate  HEENT: NC/AT, PERRL, no scleral icterus  Neck: trachea midline, no JVD  Chest/Lungs: normal effort, no adventitious breathing, no accessory muscle use  Cardiovascular: RRR  Abdomen: soft, non-tender, non-distended, no guarding/rigidity  Skin: warm and dry, no rashes, bilateral DP/PT pulses palpable  Extremities: no edema, no cyanosis  Neuro: A&Ox3, no focal deficits, sensation intact    LABS:   Recent Labs     07/13/24  0536 07/14/24  0441   WBC 5.9 5.4   HGB 14.7 13.8   HCT 43.6 40.5   MCV 91.1 91.8    181          Recent Labs     07/13/24  0536 07/14/24  0441    145   K 4.6 4.1    109   CO2 27 26   PHOS 3.1 4.2   BUN 8 11   CREATININE 0.6* 0.7*          Recent Labs     07/12/24  1419   AST 12*   ALT <5*   BILITOT 0.3   ALKPHOS 133*          Recent Labs     07/12/24  1419   LIPASE 20.0          Recent Labs     07/13/24  0002   INR 1.01        No results for input(s): \"CKTOTAL\", \"CKMB\", \"CKMBINDEX\", \"TROPONINI\" in the last 72 hours.      ASSESSMENT & PLAN:   This is a 64 y.o. male with Hx of abdominal aortic aneurysm, COPD, CAD s/p PCA, Hypotension on midodrine, HLD, T2DM presented with 3 days of worsening back pain. CT abdomen and pelvis showed Atherosclerotic aorta with a fusiform dilatation of the

## 2024-07-14 NOTE — RT PROTOCOL NOTE
RT Inhaler-Nebulizer Bronchodilator Protocol Note    There is a bronchodilator order in the chart from a provider indicating to follow the RT Bronchodilator Protocol and there is an “Initiate RT Inhaler-Nebulizer Bronchodilator Protocol” order as well (see protocol at bottom of note).    CXR Findings:  XR CHEST PORTABLE    Result Date: 7/12/2024  No radiographic evidence of acute pulmonary disease.       The findings from the last RT Protocol Assessment were as follows:   History Pulmonary Disease: Chronic pulmonary disease  Respiratory Pattern: Dyspnea on exertion or RR 21-25 bpm  Breath Sounds: Slightly diminished and/or crackles  Cough: Strong, spontaneous, non-productive  Indication for Bronchodilator Therapy: On home bronchodilators  Bronchodilator Assessment Score: 6  Pt states he takes albuterol 4x daily and prn  Aerosolized bronchodilator medication orders have been revised according to the RT Inhaler-Nebulizer Bronchodilator Protocol below.    Respiratory Therapist to perform RT Therapy Protocol Assessment initially then follow the protocol.  Repeat RT Therapy Protocol Assessment PRN for score 0-3 or on second treatment, BID, and PRN for scores above 3.    No Indications - adjust the frequency to every 6 hours PRN wheezing or bronchospasm, if no treatments needed after 48 hours then discontinue using Per Protocol order mode.     If indication present, adjust the RT bronchodilator orders based on the Bronchodilator Assessment Score as indicated below.  Use Inhaler orders unless patient has one or more of the following: on home nebulizer, not able to hold breath for 10 seconds, is not alert and oriented, cannot activate and use MDI correctly, or respiratory rate 25 breaths per minute or more, then use the equivalent nebulizer order(s) with same Frequency and PRN reasons based on the score.  If a patient is on this medication at home then do not decrease Frequency below that used at home.    0-3 - enter or 
RT bronchodilator order(s) to equivalent RT Bronchodilator order with Frequency of every 4 hours PRN for wheezing or increased work of breathing using Per Protocol order mode.        4-6 - enter or revise RT Bronchodilator order(s) to two equivalent RT bronchodilator orders with one order with BID Frequency and one order with Frequency of every 4 hours PRN wheezing or increased work of breathing using Per Protocol order mode.        7-10 - enter or revise RT Bronchodilator order(s) to two equivalent RT bronchodilator orders with one order with TID Frequency and one order with Frequency of every 4 hours PRN wheezing or increased work of breathing using Per Protocol order mode.       11-13 - enter or revise RT Bronchodilator order(s) to one equivalent RT bronchodilator order with QID Frequency and an Albuterol order with Frequency of every 4 hours PRN wheezing or increased work of breathing using Per Protocol order mode.      Greater than 13 - enter or revise RT Bronchodilator order(s) to one equivalent RT bronchodilator order with every 4 hours Frequency and an Albuterol order with Frequency of every 2 hours PRN wheezing or increased work of breathing using Per Protocol order mode.     RT to enter RT Home Evaluation for COPD & MDI Assessment order using Per Protocol order mode.    Electronically signed by Anika Christina RCP on 7/14/2024 at 3:36 AM

## 2024-07-15 PROBLEM — Z01.810 PREOPERATIVE CARDIOVASCULAR EXAMINATION: Status: ACTIVE | Noted: 2024-07-13

## 2024-07-15 LAB
ALBUMIN SERPL-MCNC: 3.6 G/DL (ref 3.4–5)
ANION GAP SERPL CALCULATED.3IONS-SCNC: 9 MMOL/L (ref 3–16)
BASOPHILS # BLD: 0 K/UL (ref 0–0.2)
BASOPHILS NFR BLD: 0.7 %
BUN SERPL-MCNC: 18 MG/DL (ref 7–20)
CALCIUM SERPL-MCNC: 8.6 MG/DL (ref 8.3–10.6)
CHLORIDE SERPL-SCNC: 105 MMOL/L (ref 99–110)
CHOLEST SERPL-MCNC: 134 MG/DL (ref 0–199)
CO2 SERPL-SCNC: 28 MMOL/L (ref 21–32)
CREAT SERPL-MCNC: 0.6 MG/DL (ref 0.8–1.3)
DEPRECATED RDW RBC AUTO: 16.3 % (ref 12.4–15.4)
ECHO BSA: 1.87 M2
EOSINOPHIL # BLD: 0.2 K/UL (ref 0–0.6)
EOSINOPHIL NFR BLD: 3.4 %
GFR SERPLBLD CREATININE-BSD FMLA CKD-EPI: >90 ML/MIN/{1.73_M2}
GLUCOSE SERPL-MCNC: 79 MG/DL (ref 70–99)
HCT VFR BLD AUTO: 39 % (ref 40.5–52.5)
HDLC SERPL-MCNC: 34 MG/DL (ref 40–60)
HGB BLD-MCNC: 13.1 G/DL (ref 13.5–17.5)
LDLC SERPL CALC-MCNC: 76 MG/DL
LYMPHOCYTES # BLD: 1.4 K/UL (ref 1–5.1)
LYMPHOCYTES NFR BLD: 25.9 %
MAGNESIUM SERPL-MCNC: 1.9 MG/DL (ref 1.8–2.4)
MCH RBC QN AUTO: 30.8 PG (ref 26–34)
MCHC RBC AUTO-ENTMCNC: 33.7 G/DL (ref 31–36)
MCV RBC AUTO: 91.6 FL (ref 80–100)
MONOCYTES # BLD: 0.6 K/UL (ref 0–1.3)
MONOCYTES NFR BLD: 11.3 %
NEUTROPHILS # BLD: 3.2 K/UL (ref 1.7–7.7)
NEUTROPHILS NFR BLD: 58.7 %
PHOSPHATE SERPL-MCNC: 4.5 MG/DL (ref 2.5–4.9)
PLATELET # BLD AUTO: 188 K/UL (ref 135–450)
PMV BLD AUTO: 7.8 FL (ref 5–10.5)
POTASSIUM SERPL-SCNC: 4.4 MMOL/L (ref 3.5–5.1)
RBC # BLD AUTO: 4.26 M/UL (ref 4.2–5.9)
SODIUM SERPL-SCNC: 142 MMOL/L (ref 136–145)
TRIGL SERPL-MCNC: 118 MG/DL (ref 0–150)
VLDLC SERPL CALC-MCNC: 24 MG/DL
WBC # BLD AUTO: 5.4 K/UL (ref 4–11)

## 2024-07-15 PROCEDURE — 93458 L HRT ARTERY/VENTRICLE ANGIO: CPT | Performed by: INTERNAL MEDICINE

## 2024-07-15 PROCEDURE — 6370000000 HC RX 637 (ALT 250 FOR IP): Performed by: INTERNAL MEDICINE

## 2024-07-15 PROCEDURE — 80061 LIPID PANEL: CPT

## 2024-07-15 PROCEDURE — 99152 MOD SED SAME PHYS/QHP 5/>YRS: CPT | Performed by: INTERNAL MEDICINE

## 2024-07-15 PROCEDURE — C1894 INTRO/SHEATH, NON-LASER: HCPCS | Performed by: INTERNAL MEDICINE

## 2024-07-15 PROCEDURE — 2580000003 HC RX 258: Performed by: INTERNAL MEDICINE

## 2024-07-15 PROCEDURE — C1769 GUIDE WIRE: HCPCS | Performed by: INTERNAL MEDICINE

## 2024-07-15 PROCEDURE — 2700000000 HC OXYGEN THERAPY PER DAY

## 2024-07-15 PROCEDURE — 6370000000 HC RX 637 (ALT 250 FOR IP)

## 2024-07-15 PROCEDURE — 99233 SBSQ HOSP IP/OBS HIGH 50: CPT | Performed by: INTERNAL MEDICINE

## 2024-07-15 PROCEDURE — 94761 N-INVAS EAR/PLS OXIMETRY MLT: CPT

## 2024-07-15 PROCEDURE — B2111ZZ FLUOROSCOPY OF MULTIPLE CORONARY ARTERIES USING LOW OSMOLAR CONTRAST: ICD-10-PCS | Performed by: INTERNAL MEDICINE

## 2024-07-15 PROCEDURE — 6360000002 HC RX W HCPCS

## 2024-07-15 PROCEDURE — 2580000003 HC RX 258

## 2024-07-15 PROCEDURE — 2709999900 HC NON-CHARGEABLE SUPPLY: Performed by: INTERNAL MEDICINE

## 2024-07-15 PROCEDURE — 2580000003 HC RX 258: Performed by: STUDENT IN AN ORGANIZED HEALTH CARE EDUCATION/TRAINING PROGRAM

## 2024-07-15 PROCEDURE — 1200000000 HC SEMI PRIVATE

## 2024-07-15 PROCEDURE — 94640 AIRWAY INHALATION TREATMENT: CPT

## 2024-07-15 PROCEDURE — 4A023N7 MEASUREMENT OF CARDIAC SAMPLING AND PRESSURE, LEFT HEART, PERCUTANEOUS APPROACH: ICD-10-PCS | Performed by: INTERNAL MEDICINE

## 2024-07-15 PROCEDURE — 6360000002 HC RX W HCPCS: Performed by: INTERNAL MEDICINE

## 2024-07-15 PROCEDURE — 85025 COMPLETE CBC W/AUTO DIFF WBC: CPT

## 2024-07-15 PROCEDURE — 6360000004 HC RX CONTRAST MEDICATION: Performed by: INTERNAL MEDICINE

## 2024-07-15 PROCEDURE — 80069 RENAL FUNCTION PANEL: CPT

## 2024-07-15 PROCEDURE — 83735 ASSAY OF MAGNESIUM: CPT

## 2024-07-15 PROCEDURE — 2500000003 HC RX 250 WO HCPCS: Performed by: INTERNAL MEDICINE

## 2024-07-15 PROCEDURE — 6370000000 HC RX 637 (ALT 250 FOR IP): Performed by: SURGERY

## 2024-07-15 PROCEDURE — 36415 COLL VENOUS BLD VENIPUNCTURE: CPT

## 2024-07-15 RX ORDER — SODIUM CHLORIDE 9 MG/ML
INJECTION, SOLUTION INTRAVENOUS PRN
Status: DISCONTINUED | OUTPATIENT
Start: 2024-07-15 | End: 2024-07-16 | Stop reason: HOSPADM

## 2024-07-15 RX ORDER — CARVEDILOL 3.12 MG/1
3.12 TABLET ORAL 2 TIMES DAILY
Qty: 60 TABLET | Refills: 3 | Status: SHIPPED | OUTPATIENT
Start: 2024-07-15

## 2024-07-15 RX ORDER — CARVEDILOL 3.12 MG/1
3.12 TABLET ORAL 2 TIMES DAILY
Status: DISCONTINUED | OUTPATIENT
Start: 2024-07-15 | End: 2024-07-16 | Stop reason: HOSPADM

## 2024-07-15 RX ORDER — SODIUM CHLORIDE 9 MG/ML
INJECTION, SOLUTION INTRAVENOUS CONTINUOUS
Status: ACTIVE | OUTPATIENT
Start: 2024-07-15 | End: 2024-07-15

## 2024-07-15 RX ORDER — AMLODIPINE BESYLATE 5 MG/1
5 TABLET ORAL DAILY
Status: DISCONTINUED | OUTPATIENT
Start: 2024-07-15 | End: 2024-07-16 | Stop reason: HOSPADM

## 2024-07-15 RX ORDER — AMLODIPINE BESYLATE 5 MG/1
5 TABLET ORAL 2 TIMES DAILY
Status: DISCONTINUED | OUTPATIENT
Start: 2024-07-15 | End: 2024-07-15

## 2024-07-15 RX ORDER — FENTANYL CITRATE 50 UG/ML
INJECTION, SOLUTION INTRAMUSCULAR; INTRAVENOUS PRN
Status: DISCONTINUED | OUTPATIENT
Start: 2024-07-15 | End: 2024-07-15 | Stop reason: HOSPADM

## 2024-07-15 RX ORDER — SODIUM CHLORIDE 0.9 % (FLUSH) 0.9 %
5-40 SYRINGE (ML) INJECTION EVERY 12 HOURS SCHEDULED
Status: DISCONTINUED | OUTPATIENT
Start: 2024-07-15 | End: 2024-07-16 | Stop reason: HOSPADM

## 2024-07-15 RX ORDER — SODIUM CHLORIDE 0.9 % (FLUSH) 0.9 %
5-40 SYRINGE (ML) INJECTION PRN
Status: DISCONTINUED | OUTPATIENT
Start: 2024-07-15 | End: 2024-07-16 | Stop reason: HOSPADM

## 2024-07-15 RX ORDER — MIDAZOLAM HYDROCHLORIDE 1 MG/ML
INJECTION INTRAMUSCULAR; INTRAVENOUS PRN
Status: DISCONTINUED | OUTPATIENT
Start: 2024-07-15 | End: 2024-07-15 | Stop reason: HOSPADM

## 2024-07-15 RX ORDER — LIDOCAINE HYDROCHLORIDE 10 MG/ML
INJECTION, SOLUTION EPIDURAL; INFILTRATION; INTRACAUDAL; PERINEURAL PRN
Status: DISCONTINUED | OUTPATIENT
Start: 2024-07-15 | End: 2024-07-15 | Stop reason: HOSPADM

## 2024-07-15 RX ORDER — ASPIRIN 81 MG/1
243 TABLET, CHEWABLE ORAL ONCE
Status: COMPLETED | OUTPATIENT
Start: 2024-07-15 | End: 2024-07-15

## 2024-07-15 RX ORDER — ATROPINE SULFATE 0.4 MG/ML
0.5 INJECTION, SOLUTION INTRAVENOUS
Status: DISCONTINUED | OUTPATIENT
Start: 2024-07-15 | End: 2024-07-16 | Stop reason: HOSPADM

## 2024-07-15 RX ORDER — AMLODIPINE BESYLATE 5 MG/1
5 TABLET ORAL DAILY
Qty: 30 TABLET | Refills: 3 | Status: SHIPPED | OUTPATIENT
Start: 2024-07-15

## 2024-07-15 RX ORDER — ACETAMINOPHEN 325 MG/1
650 TABLET ORAL EVERY 4 HOURS PRN
Status: DISCONTINUED | OUTPATIENT
Start: 2024-07-15 | End: 2024-07-16 | Stop reason: HOSPADM

## 2024-07-15 RX ORDER — AMLODIPINE BESYLATE 5 MG/1
5 TABLET ORAL 2 TIMES DAILY
Qty: 30 TABLET | Refills: 3 | Status: SHIPPED | OUTPATIENT
Start: 2024-07-15 | End: 2024-07-15

## 2024-07-15 RX ADMIN — ASPIRIN 243 MG: 81 TABLET, CHEWABLE ORAL at 09:22

## 2024-07-15 RX ADMIN — DIVALPROEX SODIUM 1000 MG: 500 TABLET, DELAYED RELEASE ORAL at 16:10

## 2024-07-15 RX ADMIN — GABAPENTIN 400 MG: 400 CAPSULE ORAL at 20:33

## 2024-07-15 RX ADMIN — IPRATROPIUM BROMIDE AND ALBUTEROL SULFATE 1 DOSE: 2.5; .5 SOLUTION RESPIRATORY (INHALATION) at 12:48

## 2024-07-15 RX ADMIN — IPRATROPIUM BROMIDE AND ALBUTEROL SULFATE 1 DOSE: 2.5; .5 SOLUTION RESPIRATORY (INHALATION) at 08:11

## 2024-07-15 RX ADMIN — ARFORMOTEROL TARTRATE: 15 SOLUTION RESPIRATORY (INHALATION) at 08:11

## 2024-07-15 RX ADMIN — PANTOPRAZOLE SODIUM 40 MG: 40 TABLET, DELAYED RELEASE ORAL at 09:00

## 2024-07-15 RX ADMIN — SODIUM CHLORIDE, PRESERVATIVE FREE 10 ML: 5 INJECTION INTRAVENOUS at 20:38

## 2024-07-15 RX ADMIN — SODIUM CHLORIDE, PRESERVATIVE FREE 10 ML: 5 INJECTION INTRAVENOUS at 11:19

## 2024-07-15 RX ADMIN — GABAPENTIN 400 MG: 400 CAPSULE ORAL at 14:05

## 2024-07-15 RX ADMIN — SODIUM CHLORIDE: 9 INJECTION, SOLUTION INTRAVENOUS at 10:46

## 2024-07-15 RX ADMIN — GABAPENTIN 400 MG: 400 CAPSULE ORAL at 09:00

## 2024-07-15 RX ADMIN — AMLODIPINE BESYLATE 5 MG: 5 TABLET ORAL at 15:57

## 2024-07-15 RX ADMIN — TAMSULOSIN HYDROCHLORIDE 0.4 MG: 0.4 CAPSULE ORAL at 09:00

## 2024-07-15 RX ADMIN — ASPIRIN 81 MG: 81 TABLET, CHEWABLE ORAL at 09:00

## 2024-07-15 RX ADMIN — SODIUM CHLORIDE, POTASSIUM CHLORIDE, SODIUM LACTATE AND CALCIUM CHLORIDE: 600; 310; 30; 20 INJECTION, SOLUTION INTRAVENOUS at 00:19

## 2024-07-15 RX ADMIN — SODIUM CHLORIDE, PRESERVATIVE FREE 10 ML: 5 INJECTION INTRAVENOUS at 09:01

## 2024-07-15 RX ADMIN — IPRATROPIUM BROMIDE AND ALBUTEROL SULFATE 1 DOSE: 2.5; .5 SOLUTION RESPIRATORY (INHALATION) at 16:23

## 2024-07-15 RX ADMIN — ARFORMOTEROL TARTRATE: 15 SOLUTION RESPIRATORY (INHALATION) at 20:19

## 2024-07-15 RX ADMIN — DIVALPROEX SODIUM 500 MG: 500 TABLET, DELAYED RELEASE ORAL at 09:07

## 2024-07-15 RX ADMIN — IPRATROPIUM BROMIDE AND ALBUTEROL SULFATE 1 DOSE: 2.5; .5 SOLUTION RESPIRATORY (INHALATION) at 20:19

## 2024-07-15 RX ADMIN — QUETIAPINE FUMARATE 300 MG: 200 TABLET ORAL at 20:33

## 2024-07-15 RX ADMIN — CARVEDILOL 3.12 MG: 3.12 TABLET, FILM COATED ORAL at 09:00

## 2024-07-15 RX ADMIN — CARVEDILOL 3.12 MG: 3.12 TABLET, FILM COATED ORAL at 20:33

## 2024-07-15 NOTE — PLAN OF CARE
Problem: Discharge Planning  Goal: Discharge to home or other facility with appropriate resources  7/14/2024 2356 by Mar Wynn RN  Flowsheets (Taken 7/14/2024 2356)  Discharge to home or other facility with appropriate resources:   Identify barriers to discharge with patient and caregiver   Identify discharge learning needs (meds, wound care, etc)   Refer to discharge planning if patient needs post-hospital services based on physician order or complex needs related to functional status, cognitive ability or social support system     Problem: Safety - Adult  Goal: Free from fall injury  7/14/2024 2356 by Mar Wynn RN  Flowsheets (Taken 7/14/2024 2356)  Free From Fall Injury:   Instruct family/caregiver on patient safety   Based on caregiver fall risk screen, instruct family/caregiver to ask for assistance with transferring infant if caregiver noted to have fall risk factors     Problem: Pain  Goal: Verbalizes/displays adequate comfort level or baseline comfort level  7/14/2024 2356 by Mar Wynn RN  Flowsheets (Taken 7/14/2024 2356)  Verbalizes/displays adequate comfort level or baseline comfort level:   Encourage patient to monitor pain and request assistance   Administer analgesics based on type and severity of pain and evaluate response   Consider cultural and social influences on pain and pain management

## 2024-07-15 NOTE — PRE SEDATION
Sedation Plan  ASA: class 2 - patient with mild systemic disease     Mallampati class: I - soft palate, uvula, fauces, pillars visible.    Sedation plan: minimal sedation    Risks, benefits, and alternatives discussed with patient.        Immediate reassessment prior to sedation:  Patient's status reviewed and vital signs assessed; acceptable to perform procedure and proceed to administer sedation as planned.

## 2024-07-15 NOTE — FLOWSHEET NOTE
Cath Lab Pre Procedure Flowsheet    Plan of Care:     Hemodynamics and cardiac rhythm will remain stable.   Comfort level will be maintained.   Respiratory function will remain adequate.   Pt/family will verbalize understanding of the procedure.   Procedure will be tolerated without complications.   Patient will recover from procedure without complications.   ID armband on patient and identification verified.   Informed consent obtained.   Non invasive blood pressure cuff applied, monitoring initiated.   EKG pads and pulse oximeter applied, monitoring initiated.   Instructions given. Patient and / or family verbalize understanding.   H&P will be documented by physician in [facility].     Pre-procedure:    NPO Status: Pt has been NPO since midnight. .    Contrast / IV Dye Allergy:     Pregnancy Test: N/A.    Prep Sites: Wrist(s)  Groin(s)    Ryder's Test:    Pulses: johnson dp and pt 1+ johnson fem 2+    Anticoagulants: lovenox 7/14    Antiplatelets: Aspirin. Last Dose: 7/15.  Any missed doses:  No..     Chief Complaint:      Diabetic: Yes, Diet controlled    Pre EKG Rhythm: nsr    Pre SBP:123/83    IV access: left a/c    Pre-procedure blood work collected by:     NIH Scale:

## 2024-07-15 NOTE — PLAN OF CARE
Problem: Discharge Planning  Goal: Discharge to home or other facility with appropriate resources  7/15/2024 1224 by Emmanuel Melton, RN  Outcome: Progressing  Flowsheets (Taken 7/14/2024 2356 by Mar Wynn RN)  Discharge to home or other facility with appropriate resources:   Identify barriers to discharge with patient and caregiver   Identify discharge learning needs (meds, wound care, etc)   Refer to discharge planning if patient needs post-hospital services based on physician order or complex needs related to functional status, cognitive ability or social support system  7/14/2024 2356 by Mar Wynn RN  Flowsheets (Taken 7/14/2024 2356)  Discharge to home or other facility with appropriate resources:   Identify barriers to discharge with patient and caregiver   Identify discharge learning needs (meds, wound care, etc)   Refer to discharge planning if patient needs post-hospital services based on physician order or complex needs related to functional status, cognitive ability or social support system     Problem: Safety - Adult  Goal: Free from fall injury  7/15/2024 1224 by Emmanuel Melton RN  Outcome: Progressing  Flowsheets (Taken 7/14/2024 2356 by Mar Wynn RN)  Free From Fall Injury:   Instruct family/caregiver on patient safety   Based on caregiver fall risk screen, instruct family/caregiver to ask for assistance with transferring infant if caregiver noted to have fall risk factors  7/14/2024 2356 by Mar Wynn RN  Flowsheets (Taken 7/14/2024 2356)  Free From Fall Injury:   Instruct family/caregiver on patient safety   Based on caregiver fall risk screen, instruct family/caregiver to ask for assistance with transferring infant if caregiver noted to have fall risk factors     Problem: Pain  Goal: Verbalizes/displays adequate comfort level or baseline comfort level  7/15/2024 1224 by Emmanuel Melton, RN  Outcome: Progressing  Flowsheets (Taken 7/14/2024 2356 by

## 2024-07-15 NOTE — CARE COORDINATION
CM following for DCP. Pt from home alone in [place], [place]. Per surgical resident, pt states he does not have a working phone, and no family or friends to take him to and from the hospital for his outpt EVAR with Dr. Cabello. Pt scheduled for the OR on July 31st at 1200, will need to arrive at least 3hrs prior to scheduled OR time- CM called [employer] transportation line ([phone]) and scheduled pt for ride from his home address to the [facility] for July 31st at 0730 (pt to be ready for transport by 0630). Trip confirmation # [phone].    CM attempted to schedule ride for that evening to get pt home- representative stated that staff from hospital will have to call [place] on the day of procedure to schedule ride home. CM confirmed that pt would be able to secure ride home if staff called to set this up same day- representative confirmed this.     Pt asleep at this time, will provide pt with above info tomorrow.     Thank you  Cat Shaka GRIGSBY, BSN,   PCU   [phone]

## 2024-07-15 NOTE — DISCHARGE INSTRUCTIONS
0630). Trip confirmation # [phone].   Hospital staff ([facility]) to call Juan ([phone]) on day of procedure to schedule same day ride home.

## 2024-07-15 NOTE — PROGRESS NOTES
Vascular Surgery   Daily Progress Note  Patient: Scot Hernandez      CC: infrarenal abdominal aortic aneurysm     SUBJECTIVE:   No acute events overnight. Denies any abdominal pain, n/v.    ROS:   A 14 point review of systems was conducted, significant findings as noted above. All other systems negative.    OBJECTIVE:    PHYSICAL EXAM:    Vitals:    07/15/24 0019 07/15/24 0200 07/15/24 0322 07/15/24 0451   BP: 135/75  126/71    Pulse: 65 60 58    Resp: 18  18    Temp: 97 °F (36.1 °C)  97.7 °F (36.5 °C)    TempSrc: Oral  Oral    SpO2: 93%  93%    Weight:    73.3 kg (161 lb 9.6 oz)   Height:           General appearance: alert, no acute distress, grooming appropriate  HEENT: NC/AT, PERRL, no scleral icterus  Neck: trachea midline, no JVD  Chest/Lungs: normal effort, no adventitious breathing, no accessory muscle use  Cardiovascular: RRR  Abdomen: soft, non-tender, non-distended, no guarding/rigidity  Skin: warm and dry, no rashes  Extremities: no edema, no cyanosis, bilateral DP/PT pulses palpable  Neuro: A&Ox3, no focal deficits, sensation intact    LABS:   Recent Labs     07/13/24  0536 07/14/24  0441   WBC 5.9 5.4   HGB 14.7 13.8   HCT 43.6 40.5   MCV 91.1 91.8    181          Recent Labs     07/13/24  0536 07/14/24  0441    145   K 4.6 4.1    109   CO2 27 26   PHOS 3.1 4.2   BUN 8 11   CREATININE 0.6* 0.7*          Recent Labs     07/12/24  1419   AST 12*   ALT <5*   BILITOT 0.3   ALKPHOS 133*          Recent Labs     07/12/24  1419   LIPASE 20.0          Recent Labs     07/13/24  0002   INR 1.01        No results for input(s): \"CKTOTAL\", \"CKMB\", \"CKMBINDEX\", \"TROPONINI\" in the last 72 hours.      ASSESSMENT & PLAN:   This is a 64 y.o. male with Hx of abdominal aortic aneurysm, COPD, CAD s/p PCA, Hypotension on midodrine, HLD, T2DM presented with 3 days of worsening back pain. CT abdomen and pelvis showed Atherosclerotic aorta with a fusiform dilatation of the infrarenal aorta measuring 3.7

## 2024-07-15 NOTE — PROGRESS NOTES
Cardiology Consult Service  Daily Progress Note        Admit Date:  7/12/2024  Primary cardiologist: Dr Jade, jaycee    Reason for Consultation/Chief Complaint: preop evaluation    Subjective:     Scot Hernandez is a 64 y.o. male with a past medical history of HLP, DM, COPD, smoker, CAD s/p POBA high OM1/ramus (with RCA ), AAA, occasional hypotension requiring midodrine.     Liz 01/2021: Normal LVEF greater than 60%, normal wall motion, fixed inferior wall perfusion defect with small apical ischemia.     C 1/27/2021: 95% ostial high OM 1/ramus stenosis, proximal RCA  with extensive left-to-right collaterals, otherwise luminal irregularities in left main and LAD.  Only POBA, no PCI with stent was not done due to proximity of ostial lesion to left main.     No prior echoes     Patient presented to the emergency room on 7/12 with lower back pain x 3 days.  CT abdomen pelvis with IV contrast revealed a AAA 3.7 cm with no dissection, mild soft tissue stranding suggestive of aortitis.  Compared to CT 03/2023, AAA was 3.4 cm.  Patient was admitted for enlarging AAA, vascular surgery was consulted, tentative plan for AAA repair on Monday.  Cardiology was consulted for preoperative evaluation.     Echo 7/13/2024: Normal LV size, LVEF 35-40%, grade 1 diastolic dysfunction, normal RV and valves.     Patient currently denies any ischemic or congestive symptoms.  He has been taking aspirin and Plavix daily.  He continues to smoke.    LHC 7/15/2024: Proximal RCA occluded with left-to-right collaterals, otherwise normal coronaries, LVEF 55%.    Interval history:  Patient reports no complaints.  There were no overnight events.    Objective:     Medications:   sodium chloride flush  5-40 mL IntraVENous 2 times per day    carvedilol  3.125 mg Oral BID    amLODIPine  5 mg Oral Daily    ipratropium 0.5 mg-albuterol 2.5 mg  1 Dose Nebulization Q4H WA RT    sodium chloride flush  10 mL IntraVENous 2 times per day

## 2024-07-16 VITALS
TEMPERATURE: 96.7 F | RESPIRATION RATE: 18 BRPM | BODY MASS INDEX: 23.02 KG/M2 | SYSTOLIC BLOOD PRESSURE: 113 MMHG | OXYGEN SATURATION: 92 % | HEART RATE: 68 BPM | DIASTOLIC BLOOD PRESSURE: 77 MMHG | WEIGHT: 151.9 LBS | HEIGHT: 68 IN

## 2024-07-16 LAB
ALBUMIN SERPL-MCNC: 3.6 G/DL (ref 3.4–5)
ANION GAP SERPL CALCULATED.3IONS-SCNC: 10 MMOL/L (ref 3–16)
BASOPHILS # BLD: 0 K/UL (ref 0–0.2)
BASOPHILS NFR BLD: 0.6 %
BUN SERPL-MCNC: 16 MG/DL (ref 7–20)
CALCIUM SERPL-MCNC: 8.7 MG/DL (ref 8.3–10.6)
CHLORIDE SERPL-SCNC: 107 MMOL/L (ref 99–110)
CO2 SERPL-SCNC: 27 MMOL/L (ref 21–32)
CREAT SERPL-MCNC: 0.8 MG/DL (ref 0.8–1.3)
DEPRECATED RDW RBC AUTO: 16.4 % (ref 12.4–15.4)
EOSINOPHIL # BLD: 0.2 K/UL (ref 0–0.6)
EOSINOPHIL NFR BLD: 3.7 %
GFR SERPLBLD CREATININE-BSD FMLA CKD-EPI: >90 ML/MIN/{1.73_M2}
GLUCOSE SERPL-MCNC: 84 MG/DL (ref 70–99)
HCT VFR BLD AUTO: 40.7 % (ref 40.5–52.5)
HGB BLD-MCNC: 13.3 G/DL (ref 13.5–17.5)
LYMPHOCYTES # BLD: 1.5 K/UL (ref 1–5.1)
LYMPHOCYTES NFR BLD: 27.7 %
MAGNESIUM SERPL-MCNC: 2 MG/DL (ref 1.8–2.4)
MCH RBC QN AUTO: 30 PG (ref 26–34)
MCHC RBC AUTO-ENTMCNC: 32.8 G/DL (ref 31–36)
MCV RBC AUTO: 91.5 FL (ref 80–100)
MONOCYTES # BLD: 0.5 K/UL (ref 0–1.3)
MONOCYTES NFR BLD: 10.2 %
NEUTROPHILS # BLD: 3.1 K/UL (ref 1.7–7.7)
NEUTROPHILS NFR BLD: 57.8 %
PHOSPHATE SERPL-MCNC: 4.6 MG/DL (ref 2.5–4.9)
PLATELET # BLD AUTO: 186 K/UL (ref 135–450)
PMV BLD AUTO: 7.8 FL (ref 5–10.5)
POTASSIUM SERPL-SCNC: 4.5 MMOL/L (ref 3.5–5.1)
RBC # BLD AUTO: 4.45 M/UL (ref 4.2–5.9)
SODIUM SERPL-SCNC: 144 MMOL/L (ref 136–145)
WBC # BLD AUTO: 5.4 K/UL (ref 4–11)

## 2024-07-16 PROCEDURE — 6360000002 HC RX W HCPCS

## 2024-07-16 PROCEDURE — 2580000003 HC RX 258: Performed by: INTERNAL MEDICINE

## 2024-07-16 PROCEDURE — 6370000000 HC RX 637 (ALT 250 FOR IP)

## 2024-07-16 PROCEDURE — 80069 RENAL FUNCTION PANEL: CPT

## 2024-07-16 PROCEDURE — 6370000000 HC RX 637 (ALT 250 FOR IP): Performed by: SURGERY

## 2024-07-16 PROCEDURE — 85025 COMPLETE CBC W/AUTO DIFF WBC: CPT

## 2024-07-16 PROCEDURE — 94761 N-INVAS EAR/PLS OXIMETRY MLT: CPT

## 2024-07-16 PROCEDURE — 94640 AIRWAY INHALATION TREATMENT: CPT

## 2024-07-16 PROCEDURE — 83735 ASSAY OF MAGNESIUM: CPT

## 2024-07-16 PROCEDURE — 6370000000 HC RX 637 (ALT 250 FOR IP): Performed by: INTERNAL MEDICINE

## 2024-07-16 PROCEDURE — 36415 COLL VENOUS BLD VENIPUNCTURE: CPT

## 2024-07-16 RX ORDER — AMLODIPINE BESYLATE 5 MG/1
5 TABLET ORAL DAILY
Qty: 30 TABLET | Refills: 3 | Status: CANCELLED | OUTPATIENT
Start: 2024-07-16

## 2024-07-16 RX ORDER — CARVEDILOL 3.12 MG/1
3.12 TABLET ORAL 2 TIMES DAILY
Qty: 60 TABLET | Refills: 3 | Status: CANCELLED | OUTPATIENT
Start: 2024-07-16

## 2024-07-16 RX ADMIN — CARVEDILOL 3.12 MG: 3.12 TABLET, FILM COATED ORAL at 07:53

## 2024-07-16 RX ADMIN — ENOXAPARIN SODIUM 40 MG: 100 INJECTION SUBCUTANEOUS at 07:52

## 2024-07-16 RX ADMIN — DIVALPROEX SODIUM 500 MG: 500 TABLET, DELAYED RELEASE ORAL at 06:25

## 2024-07-16 RX ADMIN — GABAPENTIN 400 MG: 400 CAPSULE ORAL at 07:53

## 2024-07-16 RX ADMIN — PANTOPRAZOLE SODIUM 40 MG: 40 TABLET, DELAYED RELEASE ORAL at 07:53

## 2024-07-16 RX ADMIN — ARFORMOTEROL TARTRATE: 15 SOLUTION RESPIRATORY (INHALATION) at 08:44

## 2024-07-16 RX ADMIN — ASPIRIN 81 MG: 81 TABLET, CHEWABLE ORAL at 07:53

## 2024-07-16 RX ADMIN — TAMSULOSIN HYDROCHLORIDE 0.4 MG: 0.4 CAPSULE ORAL at 07:53

## 2024-07-16 RX ADMIN — SODIUM CHLORIDE, PRESERVATIVE FREE 10 ML: 5 INJECTION INTRAVENOUS at 07:59

## 2024-07-16 RX ADMIN — IPRATROPIUM BROMIDE AND ALBUTEROL SULFATE 1 DOSE: 2.5; .5 SOLUTION RESPIRATORY (INHALATION) at 08:45

## 2024-07-16 RX ADMIN — AMLODIPINE BESYLATE 5 MG: 5 TABLET ORAL at 07:53

## 2024-07-16 NOTE — PROGRESS NOTES
Vascular Surgery   Daily Progress Note  Patient: Scot Hernandez      CC: infrarenal abdominal aortic aneurysm     SUBJECTIVE:   No acute events. Denies abdominal or back pain. Tolerating diet without nausea or vomiting. Had bowel movements and passing gas.     ROS:   A 14 point review of systems was conducted, significant findings as noted above. All other systems negative.    OBJECTIVE:    PHYSICAL EXAM:    Vitals:    07/15/24 2032 07/15/24 2300 07/16/24 0405 07/16/24 0626   BP: 139/89 138/72 101/61    Pulse: 60 68 62    Resp: 18 19 16    Temp: 97.9 °F (36.6 °C) 97.2 °F (36.2 °C) 97.6 °F (36.4 °C)    TempSrc: Oral Oral Oral    SpO2: 95% 96% 91%    Weight:    68.9 kg (151 lb 14.4 oz)   Height:           General appearance: alert, no acute distress, grooming appropriate  HEENT: NC/AT, PERRL, no scleral icterus  Neck: trachea midline, no JVD  Chest/Lungs: normal effort, no adventitious breathing, no accessory muscle use  Cardiovascular: RRR  Abdomen: soft, non-tender, non-distended, no guarding/rigidity  Skin: warm and dry, no rashes  Extremities: no edema, no cyanosis, bilateral DP/PT pulses palpable  Neuro: A&Ox3, no focal deficits, sensation intact    LABS:   Recent Labs     07/15/24  0629 07/16/24  0536   WBC 5.4 5.4   HGB 13.1* 13.3*   HCT 39.0* 40.7   MCV 91.6 91.5    186          Recent Labs     07/15/24  0629 07/16/24  0536    144   K 4.4 4.5    107   CO2 28 27   PHOS 4.5 4.6   BUN 18 16   CREATININE 0.6* 0.8           ASSESSMENT & PLAN:   This is a 64 y.o. male with Hx of abdominal aortic aneurysm, COPD, CAD s/p PCA, Hypotension on midodrine, HLD, T2DM presented with 3 days of worsening back pain. CT abdomen and pelvis showed Atherosclerotic aorta with a fusiform dilatation of the infrarenal aorta measuring 3.7 cm which measured 3.4 cm previously some minimal soft tissue stranding anterior to the dilated aorta.     - Goal SBP range of 100-140, PRN labetolol and hydralazine  - Patient

## 2024-07-16 NOTE — PLAN OF CARE
Problem: Discharge Planning  Goal: Discharge to home or other facility with appropriate resources  7/15/2024 2218 by Dionne Pitts RN  Flowsheets (Taken 7/15/2024 2218)  Discharge to home or other facility with appropriate resources:   Identify barriers to discharge with patient and caregiver   Arrange for needed discharge resources and transportation as appropriate   Identify discharge learning needs (meds, wound care, etc)     Problem: Safety - Adult  Goal: Free from fall injury  7/15/2024 2218 by Dionne Pitts RN  Outcome: Progressing  Free From Fall Injury:   Instruct family/caregiver on patient safety   Based on caregiver fall risk screen, instruct family/caregiver to ask for assistance with transferring infant if caregiver noted to have fall risk factors     Problem: Pain  Goal: Verbalizes/displays adequate comfort level or baseline comfort level  7/15/2024 2218 by Dionne Pitts RN  Outcome: Progressing  Flowsheets (Taken 7/15/2024 2218)  Verbalizes/displays adequate comfort level or baseline comfort level:   Encourage patient to monitor pain and request assistance   Assess pain using appropriate pain scale   Administer analgesics based on type and severity of pain and evaluate response     Problem: Chronic Conditions and Co-morbidities  Goal: Patient's chronic conditions and co-morbidity symptoms are monitored and maintained or improved  7/15/2024 2218 by Dionne Pitts RN  Outcome: Progressing  Care Plan - Patient's Chronic Conditions and Co-Morbidity Symptoms are Monitored and Maintained or Improved: Monitor and assess patient's chronic conditions and comorbid symptoms for stability, deterioration, or improvement

## 2024-07-16 NOTE — PLAN OF CARE
Problem: Discharge Planning  Goal: Discharge to home or other facility with appropriate resources  7/16/2024 0837 by Emmanuel Melton RN  Outcome: Progressing  Flowsheets (Taken 7/15/2024 2218 by Dionne Pitts RN)  Discharge to home or other facility with appropriate resources:   Identify barriers to discharge with patient and caregiver   Arrange for needed discharge resources and transportation as appropriate   Identify discharge learning needs (meds, wound care, etc)  7/15/2024 2218 by Dionne Pitts RN  Flowsheets (Taken 7/15/2024 2218)  Discharge to home or other facility with appropriate resources:   Identify barriers to discharge with patient and caregiver   Arrange for needed discharge resources and transportation as appropriate   Identify discharge learning needs (meds, wound care, etc)     Problem: Safety - Adult  Goal: Free from fall injury  7/16/2024 0837 by Emmanuel Melton RN  Outcome: Progressing  Flowsheets (Taken 7/14/2024 2356 by Mar Wynn RN)  Free From Fall Injury:   Instruct family/caregiver on patient safety   Based on caregiver fall risk screen, instruct family/caregiver to ask for assistance with transferring infant if caregiver noted to have fall risk factors  7/15/2024 2218 by Dionne Pitts RN  Outcome: Progressing  Flowsheets (Taken 7/14/2024 2356 by Mar Wynn RN)  Free From Fall Injury:   Instruct family/caregiver on patient safety   Based on caregiver fall risk screen, instruct family/caregiver to ask for assistance with transferring infant if caregiver noted to have fall risk factors     Problem: Pain  Goal: Verbalizes/displays adequate comfort level or baseline comfort level  7/16/2024 0837 by Emmanuel Melton RN  Outcome: Progressing  Flowsheets (Taken 7/15/2024 2218 by Dionne Pitts RN)  Verbalizes/displays adequate comfort level or baseline comfort level:   Encourage patient to monitor pain and request assistance   Assess pain using appropriate

## 2024-07-16 NOTE — DISCHARGE SUMMARY
Discharge orders received. AVS printed and reviewed with patient. New prescriptions picked up at outpatient pharmacy. All questions answered. Reviewed follow up appointment for 7/31 procedure here at [facility] as well as transport services set up for patient. IV removed, belongings collected, escorted out of facility via wheelchair, left hospital in uber.  
Pantoprazole  (Protonix)  USE--  Reduce stomach acid, protect stomach lining  SIDE EFFECTS--  Headache, diarrhea     polyethylene glycol 17 GM/SCOOP powder  Commonly known as: GLYCOLAX     polyvinyl alcohol 1.4 % ophthalmic solution  Commonly known as: LIQUIFILM TEARS     QUEtiapine 300 MG tablet  Commonly known as: SEROQUEL  Notes to patient: QUEtiapine (SEROquel; SEROquel XR)  USE-- treat bipolar problems, schizophrenia, low mood (depression)  SIDE EFFECTS-- dizziness, headache, hard stools, feeling nervous and excitable, felling sleepy, weight gain, upset stomach or throwing up, feeling tired or weak, belly pain     Symbicort 160-4.5 MCG/ACT Aero  Generic drug: budesonide-formoterol     tamsulosin 0.4 MG capsule  Commonly known as: FLOMAX  Notes to patient: Tamsulosin (Flomax)  USE--  Treat an enlarged prostate  SIDE EFFECTS--  Feeling dizzy, headache, low blood pressure            STOP taking these medications      cyclobenzaprine 5 MG tablet  Commonly known as: FLEXERIL     diclofenac sodium 1 % Gel  Commonly known as: VOLTAREN     ferrous sulfate 325 (65 Fe) MG tablet  Commonly known as: IRON 325               Where to Get Your Medications        These medications were sent to [id number] - [place], [place] - [address] - P [phone] - F [phone] [address] [facility] [address]      Phone: [phone]   amLODIPine 5 MG tablet  carvedilol 3.125 MG tablet         John Tran DO  07/16/24  7:33 AM

## 2024-07-16 NOTE — PLAN OF CARE
Problem: Discharge Planning  Goal: Discharge to home or other facility with appropriate resources  7/16/2024 0931 by Emmanuel Melton RN  Outcome: Adequate for Discharge  7/16/2024 0837 by Emmanuel Melton RN  Outcome: Progressing  Flowsheets (Taken 7/15/2024 2218 by Dionne Pitts RN)  Discharge to home or other facility with appropriate resources:   Identify barriers to discharge with patient and caregiver   Arrange for needed discharge resources and transportation as appropriate   Identify discharge learning needs (meds, wound care, etc)  7/15/2024 2218 by Dionne Pitts RN  Flowsheets (Taken 7/15/2024 2218)  Discharge to home or other facility with appropriate resources:   Identify barriers to discharge with patient and caregiver   Arrange for needed discharge resources and transportation as appropriate   Identify discharge learning needs (meds, wound care, etc)     Problem: Safety - Adult  Goal: Free from fall injury  7/16/2024 0931 by Emmanuel Melton RN  Outcome: Adequate for Discharge  7/16/2024 0837 by Emmanuel Melton RN  Outcome: Progressing  Flowsheets (Taken 7/14/2024 2356 by Mar Wynn RN)  Free From Fall Injury:   Instruct family/caregiver on patient safety   Based on caregiver fall risk screen, instruct family/caregiver to ask for assistance with transferring infant if caregiver noted to have fall risk factors  7/15/2024 2218 by Dionne Pitts RN  Outcome: Progressing  Flowsheets (Taken 7/14/2024 2356 by Mar Wynn RN)  Free From Fall Injury:   Instruct family/caregiver on patient safety   Based on caregiver fall risk screen, instruct family/caregiver to ask for assistance with transferring infant if caregiver noted to have fall risk factors     Problem: Pain  Goal: Verbalizes/displays adequate comfort level or baseline comfort level  7/16/2024 0931 by Emmanuel Melton RN  Outcome: Adequate for Discharge  7/16/2024 0837 by Emmanuel Melton,

## 2024-07-16 NOTE — CARE COORDINATION
Case Management Assessment            Discharge Note                    Date / Time of Note: 7/16/2024 9:29 AM                  Discharge Note Completed by: Nyla Matt    Patient Name: Scot Hernandez   YOB: 1960  Diagnosis: AAA (abdominal aortic aneurysm) without rupture (HCC) [I71.40]  Abdominal aortic aneurysm (AAA) 3.0 cm to 5.5 cm in diameter in male (HCC) [I71.40]   Date / Time: 7/12/2024 10:41 PM    Current PCP: Lorna Gautam PA-C  Clinic patient: No    Hospitalization in the last 30 days: No       Advance Directives:  Code Status: Full Code  [place] DNR form completed and on chart: Not Indicated    Financial:  Payor: One Month [place] MEDICAID / Plan: One Month [place] MEDICA / Product Type: *No Product type* /      Pharmacy:    [address] [phone] -  [phone]  [address] [facility] [address]  Phone: [phone] Fax: [phone]      Assistance purchasing medications?:    Assistance provided by Case Management: None at this time    Does patient want to participate in local refill/ meds to beds program?: Yes    Meds To Beds General Rules:  1. Can ONLY be done Monday- Friday between 8:30am-5pm  2. Prescription(s) must be in pharmacy by 3pm to be filled same day  3.Copy of patient's insurance/ prescription drug card and patient face sheet must be sent along with the prescription(s)  4. Cost of Rx cannot be added to hospital bill. If financial assistance is needed, please contact unit  or ;  or  CANNOT provide pharmacy voucher for patients co-pays  5. Patients can then  the prescription on their way out of the hospital at discharge, or pharmacy can deliver to the bedside if staff is available. (payment due at time of pick-up or delivery - cash, check, or card accepted)     Able to afford home medications/ co-pay costs: Yes    ADLS:  Current PT AM-PAC Score:

## 2024-07-26 ENCOUNTER — OFFICE VISIT (OUTPATIENT)
Dept: ORTHOPEDIC SURGERY | Age: 64
End: 2024-07-26
Payer: MEDICAID

## 2024-07-26 VITALS — HEIGHT: 68 IN | WEIGHT: 151 LBS | BODY MASS INDEX: 22.88 KG/M2

## 2024-07-26 DIAGNOSIS — M87.052 AVASCULAR NECROSIS OF BONE OF HIP, LEFT (HCC): Primary | ICD-10-CM

## 2024-07-26 PROCEDURE — G8427 DOCREV CUR MEDS BY ELIG CLIN: HCPCS | Performed by: ORTHOPAEDIC SURGERY

## 2024-07-26 PROCEDURE — 3017F COLORECTAL CA SCREEN DOC REV: CPT | Performed by: ORTHOPAEDIC SURGERY

## 2024-07-26 PROCEDURE — 1036F TOBACCO NON-USER: CPT | Performed by: ORTHOPAEDIC SURGERY

## 2024-07-26 PROCEDURE — 1111F DSCHRG MED/CURRENT MED MERGE: CPT | Performed by: ORTHOPAEDIC SURGERY

## 2024-07-26 PROCEDURE — G8420 CALC BMI NORM PARAMETERS: HCPCS | Performed by: ORTHOPAEDIC SURGERY

## 2024-07-26 PROCEDURE — 99213 OFFICE O/P EST LOW 20 MIN: CPT | Performed by: ORTHOPAEDIC SURGERY

## 2024-07-29 NOTE — PROGRESS NOTES
ORTHOPAEDIC SURGERY FOLLOWUP VISIT    CHIEF COMPLAINT: Left hip pain    HISTORY OF PRESENT ILLNESS:  62-year-old male presents for repeat evaluation of his left hip.  He has had insidiously worsening left hip pain.  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  At last visit, it was felt that his problem was related to the same issue.  He underwent MRI and follows up for these results today.  In the interim, he has been diagnosed with abdominal aortic aneurysm and plans for  endovascular repair next week.    PHYSICAL EXAM:  Left lower extremity:  No cuts, open wounds, or abrasions to the left hip.  There is moderate trochanteric tenderness.  There is pain with logroll.  There is pain with axial loading of the limb.  There is pain experienced across the groin with maximal hip flexion.  There is pain with rotational movements of the hip in 90 degrees of flexion.  There is pain at extremes of HARRY and FADIR. Sensation is intact to light touch in deep peroneal, superficial peroneal, tibial, sural, and saphenous nerve distributions.  Motor function is intact to EHL, FHL, tibialis anterior, and gastroc.  There is brisk capillary refill to the toes and a strong palpable dorsalis pedis pulse.  Compartments are soft and compressible.  There is no calf tenderness and a negative Homans' sign.  Gait: Ambulatory without assist devices.    RADIOGRAPHIC EXAM:  [facility]   Patient Name: Scot Hernandez   Case ID: [id number]   Patient : 1960   Referring Physician: Angel Lopez   Exam Date: 2024   Exam Description: MR Left Hip w/o Contrast   HISTORY: 64-year-old male with left hip pain, limited range of motion, numbness, tingling, and pain extending down the leg, ongoing after patient fell out of bed 3 months ago.  No history of surgery.  Evaluate for avascular necrosis, femoral

## 2024-07-30 ENCOUNTER — ANESTHESIA EVENT (OUTPATIENT)
Dept: OPERATING ROOM | Age: 64
End: 2024-07-30
Payer: MEDICAID

## 2024-07-30 ENCOUNTER — PREP FOR PROCEDURE (OUTPATIENT)
Dept: VASCULAR SURGERY | Age: 64
End: 2024-07-30

## 2024-07-30 NOTE — PROGRESS NOTES
Pt VM full and not set up to receive messages. No answer at [employer] phones and not able to leave message. No Email to send pre-op instructions to . /rd 7/30 0116

## 2024-07-31 ENCOUNTER — ANESTHESIA (OUTPATIENT)
Dept: OPERATING ROOM | Age: 64
End: 2024-07-31
Payer: MEDICAID

## 2024-07-31 ENCOUNTER — HOSPITAL ENCOUNTER (INPATIENT)
Age: 64
LOS: 8 days | Discharge: HOME | End: 2024-08-08
Attending: SURGERY | Admitting: SURGERY
Payer: MEDICAID

## 2024-07-31 DIAGNOSIS — Z86.79 S/P ENDOVASCULAR ANEURYSM REPAIR: ICD-10-CM

## 2024-07-31 DIAGNOSIS — Z98.890 S/P ENDOVASCULAR ANEURYSM REPAIR: ICD-10-CM

## 2024-07-31 DIAGNOSIS — I50.9 CHRONIC CONGESTIVE HEART FAILURE, UNSPECIFIED HEART FAILURE TYPE (HCC): ICD-10-CM

## 2024-07-31 LAB
ABO + RH BLD: NORMAL
ANION GAP SERPL CALCULATED.3IONS-SCNC: 10 MMOL/L (ref 3–16)
BLD GP AB SCN SERPL QL: NORMAL
BUN SERPL-MCNC: 16 MG/DL (ref 7–20)
CALCIUM SERPL-MCNC: 9.1 MG/DL (ref 8.3–10.6)
CHLORIDE SERPL-SCNC: 107 MMOL/L (ref 99–110)
CO2 SERPL-SCNC: 27 MMOL/L (ref 21–32)
CREAT SERPL-MCNC: 0.7 MG/DL (ref 0.8–1.3)
DEPRECATED RDW RBC AUTO: 16.4 % (ref 12.4–15.4)
EKG ATRIAL RATE: 66 BPM
EKG DIAGNOSIS: NORMAL
EKG P AXIS: 79 DEGREES
EKG P-R INTERVAL: 132 MS
EKG Q-T INTERVAL: 432 MS
EKG QRS DURATION: 86 MS
EKG QTC CALCULATION (BAZETT): 452 MS
EKG R AXIS: 67 DEGREES
EKG T AXIS: 84 DEGREES
EKG VENTRICULAR RATE: 66 BPM
GFR SERPLBLD CREATININE-BSD FMLA CKD-EPI: >90 ML/MIN/{1.73_M2}
GLUCOSE BLD-MCNC: 117 MG/DL (ref 70–99)
GLUCOSE BLD-MCNC: 87 MG/DL (ref 70–99)
GLUCOSE BLD-MCNC: 92 MG/DL (ref 70–99)
GLUCOSE BLD-MCNC: 94 MG/DL (ref 70–99)
GLUCOSE SERPL-MCNC: 100 MG/DL (ref 70–99)
HCT VFR BLD AUTO: 37.3 % (ref 40.5–52.5)
HGB BLD-MCNC: 12.8 G/DL (ref 13.5–17.5)
INR PPP: 1.09 (ref 0.85–1.15)
MCH RBC QN AUTO: 31.3 PG (ref 26–34)
MCHC RBC AUTO-ENTMCNC: 34.4 G/DL (ref 31–36)
MCV RBC AUTO: 91 FL (ref 80–100)
PERFORMED ON: ABNORMAL
PERFORMED ON: NORMAL
PLATELET # BLD AUTO: 179 K/UL (ref 135–450)
PMV BLD AUTO: 7.9 FL (ref 5–10.5)
POTASSIUM SERPL-SCNC: 4 MMOL/L (ref 3.5–5.1)
PROTHROMBIN TIME: 14.3 SEC (ref 11.9–14.9)
RBC # BLD AUTO: 4.1 M/UL (ref 4.2–5.9)
SODIUM SERPL-SCNC: 144 MMOL/L (ref 136–145)
WBC # BLD AUTO: 6.2 K/UL (ref 4–11)

## 2024-07-31 PROCEDURE — 3600000004 HC SURGERY LEVEL 4 BASE: Performed by: SURGERY

## 2024-07-31 PROCEDURE — 2580000003 HC RX 258: Performed by: NURSE ANESTHETIST, CERTIFIED REGISTERED

## 2024-07-31 PROCEDURE — C1887 CATHETER, GUIDING: HCPCS | Performed by: SURGERY

## 2024-07-31 PROCEDURE — 86850 RBC ANTIBODY SCREEN: CPT

## 2024-07-31 PROCEDURE — 80048 BASIC METABOLIC PNL TOTAL CA: CPT

## 2024-07-31 PROCEDURE — 7100000000 HC PACU RECOVERY - FIRST 15 MIN: Performed by: SURGERY

## 2024-07-31 PROCEDURE — 86901 BLOOD TYPING SEROLOGIC RH(D): CPT

## 2024-07-31 PROCEDURE — 34713 PERQ ACCESS & CLSR FEM ART: CPT | Performed by: SURGERY

## 2024-07-31 PROCEDURE — 2580000003 HC RX 258: Performed by: SURGERY

## 2024-07-31 PROCEDURE — C1894 INTRO/SHEATH, NON-LASER: HCPCS | Performed by: SURGERY

## 2024-07-31 PROCEDURE — 3700000001 HC ADD 15 MINUTES (ANESTHESIA): Performed by: SURGERY

## 2024-07-31 PROCEDURE — A4217 STERILE WATER/SALINE, 500 ML: HCPCS | Performed by: SURGERY

## 2024-07-31 PROCEDURE — B41D1ZZ FLUOROSCOPY OF AORTA AND BILATERAL LOWER EXTREMITY ARTERIES USING LOW OSMOLAR CONTRAST: ICD-10-PCS | Performed by: SURGERY

## 2024-07-31 PROCEDURE — 04V03DZ RESTRICTION OF ABDOMINAL AORTA WITH INTRALUMINAL DEVICE, PERCUTANEOUS APPROACH: ICD-10-PCS | Performed by: SURGERY

## 2024-07-31 PROCEDURE — 2500000003 HC RX 250 WO HCPCS: Performed by: NURSE ANESTHETIST, CERTIFIED REGISTERED

## 2024-07-31 PROCEDURE — 6360000002 HC RX W HCPCS: Performed by: SURGERY

## 2024-07-31 PROCEDURE — C1768 GRAFT, VASCULAR: HCPCS | Performed by: SURGERY

## 2024-07-31 PROCEDURE — 2709999900 HC NON-CHARGEABLE SUPPLY: Performed by: SURGERY

## 2024-07-31 PROCEDURE — 2000000000 HC ICU R&B

## 2024-07-31 PROCEDURE — 86900 BLOOD TYPING SEROLOGIC ABO: CPT

## 2024-07-31 PROCEDURE — 6370000000 HC RX 637 (ALT 250 FOR IP)

## 2024-07-31 PROCEDURE — C1760 CLOSURE DEV, VASC: HCPCS | Performed by: SURGERY

## 2024-07-31 PROCEDURE — 3600000014 HC SURGERY LEVEL 4 ADDTL 15MIN: Performed by: SURGERY

## 2024-07-31 PROCEDURE — 2580000003 HC RX 258: Performed by: ANESTHESIOLOGY

## 2024-07-31 PROCEDURE — 3700000000 HC ANESTHESIA ATTENDED CARE: Performed by: SURGERY

## 2024-07-31 PROCEDURE — 7100000001 HC PACU RECOVERY - ADDTL 15 MIN: Performed by: SURGERY

## 2024-07-31 PROCEDURE — 6360000002 HC RX W HCPCS

## 2024-07-31 PROCEDURE — 85610 PROTHROMBIN TIME: CPT

## 2024-07-31 PROCEDURE — 2580000003 HC RX 258

## 2024-07-31 PROCEDURE — C1725 CATH, TRANSLUMIN NON-LASER: HCPCS | Performed by: SURGERY

## 2024-07-31 PROCEDURE — 2780000010 HC IMPLANT OTHER: Performed by: SURGERY

## 2024-07-31 PROCEDURE — 34705 EVAC RPR A-BIILIAC NDGFT: CPT | Performed by: SURGERY

## 2024-07-31 PROCEDURE — 6360000002 HC RX W HCPCS: Performed by: NURSE ANESTHETIST, CERTIFIED REGISTERED

## 2024-07-31 PROCEDURE — 85027 COMPLETE CBC AUTOMATED: CPT

## 2024-07-31 PROCEDURE — C1769 GUIDE WIRE: HCPCS | Performed by: SURGERY

## 2024-07-31 PROCEDURE — 2700000000 HC OXYGEN THERAPY PER DAY

## 2024-07-31 PROCEDURE — C2628 CATHETER, OCCLUSION: HCPCS | Performed by: SURGERY

## 2024-07-31 PROCEDURE — 94761 N-INVAS EAR/PLS OXIMETRY MLT: CPT

## 2024-07-31 PROCEDURE — 6360000004 HC RX CONTRAST MEDICATION: Performed by: SURGERY

## 2024-07-31 PROCEDURE — 2500000003 HC RX 250 WO HCPCS: Performed by: SURGERY

## 2024-07-31 DEVICE — GRAFT ENDOPROS L12CM DST DIA12MM CONTRALATERAL LEG W/ C3: Type: IMPLANTABLE DEVICE | Site: GROIN | Status: FUNCTIONAL

## 2024-07-31 DEVICE — GRAFT AAA ENDOPROSTHESIS MAIN BODY: Type: IMPLANTABLE DEVICE | Site: GROIN | Status: FUNCTIONAL

## 2024-07-31 DEVICE — GRAFT ENDOPROS L12CM DST DIA14.5MM CONTRALATERAL LEG W/ C3: Type: IMPLANTABLE DEVICE | Site: GROIN | Status: FUNCTIONAL

## 2024-07-31 RX ORDER — CLOPIDOGREL BISULFATE 75 MG/1
75 TABLET ORAL DAILY
Status: DISCONTINUED | OUTPATIENT
Start: 2024-07-31 | End: 2024-08-08 | Stop reason: HOSPADM

## 2024-07-31 RX ORDER — MEPERIDINE HYDROCHLORIDE 25 MG/ML
12.5 INJECTION INTRAMUSCULAR; INTRAVENOUS; SUBCUTANEOUS EVERY 5 MIN PRN
Status: DISCONTINUED | OUTPATIENT
Start: 2024-07-31 | End: 2024-07-31

## 2024-07-31 RX ORDER — FENTANYL CITRATE 50 UG/ML
INJECTION, SOLUTION INTRAMUSCULAR; INTRAVENOUS PRN
Status: DISCONTINUED | OUTPATIENT
Start: 2024-07-31 | End: 2024-07-31 | Stop reason: SDUPTHER

## 2024-07-31 RX ORDER — TAMSULOSIN HYDROCHLORIDE 0.4 MG/1
0.4 CAPSULE ORAL DAILY
Status: DISCONTINUED | OUTPATIENT
Start: 2024-07-31 | End: 2024-08-08 | Stop reason: HOSPADM

## 2024-07-31 RX ORDER — NITROGLYCERIN 80 MG/1
1 PATCH TRANSDERMAL DAILY
Status: ON HOLD | COMMUNITY
End: 2024-08-08 | Stop reason: HOSPADM

## 2024-07-31 RX ORDER — PROPOFOL 10 MG/ML
INJECTION, EMULSION INTRAVENOUS PRN
Status: DISCONTINUED | OUTPATIENT
Start: 2024-07-31 | End: 2024-07-31 | Stop reason: SDUPTHER

## 2024-07-31 RX ORDER — QUETIAPINE FUMARATE 300 MG/1
300 TABLET, FILM COATED ORAL NIGHTLY
Status: DISCONTINUED | OUTPATIENT
Start: 2024-07-31 | End: 2024-08-07

## 2024-07-31 RX ORDER — SODIUM CHLORIDE 0.9 % (FLUSH) 0.9 %
5-40 SYRINGE (ML) INJECTION PRN
Status: DISCONTINUED | OUTPATIENT
Start: 2024-07-31 | End: 2024-07-31 | Stop reason: HOSPADM

## 2024-07-31 RX ORDER — ALBUTEROL SULFATE 2.5 MG/3ML
2.5 SOLUTION RESPIRATORY (INHALATION) EVERY 6 HOURS PRN
COMMUNITY

## 2024-07-31 RX ORDER — DIVALPROEX SODIUM 250 MG/1
1000 TABLET, DELAYED RELEASE ORAL
Status: CANCELLED | OUTPATIENT
Start: 2024-08-02

## 2024-07-31 RX ORDER — IPRATROPIUM BROMIDE AND ALBUTEROL SULFATE 2.5; .5 MG/3ML; MG/3ML
1 SOLUTION RESPIRATORY (INHALATION)
Status: DISCONTINUED | OUTPATIENT
Start: 2024-07-31 | End: 2024-07-31

## 2024-07-31 RX ORDER — DEXTROSE MONOHYDRATE 100 MG/ML
INJECTION, SOLUTION INTRAVENOUS CONTINUOUS PRN
Status: DISCONTINUED | OUTPATIENT
Start: 2024-07-31 | End: 2024-08-08 | Stop reason: HOSPADM

## 2024-07-31 RX ORDER — ALBUTEROL SULFATE 2.5 MG/3ML
2.5 SOLUTION RESPIRATORY (INHALATION) EVERY 6 HOURS PRN
Status: DISCONTINUED | OUTPATIENT
Start: 2024-07-31 | End: 2024-08-08

## 2024-07-31 RX ORDER — FENTANYL CITRATE 50 UG/ML
50 INJECTION, SOLUTION INTRAMUSCULAR; INTRAVENOUS EVERY 5 MIN PRN
Status: DISCONTINUED | OUTPATIENT
Start: 2024-07-31 | End: 2024-07-31

## 2024-07-31 RX ORDER — FLUTICASONE PROPIONATE 50 MCG
1 SPRAY, SUSPENSION (ML) NASAL DAILY
Status: DISCONTINUED | OUTPATIENT
Start: 2024-07-31 | End: 2024-08-08 | Stop reason: HOSPADM

## 2024-07-31 RX ORDER — ONDANSETRON 2 MG/ML
4 INJECTION INTRAMUSCULAR; INTRAVENOUS EVERY 6 HOURS PRN
Status: DISCONTINUED | OUTPATIENT
Start: 2024-07-31 | End: 2024-08-08 | Stop reason: HOSPADM

## 2024-07-31 RX ORDER — DEXTROSE MONOHYDRATE 25 G/50ML
12.5 INJECTION, SOLUTION INTRAVENOUS PRN
Status: DISCONTINUED | OUTPATIENT
Start: 2024-07-31 | End: 2024-08-06

## 2024-07-31 RX ORDER — FINASTERIDE 5 MG/1
5 TABLET, FILM COATED ORAL DAILY
Status: DISCONTINUED | OUTPATIENT
Start: 2024-07-31 | End: 2024-07-31

## 2024-07-31 RX ORDER — SODIUM CHLORIDE, SODIUM LACTATE, POTASSIUM CHLORIDE, CALCIUM CHLORIDE 600; 310; 30; 20 MG/100ML; MG/100ML; MG/100ML; MG/100ML
INJECTION, SOLUTION INTRAVENOUS CONTINUOUS
Status: DISCONTINUED | OUTPATIENT
Start: 2024-07-31 | End: 2024-07-31 | Stop reason: HOSPADM

## 2024-07-31 RX ORDER — DIPHENHYDRAMINE HYDROCHLORIDE 50 MG/ML
12.5 INJECTION INTRAMUSCULAR; INTRAVENOUS
Status: DISCONTINUED | OUTPATIENT
Start: 2024-07-31 | End: 2024-07-31

## 2024-07-31 RX ORDER — SODIUM CHLORIDE 0.9 % (FLUSH) 0.9 %
5-40 SYRINGE (ML) INJECTION EVERY 12 HOURS SCHEDULED
Status: DISCONTINUED | OUTPATIENT
Start: 2024-07-31 | End: 2024-07-31

## 2024-07-31 RX ORDER — ONDANSETRON 4 MG/1
4 TABLET, ORALLY DISINTEGRATING ORAL EVERY 8 HOURS PRN
Status: DISCONTINUED | OUTPATIENT
Start: 2024-07-31 | End: 2024-08-08 | Stop reason: HOSPADM

## 2024-07-31 RX ORDER — DIVALPROEX SODIUM 250 MG/1
1000 TABLET, DELAYED RELEASE ORAL
Status: DISCONTINUED | OUTPATIENT
Start: 2024-08-01 | End: 2024-07-31

## 2024-07-31 RX ORDER — DIVALPROEX SODIUM 500 MG/1
1000 TABLET, EXTENDED RELEASE ORAL NIGHTLY
Status: DISCONTINUED | OUTPATIENT
Start: 2024-07-31 | End: 2024-07-31

## 2024-07-31 RX ORDER — SODIUM CHLORIDE 9 MG/ML
INJECTION, SOLUTION INTRAVENOUS PRN
Status: DISCONTINUED | OUTPATIENT
Start: 2024-07-31 | End: 2024-07-31

## 2024-07-31 RX ORDER — ATORVASTATIN CALCIUM 80 MG/1
80 TABLET, FILM COATED ORAL DAILY
COMMUNITY

## 2024-07-31 RX ORDER — POLYETHYLENE GLYCOL 3350 17 G/17G
17 POWDER, FOR SOLUTION ORAL DAILY
Status: DISCONTINUED | OUTPATIENT
Start: 2024-08-01 | End: 2024-08-03

## 2024-07-31 RX ORDER — CARVEDILOL 6.25 MG/1
3.12 TABLET ORAL 2 TIMES DAILY
Status: DISCONTINUED | OUTPATIENT
Start: 2024-07-31 | End: 2024-08-01

## 2024-07-31 RX ORDER — SODIUM CHLORIDE 9 MG/ML
INJECTION, SOLUTION INTRAVENOUS PRN
Status: CANCELLED | OUTPATIENT
Start: 2024-07-31

## 2024-07-31 RX ORDER — SODIUM CHLORIDE 9 MG/ML
INJECTION, SOLUTION INTRAVENOUS PRN
Status: DISCONTINUED | OUTPATIENT
Start: 2024-07-31 | End: 2024-07-31 | Stop reason: HOSPADM

## 2024-07-31 RX ORDER — ONDANSETRON 2 MG/ML
INJECTION INTRAMUSCULAR; INTRAVENOUS PRN
Status: DISCONTINUED | OUTPATIENT
Start: 2024-07-31 | End: 2024-07-31 | Stop reason: SDUPTHER

## 2024-07-31 RX ORDER — SODIUM CHLORIDE 0.9 % (FLUSH) 0.9 %
5-40 SYRINGE (ML) INJECTION EVERY 12 HOURS SCHEDULED
Status: CANCELLED | OUTPATIENT
Start: 2024-07-31

## 2024-07-31 RX ORDER — DIVALPROEX SODIUM 250 MG/1
1000 TABLET, DELAYED RELEASE ORAL
Status: DISCONTINUED | OUTPATIENT
Start: 2024-07-31 | End: 2024-08-08 | Stop reason: HOSPADM

## 2024-07-31 RX ORDER — GLUCAGON 1 MG/ML
1 KIT INJECTION PRN
Status: DISCONTINUED | OUTPATIENT
Start: 2024-07-31 | End: 2024-08-08 | Stop reason: HOSPADM

## 2024-07-31 RX ORDER — ROCURONIUM BROMIDE 10 MG/ML
INJECTION, SOLUTION INTRAVENOUS PRN
Status: DISCONTINUED | OUTPATIENT
Start: 2024-07-31 | End: 2024-07-31 | Stop reason: SDUPTHER

## 2024-07-31 RX ORDER — ACETAMINOPHEN 325 MG/1
650 TABLET ORAL EVERY 6 HOURS
Status: DISCONTINUED | OUTPATIENT
Start: 2024-07-31 | End: 2024-08-08 | Stop reason: HOSPADM

## 2024-07-31 RX ORDER — MONTELUKAST SODIUM 10 MG/1
10 TABLET ORAL NIGHTLY
Status: DISCONTINUED | OUTPATIENT
Start: 2024-07-31 | End: 2024-08-08 | Stop reason: HOSPADM

## 2024-07-31 RX ORDER — DIVALPROEX SODIUM 500 MG/1
500 TABLET, DELAYED RELEASE ORAL EVERY MORNING
Status: DISCONTINUED | OUTPATIENT
Start: 2024-08-01 | End: 2024-08-08 | Stop reason: HOSPADM

## 2024-07-31 RX ORDER — NALOXONE HYDROCHLORIDE 0.4 MG/ML
INJECTION, SOLUTION INTRAMUSCULAR; INTRAVENOUS; SUBCUTANEOUS PRN
Status: DISCONTINUED | OUTPATIENT
Start: 2024-07-31 | End: 2024-07-31

## 2024-07-31 RX ORDER — ONDANSETRON 2 MG/ML
4 INJECTION INTRAMUSCULAR; INTRAVENOUS
Status: DISCONTINUED | OUTPATIENT
Start: 2024-07-31 | End: 2024-07-31

## 2024-07-31 RX ORDER — FERROUS SULFATE 325(65) MG
325 TABLET ORAL
COMMUNITY

## 2024-07-31 RX ORDER — SODIUM CHLORIDE 0.9 % (FLUSH) 0.9 %
5-40 SYRINGE (ML) INJECTION PRN
Status: DISCONTINUED | OUTPATIENT
Start: 2024-07-31 | End: 2024-07-31

## 2024-07-31 RX ORDER — DIVALPROEX SODIUM 250 MG/1
1000 TABLET, DELAYED RELEASE ORAL NIGHTLY
Status: DISCONTINUED | OUTPATIENT
Start: 2024-07-31 | End: 2024-07-31

## 2024-07-31 RX ORDER — MIDODRINE HYDROCHLORIDE 5 MG/1
2.5 TABLET ORAL 2 TIMES DAILY WITH MEALS
Status: DISCONTINUED | OUTPATIENT
Start: 2024-08-01 | End: 2024-08-01

## 2024-07-31 RX ORDER — HEPARIN SODIUM 1000 [USP'U]/ML
INJECTION, SOLUTION INTRAVENOUS; SUBCUTANEOUS PRN
Status: DISCONTINUED | OUTPATIENT
Start: 2024-07-31 | End: 2024-07-31 | Stop reason: SDUPTHER

## 2024-07-31 RX ORDER — SODIUM CHLORIDE, SODIUM GLUCONATE, SODIUM ACETATE, POTASSIUM CHLORIDE AND MAGNESIUM CHLORIDE 526; 502; 368; 37; 30 MG/100ML; MG/100ML; MG/100ML; MG/100ML; MG/100ML
100 INJECTION, SOLUTION INTRAVENOUS CONTINUOUS
Status: DISCONTINUED | OUTPATIENT
Start: 2024-07-31 | End: 2024-08-01

## 2024-07-31 RX ORDER — GABAPENTIN 400 MG/1
400 CAPSULE ORAL 3 TIMES DAILY
Status: DISCONTINUED | OUTPATIENT
Start: 2024-07-31 | End: 2024-08-08 | Stop reason: HOSPADM

## 2024-07-31 RX ORDER — EPHEDRINE SULFATE 50 MG/ML
INJECTION INTRAVENOUS PRN
Status: DISCONTINUED | OUTPATIENT
Start: 2024-07-31 | End: 2024-07-31 | Stop reason: SDUPTHER

## 2024-07-31 RX ORDER — PROTAMINE SULFATE 10 MG/ML
INJECTION, SOLUTION INTRAVENOUS PRN
Status: DISCONTINUED | OUTPATIENT
Start: 2024-07-31 | End: 2024-07-31 | Stop reason: SDUPTHER

## 2024-07-31 RX ORDER — PANTOPRAZOLE SODIUM 40 MG/1
40 TABLET, DELAYED RELEASE ORAL DAILY
Status: DISCONTINUED | OUTPATIENT
Start: 2024-07-31 | End: 2024-08-08 | Stop reason: HOSPADM

## 2024-07-31 RX ORDER — AMLODIPINE BESYLATE 5 MG/1
5 TABLET ORAL DAILY
Status: DISCONTINUED | OUTPATIENT
Start: 2024-08-01 | End: 2024-08-01

## 2024-07-31 RX ORDER — CETIRIZINE HYDROCHLORIDE 10 MG/1
10 TABLET ORAL DAILY
Status: DISCONTINUED | OUTPATIENT
Start: 2024-07-31 | End: 2024-08-08 | Stop reason: HOSPADM

## 2024-07-31 RX ORDER — OXYCODONE HYDROCHLORIDE 5 MG/1
5 TABLET ORAL EVERY 6 HOURS PRN
Status: DISCONTINUED | OUTPATIENT
Start: 2024-07-31 | End: 2024-08-08 | Stop reason: HOSPADM

## 2024-07-31 RX ORDER — PROCHLORPERAZINE EDISYLATE 5 MG/ML
5 INJECTION INTRAMUSCULAR; INTRAVENOUS
Status: DISCONTINUED | OUTPATIENT
Start: 2024-07-31 | End: 2024-07-31

## 2024-07-31 RX ORDER — FENTANYL CITRATE 50 UG/ML
25 INJECTION, SOLUTION INTRAMUSCULAR; INTRAVENOUS EVERY 5 MIN PRN
Status: DISCONTINUED | OUTPATIENT
Start: 2024-07-31 | End: 2024-07-31

## 2024-07-31 RX ORDER — SODIUM CHLORIDE 0.9 % (FLUSH) 0.9 %
5-40 SYRINGE (ML) INJECTION EVERY 12 HOURS SCHEDULED
Status: DISCONTINUED | OUTPATIENT
Start: 2024-07-31 | End: 2024-08-08 | Stop reason: HOSPADM

## 2024-07-31 RX ORDER — SODIUM CHLORIDE 0.9 % (FLUSH) 0.9 %
5-40 SYRINGE (ML) INJECTION PRN
Status: DISCONTINUED | OUTPATIENT
Start: 2024-07-31 | End: 2024-08-08 | Stop reason: HOSPADM

## 2024-07-31 RX ORDER — SODIUM CHLORIDE 0.9 % (FLUSH) 0.9 %
5-40 SYRINGE (ML) INJECTION EVERY 12 HOURS SCHEDULED
Status: DISCONTINUED | OUTPATIENT
Start: 2024-07-31 | End: 2024-07-31 | Stop reason: HOSPADM

## 2024-07-31 RX ORDER — ASPIRIN 81 MG/1
81 TABLET ORAL ONCE
Status: COMPLETED | OUTPATIENT
Start: 2024-08-01 | End: 2024-08-01

## 2024-07-31 RX ORDER — SODIUM CHLORIDE 9 MG/ML
INJECTION, SOLUTION INTRAVENOUS PRN
Status: DISCONTINUED | OUTPATIENT
Start: 2024-07-31 | End: 2024-08-08 | Stop reason: HOSPADM

## 2024-07-31 RX ORDER — SODIUM CHLORIDE 0.9 % (FLUSH) 0.9 %
5-40 SYRINGE (ML) INJECTION PRN
Status: CANCELLED | OUTPATIENT
Start: 2024-07-31

## 2024-07-31 RX ORDER — ENOXAPARIN SODIUM 100 MG/ML
40 INJECTION SUBCUTANEOUS DAILY
Status: DISCONTINUED | OUTPATIENT
Start: 2024-07-31 | End: 2024-08-08 | Stop reason: HOSPADM

## 2024-07-31 RX ADMIN — EPHEDRINE SULFATE 10 MG: 50 INJECTION INTRAVENOUS at 12:07

## 2024-07-31 RX ADMIN — MONTELUKAST SODIUM 10 MG: 10 TABLET, FILM COATED ORAL at 21:54

## 2024-07-31 RX ADMIN — PHENYLEPHRINE HYDROCHLORIDE 200 MCG: 10 INJECTION INTRAVENOUS at 13:12

## 2024-07-31 RX ADMIN — ACETAMINOPHEN 650 MG: 325 TABLET ORAL at 21:54

## 2024-07-31 RX ADMIN — ROCURONIUM BROMIDE 10 MG: 10 INJECTION, SOLUTION INTRAVENOUS at 13:15

## 2024-07-31 RX ADMIN — SODIUM CHLORIDE, POTASSIUM CHLORIDE, SODIUM LACTATE AND CALCIUM CHLORIDE: 600; 310; 30; 20 INJECTION, SOLUTION INTRAVENOUS at 13:45

## 2024-07-31 RX ADMIN — WATER 2000 MG: 1 INJECTION INTRAMUSCULAR; INTRAVENOUS; SUBCUTANEOUS at 11:55

## 2024-07-31 RX ADMIN — EPHEDRINE SULFATE 10 MG: 50 INJECTION INTRAVENOUS at 12:33

## 2024-07-31 RX ADMIN — ROCURONIUM BROMIDE 50 MG: 10 INJECTION, SOLUTION INTRAVENOUS at 11:58

## 2024-07-31 RX ADMIN — HEPARIN SODIUM 5000 UNITS: 1000 INJECTION INTRAVENOUS; SUBCUTANEOUS at 12:45

## 2024-07-31 RX ADMIN — GABAPENTIN 400 MG: 400 CAPSULE ORAL at 16:57

## 2024-07-31 RX ADMIN — ENOXAPARIN SODIUM 40 MG: 100 INJECTION SUBCUTANEOUS at 17:11

## 2024-07-31 RX ADMIN — FLUTICASONE PROPIONATE 1 SPRAY: 50 SPRAY, METERED NASAL at 17:12

## 2024-07-31 RX ADMIN — GABAPENTIN 400 MG: 400 CAPSULE ORAL at 21:54

## 2024-07-31 RX ADMIN — TAMSULOSIN HYDROCHLORIDE 0.4 MG: 0.4 CAPSULE ORAL at 16:57

## 2024-07-31 RX ADMIN — QUETIAPINE FUMARATE 300 MG: 300 TABLET ORAL at 21:53

## 2024-07-31 RX ADMIN — PHENYLEPHRINE HYDROCHLORIDE 25 MCG/MIN: 10 INJECTION, SOLUTION INTRAVENOUS at 12:15

## 2024-07-31 RX ADMIN — ROCURONIUM BROMIDE 10 MG: 10 INJECTION, SOLUTION INTRAVENOUS at 13:00

## 2024-07-31 RX ADMIN — DIVALPROEX SODIUM 1000 MG: 250 TABLET, DELAYED RELEASE ORAL at 22:31

## 2024-07-31 RX ADMIN — PROTAMINE SULFATE 20 MG: 10 INJECTION, SOLUTION INTRAVENOUS at 14:10

## 2024-07-31 RX ADMIN — PROPOFOL 100 MG: 10 INJECTION, EMULSION INTRAVENOUS at 11:58

## 2024-07-31 RX ADMIN — PHENYLEPHRINE HYDROCHLORIDE 200 MCG: 10 INJECTION INTRAVENOUS at 12:12

## 2024-07-31 RX ADMIN — SODIUM CHLORIDE, SODIUM GLUCONATE, SODIUM ACETATE, POTASSIUM CHLORIDE AND MAGNESIUM CHLORIDE 100 ML/HR: 526; 502; 368; 37; 30 INJECTION, SOLUTION INTRAVENOUS at 16:16

## 2024-07-31 RX ADMIN — EPHEDRINE SULFATE 10 MG: 50 INJECTION INTRAVENOUS at 12:12

## 2024-07-31 RX ADMIN — SUGAMMADEX 200 MG: 100 INJECTION, SOLUTION INTRAVENOUS at 14:30

## 2024-07-31 RX ADMIN — SODIUM CHLORIDE, POTASSIUM CHLORIDE, SODIUM LACTATE AND CALCIUM CHLORIDE: 600; 310; 30; 20 INJECTION, SOLUTION INTRAVENOUS at 09:37

## 2024-07-31 RX ADMIN — ROCURONIUM BROMIDE 10 MG: 10 INJECTION, SOLUTION INTRAVENOUS at 13:55

## 2024-07-31 RX ADMIN — SODIUM CHLORIDE, PRESERVATIVE FREE 10 ML: 5 INJECTION INTRAVENOUS at 21:54

## 2024-07-31 RX ADMIN — EPHEDRINE SULFATE 10 MG: 50 INJECTION INTRAVENOUS at 14:19

## 2024-07-31 RX ADMIN — PHENYLEPHRINE HYDROCHLORIDE 100 MCG: 10 INJECTION INTRAVENOUS at 12:08

## 2024-07-31 RX ADMIN — CETIRIZINE HYDROCHLORIDE 10 MG: 10 TABLET, FILM COATED ORAL at 16:57

## 2024-07-31 RX ADMIN — FENTANYL CITRATE 50 MCG: 50 INJECTION, SOLUTION INTRAMUSCULAR; INTRAVENOUS at 11:58

## 2024-07-31 RX ADMIN — ONDANSETRON 4 MG: 2 INJECTION INTRAMUSCULAR; INTRAVENOUS at 12:23

## 2024-07-31 RX ADMIN — OXYCODONE HYDROCHLORIDE 5 MG: 5 TABLET ORAL at 21:54

## 2024-07-31 RX ADMIN — ROCURONIUM BROMIDE 10 MG: 10 INJECTION, SOLUTION INTRAVENOUS at 12:26

## 2024-07-31 RX ADMIN — ACETAMINOPHEN 650 MG: 325 TABLET ORAL at 16:57

## 2024-07-31 RX ADMIN — EPHEDRINE SULFATE 10 MG: 50 INJECTION INTRAVENOUS at 13:09

## 2024-07-31 RX ADMIN — PANTOPRAZOLE SODIUM 40 MG: 40 TABLET, DELAYED RELEASE ORAL at 17:06

## 2024-07-31 RX ADMIN — FENTANYL CITRATE 50 MCG: 50 INJECTION, SOLUTION INTRAMUSCULAR; INTRAVENOUS at 12:25

## 2024-07-31 ASSESSMENT — PAIN SCALES - GENERAL
PAINLEVEL_OUTOF10: 8
PAINLEVEL_OUTOF10: 0
PAINLEVEL_OUTOF10: 0

## 2024-07-31 ASSESSMENT — PAIN DESCRIPTION - LOCATION: LOCATION: BACK

## 2024-07-31 ASSESSMENT — PAIN DESCRIPTION - DESCRIPTORS: DESCRIPTORS: ACHING

## 2024-07-31 ASSESSMENT — PAIN DESCRIPTION - ORIENTATION: ORIENTATION: MID

## 2024-07-31 ASSESSMENT — PAIN - FUNCTIONAL ASSESSMENT: PAIN_FUNCTIONAL_ASSESSMENT: 0-10

## 2024-07-31 ASSESSMENT — LIFESTYLE VARIABLES: SMOKING_STATUS: 0

## 2024-07-31 ASSESSMENT — ENCOUNTER SYMPTOMS: SHORTNESS OF BREATH: 1

## 2024-07-31 NOTE — ANESTHESIA PRE PROCEDURE
Department of Anesthesiology  Preprocedure Note       Name:  Scot Hernandez   Age:  64 y.o.  :  1960                                          MRN:  [id number]         Date:  2024      Surgeon: Surgeon(s):  Lore Cabello MD    Procedure: Procedure(s):  Endovascular Aortic Aneurysm Repair    Medications prior to admission:   Prior to Admission medications    Medication Sig Start Date End Date Taking? Authorizing Provider   carvedilol (COREG) 3.125 MG tablet Take 1 tablet by mouth 2 times daily 7/15/24   Ana Lilia Zapienia, DO   amLODIPine (NORVASC) 5 MG tablet Take 1 tablet by mouth daily 7/15/24   Ana Lilia Zaipenia, DO   Multiple Vitamin (MULTIVITAMIN) TABS  2/15/23   New Price MD   midodrine (PROAMATINE) 5 MG tablet  2/15/23   New Price MD   finasteride (PROSCAR) 5 MG tablet Take 1 tablet by mouth daily 22   New Price MD   gabapentin (NEURONTIN) 400 MG capsule Take 1 capsule by mouth 3 times daily. 23   New Price MD   ASPIRIN LOW DOSE 81 MG EC tablet  23   New Price MD   budesonide-formoterol (SYMBICORT) 160-4.5 MCG/ACT AERO TAKE 2 PUFFS BY MOUTH TWICE A DAY IN THE MORNING AND IN THE EVENING 21   New Price MD   polyvinyl alcohol (LIQUIFILM TEARS) 1.4 % ophthalmic solution  23   New Price MD   polyethylene glycol (GLYCOLAX) 17 GM/SCOOP powder  23   New Price MD   pantoprazole (PROTONIX) 40 MG tablet Take 1 tablet by mouth daily 22   New Price MD   ondansetron (ZOFRAN) 4 MG tablet Take 1 tablet by mouth 3 times daily as needed for Nausea or Vomiting 23   Rebekah Palacios DO   ipratropium-albuterol (DUONEB) 0.5-2.5 (3) MG/3ML SOLN nebulizer solution Take 3 mLs by nebulization every 4 hours 21   Tariq Diez MD   divalproex (DEPAKOTE) 500 MG DR tablet Take 1 tablet by mouth 500 mg  in am & 1000 mg afternoon 4 pm 21   Provider,

## 2024-07-31 NOTE — CONSULTS
Clinical Pharmacy Consult Note  Medication History     Admit Date: 7/31/2024    Pharmacy consulted to verify home medication list by Dr. Ana Lilia Zapien.    List of of current medications patient is taking is complete. Home Medication list in EPIC updated to reflect changes noted below.    Source of information: I spoke to the patient and viewed his dispense report.    Patient's home pharmacy: [facility] [address] [phone] [facility] [phone]      Changes made to medication list:   Medications removed: (include reason, ex: therapy completed, patient no longer taking, etc.)  Symbicort- Not taking  Montelukast- Not taking  Zofran- Not taking  Liquifilm tears- Not taking  Medications added:   Albuterol Nebulizer solution  Atorvastatin  Ferrous Sulfate  Nitroglycerin patch  Medication doses adjusted:   None  Other notes:   Patient was a poor historian and struggled to recall information  Patient reports taking Finasteride and has some at home even though it has not been filled since April 2024 on his dispense report.  Patient states he was instructed to hold Plavix and Aspirin after July 24th for surgery.    Current Outpatient Medications   Medication Instructions    albuterol (PROVENTIL HFA;VENTOLIN HFA) 108 (90 BASE) MCG/ACT inhaler 1 puff, Inhalation, EVERY 6 HOURS PRN    albuterol (PROVENTIL) 2.5 mg, Nebulization, EVERY 6 HOURS PRN    amLODIPine (NORVASC) 5 mg, Oral, DAILY    Aspirin Low Dose 81 mg, Oral, DAILY    atorvastatin (LIPITOR) 80 mg, Oral, DAILY    carvedilol (COREG) 3.125 mg, Oral, 2 TIMES DAILY    cetirizine (ZYRTEC) 10 mg, Oral, DAILY    clopidogrel (PLAVIX) 75 mg, Oral, DAILY    divalproex (DEPAKOTE) 500 MG DR tablet Take one tablet by mouth in the morning and two tablets by mouth in the afternoon (4:00 PM)    ferrous sulfate (IRON 325) 325 mg, Oral, DAILY WITH BREAKFAST    finasteride (PROSCAR) 5 mg, Oral, DAILY    fluticasone (FLONASE) 50 MCG/ACT nasal spray 1-2

## 2024-07-31 NOTE — PROGRESS NOTES
4 Eyes Skin Assessment     NAME:  Scot Hernandez  YOB: 1960  MEDICAL RECORD NUMBER:  [id number]    The patient is being assessed for  Transfer to New Unit    I agree that at least one RN has performed a thorough Head to Toe Skin Assessment on the patient. ALL assessment sites listed below have been assessed.      Areas assessed by both nurses:    Head, Face, Ears, Shoulders, Back, Chest, Arms, Elbows, Hands, Sacrum. Buttock, Coccyx, Ischium, Legs. Feet and Heels, and Under Medical Devices         Does the Patient have a Wound? No noted wound(s)    LDAs for surgical wounds on Bilateral groin sites, redness on sacrum and heels.        Roscoe Prevention initiated by RN: Yes  Wound Care Orders initiated by RN: No    Pressure Injury (Stage 3,4, Unstageable, DTI, NWPT, and Complex wounds) if present, place Wound referral order by RN under : No    New Ostomies, if present place, Ostomy referral order under : No     Nurse 1 eSignature: Electronically signed by Riri Marsh RN on 7/31/24 at 4:29 PM EDT    **SHARE this note so that the co-signing nurse can place an eSignature**    Nurse 2 eSignature: Electronically signed by Guy Thacker RN on 7/31/24 at 4:58 PM EDT

## 2024-07-31 NOTE — BRIEF OP NOTE
Brief Postoperative Note      Patient: Scot Hernandez  YOB: 1960  MRN: [id number]    Date of Procedure: 7/31/2024    Pre-Op Diagnosis Codes:     * Aneurysm of infrarenal abdominal aorta, unspecified whether ruptured (HCC) [I71.43]    Post-Op Diagnosis: Same       Procedure(s):  Ultrasound-guided percutaneous bilateral common femoral access  Endovascular repair infrarenal aortic aneurysm   Main body--[facility] [place] 23mm x 14.5mm x 12cm  Contralateral limb--12mm x 12cm  Ipsilateral extension--14.5mm x 12cm  ProGlide closure to bilateral common femoral arteries    Surgeon(s):  Lore Cabello MD    Assistant:  Resident: Ana Lilia aZpien DO    Anesthesia: General    Estimated Blood Loss (mL): 100    Complications: None    Specimens:   * No specimens in log *    Implants:  * No implants in log *      Drains:   Urinary Catheter 07/31/24 2 Way;Flores-Temperature (Active)       Findings:  Infection Present At Time Of Surgery (PATOS) (choose all levels that have infection present):  No infection present  Other Findings: Uncomplicated infrarenal EVAR. No endoleaks after deployment of graft. Dopplerable bilateral DP/PT at end of case    Electronically signed by Ana Lilia Zapien DO on 7/31/2024 at 2:40 PM

## 2024-07-31 NOTE — PLAN OF CARE
Problem: Chronic Conditions and Co-morbidities  Goal: Patient's chronic conditions and co-morbidity symptoms are monitored and maintained or improved  Outcome: Progressing  Flowsheets (Taken 7/31/2024 1600)  Care Plan - Patient's Chronic Conditions and Co-Morbidity Symptoms are Monitored and Maintained or Improved:   Monitor and assess patient's chronic conditions and comorbid symptoms for stability, deterioration, or improvement   Collaborate with multidisciplinary team to address chronic and comorbid conditions and prevent exacerbation or deterioration     Problem: Discharge Planning  Goal: Discharge to home or other facility with appropriate resources  Outcome: Progressing  Flowsheets (Taken 7/31/2024 1600)  Discharge to home or other facility with appropriate resources:   Identify barriers to discharge with patient and caregiver   Arrange for needed discharge resources and transportation as appropriate     Problem: Pain  Goal: Verbalizes/displays adequate comfort level or baseline comfort level  Outcome: Progressing     Problem: Safety - Adult  Goal: Free from fall injury  Outcome: Progressing  Flowsheets (Taken 7/31/2024 1730)  Free From Fall Injury:   Instruct family/caregiver on patient safety   Based on caregiver fall risk screen, instruct family/caregiver to ask for assistance with transferring infant if caregiver noted to have fall risk factors     Problem: ABCDS Injury Assessment  Goal: Absence of physical injury  Outcome: Progressing  Flowsheets (Taken 7/31/2024 1730)  Absence of Physical Injury: Implement safety measures based on patient assessment

## 2024-07-31 NOTE — FLOWSHEET NOTE
Dr. Carter at bedside. Patient states he is feeling SOB requesting O2. Resp easy and even appears in no acute distress.  O2 at 2L/nc placed per Dr. Carter. O2 sat 97%.  Dr. Carter notified patient unable to take upper dentures out. States he has them glued in too tight.

## 2024-07-31 NOTE — PROGRESS NOTES
Pt admitted into ICU room 3372. VSS, pt A/O x4, pulses +2. Swallow screening passed. RN called by pharmacy to verify home meds.

## 2024-07-31 NOTE — H&P
Update History & Physical    The patient's History and Physical of July 29, 2024 was reviewed with the patient and I examined the patient. There was no change. Last Aspirin and Plavix dose was 7/24/2024. The surgical site was confirmed by the patient and me.       Plan: The risks, benefits, expected outcome, and alternative to the recommended procedure have been discussed with the patient. Patient understands and wants to proceed with the procedure.     Electronically signed by Ana Lilia Zapien DO on 7/31/2024 at 11:50 AM      Encounter Date: 7/26/2024   Related encounter: Office Visit from 7/26/2024 in [facility]     Signed           ORTHOPAEDIC SURGERY FOLLOWUP VISIT     CHIEF COMPLAINT: Left hip pain     HISTORY OF PRESENT ILLNESS:  62-year-old male presents for repeat evaluation of his left hip.  He has had insidiously worsening left hip pain.  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  At last visit, it was felt that his problem was related to the same issue.  He underwent MRI and follows up for these results today.  In the interim, he has been diagnosed with abdominal aortic aneurysm and plans for  endovascular repair next week.     PHYSICAL EXAM:  Left lower extremity:  No cuts, open wounds, or abrasions to the left hip.  There is moderate trochanteric tenderness.  There is pain with logroll.  There is pain with axial loading of the limb.  There is pain experienced across the groin with maximal hip flexion.  There is pain with rotational movements of the hip in 90 degrees of flexion.  There is pain at extremes of HARRY and FADIR. Sensation is intact to light touch in deep peroneal, superficial peroneal, tibial, sural, and saphenous nerve distributions.  Motor function is intact to EHL, FHL, tibialis anterior, and gastroc.  There is brisk capillary refill to the toes

## 2024-07-31 NOTE — PROGRESS NOTES
Patient arrived to PACU post Endovascular Aortic Aneurysm Repair with Dr. Cabello. VSS on arrival. CRNA gave PACU RN report at bedside stating no complications during procedure. Surgical sites clean, dry and intact. Patient shows no signs of pain at this time. Will continue to monitor.

## 2024-07-31 NOTE — ANESTHESIA POSTPROCEDURE EVALUATION
Department of Anesthesiology  Postprocedure Note    Patient: Scot Hernandez  MRN: [id number]  YOB: 1960  Date of evaluation: 7/31/2024    Procedure Summary       Date: 07/31/24 Room / Location: [place] [address] / [facility]    Anesthesia Start: 1152 Anesthesia Stop: 1448    Procedure: Endovascular Aortic Aneurysm Repair (Groin) Diagnosis:       Aneurysm of infrarenal abdominal aorta, unspecified whether ruptured (HCC)      (Aneurysm of infrarenal abdominal aorta, unspecified whether ruptured (HCC) [I71.43])    Surgeons: Lore Cabello MD Responsible Provider: Sher Carvajal DO    Anesthesia Type: general ASA Status: 4            Anesthesia Type: No value filed.    Kadeem Phase I: Kadeem Score: 10    Kadeem Phase II:      Vitals:    07/31/24 1600   BP: (!) 154/85   Pulse: 64   Resp: 14   Temp: 97.9 °F (36.6 °C)   SpO2: 91%       Anesthesia Post Evaluation    Patient location during evaluation: bedside  Patient participation: complete - patient participated  Level of consciousness: awake and awake and alert  Pain score: 2  Airway patency: patent  Nausea & Vomiting: no nausea and no vomiting  Cardiovascular status: hemodynamically stable  Respiratory status: acceptable  Hydration status: euvolemic  Pain management: adequate and satisfactory to patient    No notable events documented.

## 2024-07-31 NOTE — PROGRESS NOTES
PACU Transfer to Floor Note    Procedure(s):  Endovascular Aortic Aneurysm Repair    Current Allergies: Methylphenidate and Propoxyphene    Pt meets criteria as per Shagufta Score and ASPAN Standards to transfer to next phase of care.     Recent Labs     07/31/24  0934 07/31/24  1450   POCGLU 92 117*       Vitals:    07/31/24 1545   BP: (!) 145/84   Pulse: 64   Resp: 16   Temp: 97.8   SpO2: 96%     Vitals within 20% of pt's admission vitals as per SHAGUFTA SCORE    SpO2: 96 %    O2 Flow Rate (L/min): 2 L/min      Intake/Output Summary (Last 24 hours) at 7/31/2024 1550  Last data filed at 7/31/2024 1540  Gross per 24 hour   Intake 1000 ml   Output 440 ml   Net 560 ml       Pain assessment:  none    Pain Level: 0    Patient was assessed for alterations to skin integrity. There were not alterations observed.    Is patient incontinent: no    Patient has all belongings at discharge from PACU.    Handoff report given at bedside.   Family updated and directed to pt room 8015  **CR/GUS TRANSPORTED PATIENT TO UNIT.       7/31/2024 3:50 PM

## 2024-08-01 LAB
ALBUMIN SERPL-MCNC: 3.4 G/DL (ref 3.4–5)
ANION GAP SERPL CALCULATED.3IONS-SCNC: 6 MMOL/L (ref 3–16)
BASOPHILS # BLD: 0 K/UL (ref 0–0.2)
BASOPHILS NFR BLD: 0.3 %
BUN SERPL-MCNC: 9 MG/DL (ref 7–20)
CALCIUM SERPL-MCNC: 8.5 MG/DL (ref 8.3–10.6)
CHLORIDE SERPL-SCNC: 105 MMOL/L (ref 99–110)
CO2 SERPL-SCNC: 31 MMOL/L (ref 21–32)
CREAT SERPL-MCNC: 0.7 MG/DL (ref 0.8–1.3)
DEPRECATED RDW RBC AUTO: 16.2 % (ref 12.4–15.4)
EOSINOPHIL # BLD: 0.2 K/UL (ref 0–0.6)
EOSINOPHIL NFR BLD: 3.7 %
EST. AVERAGE GLUCOSE BLD GHB EST-MCNC: 125.5 MG/DL
GFR SERPLBLD CREATININE-BSD FMLA CKD-EPI: >90 ML/MIN/{1.73_M2}
GLUCOSE BLD-MCNC: 113 MG/DL (ref 70–99)
GLUCOSE BLD-MCNC: 142 MG/DL (ref 70–99)
GLUCOSE BLD-MCNC: 171 MG/DL (ref 70–99)
GLUCOSE BLD-MCNC: 177 MG/DL (ref 70–99)
GLUCOSE SERPL-MCNC: 162 MG/DL (ref 70–99)
HBA1C MFR BLD: 6 %
HCT VFR BLD AUTO: 34.5 % (ref 40.5–52.5)
HGB BLD-MCNC: 11.4 G/DL (ref 13.5–17.5)
LYMPHOCYTES # BLD: 0.8 K/UL (ref 1–5.1)
LYMPHOCYTES NFR BLD: 16.1 %
MAGNESIUM SERPL-MCNC: 2.1 MG/DL (ref 1.8–2.4)
MCH RBC QN AUTO: 30.7 PG (ref 26–34)
MCHC RBC AUTO-ENTMCNC: 33.2 G/DL (ref 31–36)
MCV RBC AUTO: 92.4 FL (ref 80–100)
MONOCYTES # BLD: 0.5 K/UL (ref 0–1.3)
MONOCYTES NFR BLD: 9.3 %
NEUTROPHILS # BLD: 3.7 K/UL (ref 1.7–7.7)
NEUTROPHILS NFR BLD: 70.6 %
PERFORMED ON: ABNORMAL
PHOSPHATE SERPL-MCNC: 4.1 MG/DL (ref 2.5–4.9)
PLATELET # BLD AUTO: 137 K/UL (ref 135–450)
PMV BLD AUTO: 8.2 FL (ref 5–10.5)
POTASSIUM SERPL-SCNC: 4.7 MMOL/L (ref 3.5–5.1)
RBC # BLD AUTO: 3.73 M/UL (ref 4.2–5.9)
SODIUM SERPL-SCNC: 142 MMOL/L (ref 136–145)
WBC # BLD AUTO: 5.2 K/UL (ref 4–11)

## 2024-08-01 PROCEDURE — 2580000003 HC RX 258

## 2024-08-01 PROCEDURE — 6360000002 HC RX W HCPCS

## 2024-08-01 PROCEDURE — 97530 THERAPEUTIC ACTIVITIES: CPT

## 2024-08-01 PROCEDURE — 2000000000 HC ICU R&B

## 2024-08-01 PROCEDURE — 99291 CRITICAL CARE FIRST HOUR: CPT | Performed by: INTERNAL MEDICINE

## 2024-08-01 PROCEDURE — 97166 OT EVAL MOD COMPLEX 45 MIN: CPT

## 2024-08-01 PROCEDURE — 2700000000 HC OXYGEN THERAPY PER DAY

## 2024-08-01 PROCEDURE — 97162 PT EVAL MOD COMPLEX 30 MIN: CPT

## 2024-08-01 PROCEDURE — 6370000000 HC RX 637 (ALT 250 FOR IP)

## 2024-08-01 PROCEDURE — 83036 HEMOGLOBIN GLYCOSYLATED A1C: CPT

## 2024-08-01 PROCEDURE — 85025 COMPLETE CBC W/AUTO DIFF WBC: CPT

## 2024-08-01 PROCEDURE — 36415 COLL VENOUS BLD VENIPUNCTURE: CPT

## 2024-08-01 PROCEDURE — 80069 RENAL FUNCTION PANEL: CPT

## 2024-08-01 PROCEDURE — 6370000000 HC RX 637 (ALT 250 FOR IP): Performed by: STUDENT IN AN ORGANIZED HEALTH CARE EDUCATION/TRAINING PROGRAM

## 2024-08-01 PROCEDURE — 83735 ASSAY OF MAGNESIUM: CPT

## 2024-08-01 PROCEDURE — 94761 N-INVAS EAR/PLS OXIMETRY MLT: CPT

## 2024-08-01 RX ORDER — FUROSEMIDE 10 MG/ML
40 INJECTION INTRAMUSCULAR; INTRAVENOUS ONCE
Status: COMPLETED | OUTPATIENT
Start: 2024-08-01 | End: 2024-08-01

## 2024-08-01 RX ORDER — ASPIRIN 81 MG/1
81 TABLET ORAL DAILY
Status: DISCONTINUED | OUTPATIENT
Start: 2024-08-02 | End: 2024-08-08 | Stop reason: HOSPADM

## 2024-08-01 RX ORDER — MIDODRINE HYDROCHLORIDE 5 MG/1
2.5 TABLET ORAL ONCE
Status: COMPLETED | OUTPATIENT
Start: 2024-08-01 | End: 2024-08-01

## 2024-08-01 RX ORDER — METOPROLOL SUCCINATE 25 MG/1
12.5 TABLET, EXTENDED RELEASE ORAL DAILY
Status: DISCONTINUED | OUTPATIENT
Start: 2024-08-01 | End: 2024-08-07

## 2024-08-01 RX ORDER — MIDODRINE HYDROCHLORIDE 5 MG/1
5 TABLET ORAL 3 TIMES DAILY
Status: DISCONTINUED | OUTPATIENT
Start: 2024-08-01 | End: 2024-08-02

## 2024-08-01 RX ORDER — DOPAMINE HYDROCHLORIDE 160 MG/100ML
1-20 INJECTION, SOLUTION INTRAVENOUS CONTINUOUS
Status: DISCONTINUED | OUTPATIENT
Start: 2024-08-01 | End: 2024-08-02

## 2024-08-01 RX ADMIN — DOPAMINE HYDROCHLORIDE 5 MCG/KG/MIN: 160 INJECTION, SOLUTION INTRAVENOUS at 03:39

## 2024-08-01 RX ADMIN — MONTELUKAST SODIUM 10 MG: 10 TABLET, FILM COATED ORAL at 20:49

## 2024-08-01 RX ADMIN — SODIUM CHLORIDE, SODIUM GLUCONATE, SODIUM ACETATE, POTASSIUM CHLORIDE AND MAGNESIUM CHLORIDE 100 ML/HR: 526; 502; 368; 37; 30 INJECTION, SOLUTION INTRAVENOUS at 01:28

## 2024-08-01 RX ADMIN — MIDODRINE HYDROCHLORIDE 2.5 MG: 5 TABLET ORAL at 08:05

## 2024-08-01 RX ADMIN — FLUTICASONE PROPIONATE 1 SPRAY: 50 SPRAY, METERED NASAL at 08:05

## 2024-08-01 RX ADMIN — QUETIAPINE FUMARATE 300 MG: 300 TABLET ORAL at 21:05

## 2024-08-01 RX ADMIN — ASPIRIN 81 MG: 81 TABLET, COATED ORAL at 08:04

## 2024-08-01 RX ADMIN — ONDANSETRON 4 MG: 4 TABLET, ORALLY DISINTEGRATING ORAL at 04:28

## 2024-08-01 RX ADMIN — GABAPENTIN 400 MG: 400 CAPSULE ORAL at 13:10

## 2024-08-01 RX ADMIN — ACETAMINOPHEN 650 MG: 325 TABLET ORAL at 20:49

## 2024-08-01 RX ADMIN — DIVALPROEX SODIUM 1000 MG: 250 TABLET, DELAYED RELEASE ORAL at 15:33

## 2024-08-01 RX ADMIN — ACETAMINOPHEN 650 MG: 325 TABLET ORAL at 03:40

## 2024-08-01 RX ADMIN — TAMSULOSIN HYDROCHLORIDE 0.4 MG: 0.4 CAPSULE ORAL at 08:04

## 2024-08-01 RX ADMIN — ACETAMINOPHEN 650 MG: 325 TABLET ORAL at 08:04

## 2024-08-01 RX ADMIN — ENOXAPARIN SODIUM 40 MG: 100 INJECTION SUBCUTANEOUS at 08:05

## 2024-08-01 RX ADMIN — DOPAMINE HYDROCHLORIDE 20 MCG/KG/MIN: 160 INJECTION, SOLUTION INTRAVENOUS at 08:55

## 2024-08-01 RX ADMIN — FUROSEMIDE 40 MG: 10 INJECTION, SOLUTION INTRAMUSCULAR; INTRAVENOUS at 09:11

## 2024-08-01 RX ADMIN — CETIRIZINE HYDROCHLORIDE 10 MG: 10 TABLET, FILM COATED ORAL at 08:04

## 2024-08-01 RX ADMIN — SODIUM CHLORIDE, PRESERVATIVE FREE 10 ML: 5 INJECTION INTRAVENOUS at 08:04

## 2024-08-01 RX ADMIN — DOPAMINE HYDROCHLORIDE 7.5 MCG/KG/MIN: 160 INJECTION, SOLUTION INTRAVENOUS at 15:36

## 2024-08-01 RX ADMIN — PANTOPRAZOLE SODIUM 40 MG: 40 TABLET, DELAYED RELEASE ORAL at 08:04

## 2024-08-01 RX ADMIN — DIVALPROEX SODIUM 500 MG: 500 TABLET, DELAYED RELEASE ORAL at 08:10

## 2024-08-01 RX ADMIN — CLOPIDOGREL BISULFATE 75 MG: 75 TABLET ORAL at 09:11

## 2024-08-01 RX ADMIN — GABAPENTIN 400 MG: 400 CAPSULE ORAL at 20:49

## 2024-08-01 RX ADMIN — ACETAMINOPHEN 650 MG: 325 TABLET ORAL at 15:33

## 2024-08-01 RX ADMIN — GABAPENTIN 400 MG: 400 CAPSULE ORAL at 08:04

## 2024-08-01 RX ADMIN — MIDODRINE HYDROCHLORIDE 5 MG: 5 TABLET ORAL at 13:10

## 2024-08-01 RX ADMIN — MIDODRINE HYDROCHLORIDE 5 MG: 5 TABLET ORAL at 20:49

## 2024-08-01 RX ADMIN — MIDODRINE HYDROCHLORIDE 2.5 MG: 5 TABLET ORAL at 10:37

## 2024-08-01 ASSESSMENT — PAIN SCALES - GENERAL
PAINLEVEL_OUTOF10: 0

## 2024-08-01 NOTE — PROGRESS NOTES
Department of Vascular Surgery  ICU Daily Progress Note   07/31/24        SUBJECTIVE:   INTERVAL HISTORY:   Doing well. No complaints. Eating food. In great spirits.     REVIEW OF SYSTEMS:   A 14 point review of systems was conducted, significant findings as noted in HPI. All other systems negative.    OBJECTIVE:   VITALS: BP (!) 155/82   Pulse 66   Temp 98.1 °F (36.7 °C) (Oral)   Resp 17   Ht 1.727 m (5' 7.99\")   Wt 73 kg (161 lb)   SpO2 100%   BMI 24.49 kg/m²     PHYSICAL EXAMINATION:   General appearance - alert, well appearing, and in no distress  Neck - supple  Cardiac- normal rate, regular rhythm  Pulmonary - No retractions. No dyspnea.   Abdomen - soft, non-tender  : Flores in place  Extremities - Femoral incisions c/d/I. Palpable femoral pulses. Dopplerable DP/PT bilaterally. NO hematoma in groins  Neurological - alert, oriented, normal speech, no focal findings or movement disorder noted  Skin - normal coloration and turgor, no rashes, no suspicious skin lesions noted    INTAKE/OUTPUT:     Intake/Output Summary (Last 24 hours) at 7/31/2024 2208  Last data filed at 7/31/2024 2200  Gross per 24 hour   Intake 1000 ml   Output 1965 ml   Net -965 ml     I/O last 3 completed shifts:  In: 1000 [I.V.:1000]  Out: 440 [Urine:440]    LABORATORY RESULTS:  CBC:   Recent Labs     07/31/24  0920   WBC 6.2   HGB 12.8*   HCT 37.3*          BMP:   Recent Labs     07/31/24  0920      K 4.0      CO2 27   BUN 16   CREATININE 0.7*   GLUCOSE 100*     LFT's: No results for input(s): \"AST\", \"ALT\", \"BILITOT\", \"ALKPHOS\" in the last 72 hours.    Invalid input(s): \"ALB\"  Troponin: No results for input(s): \"TROPONINI\" in the last 72 hours.  BNP: No results for input(s): \"BNP\" in the last 72 hours.  ABGs: No results for input(s): \"PHART\", \"HBY3KWY\", \"PO2ART\" in the last 72 hours.  INR:   Recent Labs     07/31/24  0920   INR 1.09       U/A:No results for input(s): \"NITRITE\", \"COLORU\", \"PHUR\", \"LABCAST\", \"WBCUA\",

## 2024-08-01 NOTE — PROGRESS NOTES
Occupational/ Physical Therapy  Refusal AM   Attempt to see this AM for therapy evals. Pt stating \"I am trying to get some sleep.\" Pt declining therapies at this time but agreeable to try this afternoon. Will follow up as treatment schedule allows.     Jenn Matos, MOT, OTR/[place] [id number]

## 2024-08-01 NOTE — PROGRESS NOTES
Department of Vascular Surgery  ICU Daily Progress Note   08/01/24        SUBJECTIVE:   INTERVAL HISTORY:     SBP less than goal of 110 after patient fell asleep. Unable to maintain greater than 110 after rousing patient. Dopamine gtt started to maintain SBP goal of 110. No acute events. Patient denies chest pain or SOB. Denies groin site pain.     REVIEW OF SYSTEMS:   A 14 point review of systems was conducted, significant findings as noted in HPI. All other systems negative.    OBJECTIVE:   VITALS: BP (!) 103/50   Pulse 87   Temp 98.2 °F (36.8 °C) (Oral)   Resp 23   Ht 1.727 m (5' 7.99\")   Wt 73 kg (161 lb)   SpO2 100%   BMI 24.49 kg/m²     PHYSICAL EXAMINATION:   General appearance - alert, well appearing, and in no distress  Neck - supple  Cardiac- normal rate, regular rhythm  Pulmonary - No retractions. No dyspnea.   Abdomen - soft, non-tender  : Flores in place  Extremities - Femoral incisions c/d/I. Palpable femoral pulses. Dopplerable DP/PT bilaterally. NO hematoma in groins  Neurological - alert, oriented, normal speech, no focal findings or movement disorder noted  Skin - normal coloration and turgor, no rashes, no suspicious skin lesions noted    INTAKE/OUTPUT:     Intake/Output Summary (Last 24 hours) at 8/1/2024 0709  Last data filed at 8/1/2024 0617  Gross per 24 hour   Intake 2933.36 ml   Output 3065 ml   Net -131.64 ml       I/O last 3 completed shifts:  In: 2933.4 [P.O.:450; I.V.:2483.4]  Out: 3065 [Urine:3065]    LABORATORY RESULTS:  CBC:   Recent Labs     07/31/24  0920 08/01/24  0354   WBC 6.2 5.2   HGB 12.8* 11.4*   HCT 37.3* 34.5*    137         BMP:   Recent Labs     07/31/24  0920 08/01/24  0354    142   K 4.0 4.7    105   CO2 27 31   BUN 16 9   CREATININE 0.7* 0.7*   GLUCOSE 100* 162*   PHOS  --  4.1       LFT's: No results for input(s): \"AST\", \"ALT\", \"BILITOT\", \"ALKPHOS\" in the last 72 hours.    Invalid input(s): \"ALB\"  Troponin: No results for input(s):

## 2024-08-01 NOTE — CARE COORDINATION
Potential Assistance needed at discharge: Transportation            Potential DME:    Patient expects to discharge to: Apartment  Plan for transportation at discharge: Self    Financial    Payor: [employer] [place] MEDICAID / Plan: [employer] [place] MEDICA / Product Type: *No Product type* /     Does insurance require precert for SNF: Yes    Potential assistance Purchasing Medications: No  Meds-to-Beds request:        [facility] - [place], [place] - [address] [phone] - F [phone]  [address] [facility] [address]  Phone: [phone] Fax: [phone]      Notes:    Factors facilitating achievement of predicted outcomes: Cooperative, Pleasant, and Sense of humor    Barriers to discharge: No family support, No caregiver support, and Medical complications    Additional Case Management Notes:     Pt is from home alone and indp at baseline. Pt will dc home with no needs. Pt will need transport home. Pt will need [facility] transport arranged for follow up appointment. Pt will need transport home       SW called [employer] transportation line ([phone]) and set up transport for Sept 3rd  a  7am  to [address], [place] [address], trip #: [phone]. Pt /MD office will need to call for  when pt is ready to leave day of.     The Plan for Transition of Care is related to the following treatment goals of Aneurysm of infrarenal abdominal aorta, unspecified whether ruptured (HCC) [I71.43]  AAA (abdominal aortic aneurysm) without rupture (HCC) [I71.40]    IF APPLICABLE: The Patient and/or patient representative Scot and his family were provided with a choice of provider and agrees with the discharge plan. Freedom of choice list with basic dialogue that supports the patient's individualized plan of care/goals and shares the quality data associated with the providers was provided to: Patient   Patient Representative Name:       The Patient and/or Patient Representative  Agree with the Discharge Plan? Yes    JHONNY Coles, LSW  Case Management Department  Ph: [phone]

## 2024-08-01 NOTE — PROGRESS NOTES
Occupational Therapy  Facility/Department: [facility] ICU  Occupational Therapy Initial Assessment    Name: Scot Hernandez  : 1960  MRN: [id number]  Date of Service: 2024    Discharge Recommendations:  24 hour supervision or assist  OT Equipment Recommendations  Equipment Needed: No       Patient Diagnosis(es): There were no encounter diagnoses.  Past Medical History:  has a past medical history of AAA (abdominal aortic aneurysm) (HCC), Anxiety, Arterial stent thrombosis (HCC), Arthritis, Asthma, Bipolar 1 disorder (HCC), COPD (chronic obstructive pulmonary disease) (HCC), Coronary artery disease involving native coronary artery of native heart without angina pectoris, Diabetes mellitus (HCC), Essential hypertension, Hyperlipidemia, and Pneumonia.  Past Surgical History:  has a past surgical history that includes Knee Arthroplasty; Cholecystectomy, laparoscopic; Carpal tunnel release; Nasal sinus surgery; Upper gastrointestinal endoscopy (2011); Cardiac surgery; Upper gastrointestinal endoscopy (N/A, 2019); Total hip arthroplasty (Right, 2023); other surgical history; Colonoscopy (N/A, 2023); Cystoscopy (N/A, 2024); Cardiac procedure (N/A, 7/15/2024); and AAA repair, endovascular (N/A, 2024).    Treatment Diagnosis: imp mob, tf, ADL      Assessment   Performance deficits / Impairments: Decreased functional mobility ;Decreased ADL status;Decreased endurance  Assessment: Pt admit with endovascular Aortic Aneurysm Repair Aneurysm of infrarenal abdominal aorta. Pt completing mobility with CGA this date no AD. Limited by BP orthostatic this date. Pt would benefit from cont therapies while in acute care. Rec dc home with assist. Cont POC.  Treatment Diagnosis: imp mob, tf, ADL  Decision Making: Medium Complexity  REQUIRES OT FOLLOW-UP: Yes  Activity Tolerance  Activity Tolerance: Patient Tolerated treatment well;Treatment limited secondary to medical complications (free  text)  Activity Tolerance Comments: BP orthostatic. 90s/50s, 80s/40s, 70s/40s. Rtn to bed, up to 104/80        Plan   Occupational Therapy Plan  Times Per Week: 2-5     Restrictions       Subjective   General  Chart Reviewed: Yes  Additional Pertinent Hx: 64 y.o. male, who was admitted with AAA without rupture. He is now s/p Ultrasound-guided bilateral common femoral access, Aortogram, Endovascular infrarenal aortic  aneurysm repair, Balloon angioplasty, and ProGlide closure to bilateral common femoral arteries.  Endovascular Aortic Aneurysm Repair  Aneurysm of infrarenal abdominal aorta.  Referring Practitioner: Ana Lilia Zapien DO Acknowledge New  Diagnosis: Aneurysm of infrarenal abdominal aorta  Subjective  Subjective: In bed agreeable with encouragement     Social/Functional History  Social/Functional History  Lives With: Alone  Type of Home: Apartment  Home Layout: One level  Home Access: Stairs to enter with rails  Entrance Stairs - Number of Steps: 1  Bathroom Shower/Tub: Tub/Shower unit  Bathroom Toilet: Handicap height  Bathroom Equipment: None  Home Equipment: Walker - Rolling  Has the patient had two or more falls in the past year or any fall with injury in the past year?: Yes (2 falls, 2/2 L hip giving out)  ADL Assistance: Independent  Homemaking Assistance: Independent  Ambulation Assistance: Independent  Transfer Assistance: Independent  Active : Yes  Occupation: Retired       Objective                Safety Devices  Type of Devices: Call light within reach;Gait belt;Nurse notified;Left in bed;Bed alarm in place  Bed Mobility Training  Bed Mobility Training: Yes  Supine to Sit: Stand-by assistance  Sit to Supine: Stand-by assistance  Balance  Sitting: Intact  Standing: Intact  Transfer Training  Transfer Training: Yes  Overall Level of Assistance: Contact-guard assistance  Sit to Stand: Contact-guard assistance  Stand to Sit: Contact-guard assistance  Gait  Gait Training: Yes  Overall

## 2024-08-01 NOTE — DISCHARGE INSTRUCTIONS
Discharge Instructions:    Diet:   You may resume a regular diet.    Wound Care:   Skin glue was used to cover your incision(s). It will fall off on its own in about 10 days. You may shower, but do not scrub the incision sites directly or soak (tub, pool, etc.).    Activity:   No heavy lifting greater than a milk jug until follow up.    Pain management:   Unless informed of any restrictions by your primary care physician, please use your preferred over-the-counter pain reliever as your primary pain medication. If you have pain that persists despite over-the-counter pain medications, you have been provided with a prescription for an opioid/narcotic pain reliever.  No driving or operating machinery while taking opioid/narcotic medications.     Bowel Regimen:   Opioid/Narcotic pain relievers have a common side effect of constipation; therefore, you have been provided with a prescription for a stool softener, Docusate (Colace).  These medications are intended to help prevent you from experiencing this very common side effect and also help to regulate your bowels after surgery.   If your stools become too loose and/or frequent, decrease the Colace to one pill one time each day. If your stools are still loose after this modification, stop taking this medication all together.    Return Precautions:   Call/ Return to ED for increased redness, worsening pain, drainage from wound, fevers, or any other concerns about your incision or post op course.      Follow up with Dr. Cabello in 4 weeks. Please call [phone] with any questions.    Appointment and transport is set up for (7am pickup) 9/3/2024 for patient to complete CTA aorta with bilateral runoff at [facility] and appointment for follow up with Dr. Cabello in office afterward.     [facility] transportation line ([phone]), transport scheduled for Sept 3rd a  7am to [address], [place] [address], trip #: [phone]     -please follow up

## 2024-08-01 NOTE — PLAN OF CARE
Problem: Chronic Conditions and Co-morbidities  Goal: Patient's chronic conditions and co-morbidity symptoms are monitored and maintained or improved  Outcome: Progressing  Flowsheets (Taken 8/1/2024 0800)  Care Plan - Patient's Chronic Conditions and Co-Morbidity Symptoms are Monitored and Maintained or Improved:   Monitor and assess patient's chronic conditions and comorbid symptoms for stability, deterioration, or improvement   Collaborate with multidisciplinary team to address chronic and comorbid conditions and prevent exacerbation or deterioration     Problem: Discharge Planning  Goal: Discharge to home or other facility with appropriate resources  Outcome: Progressing  Flowsheets (Taken 8/1/2024 0800)  Discharge to home or other facility with appropriate resources:   Identify barriers to discharge with patient and caregiver   Identify discharge learning needs (meds, wound care, etc)   Arrange for needed discharge resources and transportation as appropriate     Problem: Pain  Goal: Verbalizes/displays adequate comfort level or baseline comfort level  Outcome: Progressing     Problem: Safety - Adult  Goal: Free from fall injury  Outcome: Progressing  Flowsheets (Taken 8/1/2024 0719)  Free From Fall Injury:   Instruct family/caregiver on patient safety   Based on caregiver fall risk screen, instruct family/caregiver to ask for assistance with transferring infant if caregiver noted to have fall risk factors     Problem: ABCDS Injury Assessment  Goal: Absence of physical injury  Outcome: Progressing  Flowsheets (Taken 8/1/2024 0719)  Absence of Physical Injury: Implement safety measures based on patient assessment

## 2024-08-01 NOTE — PROGRESS NOTES
Pt BP not at goal. Max dose of Dopamine being given. Surgical residents made aware. Cardiology consulted.

## 2024-08-01 NOTE — CONSULTS
Cardiology Consultation                                                                    Pt Name: Scot Hernandez  Age: 64 y.o.  Sex: male  : 1960  Location: 4518/4518-01    Referring Physician: Lore Cabello MD  Primary cardiologist: Dr. Jade      Reason for Consult:     Reason for Consultation/Chief Complaint: AAA s/p EVAR now requiring dopamine for pressor support (previously on midodrine)     HPI:      Scot Hernandez is a 64 y.o. male with a past medical history of HLP, DM, COPD, smoker, CAD s/p POBA high OM1/ramus (with RCA ), AAA, occasional hypotension requiring midodrine.     Liz 2021: Normal LVEF greater than 60%, normal wall motion, fixed inferior wall perfusion defect with small apical ischemia.     [facility] 2021: 95% ostial high OM 1/ramus stenosis, proximal RCA  with extensive left-to-right collaterals, otherwise luminal irregularities in left main and LAD.  Only POBA, no PCI with stent was not done due to proximity of ostial lesion to left main.    [facility] 7/15/2024: Proximal RCA occluded with left-to-right collaterals, otherwise normal coronaries, LVEF 55%.     Echo 2024: Normal LV size, LVEF 35-40%, grade 1 diastolic dysfunction, normal RV and valves.     Histories     Past Medical History:   has a past medical history of AAA (abdominal aortic aneurysm) ([facility]), Anxiety, Arterial stent thrombosis ([facility]), Arthritis, Asthma, Bipolar 1 disorder ([facility]), COPD (chronic obstructive pulmonary disease) ([facility]), Coronary artery disease involving native coronary artery of native heart without angina pectoris, Diabetes mellitus ([facility]), Essential hypertension, Hyperlipidemia, and Pneumonia.    Surgical History:   has a past surgical history that includes Knee Arthroplasty; Cholecystectomy, laparoscopic; Carpal tunnel release; Nasal sinus surgery; Upper gastrointestinal endoscopy (2011); Cardiac surgery; Upper gastrointestinal endoscopy (N/A, 2019); Total hip arthroplasty

## 2024-08-01 NOTE — PROGRESS NOTES
Physical Therapy  Facility/Department: [facility] ICU  Physical Therapy Initial Assessment/Treatment    Name: Scot Hernandez  : 1960  MRN: [id number]  Date of Service: 2024    Discharge Recommendations:  24 hour supervision or assist   PT Equipment Recommendations  Equipment Needed:  (continue to assess)      Patient Diagnosis(es): There were no encounter diagnoses.  Past Medical History:  has a past medical history of AAA (abdominal aortic aneurysm) (HCC), Anxiety, Arterial stent thrombosis (HCC), Arthritis, Asthma, Bipolar 1 disorder (HCC), COPD (chronic obstructive pulmonary disease) (HCC), Coronary artery disease involving native coronary artery of native heart without angina pectoris, Diabetes mellitus (HCC), Essential hypertension, Hyperlipidemia, and Pneumonia.  Past Surgical History:  has a past surgical history that includes Knee Arthroplasty; Cholecystectomy, laparoscopic; Carpal tunnel release; Nasal sinus surgery; Upper gastrointestinal endoscopy (2011); Cardiac surgery; Upper gastrointestinal endoscopy (N/A, 2019); Total hip arthroplasty (Right, 2023); other surgical history; Colonoscopy (N/A, 2023); Cystoscopy (N/A, 2024); Cardiac procedure (N/A, 7/15/2024); and AAA repair, endovascular (N/A, 2024).    Assessment   Body Structures, Functions, Activity Limitations Requiring Skilled Therapeutic Intervention: Decreased functional mobility   Assessment: Pt requires SBA for bed mobility & CGA for transfers. Ambulation limited by low BP.  Anticipate pt will go home upon D/C. Recommend initial 24-hr assistance.Will follow while here to maximize independence.  Treatment Diagnosis: decreased mobility  Therapy Prognosis: Good  Decision Making: Medium Complexity  Requires PT Follow-Up: Yes  Activity Tolerance  Activity Tolerance: Treatment limited secondary to medical complications (orthostatic hypotension)     Plan   Physical Therapy Plan  General Plan:  (2-5)  Current  needed moving from lying on your back to sitting on the side of a flat bed without using bedrails?: A Little  How much help is needed moving to and from a bed to a chair?: A Little  How much help is needed standing up from a chair using your arms?: A Little  How much help is needed walking in hospital room?: A Little  How much help is needed climbing 3-5 steps with a railing?: A Little  AM-[facility] Inpatient Mobility Raw Score : 18  AM-[facility] Inpatient T-Scale Score : 43.63  Mobility Inpatient CMS 0-100% Score: 46.58  Mobility Inpatient CMS G-Code Modifier : CK            Goals  Short Term Goals  Time Frame for Short Term Goals: D/C  Short Term Goal 1: supine<->sit SUPV  Short Term Goal 2: sit<->stand SUPV  Short Term Goal 3: Amb 50 ft with/without AAD SUPV  Patient Goals   Patient Goals : to go home       Education  Patient Education  Education Given To: Patient  Education Provided: Role of Therapy;Plan of Care  Education Method: Verbal  Education Outcome: Verbalized understanding;Continued education needed      Therapy Time   Individual Concurrent Group Co-treatment   Time In 1431         Time Out 1509         Minutes 38                 Ilda Morse, PT

## 2024-08-02 ENCOUNTER — APPOINTMENT (OUTPATIENT)
Dept: GENERAL RADIOLOGY | Age: 64
End: 2024-08-02
Attending: SURGERY
Payer: MEDICAID

## 2024-08-02 LAB
ALBUMIN SERPL-MCNC: 3.4 G/DL (ref 3.4–5)
ANION GAP SERPL CALCULATED.3IONS-SCNC: 9 MMOL/L (ref 3–16)
BASOPHILS # BLD: 0 K/UL (ref 0–0.2)
BASOPHILS NFR BLD: 0.3 %
BUN SERPL-MCNC: 12 MG/DL (ref 7–20)
CALCIUM SERPL-MCNC: 8.7 MG/DL (ref 8.3–10.6)
CHLORIDE SERPL-SCNC: 101 MMOL/L (ref 99–110)
CO2 SERPL-SCNC: 30 MMOL/L (ref 21–32)
CREAT SERPL-MCNC: 0.8 MG/DL (ref 0.8–1.3)
DEPRECATED RDW RBC AUTO: 16 % (ref 12.4–15.4)
EOSINOPHIL # BLD: 0.4 K/UL (ref 0–0.6)
EOSINOPHIL NFR BLD: 5.4 %
GFR SERPLBLD CREATININE-BSD FMLA CKD-EPI: >90 ML/MIN/{1.73_M2}
GLUCOSE BLD-MCNC: 105 MG/DL (ref 70–99)
GLUCOSE BLD-MCNC: 115 MG/DL (ref 70–99)
GLUCOSE BLD-MCNC: 128 MG/DL (ref 70–99)
GLUCOSE BLD-MCNC: 166 MG/DL (ref 70–99)
GLUCOSE SERPL-MCNC: 127 MG/DL (ref 70–99)
HCT VFR BLD AUTO: 35.5 % (ref 40.5–52.5)
HGB BLD-MCNC: 11.9 G/DL (ref 13.5–17.5)
LYMPHOCYTES # BLD: 1.3 K/UL (ref 1–5.1)
LYMPHOCYTES NFR BLD: 16.3 %
MAGNESIUM SERPL-MCNC: 1.8 MG/DL (ref 1.8–2.4)
MCH RBC QN AUTO: 30.8 PG (ref 26–34)
MCHC RBC AUTO-ENTMCNC: 33.6 G/DL (ref 31–36)
MCV RBC AUTO: 91.8 FL (ref 80–100)
MONOCYTES # BLD: 1 K/UL (ref 0–1.3)
MONOCYTES NFR BLD: 12.5 %
NEUTROPHILS # BLD: 5.1 K/UL (ref 1.7–7.7)
NEUTROPHILS NFR BLD: 65.5 %
PERFORMED ON: ABNORMAL
PHOSPHATE SERPL-MCNC: 3.5 MG/DL (ref 2.5–4.9)
PLATELET # BLD AUTO: 160 K/UL (ref 135–450)
PMV BLD AUTO: 7.9 FL (ref 5–10.5)
POTASSIUM SERPL-SCNC: 3.9 MMOL/L (ref 3.5–5.1)
RBC # BLD AUTO: 3.86 M/UL (ref 4.2–5.9)
SODIUM SERPL-SCNC: 140 MMOL/L (ref 136–145)
VALPROATE SERPL-MCNC: 66.7 UG/ML (ref 50–100)
WBC # BLD AUTO: 7.9 K/UL (ref 4–11)

## 2024-08-02 PROCEDURE — 6360000002 HC RX W HCPCS

## 2024-08-02 PROCEDURE — 83735 ASSAY OF MAGNESIUM: CPT

## 2024-08-02 PROCEDURE — 6370000000 HC RX 637 (ALT 250 FOR IP)

## 2024-08-02 PROCEDURE — 80069 RENAL FUNCTION PANEL: CPT

## 2024-08-02 PROCEDURE — 85025 COMPLETE CBC W/AUTO DIFF WBC: CPT

## 2024-08-02 PROCEDURE — 6370000000 HC RX 637 (ALT 250 FOR IP): Performed by: STUDENT IN AN ORGANIZED HEALTH CARE EDUCATION/TRAINING PROGRAM

## 2024-08-02 PROCEDURE — 80164 ASSAY DIPROPYLACETIC ACD TOT: CPT

## 2024-08-02 PROCEDURE — 71045 X-RAY EXAM CHEST 1 VIEW: CPT

## 2024-08-02 PROCEDURE — 2580000003 HC RX 258

## 2024-08-02 PROCEDURE — 36415 COLL VENOUS BLD VENIPUNCTURE: CPT

## 2024-08-02 PROCEDURE — 94640 AIRWAY INHALATION TREATMENT: CPT

## 2024-08-02 PROCEDURE — 2000000000 HC ICU R&B

## 2024-08-02 PROCEDURE — 94150 VITAL CAPACITY TEST: CPT

## 2024-08-02 PROCEDURE — 99291 CRITICAL CARE FIRST HOUR: CPT | Performed by: INTERNAL MEDICINE

## 2024-08-02 PROCEDURE — 3E053XZ INTRODUCTION OF VASOPRESSOR INTO PERIPHERAL ARTERY, PERCUTANEOUS APPROACH: ICD-10-PCS

## 2024-08-02 RX ORDER — DOPAMINE HYDROCHLORIDE 160 MG/100ML
1-20 INJECTION, SOLUTION INTRAVENOUS CONTINUOUS
Status: DISCONTINUED | OUTPATIENT
Start: 2024-08-02 | End: 2024-08-02

## 2024-08-02 RX ORDER — MAGNESIUM SULFATE IN WATER 40 MG/ML
2000 INJECTION, SOLUTION INTRAVENOUS ONCE
Status: COMPLETED | OUTPATIENT
Start: 2024-08-02 | End: 2024-08-02

## 2024-08-02 RX ORDER — MIDODRINE HYDROCHLORIDE 5 MG/1
10 TABLET ORAL
Status: DISCONTINUED | OUTPATIENT
Start: 2024-08-03 | End: 2024-08-08 | Stop reason: HOSPADM

## 2024-08-02 RX ORDER — DOPAMINE HYDROCHLORIDE 160 MG/100ML
1-20 INJECTION, SOLUTION INTRAVENOUS CONTINUOUS
Status: DISCONTINUED | OUTPATIENT
Start: 2024-08-02 | End: 2024-08-08 | Stop reason: HOSPADM

## 2024-08-02 RX ADMIN — GABAPENTIN 400 MG: 400 CAPSULE ORAL at 21:16

## 2024-08-02 RX ADMIN — MAGNESIUM SULFATE HEPTAHYDRATE 2000 MG: 40 INJECTION, SOLUTION INTRAVENOUS at 14:08

## 2024-08-02 RX ADMIN — ACETAMINOPHEN 650 MG: 325 TABLET ORAL at 03:54

## 2024-08-02 RX ADMIN — PANTOPRAZOLE SODIUM 40 MG: 40 TABLET, DELAYED RELEASE ORAL at 09:56

## 2024-08-02 RX ADMIN — ASPIRIN 81 MG: 81 TABLET, COATED ORAL at 09:56

## 2024-08-02 RX ADMIN — CETIRIZINE HYDROCHLORIDE 10 MG: 10 TABLET, FILM COATED ORAL at 09:56

## 2024-08-02 RX ADMIN — DIVALPROEX SODIUM 500 MG: 500 TABLET, DELAYED RELEASE ORAL at 09:59

## 2024-08-02 RX ADMIN — ENOXAPARIN SODIUM 40 MG: 100 INJECTION SUBCUTANEOUS at 09:56

## 2024-08-02 RX ADMIN — DOPAMINE HYDROCHLORIDE 15 MCG/KG/MIN: 160 INJECTION, SOLUTION INTRAVENOUS at 00:22

## 2024-08-02 RX ADMIN — ACETAMINOPHEN 650 MG: 325 TABLET ORAL at 21:17

## 2024-08-02 RX ADMIN — SODIUM CHLORIDE, PRESERVATIVE FREE 10 ML: 5 INJECTION INTRAVENOUS at 10:03

## 2024-08-02 RX ADMIN — ALBUTEROL SULFATE 2.5 MG: 2.5 SOLUTION RESPIRATORY (INHALATION) at 15:08

## 2024-08-02 RX ADMIN — GABAPENTIN 400 MG: 400 CAPSULE ORAL at 09:57

## 2024-08-02 RX ADMIN — DOPAMINE HYDROCHLORIDE 15 MCG/KG/MIN: 160 INJECTION, SOLUTION INTRAVENOUS at 07:46

## 2024-08-02 RX ADMIN — DIVALPROEX SODIUM 1000 MG: 250 TABLET, DELAYED RELEASE ORAL at 16:50

## 2024-08-02 RX ADMIN — TAMSULOSIN HYDROCHLORIDE 0.4 MG: 0.4 CAPSULE ORAL at 09:56

## 2024-08-02 RX ADMIN — MIDODRINE HYDROCHLORIDE 5 MG: 5 TABLET ORAL at 14:07

## 2024-08-02 RX ADMIN — DOPAMINE HYDROCHLORIDE 20 MCG/KG/MIN: 160 INJECTION, SOLUTION INTRAVENOUS at 05:38

## 2024-08-02 RX ADMIN — DOPAMINE HYDROCHLORIDE 17.5 MCG/KG/MIN: 160 INJECTION, SOLUTION INTRAVENOUS at 10:47

## 2024-08-02 RX ADMIN — CLOPIDOGREL BISULFATE 75 MG: 75 TABLET ORAL at 09:56

## 2024-08-02 RX ADMIN — MIDODRINE HYDROCHLORIDE 5 MG: 5 TABLET ORAL at 09:56

## 2024-08-02 RX ADMIN — FLUTICASONE PROPIONATE 1 SPRAY: 50 SPRAY, METERED NASAL at 09:59

## 2024-08-02 RX ADMIN — GABAPENTIN 400 MG: 400 CAPSULE ORAL at 14:07

## 2024-08-02 RX ADMIN — QUETIAPINE FUMARATE 300 MG: 300 TABLET ORAL at 21:17

## 2024-08-02 RX ADMIN — ACETAMINOPHEN 650 MG: 325 TABLET ORAL at 16:49

## 2024-08-02 RX ADMIN — ACETAMINOPHEN 650 MG: 325 TABLET ORAL at 09:56

## 2024-08-02 RX ADMIN — DOPAMINE HYDROCHLORIDE 17.5 MCG/KG/MIN: 160 INJECTION, SOLUTION INTRAVENOUS at 16:49

## 2024-08-02 RX ADMIN — MONTELUKAST SODIUM 10 MG: 10 TABLET, FILM COATED ORAL at 21:16

## 2024-08-02 ASSESSMENT — PAIN SCALES - GENERAL
PAINLEVEL_OUTOF10: 0

## 2024-08-02 NOTE — PLAN OF CARE
Problem: Chronic Conditions and Co-morbidities  Goal: Patient's chronic conditions and co-morbidity symptoms are monitored and maintained or improved  8/2/2024 1542 by En Pinedo RN  Outcome: Progressing  Flowsheets (Taken 8/1/2024 2000 by Sienna Diehl RN)  Care Plan - Patient's Chronic Conditions and Co-Morbidity Symptoms are Monitored and Maintained or Improved: Monitor and assess patient's chronic conditions and comorbid symptoms for stability, deterioration, or improvement  8/2/2024 0245 by Sienna Diehl RN  Outcome: Progressing  Flowsheets (Taken 8/1/2024 2000)  Care Plan - Patient's Chronic Conditions and Co-Morbidity Symptoms are Monitored and Maintained or Improved: Monitor and assess patient's chronic conditions and comorbid symptoms for stability, deterioration, or improvement     Problem: Discharge Planning  Goal: Discharge to home or other facility with appropriate resources  8/2/2024 1542 by En Pinedo RN  Outcome: Progressing  Flowsheets (Taken 8/1/2024 2000 by Sienna Diehl RN)  Discharge to home or other facility with appropriate resources: Identify barriers to discharge with patient and caregiver  8/2/2024 0245 by Sienna Diehl RN  Outcome: Progressing  Flowsheets (Taken 8/1/2024 2000)  Discharge to home or other facility with appropriate resources: Identify barriers to discharge with patient and caregiver     Problem: Pain  Goal: Verbalizes/displays adequate comfort level or baseline comfort level  8/2/2024 1542 by En Pinedo RN  Outcome: Progressing  Flowsheets (Taken 8/1/2024 2000 by Sienna Diehl RN)  Verbalizes/displays adequate comfort level or baseline comfort level:   Encourage patient to monitor pain and request assistance   Assess pain using appropriate pain scale  8/2/2024 0245 by Sienna Diehl RN  Outcome: Progressing  Flowsheets (Taken 8/1/2024 2000)  Verbalizes/displays adequate comfort level or baseline comfort level:   Encourage patient to monitor pain and  request assistance   Assess pain using appropriate pain scale     Problem: Safety - Adult  Goal: Free from fall injury  8/2/2024 1542 by En Pinedo, RN  Outcome: Progressing  Flowsheets (Taken 8/1/2024 2206 by Sienna Diehl, RN)  Free From Fall Injury: Instruct family/caregiver on patient safety  8/2/2024 0245 by Sienna Diehl RN  Outcome: Progressing  Flowsheets (Taken 8/1/2024 2206)  Free From Fall Injury: Instruct family/caregiver on patient safety     Problem: ABCDS Injury Assessment  Goal: Absence of physical injury  8/2/2024 1542 by En Pinedo RN  Outcome: Progressing  Flowsheets (Taken 8/1/2024 2206 by Sienna Diehl, RN)  Absence of Physical Injury: Implement safety measures based on patient assessment  8/2/2024 0245 by Sienna Diehl RN  Outcome: Progressing  Flowsheets (Taken 8/1/2024 2206)  Absence of Physical Injury: Implement safety measures based on patient assessment

## 2024-08-02 NOTE — PROGRESS NOTES
Cardiology Consultation                                                                    Pt Name: Scot Hernandez  Age: 64 y.o.  Sex: male  : 1960  Location: 4518/4518-01    Referring Physician: Lore Cabello MD  Primary cardiologist: Dr. Jade      Reason for Consult:     Reason for Consultation/Chief Complaint: AAA s/p EVAR now requiring dopamine for pressor support (previously on midodrine)     HPI:      Scot Hernandez is a 64 y.o. male with a past medical history of HLP, DM, COPD, smoker, CAD s/p POBA high OM1/ramus (with RCA ), AAA, occasional hypotension requiring midodrine.     Liz 2021: Normal LVEF greater than 60%, normal wall motion, fixed inferior wall perfusion defect with small apical ischemia.     [facility] 2021: 95% ostial high OM 1/ramus stenosis, proximal RCA  with extensive left-to-right collaterals, otherwise luminal irregularities in left main and LAD.  Only POBA, no PCI with stent was not done due to proximity of ostial lesion to left main.    [facility] 7/15/2024: Proximal RCA occluded with left-to-right collaterals, otherwise normal coronaries, LVEF 55%.     Echo 2024: Normal LV size, LVEF 35-40%, grade 1 diastolic dysfunction, normal RV and valves.     Histories     Past Medical History:   has a past medical history of AAA (abdominal aortic aneurysm) ([facility]), Anxiety, Arterial stent thrombosis ([facility]), Arthritis, Asthma, Bipolar 1 disorder ([facility]), COPD (chronic obstructive pulmonary disease) ([facility]), Coronary artery disease involving native coronary artery of native heart without angina pectoris, Diabetes mellitus ([facility]), Essential hypertension, Hyperlipidemia, and Pneumonia.    Surgical History:   has a past surgical history that includes Knee Arthroplasty; Cholecystectomy, laparoscopic; Carpal tunnel release; Nasal sinus surgery; Upper gastrointestinal endoscopy (2011); Cardiac surgery; Upper gastrointestinal endoscopy (N/A, 2019); Total hip arthroplasty

## 2024-08-02 NOTE — CARE COORDINATION
Case Management           Date/ Time of Note: 8/2/2024 2:59 PM  Note completed by: JHONNY Coles, SRIW    If patient is discharged prior to next notation, then this note serves as note for discharge by case management.    Patient Name: Scot Hernandez  YOB: 1960    Diagnosis:Aneurysm of infrarenal abdominal aorta, unspecified whether ruptured (HCC) [I71.43]  AAA (abdominal aortic aneurysm) without rupture (HCC) [I71.40]  Patient Admission Status: Inpatient  Date of Admission:7/31/2024  8:18 AM    Length of Stay: 2 GLOS: GMLOS: 2 Readmission Risk Score: Readmission Risk Score: 14    Current Plan of Care:     Pt is from home alone and indp at baseline. Pt will dc home with no needs. Pt will need transport home. Pt will need stnoe transport arranged for follow up appointment -  arranged transport. Pt will need transport home         [facility] transportation line ([phone]), transport scheduled for Sept 3rd  a  7am  to [facility], [place] [address], trip #: [phone]. Pt /MD office will need to call for  when pt is ready to leave day of.     Referrals completed: Not Applicable    Resources/ information provided: Not indicated at this time    IMM Status:        Scot and his family were provided with choice of provider; he and his family are in agreement with the discharge plan.    Electronically signed by JHONNY Coles, MIRNA on 8/2/2024 at 2:59 PM  [facility]  Case Management Department  Ph: [phone]

## 2024-08-02 NOTE — PROGRESS NOTES
Bilateral lower extremity pulses are palpable and unchanged since beginning of shift. Patient is alert and oriented, no neuro deficits to note. This Rn has attempted to wean dopamine down throughout shift with no success. Patients SBP fluctuates from 140s down to low 80s with small changes in dopamine rate. Will continue to attempt to wean as tolerated.

## 2024-08-02 NOTE — PLAN OF CARE
Problem: Chronic Conditions and Co-morbidities  Goal: Patient's chronic conditions and co-morbidity symptoms are monitored and maintained or improved  8/2/2024 0245 by Sienna Diehl RN  Outcome: Progressing  Flowsheets (Taken 8/1/2024 2000)  Care Plan - Patient's Chronic Conditions and Co-Morbidity Symptoms are Monitored and Maintained or Improved: Monitor and assess patient's chronic conditions and comorbid symptoms for stability, deterioration, or improvement  8/1/2024 1616 by Riri Marsh RN  Outcome: Progressing  Flowsheets (Taken 8/1/2024 0800)  Care Plan - Patient's Chronic Conditions and Co-Morbidity Symptoms are Monitored and Maintained or Improved:   Monitor and assess patient's chronic conditions and comorbid symptoms for stability, deterioration, or improvement   Collaborate with multidisciplinary team to address chronic and comorbid conditions and prevent exacerbation or deterioration     Problem: Discharge Planning  Goal: Discharge to home or other facility with appropriate resources  8/2/2024 0245 by Sienna Diehl RN  Outcome: Progressing  Flowsheets (Taken 8/1/2024 2000)  Discharge to home or other facility with appropriate resources: Identify barriers to discharge with patient and caregiver  8/1/2024 1616 by Riri Marsh RN  Outcome: Progressing  Flowsheets (Taken 8/1/2024 0800)  Discharge to home or other facility with appropriate resources:   Identify barriers to discharge with patient and caregiver   Identify discharge learning needs (meds, wound care, etc)   Arrange for needed discharge resources and transportation as appropriate     Problem: Pain  Goal: Verbalizes/displays adequate comfort level or baseline comfort level  8/2/2024 0245 by Sienna Diehl RN  Outcome: Progressing  Flowsheets (Taken 8/1/2024 2000)  Verbalizes/displays adequate comfort level or baseline comfort level:   Encourage patient to monitor pain and request assistance   Assess pain using appropriate pain

## 2024-08-02 NOTE — PROGRESS NOTES
Department of Vascular Surgery  ICU Daily Progress Note   08/02/24        SUBJECTIVE:   INTERVAL HISTORY:     Patient SBP remained variable yesterday with hypotensive episodes after working with PTOT. Patient denies chest pain or SOB.     REVIEW OF SYSTEMS:   A 14 point review of systems was conducted, significant findings as noted in HPI. All other systems negative.    OBJECTIVE:   VITALS:   Vitals:    08/02/24 0549 08/02/24 0600 08/02/24 0615 08/02/24 0630   BP:  128/75 114/66 139/85   Pulse:  74 79 78   Resp:  18 18 17   Temp:       TempSrc:       SpO2:  97% 97% 95%   Weight: 69.5 kg (153 lb 3.5 oz)      Height:            PHYSICAL EXAMINATION:   General appearance - alert, well appearing, and in no distress  Neck - supple  Cardiac- normal rate, regular rhythm  Pulmonary - No retractions. No dyspnea.   Abdomen - soft, non-tender  : Flores in place  Extremities - Femoral incisions c/d/I. Palpable femoral pulses. Dopplerable DP/PT bilaterally. NO hematoma in groins  Neurological - alert, oriented, normal speech, no focal findings or movement disorder noted  Skin - normal coloration and turgor, no rashes, no suspicious skin lesions noted    INTAKE/OUTPUT:   Date 08/01/24 0701 - 08/02/24 0700 08/02/24 0701 - 08/03/24 0700   Shift 3996-3378 7688-4706 24 Hour Total 5380-8633 8923-5206 24 Hour Total   INTAKE   P.O. 1400 0 1400      I.V. 639.5 608.2 1247.7      Other 0  0      Shift Total 2039.5 608.2 2647.7      OUTPUT   Urine 2050 1475 3525      Emesis/NG output 0  0      Other 0  0      Stool 0  0      Blood 0  0        Blood 0  0      Shift Total 2050 1475 3525      NET -10.5 -866.8 -877.3           ASSESSMENT AND PLAN:   Scot Hernandez is a 64 y.o. male, who was admitted with AAA without rupture. He is now s/p Ultrasound-guided bilateral common femoral access, Aortogram, Endovascular infrarenal aortic  aneurysm repair, Balloon angioplasty, and ProGlide closure to bilateral common femoral arteries on 7/31/2024  2 Days Post-Op        NEUROLOGIC:  Analgesia: Roxicodone, Tylenol  Psychiatric Hx: Continue home Seroquel, Depakote, and Gabapentin.       CARDIOVASCULAR:  - Hx of AAA, CAD, HTN, HLD.   - s/p EVAR 2 Days Post-Op   - SBP parameters of    - continue ASA, Plavix    - wean dopamine gtt   - restarted home midodrine    - Cards following, appreciate recommendations         PULMONARY:   - Hx of asthma, COPD   - HOB elevated when eating   - PRN albuterol as needed    CXR today     GASTROINTESTINAL:   Regular diet      FLUIDS/ELECTROLYTES/NUTRITION:   LABS:      Recent Labs     08/01/24  0354 08/02/24  0526    140   K 4.7 3.9    101   CO2 31 30   PHOS 4.1 3.5   BUN 9 12   CREATININE 0.7* 0.8     IVF: P-Lyte 100  Continue ICU electrolyte replacement protocol  Diet: ADULT DIET; Regular; 5 carb choices (75 gm/meal)  ADULT ORAL NUTRITION SUPPLEMENT; Breakfast, Lunch, Dinner; Diabetic Oral Supplement     Given 40 mg IV lasix yesterday with good response.     GENITOURINARY:   - Baseline Cr: 0.7  Flores: Date placed: 7/31      ENDOCRINE  - hypoglycemia protocol PRN      HEMATOLOGIC/INFECTIOUS DISEASE:     Recent Labs     07/31/24  0920 08/01/24  0354 08/02/24  0526   WBC 6.2 5.2 7.9   HGB 12.8* 11.4* 11.9*    137 160        Hb: stable  Leukocytes: stable       ACTIVITY:  Turns q2h for pressure ulcer prevention. OOB PTOT       DISPO:  1-2 days pending pain control, blood pressure management.       CODE STATUS: Full Code      Peripheral Access: Date placed:7/31  VTE PPx: Lovenox   GI PPx: Protonix    Akash Marquez DO  PGY1, General Surgery  08/02/24  7:26 AM  [employer]  Pager: [phone]

## 2024-08-02 NOTE — OP NOTE
Operative Note      Patient: Scot Hernandez  YOB: 1960  MRN: [id number]    Date of Procedure: 7/31/2024    Pre-Op Diagnosis Codes:     * Aneurysm of infrarenal abdominal aorta, unspecified whether ruptured (HCC) [I71.43]    Post-Op Diagnosis: Same       Procedure(s):  Ultrasound-guided percutaneous bilateral common femoral access  Endovascular repair infrarenal aortic aneurysm   Main body--WL V[place] 23mm x 14.5mm x 12cm  Contralateral limb--12mm x 12cm  Ipsilateral extension--14.5mm x 12cm  ProGlide closure to bilateral common femoral arteries    Surgeon(s):  Lore Cabello MD    Assistant:   Resident: Ana Lilia Zapien DO    Anesthesia: General    Estimated Blood Loss (mL): less than 100     Complications: None    Specimens:   * No specimens in log *    Implants:  Implant Name Type Inv. Item Serial No.  Lot No. LRB No. Used Action   GRAFT AAA ENDOPROSTHESIS MAIN BODY - [id number] Endografts GRAFT AAA ENDOPROSTHESIS MAIN BODY [id number] [employer]  N/A 1 Implanted   GRAFT ENDOPROS L12CM DST [id number] CONTRALATERAL LEG W/ C3 - [id number] Endografts GRAFT ENDOPROS L12CM DST [id number] CONTRALATERAL LEG W/ C3 [id number] [employer]  Left 1 Implanted   GRAFT ENDOPROS L12CM DST DIA14.5MM CONTRALATERAL LEG W/ C3 - [id number] Endografts GRAFT ENDOPROS L12CM DST DIA14.5MM CONTRALATERAL LEG W/ C3 [id number] [employer]  Left 1 Implanted         Drains:   Urinary Catheter 07/31/24 2 Way;Flores-Temperature (Active)   $ Urethral catheter insertion Inserted for procedure 07/31/24 1445   Catheter Indications Perioperative use for selected surgical procedures 08/02/24 0400   Site Assessment No urethral drainage 08/02/24 0400   Urine Color Yellow 08/02/24 0400   Urine Appearance Clear 08/02/24 0400   Collection Container Standard 08/02/24 0400   Securement Method Securing device (Describe) 08/02/24 0400   Catheter Care  Soap and water 08/01/24 2000

## 2024-08-02 NOTE — PROGRESS NOTES
Pt resting comfortably in bed. A&Ox4, active in all extremities.   Pedal pulses palpable, +2 bilaterally.   No complaints of pain at this time.   Dopamine gtt continues, see MAR.

## 2024-08-02 NOTE — RT PROTOCOL NOTE
RT Inhaler-Nebulizer Bronchodilator Protocol Note    There is a bronchodilator order in the chart from a provider indicating to follow the RT Bronchodilator Protocol and there is an “Initiate RT Inhaler-Nebulizer Bronchodilator Protocol” order as well (see protocol at bottom of note).    CXR Findings:  XR CHEST PORTABLE    Result Date: 8/2/2024  No acute pulmonary pathology or change from the prior study Electronically signed by Melissa Robledo      The findings from the last RT Protocol Assessment were as follows:   History Pulmonary Disease: Chronic pulmonary disease  Respiratory Pattern: Regular pattern and RR 12-20 bpm  Breath Sounds: Slightly diminished and/or crackles  Cough: Strong, spontaneous, non-productive  Indication for Bronchodilator Therapy:    Bronchodilator Assessment Score: 4  Will continue with PRN    Aerosolized bronchodilator medication orders have been revised according to the RT Inhaler-Nebulizer Bronchodilator Protocol below.    Respiratory Therapist to perform RT Therapy Protocol Assessment initially then follow the protocol.  Repeat RT Therapy Protocol Assessment PRN for score 0-3 or on second treatment, BID, and PRN for scores above 3.    No Indications - adjust the frequency to every 6 hours PRN wheezing or bronchospasm, if no treatments needed after 48 hours then discontinue using Per Protocol order mode.     If indication present, adjust the RT bronchodilator orders based on the Bronchodilator Assessment Score as indicated below.  Use Inhaler orders unless patient has one or more of the following: on home nebulizer, not able to hold breath for 10 seconds, is not alert and oriented, cannot activate and use MDI correctly, or respiratory rate 25 breaths per minute or more, then use the equivalent nebulizer order(s) with same Frequency and PRN reasons based on the score.  If a patient is on this medication at home then do not decrease Frequency below that used at home.    0-3 - enter or

## 2024-08-02 NOTE — PROGRESS NOTES
Occupational Therapy  Attempt/Hold   Attempt to see for OT session this AM. Pt orthostatic yesterday in session, currently BPs running 80s/50s while supine in bed. Per conversation with RN will hold therapies at this time. Follow up as treatment schedule allows.     Jenn Matos, MOT, OTR/L, CNS

## 2024-08-03 ENCOUNTER — APPOINTMENT (OUTPATIENT)
Dept: GENERAL RADIOLOGY | Age: 64
End: 2024-08-03
Attending: SURGERY
Payer: MEDICAID

## 2024-08-03 LAB
ALBUMIN SERPL-MCNC: 3.2 G/DL (ref 3.4–5)
ANION GAP SERPL CALCULATED.3IONS-SCNC: 13 MMOL/L (ref 3–16)
BASOPHILS # BLD: 0 K/UL (ref 0–0.2)
BASOPHILS NFR BLD: 0.3 %
BUN SERPL-MCNC: 15 MG/DL (ref 7–20)
CALCIUM SERPL-MCNC: 8.7 MG/DL (ref 8.3–10.6)
CHLORIDE SERPL-SCNC: 101 MMOL/L (ref 99–110)
CO2 SERPL-SCNC: 25 MMOL/L (ref 21–32)
CREAT SERPL-MCNC: 0.7 MG/DL (ref 0.8–1.3)
DEPRECATED RDW RBC AUTO: 16.3 % (ref 12.4–15.4)
EOSINOPHIL # BLD: 0.3 K/UL (ref 0–0.6)
EOSINOPHIL NFR BLD: 4.5 %
GFR SERPLBLD CREATININE-BSD FMLA CKD-EPI: >90 ML/MIN/{1.73_M2}
GLUCOSE BLD-MCNC: 109 MG/DL (ref 70–99)
GLUCOSE BLD-MCNC: 110 MG/DL (ref 70–99)
GLUCOSE BLD-MCNC: 114 MG/DL (ref 70–99)
GLUCOSE BLD-MCNC: 117 MG/DL (ref 70–99)
GLUCOSE BLD-MCNC: 145 MG/DL (ref 70–99)
GLUCOSE BLD-MCNC: 92 MG/DL (ref 70–99)
GLUCOSE SERPL-MCNC: 109 MG/DL (ref 70–99)
HCT VFR BLD AUTO: 35.2 % (ref 40.5–52.5)
HGB BLD-MCNC: 12 G/DL (ref 13.5–17.5)
LYMPHOCYTES # BLD: 1.2 K/UL (ref 1–5.1)
LYMPHOCYTES NFR BLD: 15.4 %
MAGNESIUM SERPL-MCNC: 2.1 MG/DL (ref 1.8–2.4)
MCH RBC QN AUTO: 30.9 PG (ref 26–34)
MCHC RBC AUTO-ENTMCNC: 34 G/DL (ref 31–36)
MCV RBC AUTO: 90.9 FL (ref 80–100)
MONOCYTES # BLD: 0.8 K/UL (ref 0–1.3)
MONOCYTES NFR BLD: 10.3 %
NEUTROPHILS # BLD: 5.3 K/UL (ref 1.7–7.7)
NEUTROPHILS NFR BLD: 69.5 %
PERFORMED ON: ABNORMAL
PERFORMED ON: NORMAL
PHOSPHATE SERPL-MCNC: 3.6 MG/DL (ref 2.5–4.9)
PLATELET # BLD AUTO: 156 K/UL (ref 135–450)
PMV BLD AUTO: 7.8 FL (ref 5–10.5)
POTASSIUM SERPL-SCNC: 4.2 MMOL/L (ref 3.5–5.1)
RBC # BLD AUTO: 3.88 M/UL (ref 4.2–5.9)
SODIUM SERPL-SCNC: 139 MMOL/L (ref 136–145)
WBC # BLD AUTO: 7.6 K/UL (ref 4–11)

## 2024-08-03 PROCEDURE — 51798 US URINE CAPACITY MEASURE: CPT

## 2024-08-03 PROCEDURE — 80069 RENAL FUNCTION PANEL: CPT

## 2024-08-03 PROCEDURE — 36415 COLL VENOUS BLD VENIPUNCTURE: CPT

## 2024-08-03 PROCEDURE — 6360000002 HC RX W HCPCS

## 2024-08-03 PROCEDURE — 2700000000 HC OXYGEN THERAPY PER DAY

## 2024-08-03 PROCEDURE — 2580000003 HC RX 258

## 2024-08-03 PROCEDURE — 83735 ASSAY OF MAGNESIUM: CPT

## 2024-08-03 PROCEDURE — 94640 AIRWAY INHALATION TREATMENT: CPT

## 2024-08-03 PROCEDURE — 71045 X-RAY EXAM CHEST 1 VIEW: CPT

## 2024-08-03 PROCEDURE — 2000000000 HC ICU R&B

## 2024-08-03 PROCEDURE — 6370000000 HC RX 637 (ALT 250 FOR IP)

## 2024-08-03 PROCEDURE — 85025 COMPLETE CBC W/AUTO DIFF WBC: CPT

## 2024-08-03 PROCEDURE — 94761 N-INVAS EAR/PLS OXIMETRY MLT: CPT

## 2024-08-03 PROCEDURE — 6370000000 HC RX 637 (ALT 250 FOR IP): Performed by: STUDENT IN AN ORGANIZED HEALTH CARE EDUCATION/TRAINING PROGRAM

## 2024-08-03 RX ORDER — POLYETHYLENE GLYCOL 3350 17 G/17G
17 POWDER, FOR SOLUTION ORAL DAILY
Status: DISCONTINUED | OUTPATIENT
Start: 2024-08-03 | End: 2024-08-08 | Stop reason: HOSPADM

## 2024-08-03 RX ORDER — SENNA AND DOCUSATE SODIUM 50; 8.6 MG/1; MG/1
2 TABLET, FILM COATED ORAL 2 TIMES DAILY
Status: DISCONTINUED | OUTPATIENT
Start: 2024-08-03 | End: 2024-08-08 | Stop reason: HOSPADM

## 2024-08-03 RX ORDER — ATORVASTATIN CALCIUM 80 MG/1
80 TABLET, FILM COATED ORAL NIGHTLY
Status: DISCONTINUED | OUTPATIENT
Start: 2024-08-03 | End: 2024-08-08 | Stop reason: HOSPADM

## 2024-08-03 RX ADMIN — DIVALPROEX SODIUM 500 MG: 500 TABLET, DELAYED RELEASE ORAL at 08:42

## 2024-08-03 RX ADMIN — DOPAMINE HYDROCHLORIDE 13 MCG/KG/MIN: 160 INJECTION, SOLUTION INTRAVENOUS at 16:41

## 2024-08-03 RX ADMIN — MIDODRINE HYDROCHLORIDE 10 MG: 5 TABLET ORAL at 12:00

## 2024-08-03 RX ADMIN — ACETAMINOPHEN 650 MG: 325 TABLET ORAL at 21:48

## 2024-08-03 RX ADMIN — DIVALPROEX SODIUM 1000 MG: 250 TABLET, DELAYED RELEASE ORAL at 16:07

## 2024-08-03 RX ADMIN — ATORVASTATIN CALCIUM 80 MG: 80 TABLET, FILM COATED ORAL at 21:48

## 2024-08-03 RX ADMIN — PANTOPRAZOLE SODIUM 40 MG: 40 TABLET, DELAYED RELEASE ORAL at 08:41

## 2024-08-03 RX ADMIN — ALBUTEROL SULFATE 2.5 MG: 2.5 SOLUTION RESPIRATORY (INHALATION) at 04:43

## 2024-08-03 RX ADMIN — DOPAMINE HYDROCHLORIDE 12.5 MCG/KG/MIN: 160 INJECTION, SOLUTION INTRAVENOUS at 08:58

## 2024-08-03 RX ADMIN — MIDODRINE HYDROCHLORIDE 10 MG: 5 TABLET ORAL at 16:06

## 2024-08-03 RX ADMIN — FLUTICASONE PROPIONATE 1 SPRAY: 50 SPRAY, METERED NASAL at 08:43

## 2024-08-03 RX ADMIN — ACETAMINOPHEN 650 MG: 325 TABLET ORAL at 04:22

## 2024-08-03 RX ADMIN — CETIRIZINE HYDROCHLORIDE 10 MG: 10 TABLET, FILM COATED ORAL at 08:41

## 2024-08-03 RX ADMIN — ACETAMINOPHEN 650 MG: 325 TABLET ORAL at 16:07

## 2024-08-03 RX ADMIN — GABAPENTIN 400 MG: 400 CAPSULE ORAL at 21:49

## 2024-08-03 RX ADMIN — ENOXAPARIN SODIUM 40 MG: 100 INJECTION SUBCUTANEOUS at 08:41

## 2024-08-03 RX ADMIN — SODIUM CHLORIDE, PRESERVATIVE FREE 10 ML: 5 INJECTION INTRAVENOUS at 08:44

## 2024-08-03 RX ADMIN — TAMSULOSIN HYDROCHLORIDE 0.4 MG: 0.4 CAPSULE ORAL at 08:41

## 2024-08-03 RX ADMIN — POLYETHYLENE GLYCOL 3350 17 G: 17 POWDER, FOR SOLUTION ORAL at 08:41

## 2024-08-03 RX ADMIN — GABAPENTIN 400 MG: 400 CAPSULE ORAL at 14:43

## 2024-08-03 RX ADMIN — ASPIRIN 81 MG: 81 TABLET, COATED ORAL at 08:42

## 2024-08-03 RX ADMIN — CLOPIDOGREL BISULFATE 75 MG: 75 TABLET ORAL at 08:41

## 2024-08-03 RX ADMIN — QUETIAPINE FUMARATE 300 MG: 300 TABLET ORAL at 21:49

## 2024-08-03 RX ADMIN — GABAPENTIN 400 MG: 400 CAPSULE ORAL at 08:41

## 2024-08-03 RX ADMIN — ALBUTEROL SULFATE 2.5 MG: 2.5 SOLUTION RESPIRATORY (INHALATION) at 23:52

## 2024-08-03 RX ADMIN — SENNOSIDES AND DOCUSATE SODIUM 2 TABLET: 50; 8.6 TABLET ORAL at 21:48

## 2024-08-03 RX ADMIN — SENNOSIDES AND DOCUSATE SODIUM 2 TABLET: 50; 8.6 TABLET ORAL at 08:41

## 2024-08-03 RX ADMIN — DOPAMINE HYDROCHLORIDE 10 MCG/KG/MIN: 160 INJECTION, SOLUTION INTRAVENOUS at 01:22

## 2024-08-03 RX ADMIN — MONTELUKAST SODIUM 10 MG: 10 TABLET, FILM COATED ORAL at 21:48

## 2024-08-03 RX ADMIN — ACETAMINOPHEN 650 MG: 325 TABLET ORAL at 08:41

## 2024-08-03 RX ADMIN — MIDODRINE HYDROCHLORIDE 10 MG: 5 TABLET ORAL at 06:38

## 2024-08-03 ASSESSMENT — PAIN SCALES - GENERAL
PAINLEVEL_OUTOF10: 0

## 2024-08-03 NOTE — PROGRESS NOTES
Point of Care Note:  Vascular Surgery  Scot RAMIREZ Syburke    1:50 PM  8/3/2024    Called Dr. Mccann for recommendations on the patient's need for dopamine gtt despite being on Midodrine at an increased dose for approximately 24 hours now.     Advised to obtain echocardiogram on Monday to better assess patient's HF and cardiac function. Plan wean dopamine if possible on Midodrine.    - Order placed for echocardiogram on Monday morning.      Melissa Pitts DO, MS  PGY4, General Surgery  08/03/24  1:52 PM  [phone]

## 2024-08-03 NOTE — PROGRESS NOTES
Pt resting comfortably in bed, denies pain at this time. A&Ox4. Bilateral pedal pulses palpable +1.   Attempted to wean dopamine gtt with minimal success, see MAR for titrations.

## 2024-08-03 NOTE — PROGRESS NOTES
Department of Vascular Surgery  ICU Daily Progress Note   08/03/24        SUBJECTIVE:   INTERVAL HISTORY:   Patient with increased dose of midodrine, still requiring dopamine. Will reach out to cardiology for further recommendations.     Otherwise rested well overnight. Works with PT/OT during the day.       OBJECTIVE:   VITALS:   Vitals:    08/03/24 0715 08/03/24 0730 08/03/24 0800 08/03/24 0915   BP: (!) 87/59 104/62     Pulse: 73 69  64   Resp: 22 17  19   Temp:   97.2 °F (36.2 °C)    TempSrc:       SpO2: 96% 96%  98%   Weight:       Height:            PHYSICAL EXAMINATION:   General appearance - alert, well appearing, and in no distress  Neck - supple  Cardiac- normal rate, regular rhythm  Pulmonary - No retractions. No dyspnea.   Abdomen - soft, non-tender  : Wharton in place  Extremities - Femoral incisions c/d/I. Palpable femoral pulses. Dopplerable DP/PT bilaterally. NO hematoma in groins  Neurological - alert, oriented, normal speech, no focal findings or movement disorder noted  Skin - normal coloration and turgor, no rashes, no suspicious skin lesions noted    ASSESSMENT AND PLAN:   Scot Hernandez is a 64 y.o. male, who was admitted with AAA without rupture. He is now s/p Ultrasound-guided bilateral common femoral access, Aortogram, Endovascular infrarenal aortic  aneurysm repair, Balloon angioplasty, and ProGlide closure to bilateral common femoral arteries on 7/31/2024 3 Days Post-Op        - continue to wean dopamine as able  - Will follow up cardiology recommendations  - Continue to work with PT/OT  - Will remove wharton today, patient due to void independently.  - Continue regular diet  - Continue ASA and plavix  - Restarted Lipitor 80mg daily.     Melissa Pitts DO, MS  PGY4, General Surgery  08/03/24  10:57 AM  [phone]

## 2024-08-03 NOTE — PLAN OF CARE
Problem: Chronic Conditions and Co-morbidities  Goal: Patient's chronic conditions and co-morbidity symptoms are monitored and maintained or improved  8/2/2024 2319 by Sienna Diehl RN  Outcome: Progressing  Flowsheets (Taken 8/2/2024 2000)  Care Plan - Patient's Chronic Conditions and Co-Morbidity Symptoms are Monitored and Maintained or Improved: Monitor and assess patient's chronic conditions and comorbid symptoms for stability, deterioration, or improvement  8/2/2024 1542 by En Pinedo RN  Outcome: Progressing  Flowsheets (Taken 8/1/2024 2000 by Sienna Diehl RN)  Care Plan - Patient's Chronic Conditions and Co-Morbidity Symptoms are Monitored and Maintained or Improved: Monitor and assess patient's chronic conditions and comorbid symptoms for stability, deterioration, or improvement     Problem: Discharge Planning  Goal: Discharge to home or other facility with appropriate resources  8/2/2024 2319 by Sienna Diehl RN  Outcome: Progressing  Flowsheets (Taken 8/2/2024 2000)  Discharge to home or other facility with appropriate resources: Identify barriers to discharge with patient and caregiver  8/2/2024 1542 by En Pinedo RN  Outcome: Progressing  Flowsheets (Taken 8/1/2024 2000 by Sienna Diehl RN)  Discharge to home or other facility with appropriate resources: Identify barriers to discharge with patient and caregiver     Problem: Pain  Goal: Verbalizes/displays adequate comfort level or baseline comfort level  8/2/2024 2319 by Sienna Diehl RN  Outcome: Progressing  8/2/2024 1542 by En Pinedo RN  Outcome: Progressing  Flowsheets (Taken 8/1/2024 2000 by Sienna Diehl RN)  Verbalizes/displays adequate comfort level or baseline comfort level:   Encourage patient to monitor pain and request assistance   Assess pain using appropriate pain scale     Problem: Safety - Adult  Goal: Free from fall injury  8/2/2024 2319 by Sienna Diehl RN  Outcome: Progressing  Flowsheets (Taken 8/2/2024

## 2024-08-04 ENCOUNTER — APPOINTMENT (OUTPATIENT)
Dept: GENERAL RADIOLOGY | Age: 64
End: 2024-08-04
Attending: SURGERY
Payer: MEDICAID

## 2024-08-04 LAB
ALBUMIN SERPL-MCNC: 3.2 G/DL (ref 3.4–5)
ANION GAP SERPL CALCULATED.3IONS-SCNC: 9 MMOL/L (ref 3–16)
BASOPHILS # BLD: 0 K/UL (ref 0–0.2)
BASOPHILS NFR BLD: 0.4 %
BUN SERPL-MCNC: 18 MG/DL (ref 7–20)
CALCIUM SERPL-MCNC: 9 MG/DL (ref 8.3–10.6)
CHLORIDE SERPL-SCNC: 101 MMOL/L (ref 99–110)
CO2 SERPL-SCNC: 31 MMOL/L (ref 21–32)
CREAT SERPL-MCNC: 0.9 MG/DL (ref 0.8–1.3)
DEPRECATED RDW RBC AUTO: 16.2 % (ref 12.4–15.4)
EOSINOPHIL # BLD: 0.4 K/UL (ref 0–0.6)
EOSINOPHIL NFR BLD: 6.8 %
GFR SERPLBLD CREATININE-BSD FMLA CKD-EPI: >90 ML/MIN/{1.73_M2}
GLUCOSE BLD-MCNC: 104 MG/DL (ref 70–99)
GLUCOSE BLD-MCNC: 115 MG/DL (ref 70–99)
GLUCOSE BLD-MCNC: 119 MG/DL (ref 70–99)
GLUCOSE BLD-MCNC: 121 MG/DL (ref 70–99)
GLUCOSE BLD-MCNC: 127 MG/DL (ref 70–99)
GLUCOSE BLD-MCNC: 136 MG/DL (ref 70–99)
GLUCOSE SERPL-MCNC: 97 MG/DL (ref 70–99)
HCT VFR BLD AUTO: 36.1 % (ref 40.5–52.5)
HGB BLD-MCNC: 12.2 G/DL (ref 13.5–17.5)
LYMPHOCYTES # BLD: 0.9 K/UL (ref 1–5.1)
LYMPHOCYTES NFR BLD: 15.1 %
MAGNESIUM SERPL-MCNC: 1.9 MG/DL (ref 1.8–2.4)
MCH RBC QN AUTO: 30.8 PG (ref 26–34)
MCHC RBC AUTO-ENTMCNC: 33.7 G/DL (ref 31–36)
MCV RBC AUTO: 91.6 FL (ref 80–100)
MONOCYTES # BLD: 0.7 K/UL (ref 0–1.3)
MONOCYTES NFR BLD: 10.9 %
NEUTROPHILS # BLD: 4.2 K/UL (ref 1.7–7.7)
NEUTROPHILS NFR BLD: 66.8 %
PERFORMED ON: ABNORMAL
PHOSPHATE SERPL-MCNC: 3.3 MG/DL (ref 2.5–4.9)
PLATELET # BLD AUTO: 169 K/UL (ref 135–450)
PMV BLD AUTO: 7.8 FL (ref 5–10.5)
POTASSIUM SERPL-SCNC: 5.1 MMOL/L (ref 3.5–5.1)
RBC # BLD AUTO: 3.94 M/UL (ref 4.2–5.9)
SODIUM SERPL-SCNC: 141 MMOL/L (ref 136–145)
WBC # BLD AUTO: 6.3 K/UL (ref 4–11)

## 2024-08-04 PROCEDURE — 6370000000 HC RX 637 (ALT 250 FOR IP)

## 2024-08-04 PROCEDURE — 2000000000 HC ICU R&B

## 2024-08-04 PROCEDURE — 94640 AIRWAY INHALATION TREATMENT: CPT

## 2024-08-04 PROCEDURE — 2580000003 HC RX 258

## 2024-08-04 PROCEDURE — 71045 X-RAY EXAM CHEST 1 VIEW: CPT

## 2024-08-04 PROCEDURE — 6360000002 HC RX W HCPCS

## 2024-08-04 PROCEDURE — 85025 COMPLETE CBC W/AUTO DIFF WBC: CPT

## 2024-08-04 PROCEDURE — 80069 RENAL FUNCTION PANEL: CPT

## 2024-08-04 PROCEDURE — 36415 COLL VENOUS BLD VENIPUNCTURE: CPT

## 2024-08-04 PROCEDURE — 6370000000 HC RX 637 (ALT 250 FOR IP): Performed by: STUDENT IN AN ORGANIZED HEALTH CARE EDUCATION/TRAINING PROGRAM

## 2024-08-04 PROCEDURE — 83735 ASSAY OF MAGNESIUM: CPT

## 2024-08-04 RX ORDER — CALCIUM CARBONATE 500 MG/1
500 TABLET, CHEWABLE ORAL 3 TIMES DAILY PRN
Status: DISCONTINUED | OUTPATIENT
Start: 2024-08-04 | End: 2024-08-08 | Stop reason: HOSPADM

## 2024-08-04 RX ADMIN — ATORVASTATIN CALCIUM 80 MG: 80 TABLET, FILM COATED ORAL at 21:24

## 2024-08-04 RX ADMIN — CETIRIZINE HYDROCHLORIDE 10 MG: 10 TABLET, FILM COATED ORAL at 08:43

## 2024-08-04 RX ADMIN — FLUTICASONE PROPIONATE 1 SPRAY: 50 SPRAY, METERED NASAL at 09:31

## 2024-08-04 RX ADMIN — CLOPIDOGREL BISULFATE 75 MG: 75 TABLET ORAL at 08:44

## 2024-08-04 RX ADMIN — ACETAMINOPHEN 650 MG: 325 TABLET ORAL at 03:58

## 2024-08-04 RX ADMIN — SENNOSIDES AND DOCUSATE SODIUM 2 TABLET: 50; 8.6 TABLET ORAL at 21:23

## 2024-08-04 RX ADMIN — ASPIRIN 81 MG: 81 TABLET, COATED ORAL at 08:44

## 2024-08-04 RX ADMIN — ANTACID TABLETS 500 MG: 500 TABLET, CHEWABLE ORAL at 21:23

## 2024-08-04 RX ADMIN — ALBUTEROL SULFATE 2.5 MG: 2.5 SOLUTION RESPIRATORY (INHALATION) at 20:34

## 2024-08-04 RX ADMIN — ACETAMINOPHEN 650 MG: 325 TABLET ORAL at 21:23

## 2024-08-04 RX ADMIN — DIVALPROEX SODIUM 1000 MG: 250 TABLET, DELAYED RELEASE ORAL at 16:21

## 2024-08-04 RX ADMIN — DIVALPROEX SODIUM 500 MG: 500 TABLET, DELAYED RELEASE ORAL at 08:44

## 2024-08-04 RX ADMIN — GABAPENTIN 400 MG: 400 CAPSULE ORAL at 08:43

## 2024-08-04 RX ADMIN — MONTELUKAST SODIUM 10 MG: 10 TABLET, FILM COATED ORAL at 21:23

## 2024-08-04 RX ADMIN — ACETAMINOPHEN 650 MG: 325 TABLET ORAL at 16:21

## 2024-08-04 RX ADMIN — MIDODRINE HYDROCHLORIDE 10 MG: 5 TABLET ORAL at 06:41

## 2024-08-04 RX ADMIN — DOPAMINE HYDROCHLORIDE 7.5 MCG/KG/MIN: 160 INJECTION, SOLUTION INTRAVENOUS at 02:51

## 2024-08-04 RX ADMIN — MIDODRINE HYDROCHLORIDE 10 MG: 5 TABLET ORAL at 11:30

## 2024-08-04 RX ADMIN — SENNOSIDES AND DOCUSATE SODIUM 2 TABLET: 50; 8.6 TABLET ORAL at 08:43

## 2024-08-04 RX ADMIN — SODIUM CHLORIDE, PRESERVATIVE FREE 10 ML: 5 INJECTION INTRAVENOUS at 11:29

## 2024-08-04 RX ADMIN — DOPAMINE HYDROCHLORIDE 12.5 MCG/KG/MIN: 160 INJECTION, SOLUTION INTRAVENOUS at 16:53

## 2024-08-04 RX ADMIN — MIDODRINE HYDROCHLORIDE 10 MG: 5 TABLET ORAL at 16:21

## 2024-08-04 RX ADMIN — PANTOPRAZOLE SODIUM 40 MG: 40 TABLET, DELAYED RELEASE ORAL at 08:44

## 2024-08-04 RX ADMIN — ACETAMINOPHEN 650 MG: 325 TABLET ORAL at 08:44

## 2024-08-04 RX ADMIN — DOPAMINE HYDROCHLORIDE 15 MCG/KG/MIN: 160 INJECTION, SOLUTION INTRAVENOUS at 10:57

## 2024-08-04 RX ADMIN — ENOXAPARIN SODIUM 40 MG: 100 INJECTION SUBCUTANEOUS at 08:43

## 2024-08-04 RX ADMIN — TAMSULOSIN HYDROCHLORIDE 0.4 MG: 0.4 CAPSULE ORAL at 08:44

## 2024-08-04 RX ADMIN — GABAPENTIN 400 MG: 400 CAPSULE ORAL at 21:23

## 2024-08-04 RX ADMIN — GABAPENTIN 400 MG: 400 CAPSULE ORAL at 14:34

## 2024-08-04 ASSESSMENT — PAIN SCALES - GENERAL
PAINLEVEL_OUTOF10: 2
PAINLEVEL_OUTOF10: 0
PAINLEVEL_OUTOF10: 0

## 2024-08-04 NOTE — PROGRESS NOTES
Pt resting in bed. Up to bathroom to void with gait belt. Tolerates transfer well.   A&Ox4. Bilateral Pedal/Post tibial pulses palpable +1.   Attempts made to wean dopamine gtt without success, see MAR.

## 2024-08-04 NOTE — PROGRESS NOTES
Department of Vascular Surgery  ICU Daily Progress Note   08/04/24        SUBJECTIVE:   INTERVAL HISTORY:   Patient with increased dose of midodrine, still requiring dopamine. Will reach out to cardiology for further recommendations.     Otherwise rested well overnight. Works with PT/OT during the day.       OBJECTIVE:   VITALS:   Vitals:    08/04/24 0615 08/04/24 0630 08/04/24 0645 08/04/24 0700   BP: (!) 63/37 (!) 77/50 116/85 103/83   Pulse: 80 72 90 87   Resp: 21 18 16 16   Temp:       TempSrc:       SpO2: 98% 99% 97% 95%   Weight:       Height:            PHYSICAL EXAMINATION:   General appearance - alert, well appearing, and in no distress  Neck - supple  Cardiac- normal rate, regular rhythm  Pulmonary - No retractions. No dyspnea.   Abdomen - soft, non-tender  : Flores in place  Extremities - Femoral incisions c/d/I. Palpable femoral pulses. Dopplerable DP/PT bilaterally. NO hematoma in groins  Neurological - alert, oriented, normal speech, no focal findings or movement disorder noted  Skin - normal coloration and turgor, no rashes, no suspicious skin lesions noted    ASSESSMENT AND PLAN:   Scot Hernandez is a 64 y.o. male, who was admitted with AAA without rupture. He is now s/p Ultrasound-guided bilateral common femoral access, Aortogram, Endovascular infrarenal aortic  aneurysm repair, Balloon angioplasty, and ProGlide closure to bilateral common femoral arteries on 7/31/2024 4 Days Post-Op        - continue to wean dopamine as able, SBP goal > 90. Continue midodrine  -wean oxygen as able, > 88%  - Will follow up cardiology recommendations   Will obtain echo tomorrow   - Continue to work with PT/OT  - Continue regular diet  - Continue ASA and plavix  - Restarted Lipitor 80mg daily.     Akash Marquez DO  PGY1, General Surgery  08/04/24  8:20 AM  [facility]  Pager: [phone]

## 2024-08-05 LAB
ALBUMIN SERPL-MCNC: 3.4 G/DL (ref 3.4–5)
ANION GAP SERPL CALCULATED.3IONS-SCNC: 9 MMOL/L (ref 3–16)
BASOPHILS # BLD: 0.1 K/UL (ref 0–0.2)
BASOPHILS NFR BLD: 0.8 %
BUN SERPL-MCNC: 23 MG/DL (ref 7–20)
CALCIUM SERPL-MCNC: 8.9 MG/DL (ref 8.3–10.6)
CHLORIDE SERPL-SCNC: 103 MMOL/L (ref 99–110)
CO2 SERPL-SCNC: 30 MMOL/L (ref 21–32)
CORTIS SERPL-MCNC: 19.7 UG/DL
CREAT SERPL-MCNC: 0.8 MG/DL (ref 0.8–1.3)
DEPRECATED RDW RBC AUTO: 16.1 % (ref 12.4–15.4)
EOSINOPHIL # BLD: 0.6 K/UL (ref 0–0.6)
EOSINOPHIL NFR BLD: 9.3 %
GFR SERPLBLD CREATININE-BSD FMLA CKD-EPI: >90 ML/MIN/{1.73_M2}
GLUCOSE BLD-MCNC: 112 MG/DL (ref 70–99)
GLUCOSE BLD-MCNC: 82 MG/DL (ref 70–99)
GLUCOSE BLD-MCNC: 84 MG/DL (ref 70–99)
GLUCOSE SERPL-MCNC: 88 MG/DL (ref 70–99)
HCT VFR BLD AUTO: 34 % (ref 40.5–52.5)
HGB BLD-MCNC: 11.7 G/DL (ref 13.5–17.5)
LYMPHOCYTES # BLD: 1 K/UL (ref 1–5.1)
LYMPHOCYTES NFR BLD: 15.3 %
MAGNESIUM SERPL-MCNC: 1.9 MG/DL (ref 1.8–2.4)
MCH RBC QN AUTO: 31.3 PG (ref 26–34)
MCHC RBC AUTO-ENTMCNC: 34.4 G/DL (ref 31–36)
MCV RBC AUTO: 91 FL (ref 80–100)
MONOCYTES # BLD: 0.7 K/UL (ref 0–1.3)
MONOCYTES NFR BLD: 11 %
NEUTROPHILS # BLD: 4 K/UL (ref 1.7–7.7)
NEUTROPHILS NFR BLD: 63.6 %
PERFORMED ON: ABNORMAL
PERFORMED ON: NORMAL
PERFORMED ON: NORMAL
PHOSPHATE SERPL-MCNC: 4.6 MG/DL (ref 2.5–4.9)
PLATELET # BLD AUTO: 205 K/UL (ref 135–450)
PMV BLD AUTO: 7.9 FL (ref 5–10.5)
POTASSIUM SERPL-SCNC: 4.5 MMOL/L (ref 3.5–5.1)
RBC # BLD AUTO: 3.74 M/UL (ref 4.2–5.9)
SODIUM SERPL-SCNC: 142 MMOL/L (ref 136–145)
WBC # BLD AUTO: 6.3 K/UL (ref 4–11)

## 2024-08-05 PROCEDURE — 6360000002 HC RX W HCPCS

## 2024-08-05 PROCEDURE — 6370000000 HC RX 637 (ALT 250 FOR IP)

## 2024-08-05 PROCEDURE — 94761 N-INVAS EAR/PLS OXIMETRY MLT: CPT

## 2024-08-05 PROCEDURE — 83735 ASSAY OF MAGNESIUM: CPT

## 2024-08-05 PROCEDURE — 80069 RENAL FUNCTION PANEL: CPT

## 2024-08-05 PROCEDURE — 94640 AIRWAY INHALATION TREATMENT: CPT

## 2024-08-05 PROCEDURE — 2580000003 HC RX 258

## 2024-08-05 PROCEDURE — 6370000000 HC RX 637 (ALT 250 FOR IP): Performed by: STUDENT IN AN ORGANIZED HEALTH CARE EDUCATION/TRAINING PROGRAM

## 2024-08-05 PROCEDURE — 94669 MECHANICAL CHEST WALL OSCILL: CPT

## 2024-08-05 PROCEDURE — 99291 CRITICAL CARE FIRST HOUR: CPT | Performed by: INTERNAL MEDICINE

## 2024-08-05 PROCEDURE — 6370000000 HC RX 637 (ALT 250 FOR IP): Performed by: INTERNAL MEDICINE

## 2024-08-05 PROCEDURE — 82533 TOTAL CORTISOL: CPT

## 2024-08-05 PROCEDURE — 85025 COMPLETE CBC W/AUTO DIFF WBC: CPT

## 2024-08-05 PROCEDURE — 84244 ASSAY OF RENIN: CPT

## 2024-08-05 PROCEDURE — 82088 ASSAY OF ALDOSTERONE: CPT

## 2024-08-05 PROCEDURE — 97535 SELF CARE MNGMENT TRAINING: CPT

## 2024-08-05 PROCEDURE — 97530 THERAPEUTIC ACTIVITIES: CPT

## 2024-08-05 PROCEDURE — 2000000000 HC ICU R&B

## 2024-08-05 PROCEDURE — 2700000000 HC OXYGEN THERAPY PER DAY

## 2024-08-05 PROCEDURE — 36415 COLL VENOUS BLD VENIPUNCTURE: CPT

## 2024-08-05 RX ORDER — FLUDROCORTISONE ACETATE 0.1 MG/1
0.1 TABLET ORAL DAILY
Status: DISCONTINUED | OUTPATIENT
Start: 2024-08-05 | End: 2024-08-06

## 2024-08-05 RX ORDER — SODIUM CHLORIDE 0.9 % (FLUSH) 0.9 %
10 SYRINGE (ML) INJECTION EVERY 12 HOURS SCHEDULED
Status: DISCONTINUED | OUTPATIENT
Start: 2024-08-05 | End: 2024-08-08 | Stop reason: HOSPADM

## 2024-08-05 RX ORDER — KETOTIFEN FUMARATE 0.35 MG/ML
1 SOLUTION/ DROPS OPHTHALMIC 2 TIMES DAILY
Status: DISCONTINUED | OUTPATIENT
Start: 2024-08-05 | End: 2024-08-08 | Stop reason: HOSPADM

## 2024-08-05 RX ORDER — FLUDROCORTISONE ACETATE 0.1 MG/1
0.05 TABLET ORAL DAILY
Status: DISCONTINUED | OUTPATIENT
Start: 2024-08-05 | End: 2024-08-05

## 2024-08-05 RX ORDER — SODIUM CHLORIDE 0.9 % (FLUSH) 0.9 %
10 SYRINGE (ML) INJECTION PRN
Status: DISCONTINUED | OUTPATIENT
Start: 2024-08-05 | End: 2024-08-08 | Stop reason: HOSPADM

## 2024-08-05 RX ORDER — KETOTIFEN FUMARATE 0.35 MG/ML
1 SOLUTION/ DROPS OPHTHALMIC 2 TIMES DAILY
Status: CANCELLED | OUTPATIENT
Start: 2024-08-05

## 2024-08-05 RX ADMIN — POLYETHYLENE GLYCOL 3350 17 G: 17 POWDER, FOR SOLUTION ORAL at 08:16

## 2024-08-05 RX ADMIN — FLUTICASONE PROPIONATE 1 SPRAY: 50 SPRAY, METERED NASAL at 08:14

## 2024-08-05 RX ADMIN — PANTOPRAZOLE SODIUM 40 MG: 40 TABLET, DELAYED RELEASE ORAL at 08:12

## 2024-08-05 RX ADMIN — DIVALPROEX SODIUM 500 MG: 500 TABLET, DELAYED RELEASE ORAL at 08:13

## 2024-08-05 RX ADMIN — CETIRIZINE HYDROCHLORIDE 10 MG: 10 TABLET, FILM COATED ORAL at 08:12

## 2024-08-05 RX ADMIN — ONDANSETRON 4 MG: 2 INJECTION INTRAMUSCULAR; INTRAVENOUS at 18:53

## 2024-08-05 RX ADMIN — TAMSULOSIN HYDROCHLORIDE 0.4 MG: 0.4 CAPSULE ORAL at 08:11

## 2024-08-05 RX ADMIN — ACETAMINOPHEN 650 MG: 325 TABLET ORAL at 17:50

## 2024-08-05 RX ADMIN — GABAPENTIN 400 MG: 400 CAPSULE ORAL at 08:12

## 2024-08-05 RX ADMIN — FLUDROCORTISONE ACETATE 0.1 MG: 0.1 TABLET ORAL at 15:21

## 2024-08-05 RX ADMIN — ASPIRIN 81 MG: 81 TABLET, COATED ORAL at 08:12

## 2024-08-05 RX ADMIN — MIDODRINE HYDROCHLORIDE 10 MG: 5 TABLET ORAL at 11:53

## 2024-08-05 RX ADMIN — QUETIAPINE FUMARATE 300 MG: 300 TABLET ORAL at 21:17

## 2024-08-05 RX ADMIN — ACETAMINOPHEN 650 MG: 325 TABLET ORAL at 11:53

## 2024-08-05 RX ADMIN — KETOTIFEN FUMARATE 1 DROP: 0.25 SOLUTION/ DROPS OPHTHALMIC at 21:30

## 2024-08-05 RX ADMIN — DOPAMINE HYDROCHLORIDE 5 MCG/KG/MIN: 160 INJECTION, SOLUTION INTRAVENOUS at 05:02

## 2024-08-05 RX ADMIN — MIDODRINE HYDROCHLORIDE 10 MG: 5 TABLET ORAL at 17:53

## 2024-08-05 RX ADMIN — DIVALPROEX SODIUM 1000 MG: 250 TABLET, DELAYED RELEASE ORAL at 17:51

## 2024-08-05 RX ADMIN — ENOXAPARIN SODIUM 40 MG: 100 INJECTION SUBCUTANEOUS at 08:13

## 2024-08-05 RX ADMIN — GABAPENTIN 400 MG: 400 CAPSULE ORAL at 21:17

## 2024-08-05 RX ADMIN — CLOPIDOGREL BISULFATE 75 MG: 75 TABLET ORAL at 08:12

## 2024-08-05 RX ADMIN — ALBUTEROL SULFATE 2.5 MG: 2.5 SOLUTION RESPIRATORY (INHALATION) at 13:09

## 2024-08-05 RX ADMIN — MIDODRINE HYDROCHLORIDE 10 MG: 5 TABLET ORAL at 05:02

## 2024-08-05 RX ADMIN — ACETAMINOPHEN 650 MG: 325 TABLET ORAL at 05:02

## 2024-08-05 RX ADMIN — SODIUM CHLORIDE, PRESERVATIVE FREE 5 ML: 5 INJECTION INTRAVENOUS at 21:00

## 2024-08-05 RX ADMIN — MONTELUKAST SODIUM 10 MG: 10 TABLET, FILM COATED ORAL at 21:17

## 2024-08-05 RX ADMIN — ATORVASTATIN CALCIUM 80 MG: 80 TABLET, FILM COATED ORAL at 21:17

## 2024-08-05 RX ADMIN — ACETAMINOPHEN 650 MG: 325 TABLET ORAL at 21:17

## 2024-08-05 RX ADMIN — GABAPENTIN 400 MG: 400 CAPSULE ORAL at 15:20

## 2024-08-05 ASSESSMENT — ENCOUNTER SYMPTOMS: SHORTNESS OF BREATH: 0

## 2024-08-05 ASSESSMENT — PAIN SCALES - GENERAL
PAINLEVEL_OUTOF10: 0
PAINLEVEL_OUTOF10: 0

## 2024-08-05 NOTE — PROGRESS NOTES
Department of Vascular Surgery  ICU Daily Progress Note   08/05/24        SUBJECTIVE:   INTERVAL HISTORY:   No acute events overnight.  Pain well-controlled at this time.  Tolerating diet without nausea/vomiting. No further complaints this morning. Dopamine rate coming down.       OBJECTIVE:   VITALS:   Vitals:    08/05/24 0615 08/05/24 0630 08/05/24 0645 08/05/24 0800   BP: 102/60 (!) 79/69 109/70    Pulse: 59 64 64    Resp: 20 15 22    Temp:    97.6 °F (36.4 °C)   TempSrc:    Oral   SpO2: 98% 98% 98%    Weight:       Height:            PHYSICAL EXAMINATION:   General appearance - alert, well appearing, and in no distress  Neck - supple  Cardiac- normal rate, regular rhythm  Pulmonary - No retractions. No dyspnea.   Abdomen - soft, non-tender  : Flores in place  Extremities - Femoral incisions c/d/I. Palpable femoral pulses. Dopplerable DP/PT bilaterally. NO hematoma in groins  Neurological - alert, oriented, normal speech, no focal findings or movement disorder noted  Skin - normal coloration and turgor, no rashes, no suspicious skin lesions noted    ASSESSMENT AND PLAN:   Scot Hernandez is a 64 y.o. male, who was admitted with AAA without rupture. He is now s/p Ultrasound-guided bilateral common femoral access, Aortogram, Endovascular infrarenal aortic  aneurysm repair, Balloon angioplasty, and ProGlide closure to bilateral common femoral arteries on 7/31/2024 5 Days Post-Op        - continue to wean dopamine as able, SBP goal > 90. Continue midodrine  -wean oxygen as able, > 88%  - Will follow up cardiology recommendations   Will obtain am cortisol, renin, and aldosterone level this am    Will obtain echo today   - Continue to work with PT/OT  - Continue regular diet  - Continue ASA and plavix  - Restarted Lipitor 80mg daily.     Akash Marquez DO  PGY1, General Surgery  08/05/24  8:13 AM  [facility]  Pager: [phone]

## 2024-08-05 NOTE — CARE COORDINATION
Pt is from home alone and indp at baseline.  Pt will need transport home. SW arranged transport with [facility] for follow up appointment.     PT/OT pending recommendations.         [employer] transportation line ([phone]), transport scheduled for Sept 3rd  a  7am  to [address], [place] [address], trip #: [id number]. Pt /MD office will need to call for  when pt is ready to leave day of.     Annika Dickerson RN, BSN, CM  Case Management Department  [phone]

## 2024-08-05 NOTE — PROGRESS NOTES
Occupational Therapy  Facility/Department: [facility] ICU  Occupational Therapy   Name: Scot Hernandez  : 1960  MRN: [id number]  Date of Service: 2024    Discharge Recommendations:  24 hour supervision or assist  OT Equipment Recommendations  Equipment Needed: No       Patient Diagnosis(es): The encounter diagnosis was Chronic congestive heart failure, unspecified heart failure type (HCC).  Past Medical History:  has a past medical history of AAA (abdominal aortic aneurysm) (HCC), Anxiety, Arterial stent thrombosis (HCC), Arthritis, Asthma, Bipolar 1 disorder (HCC), COPD (chronic obstructive pulmonary disease) (HCC), Coronary artery disease involving native coronary artery of native heart without angina pectoris, Diabetes mellitus (HCC), Essential hypertension, Hyperlipidemia, and Pneumonia.  Past Surgical History:  has a past surgical history that includes Knee Arthroplasty; Cholecystectomy, laparoscopic; Carpal tunnel release; Nasal sinus surgery; Upper gastrointestinal endoscopy (2011); Cardiac surgery; Upper gastrointestinal endoscopy (N/A, 2019); Total hip arthroplasty (Right, 2023); other surgical history; Colonoscopy (N/A, 2023); Cystoscopy (N/A, 2024); Cardiac procedure (N/A, 7/15/2024); and AAA repair, endovascular (N/A, 2024).    Treatment Diagnosis: imp mob, tf, ADL      Assessment   Performance deficits / Impairments: Decreased functional mobility ;Decreased ADL status;Decreased endurance  Assessment: Pt is pleasant and cooperative.  Pt is very talkative. Pt is progressing well.  Req SBA for bed to/from chair and to don pants.  Cont OT tx per plan of care.  Treatment Diagnosis: imp mob, tf, ADL  Prognosis: Good  REQUIRES OT FOLLOW-UP: Yes  Activity Tolerance  Activity Tolerance: Patient Tolerated treatment well  Activity Tolerance Comments: BP: Supine 99/72, sitting /76, after transfer to chair 95/68        Plan   Occupational Therapy Plan  Times Per  mobility  Supine to Sit: Supervision  Sit to Supine: Supervision  Transfers  Stand Step Transfers: Stand by assistance  Sit to stand: Stand by assistance  Stand to sit: Stand by assistance  Transfer Comments: SBA bed to/from chair     Cognition  Overall Cognitive Status: WNL  Orientation  Overall Orientation Status: Within Normal Limits                  Education Given To: Patient  Education Provided: Role of Therapy;Plan of Care;ADL Adaptive Strategies;Transfer Training  Education Method: Verbal;Demonstration  Barriers to Learning: None  Education Outcome: Verbalized understanding;Demonstrated understanding                   AM-PAC - ADL  AM-PAC Daily Activity - Inpatient   How much help is needed for putting on and taking off regular lower body clothing?: A Little  How much help is needed for bathing (which includes washing, rinsing, drying)?: A Little  How much help is needed for toileting (which includes using toilet, bedpan, or urinal)?: A Little  How much help is needed for putting on and taking off regular upper body clothing?: None  How much help is needed for taking care of personal grooming?: A Little  How much help for eating meals?: None  AM-[facility] Inpatient Daily Activity Raw Score: 20  AM-PAC Inpatient ADL T-Scale Score : 42.03  ADL Inpatient CMS 0-100% Score: 38.32  ADL Inpatient CMS G-Code Modifier : CJ    Goals                       No goals met  Short Term Goals  Time Frame for Short Term Goals: dc  Short Term Goal 1: supine to sit with SPVN  Short Term Goal 2: Fx transfers with SPVN  Short Term Goal 3: UB/LB dressing with SPVN  Short Term Goal 4: toileting with SPVN       Therapy Time   Individual Concurrent Group Co-treatment   Time In 1355         Time Out 1425         Minutes 30         Timed Code Treatment Minutes: 30 Minutes   Total Treatment Time:30    Richelle Martinez OTR/L [id number]

## 2024-08-05 NOTE — PLAN OF CARE
Problem: Chronic Conditions and Co-morbidities  Goal: Patient's chronic conditions and co-morbidity symptoms are monitored and maintained or improved  Outcome: Progressing  Flowsheets (Taken 8/4/2024 2000 by Jennie Castillo, RN)  Care Plan - Patient's Chronic Conditions and Co-Morbidity Symptoms are Monitored and Maintained or Improved:   Monitor and assess patient's chronic conditions and comorbid symptoms for stability, deterioration, or improvement   Collaborate with multidisciplinary team to address chronic and comorbid conditions and prevent exacerbation or deterioration   Update acute care plan with appropriate goals if chronic or comorbid symptoms are exacerbated and prevent overall improvement and discharge     Problem: Discharge Planning  Goal: Discharge to home or other facility with appropriate resources  Outcome: Progressing  Flowsheets (Taken 8/4/2024 2000 by Jennie Castillo, RN)  Discharge to home or other facility with appropriate resources:   Identify barriers to discharge with patient and caregiver   Arrange for needed discharge resources and transportation as appropriate   Identify discharge learning needs (meds, wound care, etc)   Arrange for interpreters to assist at discharge as needed   Refer to discharge planning if patient needs post-hospital services based on physician order or complex needs related to functional status, cognitive ability or social support system     Problem: Pain  Goal: Verbalizes/displays adequate comfort level or baseline comfort level  Outcome: Progressing  Flowsheets (Taken 8/1/2024 2000 by Sienna Diehl, RN)  Verbalizes/displays adequate comfort level or baseline comfort level:   Encourage patient to monitor pain and request assistance   Assess pain using appropriate pain scale     Problem: Safety - Adult  Goal: Free from fall injury  Outcome: Progressing  Flowsheets (Taken 8/3/2024 2244 by Sienna Diehl, RN)  Free From Fall Injury: Instruct family/caregiver

## 2024-08-05 NOTE — PROGRESS NOTES
Physical Therapy  Attempt    Chart reviewed.  Attempted PT tx - pt unavailable as MD in room speaking with pt.  Will f/u later date per POC.    Shantell Grubbs, PT, DPT  [id number]

## 2024-08-05 NOTE — PROGRESS NOTES
Cardiology Consultation                                                                    Pt Name: Scot Hernandez  Age: 64 y.o.  Sex: male  : 1960  Location: 4518/4518-01    Referring Physician: Lore Cabello MD  Primary cardiologist: Dr. Jade      Reason for Consult:     Reason for Consultation/Chief Complaint: AAA s/p EVAR now requiring dopamine for pressor support (previously on midodrine)     HPI:      Scot Hernandez is a 64 y.o. male with a past medical history of HLP, DM, COPD, smoker, CAD s/p POBA high OM1/ramus (with RCA ), AAA, occasional hypotension requiring midodrine.     [facility] 2021: Normal LVEF greater than 60%, normal wall motion, fixed inferior wall perfusion defect with small apical ischemia.     [facility] 2021: 95% ostial high OM 1/ramus stenosis, proximal RCA  with extensive left-to-right collaterals, otherwise luminal irregularities in left main and LAD.  Only POBA, no PCI with stent was not done due to proximity of ostial lesion to left main.    [facility] 7/15/2024: Proximal RCA occluded with left-to-right collaterals, otherwise normal coronaries, LVEF 55%.     Echo 2024: Normal LV size, LVEF 35-40%, grade 1 diastolic dysfunction, normal RV and valves.     Histories     Past Medical History:   has a past medical history of AAA (abdominal aortic aneurysm) ([facility]), Anxiety, Arterial stent thrombosis ([facility]), Arthritis, Asthma, Bipolar 1 disorder ([facility]), COPD (chronic obstructive pulmonary disease) ([facility]), Coronary artery disease involving native coronary artery of native heart without angina pectoris, Diabetes mellitus ([facility]), Essential hypertension, Hyperlipidemia, and Pneumonia.    Surgical History:   has a past surgical history that includes Knee Arthroplasty; Cholecystectomy, laparoscopic; Carpal tunnel release; Nasal sinus surgery; Upper gastrointestinal endoscopy (2011); Cardiac surgery; Upper gastrointestinal endoscopy (N/A, 2019); Total hip arthroplasty  postoperative course (stress).         - Check a random cortisol, renin and aldosterone levels. Cortisol may be too low in the setting of recent surgery.   - Continue midodrine to 10 mg p.o. every 8 hours.  - Wean patient off dopamine drip as tolerated.  - Orthostatic vital signs to exclude hypovolemia / dehydration.   - Will try fludrocortisone 0.1 mcg p.o. daily starting today.  - Patient may need stress dose steroids; will recommend internal medicine consultation for decision about potential need for steroids.  - Continue with aspirin and Plavix.  - Continue Toprol 12.5 mg p.o. daily in view of CAD if BP allows and stopped other antihypertensive medications (has been held due to low BP).      Due to the high probability of clinically significant life threating deterioration of the patient's condition that required my urgent intervention, a total critical care time 35 minutes was used. This time excludes any time that may have been spent performing procedures. This includes but not limited to vital sign monitoring, telemetry monitoring, continuous pulse oximety, IV medication, clinical response to the IV medications, documentation time, consultation time, interpretation of lab data, review of nursing notes and old record review.         I have personally reviewed the reports and images of labs, radiological studies, cardiac studies including ECG's and telemetry, current and old medical records. The note was completed using EMR and Dragon dictation system. Every effort was made to ensure accuracy; however, inadvertent computerized transcription errors may be present.    All questions and concerns were addressed to the patient/family. Alternatives to my treatment were discussed.     I would like to thank you for providing me the opportunity to participate in the care of your patient. If you have any questions, please do not hesitate to contact me.     Selvin Jade MD, FACC, [facility]  [facility] -

## 2024-08-06 LAB
ALBUMIN SERPL-MCNC: 3.4 G/DL (ref 3.4–5)
ANION GAP SERPL CALCULATED.3IONS-SCNC: 7 MMOL/L (ref 3–16)
BASOPHILS # BLD: 0 K/UL (ref 0–0.2)
BASOPHILS NFR BLD: 0.6 %
BUN SERPL-MCNC: 21 MG/DL (ref 7–20)
CALCIUM SERPL-MCNC: 8.6 MG/DL (ref 8.3–10.6)
CHLORIDE SERPL-SCNC: 105 MMOL/L (ref 99–110)
CO2 SERPL-SCNC: 30 MMOL/L (ref 21–32)
CREAT SERPL-MCNC: 0.7 MG/DL (ref 0.8–1.3)
DEPRECATED RDW RBC AUTO: 15.5 % (ref 12.4–15.4)
EOSINOPHIL # BLD: 0.4 K/UL (ref 0–0.6)
EOSINOPHIL NFR BLD: 9.3 %
GFR SERPLBLD CREATININE-BSD FMLA CKD-EPI: >90 ML/MIN/{1.73_M2}
GLUCOSE BLD-MCNC: 115 MG/DL (ref 70–99)
GLUCOSE BLD-MCNC: 82 MG/DL (ref 70–99)
GLUCOSE BLD-MCNC: 89 MG/DL (ref 70–99)
GLUCOSE BLD-MCNC: 90 MG/DL (ref 70–99)
GLUCOSE SERPL-MCNC: 103 MG/DL (ref 70–99)
HCT VFR BLD AUTO: 33.6 % (ref 40.5–52.5)
HGB BLD-MCNC: 11.3 G/DL (ref 13.5–17.5)
LYMPHOCYTES # BLD: 0.8 K/UL (ref 1–5.1)
LYMPHOCYTES NFR BLD: 19.4 %
MAGNESIUM SERPL-MCNC: 2 MG/DL (ref 1.8–2.4)
MCH RBC QN AUTO: 30.7 PG (ref 26–34)
MCHC RBC AUTO-ENTMCNC: 33.7 G/DL (ref 31–36)
MCV RBC AUTO: 91.1 FL (ref 80–100)
MONOCYTES # BLD: 0.5 K/UL (ref 0–1.3)
MONOCYTES NFR BLD: 11.6 %
NEUTROPHILS # BLD: 2.5 K/UL (ref 1.7–7.7)
NEUTROPHILS NFR BLD: 59.1 %
PERFORMED ON: ABNORMAL
PERFORMED ON: NORMAL
PHOSPHATE SERPL-MCNC: 4.1 MG/DL (ref 2.5–4.9)
PLATELET # BLD AUTO: 179 K/UL (ref 135–450)
PMV BLD AUTO: 7.9 FL (ref 5–10.5)
POTASSIUM SERPL-SCNC: 4.1 MMOL/L (ref 3.5–5.1)
RBC # BLD AUTO: 3.69 M/UL (ref 4.2–5.9)
SODIUM SERPL-SCNC: 142 MMOL/L (ref 136–145)
WBC # BLD AUTO: 4.3 K/UL (ref 4–11)

## 2024-08-06 PROCEDURE — 83735 ASSAY OF MAGNESIUM: CPT

## 2024-08-06 PROCEDURE — 80069 RENAL FUNCTION PANEL: CPT

## 2024-08-06 PROCEDURE — 85025 COMPLETE CBC W/AUTO DIFF WBC: CPT

## 2024-08-06 PROCEDURE — 6370000000 HC RX 637 (ALT 250 FOR IP)

## 2024-08-06 PROCEDURE — P9045 ALBUMIN (HUMAN), 5%, 250 ML: HCPCS | Performed by: HOSPITALIST

## 2024-08-06 PROCEDURE — 2580000003 HC RX 258: Performed by: INTERNAL MEDICINE

## 2024-08-06 PROCEDURE — 94669 MECHANICAL CHEST WALL OSCILL: CPT

## 2024-08-06 PROCEDURE — 6370000000 HC RX 637 (ALT 250 FOR IP): Performed by: STUDENT IN AN ORGANIZED HEALTH CARE EDUCATION/TRAINING PROGRAM

## 2024-08-06 PROCEDURE — 6360000002 HC RX W HCPCS: Performed by: HOSPITALIST

## 2024-08-06 PROCEDURE — 36415 COLL VENOUS BLD VENIPUNCTURE: CPT

## 2024-08-06 PROCEDURE — 6360000002 HC RX W HCPCS

## 2024-08-06 PROCEDURE — 94761 N-INVAS EAR/PLS OXIMETRY MLT: CPT

## 2024-08-06 PROCEDURE — 94640 AIRWAY INHALATION TREATMENT: CPT

## 2024-08-06 PROCEDURE — 99291 CRITICAL CARE FIRST HOUR: CPT | Performed by: INTERNAL MEDICINE

## 2024-08-06 PROCEDURE — 6370000000 HC RX 637 (ALT 250 FOR IP): Performed by: INTERNAL MEDICINE

## 2024-08-06 PROCEDURE — 2000000000 HC ICU R&B

## 2024-08-06 PROCEDURE — 2580000003 HC RX 258

## 2024-08-06 PROCEDURE — 6370000000 HC RX 637 (ALT 250 FOR IP): Performed by: HOSPITALIST

## 2024-08-06 RX ORDER — SODIUM CHLORIDE, SODIUM LACTATE, POTASSIUM CHLORIDE, AND CALCIUM CHLORIDE .6; .31; .03; .02 G/100ML; G/100ML; G/100ML; G/100ML
500 INJECTION, SOLUTION INTRAVENOUS ONCE
Status: COMPLETED | OUTPATIENT
Start: 2024-08-06 | End: 2024-08-06

## 2024-08-06 RX ORDER — INSULIN LISPRO 100 [IU]/ML
0-4 INJECTION, SOLUTION INTRAVENOUS; SUBCUTANEOUS NIGHTLY
Status: DISCONTINUED | OUTPATIENT
Start: 2024-08-06 | End: 2024-08-08 | Stop reason: HOSPADM

## 2024-08-06 RX ORDER — FLUDROCORTISONE ACETATE 0.1 MG/1
0.2 TABLET ORAL DAILY
Status: DISCONTINUED | OUTPATIENT
Start: 2024-08-07 | End: 2024-08-08 | Stop reason: HOSPADM

## 2024-08-06 RX ORDER — COSYNTROPIN 0.25 MG/ML
250 INJECTION, POWDER, FOR SOLUTION INTRAMUSCULAR; INTRAVENOUS ONCE
Status: COMPLETED | OUTPATIENT
Start: 2024-08-07 | End: 2024-08-07

## 2024-08-06 RX ORDER — INSULIN LISPRO 100 [IU]/ML
0-4 INJECTION, SOLUTION INTRAVENOUS; SUBCUTANEOUS
Status: DISCONTINUED | OUTPATIENT
Start: 2024-08-06 | End: 2024-08-08 | Stop reason: HOSPADM

## 2024-08-06 RX ORDER — FLUDROCORTISONE ACETATE 0.1 MG/1
0.1 TABLET ORAL ONCE
Status: COMPLETED | OUTPATIENT
Start: 2024-08-06 | End: 2024-08-06

## 2024-08-06 RX ORDER — ALBUMIN, HUMAN INJ 5% 5 %
25 SOLUTION INTRAVENOUS ONCE
Status: COMPLETED | OUTPATIENT
Start: 2024-08-06 | End: 2024-08-06

## 2024-08-06 RX ADMIN — ALBUMIN (HUMAN) 25 G: 12.5 INJECTION, SOLUTION INTRAVENOUS at 13:42

## 2024-08-06 RX ADMIN — ACETAMINOPHEN 650 MG: 325 TABLET ORAL at 16:28

## 2024-08-06 RX ADMIN — KETOTIFEN FUMARATE 1 DROP: 0.25 SOLUTION/ DROPS OPHTHALMIC at 21:34

## 2024-08-06 RX ADMIN — ACETAMINOPHEN 650 MG: 325 TABLET ORAL at 06:00

## 2024-08-06 RX ADMIN — DOPAMINE HYDROCHLORIDE 5 MCG/KG/MIN: 160 INJECTION, SOLUTION INTRAVENOUS at 02:29

## 2024-08-06 RX ADMIN — ALBUTEROL SULFATE 2.5 MG: 2.5 SOLUTION RESPIRATORY (INHALATION) at 20:58

## 2024-08-06 RX ADMIN — FLUDROCORTISONE ACETATE 0.1 MG: 0.1 TABLET ORAL at 10:44

## 2024-08-06 RX ADMIN — ASPIRIN 81 MG: 81 TABLET, COATED ORAL at 10:45

## 2024-08-06 RX ADMIN — QUETIAPINE FUMARATE 300 MG: 300 TABLET ORAL at 21:32

## 2024-08-06 RX ADMIN — PANTOPRAZOLE SODIUM 40 MG: 40 TABLET, DELAYED RELEASE ORAL at 10:45

## 2024-08-06 RX ADMIN — MONTELUKAST SODIUM 10 MG: 10 TABLET, FILM COATED ORAL at 21:32

## 2024-08-06 RX ADMIN — MIDODRINE HYDROCHLORIDE 10 MG: 5 TABLET ORAL at 06:44

## 2024-08-06 RX ADMIN — ACETAMINOPHEN 650 MG: 325 TABLET ORAL at 10:46

## 2024-08-06 RX ADMIN — DIVALPROEX SODIUM 500 MG: 500 TABLET, DELAYED RELEASE ORAL at 12:15

## 2024-08-06 RX ADMIN — FLUDROCORTISONE ACETATE 0.1 MG: 0.1 TABLET ORAL at 13:35

## 2024-08-06 RX ADMIN — MIDODRINE HYDROCHLORIDE 10 MG: 5 TABLET ORAL at 10:45

## 2024-08-06 RX ADMIN — CETIRIZINE HYDROCHLORIDE 10 MG: 10 TABLET, FILM COATED ORAL at 10:45

## 2024-08-06 RX ADMIN — SODIUM CHLORIDE, PRESERVATIVE FREE 10 ML: 5 INJECTION INTRAVENOUS at 21:25

## 2024-08-06 RX ADMIN — FLUTICASONE PROPIONATE 1 SPRAY: 50 SPRAY, METERED NASAL at 12:16

## 2024-08-06 RX ADMIN — ATORVASTATIN CALCIUM 80 MG: 80 TABLET, FILM COATED ORAL at 21:32

## 2024-08-06 RX ADMIN — DIVALPROEX SODIUM 1000 MG: 250 TABLET, DELAYED RELEASE ORAL at 16:28

## 2024-08-06 RX ADMIN — GABAPENTIN 400 MG: 400 CAPSULE ORAL at 21:30

## 2024-08-06 RX ADMIN — GABAPENTIN 400 MG: 400 CAPSULE ORAL at 13:35

## 2024-08-06 RX ADMIN — ACETAMINOPHEN 650 MG: 325 TABLET ORAL at 21:31

## 2024-08-06 RX ADMIN — KETOTIFEN FUMARATE 1 DROP: 0.25 SOLUTION/ DROPS OPHTHALMIC at 12:16

## 2024-08-06 RX ADMIN — MIDODRINE HYDROCHLORIDE 10 MG: 5 TABLET ORAL at 16:28

## 2024-08-06 RX ADMIN — SODIUM CHLORIDE, POTASSIUM CHLORIDE, SODIUM LACTATE AND CALCIUM CHLORIDE 500 ML: 600; 310; 30; 20 INJECTION, SOLUTION INTRAVENOUS at 15:47

## 2024-08-06 RX ADMIN — GABAPENTIN 400 MG: 400 CAPSULE ORAL at 10:45

## 2024-08-06 RX ADMIN — CLOPIDOGREL BISULFATE 75 MG: 75 TABLET ORAL at 10:45

## 2024-08-06 RX ADMIN — ENOXAPARIN SODIUM 40 MG: 100 INJECTION SUBCUTANEOUS at 10:46

## 2024-08-06 RX ADMIN — ALBUTEROL SULFATE 2.5 MG: 2.5 SOLUTION RESPIRATORY (INHALATION) at 11:07

## 2024-08-06 ASSESSMENT — PAIN SCALES - GENERAL
PAINLEVEL_OUTOF10: 2
PAINLEVEL_OUTOF10: 0
PAINLEVEL_OUTOF10: 2
PAINLEVEL_OUTOF10: 0
PAINLEVEL_OUTOF10: 0
PAINLEVEL_OUTOF10: 3

## 2024-08-06 ASSESSMENT — PAIN DESCRIPTION - DESCRIPTORS: DESCRIPTORS: ACHING

## 2024-08-06 ASSESSMENT — PAIN - FUNCTIONAL ASSESSMENT
PAIN_FUNCTIONAL_ASSESSMENT: ACTIVITIES ARE NOT PREVENTED

## 2024-08-06 ASSESSMENT — ENCOUNTER SYMPTOMS: SHORTNESS OF BREATH: 0

## 2024-08-06 ASSESSMENT — PAIN DESCRIPTION - LOCATION: LOCATION: HEAD

## 2024-08-06 NOTE — PROGRESS NOTES
08/06/24 1011   Encounter Summary   Encounter Overview/Reason Initial Encounter   Service Provided For Patient   Referral/Consult From Rounding   Support System Unknown   Last Encounter  08/06/24  ([facility] - pt in good spirits)   Complexity of Encounter Low   Begin Time 1009   End Time  1015   Total Time Calculated 6 min   Assessment/Intervention/Outcome   Assessment Coping   Intervention Prayer (assurance of)/[place];Active listening   Outcome Comfort;Connection/Belonging;Expressed Gratitude     Pt in good spirits and enjoying breakfast.  provided blessing. Pt expressed gratitude.  told pt that if pt is in need of future spiritual care services to let nurse know. Pt expressed gratitude    Student chaplain Sienna Lou

## 2024-08-06 NOTE — PROGRESS NOTES
Handoff report received;  Dopamine off;  Ambulated to bathroom, did complain of lightheadedness  Pulses are are dopplerable,  Groin site no hematoma, bleeding.     Needs provided, safety protocols in place,  Call light within reach.

## 2024-08-06 NOTE — FLOWSHEET NOTE
Orthostatic blood pressures completed. Pt was wearing bilateral below knee compression stockings and abdominal binder during measurements.      08/06/24 1043   Vitals   Blood Pressure Lying 109/72   Pulse Lying 77 PER MINUTE   Blood Pressure Sitting 101/71   Pulse Sitting 80 PER MINUTE   Blood Pressure Standing 83/62   Pulse Standing 91 PER MINUTE

## 2024-08-06 NOTE — PROGRESS NOTES
Patient's BP persistently below 90 systolic while deeply sleeping, re started on low dose Dopamine. Will taper accordingly.

## 2024-08-06 NOTE — CONSULTS
V2.0  [facility] Consult Note      Name:  Scot Hernandez /Age/Sex: 1960  (64 y.o. male)   MRN & CSN:  [id number] & [id number] Encounter Date/Time: 2024 9:31 AM EDT   Location:  4518/4518-01 PCP: Lorna Gautam PA-C     Attending:Lore Cabello MD  Consulting Provider: Desmond Lazo MD      Hospital Day: 7    Assessment and Recommendations   Scot Hernandez is a 64 y.o. male known case of COPD, DMII, Anxiety, CAD/MI, CHF, HTN, HLD, tobacco abuse and recent diagnosis of enlarging AAA was admitted under vascular surgery for repair . We were consulted for concern for adrenal insuffiencey     Pt was admitted - for back pain and was found with AAA 3.7 cm without dissection. She had evaluation by cardiology as Echo showed EF 35-40%. Angio showed RCA occlusion and critical high OM1/ramus stenosis not intervened due to proximity to left main coronary artery . Per cardiology she was at intermediate risk but may proceed without further testing  . Plan was for TEVAR  for which she is admitted .     Pt has hx of HTN on carvedilol and amlodipine. But she is also on midodrine for hx of intermittent hypotension     He underwent it and was doing well initially . Her BP started to drop so he was started on pressors. He was started on dopamine . He was started on home midodrine but cont to have episodes of hypotension . He was off dopamine yesterday then restarted again this morning due to low BP. Cardiology started Florinef . Pt is on metoprolol xl 12.5 mg and flomax. Random cortisol yesterday noon was 19.7    Hypotension  -Cortisol level of 19.7 makes adrenal insuffiencey very unlikely . Will repeat ACTH stimulation test tomorrow morning for confirmation   -Cont midodrine 10 mg TID  -Increase florinef to 0.2 mg   -Add compression stocking,  and abdomina binder if ok with vascular   -Will hold Flomax to see if its contributing   -If he cont to have hypotension we can always try steroids for a couple of  NITRU Negative 07/12/2024 03:39 PM    COLORU Yellow 07/12/2024 03:39 PM    PHUR 7.5 07/12/2024 03:39 PM    PHUR 6.0 03/11/2023 03:28 PM    WBCUA 0-2 03/11/2023 03:28 PM    RBCUA 0-2 03/11/2023 03:28 PM    MUCUS 2+ 03/11/2023 03:28 PM    BACTERIA Rare 03/11/2023 03:28 PM    CLARITYU Clear 07/12/2024 03:39 PM    LEUKOCYTESUR Negative 07/12/2024 03:39 PM    UROBILINOGEN 1.0 07/12/2024 03:39 PM    BILIRUBINUR Negative 07/12/2024 03:39 PM    BLOODU Negative 07/12/2024 03:39 PM    GLUCOSEU Negative 07/12/2024 03:39 PM    KETUA Negative 07/12/2024 03:39 PM     Urine Cultures:   Lab Results   Component Value Date/Time    LABURIN No growth at 18 to 36 hours 10/13/2022 06:18 PM     Blood Cultures:   Lab Results   Component Value Date/Time    BC No growth after 5 days of incubation. 01/27/2018 03:25 PM     Lab Results   Component Value Date/Time    BLOODCULT2 No growth after 5 days of incubation. 01/27/2018 03:35 PM     Organism: No results found for: \"ORG\"      Electronically signed by Desmond Lazo MD on 8/6/2024 at 9:31 AM

## 2024-08-06 NOTE — PROGRESS NOTES
Cardiology Consultation                                                                    Pt Name: Scot Hernandez  Age: 64 y.o.  Sex: male  : 1960  Location: [facility]8/4518-01    Referring Physician: Lore Cabello MD  Primary cardiologist: Dr. Jade      Reason for Consult:     Reason for Consultation/Chief Complaint: AAA s/p EVAR now requiring dopamine for pressor support (previously on midodrine)     Interval history:     Dopamine weaned x1 overnight but restarted d/t hypotension. Orthostatics positive today. Given albumin and dopamine weaned off again.    HPI:      Scot Hernandez is a 64 y.o. male with a past medical history of HLP, DM, COPD, smoker, CAD s/p POBA high OM1/ramus (with RCA ), AAA, occasional hypotension requiring midodrine.     Liz 2021: Normal LVEF greater than 60%, normal wall motion, fixed inferior wall perfusion defect with small apical ischemia.     [facility] 2021: 95% ostial high OM 1/ramus stenosis, proximal RCA  with extensive left-to-right collaterals, otherwise luminal irregularities in left main and LAD.  Only POBA, no PCI with stent was not done due to proximity of ostial lesion to left main.    [facility] 7/15/2024: Proximal RCA occluded with left-to-right collaterals, otherwise normal coronaries, LVEF 55%.     Echo 2024: Normal LV size, LVEF 35-40%, grade 1 diastolic dysfunction, normal RV and valves.     Histories     Past Medical History:   has a past medical history of AAA (abdominal aortic aneurysm) ([facility]), Anxiety, Arterial stent thrombosis ([facility]), Arthritis, Asthma, Bipolar 1 disorder (HCC), COPD (chronic obstructive pulmonary disease) ([facility]), Coronary artery disease involving native coronary artery of native heart without angina pectoris, Diabetes mellitus ([facility]), Essential hypertension, Hyperlipidemia, and Pneumonia.    Surgical History:   has a past surgical history that includes Knee Arthroplasty; Cholecystectomy, laparoscopic; Carpal tunnel release; Nasal  to participate in the care of your patient. If you have any questions, please do not hesitate to contact me.     Selvin Jade MD, FAC, [facility]  [facility] [place] [id number]  Ph: [phone]  Fax: [phone]

## 2024-08-06 NOTE — PROGRESS NOTES
Department of Vascular Surgery  ICU Daily Progress Note   08/06/24        SUBJECTIVE:     Patient off dopamine briefly yesterday however was placed back on overnight due to hypotension. Denies pain. Ambulating and OOB    OBJECTIVE:   VITALS:   Vitals:    08/06/24 0545 08/06/24 0600 08/06/24 0615 08/06/24 0630   BP: 105/67 104/68 105/72 94/67   Pulse: 57 58 57 60   Resp: 14 16 15 16   Temp:       TempSrc:       SpO2: 100% 100% 100% 100%   Weight:  69 kg (152 lb 1.9 oz)     Height:            PHYSICAL EXAMINATION:   General appearance - alert, well appearing, and in no distress  Neck - supple  Cardiac- normal rate, regular rhythm  Pulmonary - No retractions. No dyspnea.   Abdomen - soft, non-tender  : Flores in place  Extremities - Femoral incisions c/d/I. Palpable femoral pulses. Dopplerable DP/PT bilaterally. NO hematoma in groins  Neurological - alert, oriented, normal speech, no focal findings or movement disorder noted  Skin - normal coloration and turgor, no rashes, no suspicious skin lesions noted    ASSESSMENT AND PLAN:   Scot Hernandez is a 64 y.o. male, who was admitted with AAA without rupture. He is now s/p Ultrasound-guided bilateral common femoral access, Aortogram, Endovascular infrarenal aortic  aneurysm repair, Balloon angioplasty, and ProGlide closure to bilateral common femoral arteries on 7/31/2024 6 Days Post-Op        Will reach out to medicine team today for other means of BP control.   Continue to wean dopamine as able, SBP goal > 90. Continue midodrine  Wean oxygen as able, > 88%  Appreciate cardiology recommendations  Continue to work with PT/OT  Continue regular diet  Continue ASA and plavix  Restarted Lipitor 80mg daily.     Akash Marquez DO  PGY1, General Surgery  08/06/24  7:39 AM  [employer] ([employer])  Pager: [phone]

## 2024-08-07 LAB
ALBUMIN SERPL-MCNC: 3.6 G/DL (ref 3.4–5)
ANION GAP SERPL CALCULATED.3IONS-SCNC: 7 MMOL/L (ref 3–16)
BASOPHILS # BLD: 0 K/UL (ref 0–0.2)
BASOPHILS NFR BLD: 0.5 %
BUN SERPL-MCNC: 14 MG/DL (ref 7–20)
CALCIUM SERPL-MCNC: 8.5 MG/DL (ref 8.3–10.6)
CHLORIDE SERPL-SCNC: 106 MMOL/L (ref 99–110)
CO2 SERPL-SCNC: 29 MMOL/L (ref 21–32)
CORTIS SERPL-MCNC: 5 UG/DL
CREAT SERPL-MCNC: 0.7 MG/DL (ref 0.8–1.3)
DEPRECATED RDW RBC AUTO: 15.9 % (ref 12.4–15.4)
EOSINOPHIL # BLD: 0.3 K/UL (ref 0–0.6)
EOSINOPHIL NFR BLD: 6.9 %
GFR SERPLBLD CREATININE-BSD FMLA CKD-EPI: >90 ML/MIN/{1.73_M2}
GLUCOSE BLD-MCNC: 100 MG/DL (ref 70–99)
GLUCOSE BLD-MCNC: 108 MG/DL (ref 70–99)
GLUCOSE BLD-MCNC: 95 MG/DL (ref 70–99)
GLUCOSE SERPL-MCNC: 135 MG/DL (ref 70–99)
HCT VFR BLD AUTO: 32.4 % (ref 40.5–52.5)
HGB BLD-MCNC: 11 G/DL (ref 13.5–17.5)
LYMPHOCYTES # BLD: 0.7 K/UL (ref 1–5.1)
LYMPHOCYTES NFR BLD: 15 %
MAGNESIUM SERPL-MCNC: 1.8 MG/DL (ref 1.8–2.4)
MCH RBC QN AUTO: 30.9 PG (ref 26–34)
MCHC RBC AUTO-ENTMCNC: 33.9 G/DL (ref 31–36)
MCV RBC AUTO: 91.3 FL (ref 80–100)
MONOCYTES # BLD: 0.6 K/UL (ref 0–1.3)
MONOCYTES NFR BLD: 13.6 %
NEUTROPHILS # BLD: 3 K/UL (ref 1.7–7.7)
NEUTROPHILS NFR BLD: 64 %
PERFORMED ON: ABNORMAL
PERFORMED ON: ABNORMAL
PERFORMED ON: NORMAL
PHOSPHATE SERPL-MCNC: 2.6 MG/DL (ref 2.5–4.9)
PLATELET # BLD AUTO: 189 K/UL (ref 135–450)
PMV BLD AUTO: 7.8 FL (ref 5–10.5)
POTASSIUM SERPL-SCNC: 3.4 MMOL/L (ref 3.5–5.1)
RBC # BLD AUTO: 3.55 M/UL (ref 4.2–5.9)
SODIUM SERPL-SCNC: 142 MMOL/L (ref 136–145)
WBC # BLD AUTO: 4.7 K/UL (ref 4–11)

## 2024-08-07 PROCEDURE — 2580000003 HC RX 258: Performed by: INTERNAL MEDICINE

## 2024-08-07 PROCEDURE — 94640 AIRWAY INHALATION TREATMENT: CPT

## 2024-08-07 PROCEDURE — 6370000000 HC RX 637 (ALT 250 FOR IP)

## 2024-08-07 PROCEDURE — 97530 THERAPEUTIC ACTIVITIES: CPT

## 2024-08-07 PROCEDURE — 99233 SBSQ HOSP IP/OBS HIGH 50: CPT | Performed by: INTERNAL MEDICINE

## 2024-08-07 PROCEDURE — 85025 COMPLETE CBC W/AUTO DIFF WBC: CPT

## 2024-08-07 PROCEDURE — 80069 RENAL FUNCTION PANEL: CPT

## 2024-08-07 PROCEDURE — 6360000002 HC RX W HCPCS

## 2024-08-07 PROCEDURE — 6360000002 HC RX W HCPCS: Performed by: HOSPITALIST

## 2024-08-07 PROCEDURE — 2000000000 HC ICU R&B

## 2024-08-07 PROCEDURE — 97535 SELF CARE MNGMENT TRAINING: CPT

## 2024-08-07 PROCEDURE — 97116 GAIT TRAINING THERAPY: CPT

## 2024-08-07 PROCEDURE — 36415 COLL VENOUS BLD VENIPUNCTURE: CPT

## 2024-08-07 PROCEDURE — 94669 MECHANICAL CHEST WALL OSCILL: CPT

## 2024-08-07 PROCEDURE — 6370000000 HC RX 637 (ALT 250 FOR IP): Performed by: STUDENT IN AN ORGANIZED HEALTH CARE EDUCATION/TRAINING PROGRAM

## 2024-08-07 PROCEDURE — 94761 N-INVAS EAR/PLS OXIMETRY MLT: CPT

## 2024-08-07 PROCEDURE — 83735 ASSAY OF MAGNESIUM: CPT

## 2024-08-07 PROCEDURE — 6370000000 HC RX 637 (ALT 250 FOR IP): Performed by: HOSPITALIST

## 2024-08-07 RX ORDER — SODIUM CHLORIDE 9 MG/ML
INJECTION, SOLUTION INTRAVENOUS CONTINUOUS
Status: ACTIVE | OUTPATIENT
Start: 2024-08-07 | End: 2024-08-07

## 2024-08-07 RX ORDER — MAGNESIUM SULFATE IN WATER 40 MG/ML
2000 INJECTION, SOLUTION INTRAVENOUS ONCE
Status: COMPLETED | OUTPATIENT
Start: 2024-08-07 | End: 2024-08-07

## 2024-08-07 RX ORDER — POTASSIUM CHLORIDE 20 MEQ/1
20 TABLET, EXTENDED RELEASE ORAL ONCE
Status: DISCONTINUED | OUTPATIENT
Start: 2024-08-07 | End: 2024-08-07

## 2024-08-07 RX ORDER — POTASSIUM CHLORIDE 20 MEQ/1
40 TABLET, EXTENDED RELEASE ORAL
Status: COMPLETED | OUTPATIENT
Start: 2024-08-07 | End: 2024-08-07

## 2024-08-07 RX ADMIN — CLOPIDOGREL BISULFATE 75 MG: 75 TABLET ORAL at 08:35

## 2024-08-07 RX ADMIN — FLUDROCORTISONE ACETATE 0.2 MG: 0.1 TABLET ORAL at 08:36

## 2024-08-07 RX ADMIN — CETIRIZINE HYDROCHLORIDE 10 MG: 10 TABLET, FILM COATED ORAL at 08:35

## 2024-08-07 RX ADMIN — ACETAMINOPHEN 650 MG: 325 TABLET ORAL at 10:39

## 2024-08-07 RX ADMIN — KETOTIFEN FUMARATE 1 DROP: 0.25 SOLUTION/ DROPS OPHTHALMIC at 21:06

## 2024-08-07 RX ADMIN — ALBUTEROL SULFATE 2.5 MG: 2.5 SOLUTION RESPIRATORY (INHALATION) at 20:17

## 2024-08-07 RX ADMIN — GABAPENTIN 400 MG: 400 CAPSULE ORAL at 15:02

## 2024-08-07 RX ADMIN — POTASSIUM CHLORIDE 40 MEQ: 1500 TABLET, EXTENDED RELEASE ORAL at 10:39

## 2024-08-07 RX ADMIN — ALBUTEROL SULFATE 2.5 MG: 2.5 SOLUTION RESPIRATORY (INHALATION) at 11:24

## 2024-08-07 RX ADMIN — ACETAMINOPHEN 650 MG: 325 TABLET ORAL at 16:50

## 2024-08-07 RX ADMIN — PANTOPRAZOLE SODIUM 40 MG: 40 TABLET, DELAYED RELEASE ORAL at 08:35

## 2024-08-07 RX ADMIN — DOPAMINE HYDROCHLORIDE 2.5 MCG/KG/MIN: 160 INJECTION, SOLUTION INTRAVENOUS at 00:02

## 2024-08-07 RX ADMIN — SODIUM CHLORIDE, PRESERVATIVE FREE 10 ML: 5 INJECTION INTRAVENOUS at 20:58

## 2024-08-07 RX ADMIN — MAGNESIUM SULFATE HEPTAHYDRATE 2000 MG: 40 INJECTION, SOLUTION INTRAVENOUS at 10:46

## 2024-08-07 RX ADMIN — DIVALPROEX SODIUM 1000 MG: 250 TABLET, DELAYED RELEASE ORAL at 16:50

## 2024-08-07 RX ADMIN — ATORVASTATIN CALCIUM 80 MG: 80 TABLET, FILM COATED ORAL at 20:57

## 2024-08-07 RX ADMIN — DIVALPROEX SODIUM 500 MG: 500 TABLET, DELAYED RELEASE ORAL at 08:35

## 2024-08-07 RX ADMIN — GABAPENTIN 400 MG: 400 CAPSULE ORAL at 08:35

## 2024-08-07 RX ADMIN — SENNOSIDES AND DOCUSATE SODIUM 2 TABLET: 50; 8.6 TABLET ORAL at 20:57

## 2024-08-07 RX ADMIN — POTASSIUM CHLORIDE 40 MEQ: 1500 TABLET, EXTENDED RELEASE ORAL at 12:17

## 2024-08-07 RX ADMIN — MONTELUKAST SODIUM 10 MG: 10 TABLET, FILM COATED ORAL at 21:06

## 2024-08-07 RX ADMIN — GABAPENTIN 400 MG: 400 CAPSULE ORAL at 20:57

## 2024-08-07 RX ADMIN — QUETIAPINE FUMARATE 150 MG: 25 TABLET ORAL at 20:56

## 2024-08-07 RX ADMIN — MIDODRINE HYDROCHLORIDE 10 MG: 5 TABLET ORAL at 18:11

## 2024-08-07 RX ADMIN — KETOTIFEN FUMARATE 1 DROP: 0.25 SOLUTION/ DROPS OPHTHALMIC at 08:40

## 2024-08-07 RX ADMIN — ACETAMINOPHEN 650 MG: 325 TABLET ORAL at 20:57

## 2024-08-07 RX ADMIN — ENOXAPARIN SODIUM 40 MG: 100 INJECTION SUBCUTANEOUS at 08:37

## 2024-08-07 RX ADMIN — FLUTICASONE PROPIONATE 1 SPRAY: 50 SPRAY, METERED NASAL at 08:40

## 2024-08-07 RX ADMIN — COSYNTROPIN 250 MCG: 0.25 INJECTION, POWDER, LYOPHILIZED, FOR SOLUTION INTRAMUSCULAR; INTRAVENOUS at 08:37

## 2024-08-07 RX ADMIN — ASPIRIN 81 MG: 81 TABLET, COATED ORAL at 08:35

## 2024-08-07 RX ADMIN — MIDODRINE HYDROCHLORIDE 10 MG: 5 TABLET ORAL at 07:03

## 2024-08-07 RX ADMIN — MIDODRINE HYDROCHLORIDE 10 MG: 5 TABLET ORAL at 10:39

## 2024-08-07 RX ADMIN — SODIUM CHLORIDE: 9 INJECTION, SOLUTION INTRAVENOUS at 10:44

## 2024-08-07 ASSESSMENT — PAIN SCALES - GENERAL
PAINLEVEL_OUTOF10: 0

## 2024-08-07 ASSESSMENT — ENCOUNTER SYMPTOMS: SHORTNESS OF BREATH: 0

## 2024-08-07 NOTE — PROGRESS NOTES
Cardiology Consultation                                                                    Pt Name: Scot Hernandez  Age: 64 y.o.  Sex: male  : 1960  Location: [facility]8/4518-01    Referring Physician: Lore Cabello MD  Primary cardiologist: Dr. Jade      Reason for Consult:     Reason for Consultation/Chief Complaint: AAA s/p EVAR now requiring dopamine for pressor support (previously on midodrine)     Interval history:     Dopamine weaned x1 overnight but restarted d/t hypotension. Orthostatics positive today. Given albumin and dopamine weaned off again.    HPI:      Scot Hernandez is a 64 y.o. male with a past medical history of HLP, DM, COPD, smoker, CAD s/p POBA high OM1/ramus (with RCA ), AAA, occasional hypotension requiring midodrine.     Liz 2021: Normal LVEF greater than 60%, normal wall motion, fixed inferior wall perfusion defect with small apical ischemia.     [facility] 2021: 95% ostial high OM 1/ramus stenosis, proximal RCA  with extensive left-to-right collaterals, otherwise luminal irregularities in left main and LAD.  Only POBA, no PCI with stent was not done due to proximity of ostial lesion to left main.    [facility] 7/15/2024: Proximal RCA occluded with left-to-right collaterals, otherwise normal coronaries, LVEF 55%.     Echo 2024: Normal LV size, LVEF 35-40%, grade 1 diastolic dysfunction, normal RV and valves.     Histories     Past Medical History:   has a past medical history of AAA (abdominal aortic aneurysm) ([facility]), Anxiety, Arterial stent thrombosis ([facility]), Arthritis, Asthma, Bipolar 1 disorder (HCC), COPD (chronic obstructive pulmonary disease) ([facility]), Coronary artery disease involving native coronary artery of native heart without angina pectoris, Diabetes mellitus ([facility]), Essential hypertension, Hyperlipidemia, and Pneumonia.    Surgical History:   has a past surgical history that includes Knee Arthroplasty; Cholecystectomy, laparoscopic; Carpal tunnel release; Nasal  [address] [address]  Ph: [phone]  Fax: [phone]

## 2024-08-07 NOTE — PLAN OF CARE
Problem: Chronic Conditions and Co-morbidities  Goal: Patient's chronic conditions and co-morbidity symptoms are monitored and maintained or improved  Outcome: Progressing  Flowsheets (Taken 8/6/2024 2000 by Vikki De Luna, RN)  Care Plan - Patient's Chronic Conditions and Co-Morbidity Symptoms are Monitored and Maintained or Improved: Monitor and assess patient's chronic conditions and comorbid symptoms for stability, deterioration, or improvement     Problem: Discharge Planning  Goal: Discharge to home or other facility with appropriate resources  Outcome: Progressing  Flowsheets (Taken 8/6/2024 2000 by Vikki De Luna, RN)  Discharge to home or other facility with appropriate resources:   Identify barriers to discharge with patient and caregiver   Arrange for needed discharge resources and transportation as appropriate     Problem: Pain  Goal: Verbalizes/displays adequate comfort level or baseline comfort level  Outcome: Progressing  Flowsheets (Taken 8/7/2024 0000 by Vikki De Luna, RN)  Verbalizes/displays adequate comfort level or baseline comfort level: Encourage patient to monitor pain and request assistance     Problem: Safety - Adult  Goal: Free from fall injury  Outcome: Progressing  Flowsheets (Taken 8/3/2024 2244 by Sienna Diehl, RN)  Free From Fall Injury: Instruct family/caregiver on patient safety     Problem: ABCDS Injury Assessment  Goal: Absence of physical injury  Outcome: Progressing  Flowsheets (Taken 8/6/2024 0127 by Destiny Corley, RN)  Absence of Physical Injury: Implement safety measures based on patient assessment     Problem: Skin/Tissue Integrity  Goal: Absence of new skin breakdown  Description: 1.  Monitor for areas of redness and/or skin breakdown  2.  Assess vascular access sites hourly  3.  Every 4-6 hours minimum:  Change oxygen saturation probe site  4.  Every 4-6 hours:  If on nasal continuous positive airway pressure, respiratory therapy assess nares and determine need for  appliance change or resting period.  Outcome: Progressing

## 2024-08-07 NOTE — CARE COORDINATION
CM following for discharge planning. Pt from home alone. Back on dopamine gtt.   OT signed off, PT recommending home PT at discharge.  Pt will need ride at discharge.     Annika Dickerson RN, BSN, CM  Case Management Department  [phone]

## 2024-08-07 NOTE — FLOWSHEET NOTE
Orthostatic vitals completed. Patient was asymptomatic with position changes.       08/07/24 0859   Vitals   Orthostatic B/P and Pulse? Yes   Blood Pressure Lying 154/84   Pulse Lying 68 PER MINUTE   Blood Pressure Sitting 135/81   Pulse Sitting 77 PER MINUTE   Blood Pressure Standing 125/87   Pulse Standing 83 PER MINUTE

## 2024-08-07 NOTE — PROGRESS NOTES
Occupational Therapy  Facility/Department: [facility] ICU  Daily Treatment Note and Discharge   NAME: Scot Hernandez  : 1960  MRN: [id number]    Date of Service: 2024    Discharge Recommendations:  24 hour supervision or assist  OT Equipment Recommendations  Equipment Needed: No      Patient Diagnosis(es): The encounter diagnosis was Chronic congestive heart failure, unspecified heart failure type (HCC).     Assessment    Assessment: Pt demonstrates improvements in overall functional strength and ADL participation. /93 (103), HR upper 80s and SpO2 94% on RA s/p activity. Pt requires consistent and extended rest breaks this date d/t significant shortness of breath and fatigue. Pt reporting needing a breathing treatment and RN notified. Pt achieves all goals this date with no further acute skilled OT needs at this time. Will d/c pt from inpt caseload. Please re-consult as new needs arise. Thank you.  Activity Tolerance: Patient tolerated evaluation without incident;Patient limited by endurance  Discharge Recommendations: 24 hour supervision or assist  Equipment Needed: No      Plan     D/c all goals met    Restrictions   Position Activity Restriction  Other position/activity restrictions: up in chair    Subjective   Subjective  Subjective: Pt presents supine in bed upon arrival. Agreeable to session. Noted discomfort d/t shortness of breath but no c/o pain this session. RN notified of shortness of breath and pt request for breathing treatment.  Orientation  Overall Orientation Status: Within Normal Limits  Orientation Level: Oriented X4  Cognition  Overall Cognitive Status: WNL        Objective    Bed Mobility Training  Bed Mobility Training: Yes  Overall Level of Assistance: Modified independent (HOB elevated)  Supine to Sit: Modified independent  Scooting: Modified independent  Balance  Sitting: Intact (Mod ind/SPV sitting EOB and toilet)  Standing: Intact (SPV)  Transfer Training  Transfer Training:

## 2024-08-07 NOTE — PROGRESS NOTES
Cosyntropin injection administered at 0843, 60 minutes post injection cortisol level lab draw retimed for 0945.

## 2024-08-07 NOTE — PROGRESS NOTES
Pt pressure 86/49. Surgery team contacted via secure message. Ordered to restart dopamine gtt, see MAR.

## 2024-08-07 NOTE — PROGRESS NOTES
Physical Therapy  Daily Treatment Note    Discharge Recommendation:  Home with initial 24 hour supervision/assist  Equipment Needs:  None anticipated    Assessment:  Pt with improved gait tolerance this session. Mild ZAMARRIPA with activity, but recovered well with rest breaks. Pt from home alone. Making progress, but continues to be limited due to fatigue and weakness. Would benefit from initial 24 hour supervision/assist at home.     Chart Reviewed: Yes     Other position/activity restrictions: up in chair   Additional Pertinent Hx: Scot Hernandez is a 64 y.o. male, who was admitted with AAA without rupture. He is now s/p Ultrasound-guided bilateral common femoral access, Aortogram, Endovascular infrarenal aortic  aneurysm repair, Balloon angioplasty, and ProGlide closure to bilateral common femoral arteries on 7/31      Diagnosis: Aneurysm of infrarenal abdominal aorta   Treatment Diagnosis: decreased mobility    Subjective: Pt in bed initially. Agreeable to working with PT.   Not sure when he's being D/Pablo.   \"I need to go to the bathroom.\"    Pain: Denies    Objective:    Bed mobility  Supine to sit: Supervision, HOB up partially with use of railing  Scooting: SBA to EOB  Sit to Supine: SBA, HOB up partially    Transfers  Sit to stand: SBA from bed; SBA from commode with grab bar  Stand to sit: SBA onto bed; SBA onto commode with grab bar    Ambulation  Assistance Level: CGA to SBA  Assistive device: None initially, but then pt preferring to hold onto IV pole with R UE  Distance: 10 ft, 80 ft. Seated rest between walks.   Quality of gait: Decreased pace; weak, but no overt LOB    Vitals  BP when sitting EOB prior to ambulation: 147/93. BP EOB after ambulation: 139/85.  Pt on room air throughout session. Mild ZAMARRIPA noted after walking. SpO2 = 88% on RA, improving to 93% within 1 minute (cues for pursed lip breathing.)   Other: RN aware.     Patient Education  Role of PT. Calling for assist with needs. Expressed

## 2024-08-07 NOTE — PROGRESS NOTES
V2.0    [facility] Progress Note      Name:  Scot Hernandez /Age/Sex: 1960  (64 y.o. male)   MRN & CSN:  [id number] & [id number] Encounter Date/Time: 2024 11:08 AM EDT   Location:  4518/4518-01 PCP: Lorna Gautam PA-C     Attending:Lore Cabello MD       Hospital Day: 8    Assessment and Recommendations   Scot Hernandez is a 64 y.o. male known case of COPD, DMII, Anxiety, CAD/MI, CHF, HTN, HLD, tobacco abuse and recent diagnosis of enlarging AAA was admitted under vascular surgery for repair . We were consulted for concern for adrenal insuffiencey      Pt was admitted - for back pain and was found with AAA 3.7 cm without dissection. She had evaluation by cardiology as Echo showed EF 35-40%. Angio showed RCA occlusion and critical high OM1/ramus stenosis not intervened due to proximity to left main coronary artery . Per cardiology she was at intermediate risk but may proceed without further testing  . Plan was for TEVAR  for which she is admitted .      Pt has hx of HTN on carvedilol and amlodipine. But she is also on midodrine for hx of intermittent hypotension      He underwent it and was doing well initially . Her BP started to drop so he was started on pressors. He was started on dopamine . He was started on home midodrine but cont to have episodes of hypotension . He was off dopamine yesterday then restarted again this morning due to low BP. Cardiology started Florinef . Pt is on metoprolol xl 12.5 mg and flomax. Random cortisol yesterday noon was 19.7     Hypotension  -BP in 140s this morning. Dicussed with nurse to turn off the dopamine and see how he does  -BP improved after albumin and fluids so he might have been hypovolemic   -His BP dropped after he received the Seroquel. Will decrease the dose for now increase its contributing   -Cortisol level of 19.7 makes adrenal insuffiencey very unlikely . This morning cortisl is 5 which is on the lower side. Pending cortisol after the

## 2024-08-07 NOTE — PROGRESS NOTES
Department of Vascular Surgery  ICU Daily Progress Note   08/07/24        SUBJECTIVE:     Dopamine resumed again overnight. Off this am. Blood pressure now increasing.     OBJECTIVE:   VITALS:   Vitals:    08/07/24 1215 08/07/24 1230 08/07/24 1245 08/07/24 1300   BP: (!) 153/82 (!) 156/83 (!) 146/90 (!) 146/96   Pulse: (!) 46 61 62 63   Resp:       Temp:       TempSrc:       SpO2: 96% 95% 96% 96%   Weight:       Height:            PHYSICAL EXAMINATION:   General appearance - alert, well appearing, and in no distress  Neck - supple  Cardiac- normal rate, regular rhythm  Pulmonary - No retractions. No dyspnea.   Abdomen - soft, non-tender  : Flores in place  Extremities - Femoral incisions c/d/I. Palpable femoral pulses. Dopplerable DP/PT bilaterally. NO hematoma in groins  Neurological - alert, oriented, normal speech, no focal findings or movement disorder noted  Skin - normal coloration and turgor, no rashes, no suspicious skin lesions noted    ASSESSMENT AND PLAN:   Scot Hernandez is a 64 y.o. male, who was admitted with AAA without rupture. He is now s/p Ultrasound-guided bilateral common femoral access, Aortogram, Endovascular infrarenal aortic  aneurysm repair, Balloon angioplasty, and ProGlide closure to bilateral common femoral arteries on 7/31/2024 7 Days Post-Op        F/u ACTH stim test  SBP goal > 90. Continue midodrine  Wean oxygen as able, > 88%  Appreciate cardiology recommendations  Continue to work with PT/OT  Continue regular diet  Continue ASA and plavix  Restarted Lipitor 80mg daily.   Dispo: 1-2 days pending BP control     Akash Marquez DO  PGY1, General Surgery  08/07/24  1:21 PM  [employer]  Pager: [phone]

## 2024-08-08 VITALS
HEIGHT: 68 IN | BODY MASS INDEX: 23.09 KG/M2 | WEIGHT: 152.34 LBS | RESPIRATION RATE: 14 BRPM | OXYGEN SATURATION: 97 % | SYSTOLIC BLOOD PRESSURE: 155 MMHG | HEART RATE: 72 BPM | TEMPERATURE: 97.8 F | DIASTOLIC BLOOD PRESSURE: 91 MMHG

## 2024-08-08 LAB
ALBUMIN SERPL-MCNC: 3.2 G/DL (ref 3.4–5)
ANION GAP SERPL CALCULATED.3IONS-SCNC: 12 MMOL/L (ref 3–16)
BASOPHILS # BLD: 0 K/UL (ref 0–0.2)
BASOPHILS NFR BLD: 0.6 %
BUN SERPL-MCNC: 14 MG/DL (ref 7–20)
CALCIUM SERPL-MCNC: 8.7 MG/DL (ref 8.3–10.6)
CHLORIDE SERPL-SCNC: 110 MMOL/L (ref 99–110)
CO2 SERPL-SCNC: 21 MMOL/L (ref 21–32)
CORTIS 1H P 250 UG ACTH RIV SERPL-MCNC: 34.1 UG/DL
CORTIS 30M P 250 UG ACTH RIV SERPL-MCNC: 29.9 UG/DL
CORTIS SERPL-MCNC: 11.2 UG/DL
CORTIS SERPL-MCNC: 11.6 UG/DL
CREAT SERPL-MCNC: 0.6 MG/DL (ref 0.8–1.3)
DEPRECATED RDW RBC AUTO: 16.2 % (ref 12.4–15.4)
EOSINOPHIL # BLD: 0.2 K/UL (ref 0–0.6)
EOSINOPHIL NFR BLD: 6.1 %
GFR SERPLBLD CREATININE-BSD FMLA CKD-EPI: >90 ML/MIN/{1.73_M2}
GLUCOSE BLD-MCNC: 83 MG/DL (ref 70–99)
GLUCOSE SERPL-MCNC: 84 MG/DL (ref 70–99)
HCT VFR BLD AUTO: 34.3 % (ref 40.5–52.5)
HGB BLD-MCNC: 11.5 G/DL (ref 13.5–17.5)
LYMPHOCYTES # BLD: 0.8 K/UL (ref 1–5.1)
LYMPHOCYTES NFR BLD: 20.6 %
MAGNESIUM SERPL-MCNC: 2.2 MG/DL (ref 1.8–2.4)
MCH RBC QN AUTO: 31 PG (ref 26–34)
MCHC RBC AUTO-ENTMCNC: 33.6 G/DL (ref 31–36)
MCV RBC AUTO: 92.1 FL (ref 80–100)
MONOCYTES # BLD: 0.6 K/UL (ref 0–1.3)
MONOCYTES NFR BLD: 14 %
NEUTROPHILS # BLD: 2.4 K/UL (ref 1.7–7.7)
NEUTROPHILS NFR BLD: 58.7 %
PERFORMED ON: NORMAL
PHOSPHATE SERPL-MCNC: 3.4 MG/DL (ref 2.5–4.9)
PLATELET # BLD AUTO: 212 K/UL (ref 135–450)
PMV BLD AUTO: 7.7 FL (ref 5–10.5)
POTASSIUM SERPL-SCNC: 4.5 MMOL/L (ref 3.5–5.1)
RBC # BLD AUTO: 3.72 M/UL (ref 4.2–5.9)
REASON FOR REJECTION: NORMAL
REJECTED TEST: NORMAL
SODIUM SERPL-SCNC: 143 MMOL/L (ref 136–145)
WBC # BLD AUTO: 4 K/UL (ref 4–11)

## 2024-08-08 PROCEDURE — 80069 RENAL FUNCTION PANEL: CPT

## 2024-08-08 PROCEDURE — 94640 AIRWAY INHALATION TREATMENT: CPT

## 2024-08-08 PROCEDURE — 36415 COLL VENOUS BLD VENIPUNCTURE: CPT

## 2024-08-08 PROCEDURE — 94761 N-INVAS EAR/PLS OXIMETRY MLT: CPT

## 2024-08-08 PROCEDURE — 6370000000 HC RX 637 (ALT 250 FOR IP)

## 2024-08-08 PROCEDURE — 6360000002 HC RX W HCPCS

## 2024-08-08 PROCEDURE — 83735 ASSAY OF MAGNESIUM: CPT

## 2024-08-08 PROCEDURE — 6360000002 HC RX W HCPCS: Performed by: HOSPITALIST

## 2024-08-08 PROCEDURE — 82533 TOTAL CORTISOL: CPT

## 2024-08-08 PROCEDURE — 6370000000 HC RX 637 (ALT 250 FOR IP): Performed by: STUDENT IN AN ORGANIZED HEALTH CARE EDUCATION/TRAINING PROGRAM

## 2024-08-08 PROCEDURE — 85025 COMPLETE CBC W/AUTO DIFF WBC: CPT

## 2024-08-08 PROCEDURE — 80400 ACTH STIMULATION PANEL: CPT

## 2024-08-08 PROCEDURE — 2580000003 HC RX 258

## 2024-08-08 PROCEDURE — 2580000003 HC RX 258: Performed by: INTERNAL MEDICINE

## 2024-08-08 RX ORDER — CLOPIDOGREL BISULFATE 75 MG/1
75 TABLET ORAL DAILY
Qty: 90 TABLET | Refills: 0 | Status: SHIPPED | OUTPATIENT
Start: 2024-08-08

## 2024-08-08 RX ORDER — ALBUTEROL SULFATE 2.5 MG/3ML
2.5 SOLUTION RESPIRATORY (INHALATION) 2 TIMES DAILY
Status: DISCONTINUED | OUTPATIENT
Start: 2024-08-08 | End: 2024-08-08 | Stop reason: HOSPADM

## 2024-08-08 RX ORDER — MIDODRINE HYDROCHLORIDE 5 MG/1
10 TABLET ORAL 3 TIMES DAILY
Qty: 90 TABLET | Refills: 3 | Status: SHIPPED | OUTPATIENT
Start: 2024-08-08

## 2024-08-08 RX ORDER — COSYNTROPIN 0.25 MG/ML
250 INJECTION, POWDER, FOR SOLUTION INTRAMUSCULAR; INTRAVENOUS ONCE
Status: COMPLETED | OUTPATIENT
Start: 2024-08-08 | End: 2024-08-08

## 2024-08-08 RX ADMIN — SODIUM CHLORIDE, PRESERVATIVE FREE 10 ML: 5 INJECTION INTRAVENOUS at 07:23

## 2024-08-08 RX ADMIN — PANTOPRAZOLE SODIUM 40 MG: 40 TABLET, DELAYED RELEASE ORAL at 07:26

## 2024-08-08 RX ADMIN — CETIRIZINE HYDROCHLORIDE 10 MG: 10 TABLET, FILM COATED ORAL at 07:26

## 2024-08-08 RX ADMIN — KETOTIFEN FUMARATE 1 DROP: 0.25 SOLUTION/ DROPS OPHTHALMIC at 07:24

## 2024-08-08 RX ADMIN — COSYNTROPIN 250 MCG: 0.25 INJECTION, POWDER, LYOPHILIZED, FOR SOLUTION INTRAMUSCULAR; INTRAVENOUS at 08:49

## 2024-08-08 RX ADMIN — SODIUM CHLORIDE, PRESERVATIVE FREE 10 ML: 5 INJECTION INTRAVENOUS at 07:27

## 2024-08-08 RX ADMIN — FLUTICASONE PROPIONATE 1 SPRAY: 50 SPRAY, METERED NASAL at 07:24

## 2024-08-08 RX ADMIN — ALBUTEROL SULFATE 2.5 MG: 2.5 SOLUTION RESPIRATORY (INHALATION) at 08:33

## 2024-08-08 RX ADMIN — CLOPIDOGREL BISULFATE 75 MG: 75 TABLET ORAL at 07:26

## 2024-08-08 RX ADMIN — DIVALPROEX SODIUM 500 MG: 500 TABLET, DELAYED RELEASE ORAL at 08:22

## 2024-08-08 RX ADMIN — SENNOSIDES AND DOCUSATE SODIUM 2 TABLET: 50; 8.6 TABLET ORAL at 07:26

## 2024-08-08 RX ADMIN — POLYETHYLENE GLYCOL 3350 17 G: 17 POWDER, FOR SOLUTION ORAL at 07:25

## 2024-08-08 RX ADMIN — ACETAMINOPHEN 650 MG: 325 TABLET ORAL at 08:22

## 2024-08-08 RX ADMIN — ENOXAPARIN SODIUM 40 MG: 100 INJECTION SUBCUTANEOUS at 08:22

## 2024-08-08 RX ADMIN — ASPIRIN 81 MG: 81 TABLET, COATED ORAL at 07:26

## 2024-08-08 RX ADMIN — GABAPENTIN 400 MG: 400 CAPSULE ORAL at 07:26

## 2024-08-08 ASSESSMENT — PAIN SCALES - GENERAL
PAINLEVEL_OUTOF10: 0
PAINLEVEL_OUTOF10: 0

## 2024-08-08 NOTE — PLAN OF CARE
by Destiny Corley, RN)  Absence of Physical Injury: Implement safety measures based on patient assessment     Problem: Skin/Tissue Integrity  Goal: Absence of new skin breakdown  Description: 1.  Monitor for areas of redness and/or skin breakdown  2.  Assess vascular access sites hourly  3.  Every 4-6 hours minimum:  Change oxygen saturation probe site  4.  Every 4-6 hours:  If on nasal continuous positive airway pressure, respiratory therapy assess nares and determine need for appliance change or resting period.  8/7/2024 2020 by Cleopatra Ambriz, RN  Outcome: Progressing  8/7/2024 1844 by Shyla Jernigan, RN  Outcome: Progressing

## 2024-08-08 NOTE — PROGRESS NOTES
Department of Vascular Surgery  ICU Daily Progress Note   08/08/24        SUBJECTIVE:     NAEON. Patient resting comfortably in bed. He has been off dopamine with SBP >100. No other complaints reported.     OBJECTIVE:   VITALS:   Vitals:    08/08/24 0500 08/08/24 0530 08/08/24 0600 08/08/24 0630   BP: 109/63 126/82 (!) 136/95 (!) 145/92   Pulse: 66 71 66 69   Resp: 24 15 16 26   Temp:       TempSrc:       SpO2: 93% 95% 94% 94%   Weight:   69.1 kg (152 lb 5.4 oz)    Height:            PHYSICAL EXAMINATION:   General appearance - alert, well appearing, and in no distress  Neck - supple  Cardiac- normal rate, regular rhythm  Pulmonary - No retractions. No dyspnea.   Abdomen - soft, non-tender, no guarding, no rebound  : Flores in place  Extremities - Femoral incisions c/d/I. Palpable femoral pulses. No hematoma in groins  Neurological - alert, oriented, normal speech, no focal findings or movement disorder noted  Skin - normal coloration and turgor, no rashes, no suspicious skin lesions noted    ASSESSMENT AND PLAN:   Scot Hernandez is a 64 y.o. male, who was admitted with AAA without rupture. He is now s/p Ultrasound-guided bilateral common femoral access, Aortogram, Endovascular infrarenal aortic  aneurysm repair, Balloon angioplasty, and ProGlide closure to bilateral common femoral arteries on 7/31/2024 8 Days Post-Op      Patient has been off dopamine and maintaining SBP > 100. Maintaining > 88% on RA  Patient is deemed stable to be discharged today  Will follow-up in clinic for postoperative management with repeat CT scan. Social work on board in setting up ride to and from appointment and imaging.   Dispo: later today pending ride home    Maranda Lopez DO  PGY1, General Surgery  08/08/24  7:27 AM  [phone]

## 2024-08-08 NOTE — PROGRESS NOTES
V2.0    [facility] Progress Note      Name:  Scot Hernandez /Age/Sex: 1960  (64 y.o. male)   MRN & CSN:  [id number] & [id number] Encounter Date/Time: 2024 11:08 AM EDT   Location:  4518/4518-01 PCP: Lorna Gautam PA-C     Attending:Lore Cabello MD       Hospital Day: 9    Assessment and Recommendations   Scot Hernandez is a 64 y.o. male known case of COPD, DMII, Anxiety, CAD/MI, CHF, HTN, HLD, tobacco abuse and recent diagnosis of enlarging AAA was admitted under vascular surgery for repair . We were consulted for concern for adrenal insuffiencey      Pt was admitted - for back pain and was found with AAA 3.7 cm without dissection. She had evaluation by cardiology as Echo showed EF 35-40%. Angio showed RCA occlusion and critical high OM1/ramus stenosis not intervened due to proximity to left main coronary artery . Per cardiology she was at intermediate risk but may proceed without further testing  . Plan was for TEVAR  for which she is admitted .      Pt has hx of HTN on carvedilol and amlodipine. But she is also on midodrine for hx of intermittent hypotension      He underwent it and was doing well initially . Her BP started to drop so he was started on pressors. He was started on dopamine . He was started on home midodrine but cont to have episodes of hypotension . He was off dopamine yesterday then restarted again this morning due to low BP. Cardiology started Florinef . Pt is on metoprolol xl 12.5 mg and flomax. Random cortisol yesterday noon was 19.7     Hypotension  -BP has been stable off pressors   -Cont decrease dose Seroquel.   -Cortisol level of 19.7 makes adrenal insuffiencey very unlikely . This morning cortisl is 5 but the rest of the panel was not done. Repeat today if possible   -Cont midodrine 10 mg TID  -Florinef stopped and doing ok off it   -Ok to discharge if BP cont to be stable      AAA S/P TEVAR  - per primary     HFrEF  CAD/Hx of MI  -Recent Angio showing  RCA occlusion and critical high OM1/ramus stenosis not intervened due to proximity to left main coronary artery  -Per cardiology     COPD, stable  DMII, A1c 6  Anxiety  CAD/MI  HTN  HLD  Tobacco abuse   -Cont other home meds   -SSI    Medical Decision Making:  The following items were considered in medical decision making:  Discussion of patient care with other providers  Reviewed clinical lab tests if any  Reviewed radiology tests if any  Reviewed other diagnostic tests/interventions  Independent review of radiologic images if any  Microbiology cultures and other micro tests if any        Subjective:     Doing ok. No new complaints       Review of Systems:      Pertinent positives and negatives discussed in HPI    Objective:   No intake or output data in the 24 hours ending 08/08/24 0746     Vitals:   Vitals:    08/08/24 0500 08/08/24 0530 08/08/24 0600 08/08/24 0630   BP: 109/63 126/82 (!) 136/95 (!) 145/92   Pulse: 66 71 66 69   Resp: 24 15 16 26   Temp:       TempSrc:       SpO2: 93% 95% 94% 94%   Weight:   69.1 kg (152 lb 5.4 oz)    Height:             Physical Exam:      General: NAD  Eyes: EOMI  ENT: neck supple  Cardiovascular: Regular rate.  Respiratory: Clear to auscultation  Gastrointestinal: Soft, non tender  Genitourinary: no suprapubic tenderness  Musculoskeletal: No edema  Skin: warm, dry  Neuro: Alert.  Psych: Mood appropriate.         Medications:   Medications:    QUEtiapine  150 mg Oral Nightly    insulin lispro  0-4 Units SubCUTAneous TID WC    insulin lispro  0-4 Units SubCUTAneous Nightly    [Held by provider] fludrocortisone  0.2 mg Oral Daily    ketotifen fumarate  1 drop Both Eyes BID    sodium chloride flush  10 mL IntraVENous 2 times per day    polyethylene glycol  17 g Oral Daily    sennosides-docusate sodium  2 tablet Oral BID    atorvastatin  80 mg Oral Nightly    midodrine  10 mg Oral TID AC    aspirin  81 mg Oral Daily    cetirizine  10 mg Oral Daily    clopidogrel  75 mg Oral Daily

## 2024-08-08 NOTE — CARE COORDINATION
Case Management Assessment            Discharge Note                    Date / Time of Note: 8/8/2024 12:38 PM                  Discharge Note Completed by: LATOYA DELACRUZ    Patient Name: Scot Hernandez   YOB: 1960  Diagnosis: Aneurysm of infrarenal abdominal aorta, unspecified whether ruptured (HCC) [I71.43]  AAA (abdominal aortic aneurysm) without rupture (HCC) [I71.40]   Date / Time: 7/31/2024  8:18 AM    Current PCP: Lorna Gautam PA-C  Clinic patient: No    Hospitalization in the last 30 days: Yes  Readmission Assessment  Number of Days since last admission?:  (planned surgery)    Advance Directives:  Code Status: Full Code  [place] DNR form completed and on chart: Not Indicated    Financial:  Payor: [employer] [place] MEDICAID / Plan: [employer] [place] MEDICA / Product Type: *No Product type* /      Pharmacy:    [facility] [phone] -  [phone]  [address] [facility] [address]  Phone: [phone] Fax: [phone]      Assistance purchasing medications?: Potential Assistance Purchasing Medications: No  Assistance provided by Case Management: None at this time    Does patient want to participate in local refill/ meds to beds program?:      Meds To Beds General Rules:  1. Can ONLY be done Monday- Friday between 8:30am-5pm  2. Prescription(s) must be in pharmacy by 3pm to be filled same day  3.Copy of patient's insurance/ prescription drug card and patient face sheet must be sent along with the prescription(s)  4. Cost of Rx cannot be added to hospital bill. If financial assistance is needed, please contact unit  or ;  or  CANNOT provide pharmacy voucher for patients co-pays  5. Patients can then  the prescription on their way out of the hospital at discharge, or pharmacy can deliver to the bedside if staff is available. (payment due at time of pick-up or delivery -

## 2024-08-08 NOTE — PLAN OF CARE
Problem: Chronic Conditions and Co-morbidities  Goal: Patient's chronic conditions and co-morbidity symptoms are monitored and maintained or improved  Outcome: Adequate for Discharge  Flowsheets (Taken 8/8/2024 0800)  Care Plan - Patient's Chronic Conditions and Co-Morbidity Symptoms are Monitored and Maintained or Improved:   Monitor and assess patient's chronic conditions and comorbid symptoms for stability, deterioration, or improvement   Collaborate with multidisciplinary team to address chronic and comorbid conditions and prevent exacerbation or deterioration     Problem: Discharge Planning  Goal: Discharge to home or other facility with appropriate resources  Outcome: Adequate for Discharge  Flowsheets (Taken 8/8/2024 0800)  Discharge to home or other facility with appropriate resources:   Identify barriers to discharge with patient and caregiver   Arrange for needed discharge resources and transportation as appropriate   Identify discharge learning needs (meds, wound care, etc)     Problem: Pain  Goal: Verbalizes/displays adequate comfort level or baseline comfort level  Outcome: Adequate for Discharge  Flowsheets (Taken 8/8/2024 0800)  Verbalizes/displays adequate comfort level or baseline comfort level:   Encourage patient to monitor pain and request assistance   Assess pain using appropriate pain scale     Problem: Safety - Adult  Goal: Free from fall injury  Outcome: Adequate for Discharge  Flowsheets (Taken 8/8/2024 0814)  Free From Fall Injury:   Instruct family/caregiver on patient safety   Based on caregiver fall risk screen, instruct family/caregiver to ask for assistance with transferring infant if caregiver noted to have fall risk factors     Problem: ABCDS Injury Assessment  Goal: Absence of physical injury  Outcome: Adequate for Discharge  Flowsheets (Taken 8/8/2024 0814)  Absence of Physical Injury: Implement safety measures based on patient assessment     Problem: Skin/Tissue Integrity  Goal:

## 2024-08-08 NOTE — PROGRESS NOTES
Pt discharged home. All discharge education completed by this RN. All pt belongings sent home with pt. All LDAs removed.

## 2024-08-09 LAB
ALDOST SERPL-MCNC: 9.5 NG/DL
ALDOST/RENIN PLAS-RTO: 4.3 RATIO
RENIN PLAS-CCNC: 2.2 NG/ML/HR

## 2024-08-12 NOTE — DISCHARGE SUMMARY
suspicious skin lesions noted    Disposition:     Home    Condition at discharge:  Stable    Discharge Instructions:  See separate form    Patient Instructions:      Medication List        START taking these medications      clopidogrel 75 MG tablet  Commonly known as: PLAVIX  Take 1 tablet by mouth daily     midodrine 5 MG tablet  Commonly known as: PROAMATINE  Take 2 tablets by mouth 3 times daily            CONTINUE taking these medications      * albuterol (2.5 MG/3ML) 0.083% nebulizer solution  Commonly known as: PROVENTIL     * albuterol sulfate  (90 Base) MCG/ACT inhaler  Commonly known as: PROVENTIL;VENTOLIN;PROAIR     Aspirin Low Dose 81 MG EC tablet  Generic drug: aspirin     atorvastatin 80 MG tablet  Commonly known as: LIPITOR     cetirizine 10 MG tablet  Commonly known as: ZYRTEC     divalproex 500 MG DR tablet  Commonly known as: DEPAKOTE     ferrous sulfate 325 (65 Fe) MG tablet  Commonly known as: IRON 325     finasteride 5 MG tablet  Commonly known as: PROSCAR     fluticasone 50 MCG/ACT nasal spray  Commonly known as: FLONASE     gabapentin 400 MG capsule  Commonly known as: NEURONTIN     Multivitamin Tabs     pantoprazole 40 MG tablet  Commonly known as: PROTONIX     QUEtiapine 300 MG tablet  Commonly known as: SEROQUEL           * This list has 2 medication(s) that are the same as other medications prescribed for you. Read the directions carefully, and ask your doctor or other care provider to review them with you.                STOP taking these medications      amLODIPine 5 MG tablet  Commonly known as: NORVASC     carvedilol 3.125 MG tablet  Commonly known as: COREG     nitroGLYCERIN 0.4 MG/HR  Commonly known as: NITRODUR     polyethylene glycol 17 GM/SCOOP powder  Commonly known as: GLYCOLAX     tamsulosin 0.4 MG capsule  Commonly known as: FLOMAX               Where to Get Your Medications        These medications were sent to [id number] - [place], [place] - [address]

## 2024-08-14 PROBLEM — Z01.810 PREOPERATIVE CARDIOVASCULAR EXAMINATION: Status: RESOLVED | Noted: 2024-07-13 | Resolved: 2024-08-14

## 2024-08-19 ENCOUNTER — TELEPHONE (OUTPATIENT)
Dept: VASCULAR SURGERY | Age: 64
End: 2024-08-19

## 2024-08-19 DIAGNOSIS — Z86.79 S/P AAA (ABDOMINAL AORTIC ANEURYSM) REPAIR: Primary | ICD-10-CM

## 2024-08-19 DIAGNOSIS — Z98.890 S/P AAA (ABDOMINAL AORTIC ANEURYSM) REPAIR: Primary | ICD-10-CM

## 2024-08-19 NOTE — TELEPHONE ENCOUNTER
Attempted to call Mr. Hernandez but mailbox was full and unable to LVM. Was calling to follow up from hospital admission/surgery. Mr. Hernandez also needs a post-op appt for the fist week of September with a CTA A/P w&w/o contrast prior to appt.

## 2024-09-16 ENCOUNTER — TELEPHONE (OUTPATIENT)
Dept: VASCULAR SURGERY | Age: 64
End: 2024-09-16

## 2024-11-01 ENCOUNTER — OFFICE VISIT (OUTPATIENT)
Dept: ORTHOPEDIC SURGERY | Age: 64
End: 2024-11-01
Payer: MEDICAID

## 2024-11-01 VITALS — BODY MASS INDEX: 23.04 KG/M2 | HEIGHT: 68 IN | WEIGHT: 152 LBS

## 2024-11-01 DIAGNOSIS — M87.052 AVASCULAR NECROSIS OF BONE OF HIP, LEFT: Primary | ICD-10-CM

## 2024-11-01 PROCEDURE — 99214 OFFICE O/P EST MOD 30 MIN: CPT | Performed by: ORTHOPAEDIC SURGERY

## 2024-11-01 PROCEDURE — G8427 DOCREV CUR MEDS BY ELIG CLIN: HCPCS | Performed by: ORTHOPAEDIC SURGERY

## 2024-11-01 PROCEDURE — G8420 CALC BMI NORM PARAMETERS: HCPCS | Performed by: ORTHOPAEDIC SURGERY

## 2024-11-01 PROCEDURE — G8484 FLU IMMUNIZE NO ADMIN: HCPCS | Performed by: ORTHOPAEDIC SURGERY

## 2024-11-01 PROCEDURE — 3017F COLORECTAL CA SCREEN DOC REV: CPT | Performed by: ORTHOPAEDIC SURGERY

## 2024-11-01 PROCEDURE — 1036F TOBACCO NON-USER: CPT | Performed by: ORTHOPAEDIC SURGERY

## 2024-11-01 NOTE — PROGRESS NOTES
left femoral head with subchondral collapse/fragmentation which has progressed from prior radiographs.     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  I described the risk, fess, alternatives, and standard postoperative recovery course for hip replacement surgery.  He is well aware of this as he has gone undergone right hip replacement in January 2024.  Additionally, he had some other medical issues that did not allow him to proceed sooner than this time point.  He plans to move forward with clearance from his other medical professionals related to elective hip surgery.  His surgery will be planned in the near future at [facility].  All questions answered.     Angel Lopez MD

## 2024-11-04 ENCOUNTER — PREP FOR PROCEDURE (OUTPATIENT)
Dept: ORTHOPEDIC SURGERY | Age: 64
End: 2024-11-04

## 2024-11-04 DIAGNOSIS — M87.051 AVASCULAR NECROSIS OF BONES OF BOTH HIPS: ICD-10-CM

## 2024-11-04 DIAGNOSIS — M87.052 AVASCULAR NECROSIS OF BONE OF HIP, LEFT: Primary | ICD-10-CM

## 2024-11-04 DIAGNOSIS — M87.052 AVASCULAR NECROSIS OF BONES OF BOTH HIPS: ICD-10-CM

## 2024-12-04 ENCOUNTER — APPOINTMENT (OUTPATIENT)
Dept: CT IMAGING | Age: 64
End: 2024-12-04
Payer: MEDICAID

## 2024-12-04 ENCOUNTER — APPOINTMENT (OUTPATIENT)
Dept: GENERAL RADIOLOGY | Age: 64
End: 2024-12-04
Payer: MEDICAID

## 2024-12-04 ENCOUNTER — HOSPITAL ENCOUNTER (INPATIENT)
Age: 64
LOS: 3 days | Discharge: HOME | End: 2024-12-07
Attending: EMERGENCY MEDICINE | Admitting: INTERNAL MEDICINE
Payer: MEDICAID

## 2024-12-04 DIAGNOSIS — J44.1 COPD EXACERBATION (HCC): Primary | ICD-10-CM

## 2024-12-04 DIAGNOSIS — R09.02 HYPOXIA: ICD-10-CM

## 2024-12-04 LAB
ALBUMIN SERPL-MCNC: 3.8 G/DL (ref 3.4–5)
ALBUMIN/GLOB SERPL: 1.2 {RATIO} (ref 1.1–2.2)
ALP SERPL-CCNC: 138 U/L (ref 40–129)
ALT SERPL-CCNC: <5 U/L (ref 10–40)
ANION GAP SERPL CALCULATED.3IONS-SCNC: 12 MMOL/L (ref 3–16)
AST SERPL-CCNC: 18 U/L (ref 15–37)
BASOPHILS # BLD: 0 K/UL (ref 0–0.2)
BASOPHILS NFR BLD: 0.4 %
BILIRUB SERPL-MCNC: 0.3 MG/DL (ref 0–1)
BUN SERPL-MCNC: 28 MG/DL (ref 7–20)
CALCIUM SERPL-MCNC: 9.3 MG/DL (ref 8.3–10.6)
CHLORIDE SERPL-SCNC: 105 MMOL/L (ref 99–110)
CO2 SERPL-SCNC: 25 MMOL/L (ref 21–32)
CREAT SERPL-MCNC: 0.9 MG/DL (ref 0.8–1.3)
DEPRECATED RDW RBC AUTO: 16.6 % (ref 12.4–15.4)
EKG ATRIAL RATE: 84 BPM
EKG DIAGNOSIS: NORMAL
EKG P AXIS: 60 DEGREES
EKG P-R INTERVAL: 130 MS
EKG Q-T INTERVAL: 364 MS
EKG QRS DURATION: 78 MS
EKG QTC CALCULATION (BAZETT): 430 MS
EKG R AXIS: 31 DEGREES
EKG T AXIS: 60 DEGREES
EKG VENTRICULAR RATE: 84 BPM
EOSINOPHIL # BLD: 0.1 K/UL (ref 0–0.6)
EOSINOPHIL NFR BLD: 1.3 %
FLUAV RNA RESP QL NAA+PROBE: NOT DETECTED
FLUBV RNA RESP QL NAA+PROBE: NOT DETECTED
GFR SERPLBLD CREATININE-BSD FMLA CKD-EPI: >90 ML/MIN/{1.73_M2}
GLUCOSE SERPL-MCNC: 85 MG/DL (ref 70–99)
HCT VFR BLD AUTO: 38.9 % (ref 40.5–52.5)
HGB BLD-MCNC: 13.2 G/DL (ref 13.5–17.5)
LACTATE BLDV-SCNC: 0.9 MMOL/L (ref 0.4–2)
LYMPHOCYTES # BLD: 1.1 K/UL (ref 1–5.1)
LYMPHOCYTES NFR BLD: 13.2 %
MCH RBC QN AUTO: 30.1 PG (ref 26–34)
MCHC RBC AUTO-ENTMCNC: 33.8 G/DL (ref 31–36)
MCV RBC AUTO: 89 FL (ref 80–100)
MONOCYTES # BLD: 0.9 K/UL (ref 0–1.3)
MONOCYTES NFR BLD: 11 %
NEUTROPHILS # BLD: 6 K/UL (ref 1.7–7.7)
NEUTROPHILS NFR BLD: 74.1 %
NT-PROBNP SERPL-MCNC: 230 PG/ML (ref 0–124)
PLATELET # BLD AUTO: 169 K/UL (ref 135–450)
PMV BLD AUTO: 7.8 FL (ref 5–10.5)
POTASSIUM SERPL-SCNC: 4.2 MMOL/L (ref 3.5–5.1)
PROT SERPL-MCNC: 6.9 G/DL (ref 6.4–8.2)
RBC # BLD AUTO: 4.37 M/UL (ref 4.2–5.9)
SARS-COV-2 RNA RESP QL NAA+PROBE: NOT DETECTED
SODIUM SERPL-SCNC: 142 MMOL/L (ref 136–145)
TROPONIN, HIGH SENSITIVITY: 7 NG/L (ref 0–22)
TROPONIN, HIGH SENSITIVITY: <6 NG/L (ref 0–22)
WBC # BLD AUTO: 8.2 K/UL (ref 4–11)

## 2024-12-04 PROCEDURE — 6370000000 HC RX 637 (ALT 250 FOR IP): Performed by: NURSE PRACTITIONER

## 2024-12-04 PROCEDURE — 71275 CT ANGIOGRAPHY CHEST: CPT

## 2024-12-04 PROCEDURE — 96374 THER/PROPH/DIAG INJ IV PUSH: CPT

## 2024-12-04 PROCEDURE — 2580000003 HC RX 258: Performed by: NURSE PRACTITIONER

## 2024-12-04 PROCEDURE — 6360000002 HC RX W HCPCS: Performed by: PHYSICIAN ASSISTANT

## 2024-12-04 PROCEDURE — 6360000004 HC RX CONTRAST MEDICATION: Performed by: PHYSICIAN ASSISTANT

## 2024-12-04 PROCEDURE — 84484 ASSAY OF TROPONIN QUANT: CPT

## 2024-12-04 PROCEDURE — 80053 COMPREHEN METABOLIC PANEL: CPT

## 2024-12-04 PROCEDURE — 83605 ASSAY OF LACTIC ACID: CPT

## 2024-12-04 PROCEDURE — 1200000000 HC SEMI PRIVATE

## 2024-12-04 PROCEDURE — 94640 AIRWAY INHALATION TREATMENT: CPT

## 2024-12-04 PROCEDURE — 85025 COMPLETE CBC W/AUTO DIFF WBC: CPT

## 2024-12-04 PROCEDURE — 2580000003 HC RX 258: Performed by: PHYSICIAN ASSISTANT

## 2024-12-04 PROCEDURE — 83880 ASSAY OF NATRIURETIC PEPTIDE: CPT

## 2024-12-04 PROCEDURE — 2700000000 HC OXYGEN THERAPY PER DAY

## 2024-12-04 PROCEDURE — 6370000000 HC RX 637 (ALT 250 FOR IP): Performed by: PHYSICIAN ASSISTANT

## 2024-12-04 PROCEDURE — 6370000000 HC RX 637 (ALT 250 FOR IP): Performed by: INTERNAL MEDICINE

## 2024-12-04 PROCEDURE — 93010 ELECTROCARDIOGRAM REPORT: CPT | Performed by: INTERNAL MEDICINE

## 2024-12-04 PROCEDURE — 94761 N-INVAS EAR/PLS OXIMETRY MLT: CPT

## 2024-12-04 PROCEDURE — 93005 ELECTROCARDIOGRAM TRACING: CPT | Performed by: EMERGENCY MEDICINE

## 2024-12-04 PROCEDURE — 87636 SARSCOV2 & INF A&B AMP PRB: CPT

## 2024-12-04 PROCEDURE — 6360000002 HC RX W HCPCS: Performed by: NURSE PRACTITIONER

## 2024-12-04 PROCEDURE — 71045 X-RAY EXAM CHEST 1 VIEW: CPT

## 2024-12-04 PROCEDURE — 99285 EMERGENCY DEPT VISIT HI MDM: CPT

## 2024-12-04 RX ORDER — QUETIAPINE FUMARATE 100 MG/1
100 TABLET, FILM COATED ORAL DAILY
COMMUNITY

## 2024-12-04 RX ORDER — TIOTROPIUM BROMIDE 18 UG/1
18 CAPSULE ORAL; RESPIRATORY (INHALATION) DAILY
Status: ON HOLD | COMMUNITY
End: 2024-12-07 | Stop reason: HOSPADM

## 2024-12-04 RX ORDER — DIVALPROEX SODIUM 500 MG/1
1000 TABLET, DELAYED RELEASE ORAL
Status: DISCONTINUED | OUTPATIENT
Start: 2024-12-05 | End: 2024-12-07 | Stop reason: HOSPADM

## 2024-12-04 RX ORDER — IPRATROPIUM BROMIDE AND ALBUTEROL SULFATE 2.5; .5 MG/3ML; MG/3ML
1 SOLUTION RESPIRATORY (INHALATION) EVERY 4 HOURS
Status: ON HOLD | COMMUNITY
End: 2024-12-07 | Stop reason: HOSPADM

## 2024-12-04 RX ORDER — BUDESONIDE AND FORMOTEROL FUMARATE DIHYDRATE 160; 4.5 UG/1; UG/1
2 AEROSOL RESPIRATORY (INHALATION) 2 TIMES DAILY
COMMUNITY

## 2024-12-04 RX ORDER — TAMSULOSIN HYDROCHLORIDE 0.4 MG/1
0.4 CAPSULE ORAL DAILY
Status: DISCONTINUED | OUTPATIENT
Start: 2024-12-04 | End: 2024-12-07 | Stop reason: HOSPADM

## 2024-12-04 RX ORDER — BENZONATATE 100 MG/1
100 CAPSULE ORAL 3 TIMES DAILY PRN
Status: DISCONTINUED | OUTPATIENT
Start: 2024-12-04 | End: 2024-12-07 | Stop reason: HOSPADM

## 2024-12-04 RX ORDER — KETOTIFEN FUMARATE 0.35 MG/ML
1 SOLUTION/ DROPS OPHTHALMIC 2 TIMES DAILY
Status: DISCONTINUED | OUTPATIENT
Start: 2024-12-04 | End: 2024-12-07 | Stop reason: HOSPADM

## 2024-12-04 RX ORDER — KETOTIFEN FUMARATE 0.35 MG/ML
1 SOLUTION/ DROPS OPHTHALMIC 2 TIMES DAILY
Status: ON HOLD | COMMUNITY
End: 2024-12-07 | Stop reason: HOSPADM

## 2024-12-04 RX ORDER — ACETAMINOPHEN 650 MG/1
650 SUPPOSITORY RECTAL EVERY 6 HOURS PRN
Status: DISCONTINUED | OUTPATIENT
Start: 2024-12-04 | End: 2024-12-07 | Stop reason: HOSPADM

## 2024-12-04 RX ORDER — GABAPENTIN 400 MG/1
400 CAPSULE ORAL 3 TIMES DAILY
Status: DISCONTINUED | OUTPATIENT
Start: 2024-12-04 | End: 2024-12-07 | Stop reason: HOSPADM

## 2024-12-04 RX ORDER — CARVEDILOL 3.12 MG/1
3.12 TABLET ORAL 2 TIMES DAILY WITH MEALS
Status: DISCONTINUED | OUTPATIENT
Start: 2024-12-04 | End: 2024-12-07 | Stop reason: HOSPADM

## 2024-12-04 RX ORDER — PREDNISONE 20 MG/1
40 TABLET ORAL DAILY
Status: DISCONTINUED | OUTPATIENT
Start: 2024-12-07 | End: 2024-12-07 | Stop reason: HOSPADM

## 2024-12-04 RX ORDER — ENOXAPARIN SODIUM 100 MG/ML
40 INJECTION SUBCUTANEOUS DAILY
Status: DISCONTINUED | OUTPATIENT
Start: 2024-12-04 | End: 2024-12-07 | Stop reason: HOSPADM

## 2024-12-04 RX ORDER — CYCLOBENZAPRINE HCL 10 MG
5 TABLET ORAL 3 TIMES DAILY PRN
Status: DISCONTINUED | OUTPATIENT
Start: 2024-12-04 | End: 2024-12-07 | Stop reason: HOSPADM

## 2024-12-04 RX ORDER — ALBUTEROL SULFATE 90 UG/1
1 INHALANT RESPIRATORY (INHALATION) EVERY 6 HOURS PRN
Status: DISCONTINUED | OUTPATIENT
Start: 2024-12-04 | End: 2024-12-07 | Stop reason: HOSPADM

## 2024-12-04 RX ORDER — IOPAMIDOL 755 MG/ML
75 INJECTION, SOLUTION INTRAVASCULAR
Status: COMPLETED | OUTPATIENT
Start: 2024-12-04 | End: 2024-12-04

## 2024-12-04 RX ORDER — MIDODRINE HYDROCHLORIDE 10 MG/1
10 TABLET ORAL 3 TIMES DAILY
Status: DISCONTINUED | OUTPATIENT
Start: 2024-12-04 | End: 2024-12-07 | Stop reason: HOSPADM

## 2024-12-04 RX ORDER — SODIUM CHLORIDE 9 MG/ML
INJECTION, SOLUTION INTRAVENOUS PRN
Status: DISCONTINUED | OUTPATIENT
Start: 2024-12-04 | End: 2024-12-07 | Stop reason: HOSPADM

## 2024-12-04 RX ORDER — FINASTERIDE 5 MG/1
5 TABLET, FILM COATED ORAL DAILY
Status: DISCONTINUED | OUTPATIENT
Start: 2024-12-04 | End: 2024-12-07 | Stop reason: HOSPADM

## 2024-12-04 RX ORDER — FLUTICASONE PROPIONATE 50 MCG
1 SPRAY, SUSPENSION (ML) NASAL NIGHTLY PRN
Status: DISCONTINUED | OUTPATIENT
Start: 2024-12-04 | End: 2024-12-07 | Stop reason: HOSPADM

## 2024-12-04 RX ORDER — TAMSULOSIN HYDROCHLORIDE 0.4 MG/1
0.4 CAPSULE ORAL DAILY
COMMUNITY

## 2024-12-04 RX ORDER — MULTIVITAMIN WITH IRON
1 TABLET ORAL DAILY
Status: DISCONTINUED | OUTPATIENT
Start: 2024-12-04 | End: 2024-12-07 | Stop reason: HOSPADM

## 2024-12-04 RX ORDER — POLYETHYLENE GLYCOL 3350 17 G/17G
17 POWDER, FOR SOLUTION ORAL DAILY PRN
Status: DISCONTINUED | OUTPATIENT
Start: 2024-12-04 | End: 2024-12-07 | Stop reason: HOSPADM

## 2024-12-04 RX ORDER — ASPIRIN 81 MG/1
81 TABLET ORAL DAILY
Status: DISCONTINUED | OUTPATIENT
Start: 2024-12-04 | End: 2024-12-07 | Stop reason: HOSPADM

## 2024-12-04 RX ORDER — PANTOPRAZOLE SODIUM 40 MG/1
40 TABLET, DELAYED RELEASE ORAL DAILY
Status: DISCONTINUED | OUTPATIENT
Start: 2024-12-04 | End: 2024-12-07 | Stop reason: HOSPADM

## 2024-12-04 RX ORDER — FERROUS SULFATE 325(65) MG
325 TABLET ORAL
Status: DISCONTINUED | OUTPATIENT
Start: 2024-12-05 | End: 2024-12-07 | Stop reason: HOSPADM

## 2024-12-04 RX ORDER — DIVALPROEX SODIUM 500 MG/1
500 TABLET, DELAYED RELEASE ORAL DAILY
Status: DISCONTINUED | OUTPATIENT
Start: 2024-12-05 | End: 2024-12-07 | Stop reason: HOSPADM

## 2024-12-04 RX ORDER — IPRATROPIUM BROMIDE AND ALBUTEROL SULFATE 2.5; .5 MG/3ML; MG/3ML
1 SOLUTION RESPIRATORY (INHALATION) ONCE
Status: COMPLETED | OUTPATIENT
Start: 2024-12-04 | End: 2024-12-04

## 2024-12-04 RX ORDER — NITROGLYCERIN 0.4 MG/1
0.4 TABLET SUBLINGUAL EVERY 5 MIN PRN
COMMUNITY

## 2024-12-04 RX ORDER — SODIUM CHLORIDE 0.9 % (FLUSH) 0.9 %
5-40 SYRINGE (ML) INJECTION PRN
Status: DISCONTINUED | OUTPATIENT
Start: 2024-12-04 | End: 2024-12-07 | Stop reason: HOSPADM

## 2024-12-04 RX ORDER — ONDANSETRON 2 MG/ML
4 INJECTION INTRAMUSCULAR; INTRAVENOUS EVERY 6 HOURS PRN
Status: DISCONTINUED | OUTPATIENT
Start: 2024-12-04 | End: 2024-12-07 | Stop reason: HOSPADM

## 2024-12-04 RX ORDER — CYCLOBENZAPRINE HCL 5 MG
5 TABLET ORAL 3 TIMES DAILY PRN
Status: ON HOLD | COMMUNITY
End: 2024-12-07 | Stop reason: HOSPADM

## 2024-12-04 RX ORDER — CETIRIZINE HYDROCHLORIDE 10 MG/1
10 TABLET ORAL DAILY
Status: DISCONTINUED | OUTPATIENT
Start: 2024-12-04 | End: 2024-12-07 | Stop reason: HOSPADM

## 2024-12-04 RX ORDER — DICLOFENAC SODIUM 1 MG/ML
1 SOLUTION/ DROPS OPHTHALMIC 4 TIMES DAILY
Status: ON HOLD | COMMUNITY
End: 2024-12-07 | Stop reason: HOSPADM

## 2024-12-04 RX ORDER — TRIAMCINOLONE ACETONIDE 1 MG/G
1 CREAM TOPICAL 2 TIMES DAILY
Status: ON HOLD | COMMUNITY
End: 2024-12-07 | Stop reason: HOSPADM

## 2024-12-04 RX ORDER — SODIUM CHLORIDE 0.9 % (FLUSH) 0.9 %
5-40 SYRINGE (ML) INJECTION EVERY 12 HOURS SCHEDULED
Status: DISCONTINUED | OUTPATIENT
Start: 2024-12-04 | End: 2024-12-07 | Stop reason: HOSPADM

## 2024-12-04 RX ORDER — IPRATROPIUM BROMIDE AND ALBUTEROL SULFATE 2.5; .5 MG/3ML; MG/3ML
1 SOLUTION RESPIRATORY (INHALATION) 3 TIMES DAILY
Status: DISCONTINUED | OUTPATIENT
Start: 2024-12-04 | End: 2024-12-07 | Stop reason: HOSPADM

## 2024-12-04 RX ORDER — QUETIAPINE FUMARATE 100 MG/1
100 TABLET, FILM COATED ORAL DAILY
Status: DISCONTINUED | OUTPATIENT
Start: 2024-12-05 | End: 2024-12-07 | Stop reason: HOSPADM

## 2024-12-04 RX ORDER — AMLODIPINE BESYLATE 5 MG/1
5 TABLET ORAL DAILY
COMMUNITY

## 2024-12-04 RX ORDER — ACETAMINOPHEN 325 MG/1
650 TABLET ORAL EVERY 6 HOURS PRN
Status: DISCONTINUED | OUTPATIENT
Start: 2024-12-04 | End: 2024-12-07 | Stop reason: HOSPADM

## 2024-12-04 RX ORDER — CLOPIDOGREL BISULFATE 75 MG/1
75 TABLET ORAL DAILY
Status: DISCONTINUED | OUTPATIENT
Start: 2024-12-04 | End: 2024-12-07 | Stop reason: HOSPADM

## 2024-12-04 RX ORDER — BENZONATATE 100 MG/1
100 CAPSULE ORAL 3 TIMES DAILY PRN
COMMUNITY

## 2024-12-04 RX ORDER — AMLODIPINE BESYLATE 5 MG/1
5 TABLET ORAL DAILY
Status: DISCONTINUED | OUTPATIENT
Start: 2024-12-04 | End: 2024-12-07 | Stop reason: HOSPADM

## 2024-12-04 RX ORDER — ONDANSETRON 4 MG/1
4 TABLET, ORALLY DISINTEGRATING ORAL EVERY 8 HOURS PRN
Status: DISCONTINUED | OUTPATIENT
Start: 2024-12-04 | End: 2024-12-07 | Stop reason: HOSPADM

## 2024-12-04 RX ORDER — IPRATROPIUM BROMIDE AND ALBUTEROL SULFATE 2.5; .5 MG/3ML; MG/3ML
1 SOLUTION RESPIRATORY (INHALATION)
Status: DISCONTINUED | OUTPATIENT
Start: 2024-12-04 | End: 2024-12-04

## 2024-12-04 RX ORDER — BUDESONIDE AND FORMOTEROL FUMARATE DIHYDRATE 160; 4.5 UG/1; UG/1
2 AEROSOL RESPIRATORY (INHALATION) 2 TIMES DAILY
Status: DISCONTINUED | OUTPATIENT
Start: 2024-12-04 | End: 2024-12-07 | Stop reason: HOSPADM

## 2024-12-04 RX ORDER — CARVEDILOL 3.12 MG/1
3.12 TABLET ORAL 2 TIMES DAILY WITH MEALS
COMMUNITY

## 2024-12-04 RX ORDER — ATORVASTATIN CALCIUM 40 MG/1
80 TABLET, FILM COATED ORAL NIGHTLY
Status: DISCONTINUED | OUTPATIENT
Start: 2024-12-04 | End: 2024-12-07 | Stop reason: HOSPADM

## 2024-12-04 RX ORDER — ASPIRIN 325 MG
325 TABLET ORAL ONCE
Status: COMPLETED | OUTPATIENT
Start: 2024-12-04 | End: 2024-12-04

## 2024-12-04 RX ORDER — ALBUTEROL SULFATE 0.83 MG/ML
2.5 SOLUTION RESPIRATORY (INHALATION) EVERY 6 HOURS PRN
Status: DISCONTINUED | OUTPATIENT
Start: 2024-12-04 | End: 2024-12-05

## 2024-12-04 RX ADMIN — IPRATROPIUM BROMIDE AND ALBUTEROL SULFATE 1 DOSE: 2.5; .5 SOLUTION RESPIRATORY (INHALATION) at 19:42

## 2024-12-04 RX ADMIN — IPRATROPIUM BROMIDE AND ALBUTEROL SULFATE 1 DOSE: 2.5; .5 SOLUTION RESPIRATORY (INHALATION) at 16:42

## 2024-12-04 RX ADMIN — ASPIRIN 325 MG: 325 TABLET ORAL at 13:33

## 2024-12-04 RX ADMIN — KETOTIFEN FUMARATE 1 DROP: 0.25 SOLUTION/ DROPS OPHTHALMIC at 22:26

## 2024-12-04 RX ADMIN — IOPAMIDOL 75 ML: 755 INJECTION, SOLUTION INTRAVENOUS at 14:32

## 2024-12-04 RX ADMIN — AMLODIPINE BESYLATE 5 MG: 5 TABLET ORAL at 22:24

## 2024-12-04 RX ADMIN — AZITHROMYCIN MONOHYDRATE 500 MG: 500 INJECTION, POWDER, LYOPHILIZED, FOR SOLUTION INTRAVENOUS at 22:34

## 2024-12-04 RX ADMIN — CLOPIDOGREL BISULFATE 75 MG: 75 TABLET ORAL at 22:22

## 2024-12-04 RX ADMIN — ENOXAPARIN SODIUM 40 MG: 100 INJECTION SUBCUTANEOUS at 22:25

## 2024-12-04 RX ADMIN — CARVEDILOL 3.12 MG: 3.12 TABLET, FILM COATED ORAL at 22:23

## 2024-12-04 RX ADMIN — QUETIAPINE FUMARATE 300 MG: 200 TABLET ORAL at 22:22

## 2024-12-04 RX ADMIN — ASPIRIN 81 MG: 81 TABLET, COATED ORAL at 22:23

## 2024-12-04 RX ADMIN — TAMSULOSIN HYDROCHLORIDE 0.4 MG: 0.4 CAPSULE ORAL at 22:22

## 2024-12-04 RX ADMIN — BUDESONIDE AND FORMOTEROL FUMARATE DIHYDRATE 2 PUFF: 160; 4.5 AEROSOL RESPIRATORY (INHALATION) at 19:42

## 2024-12-04 RX ADMIN — GABAPENTIN 400 MG: 400 CAPSULE ORAL at 22:24

## 2024-12-04 RX ADMIN — ATORVASTATIN CALCIUM 80 MG: 40 TABLET, FILM COATED ORAL at 22:25

## 2024-12-04 RX ADMIN — MIDODRINE HYDROCHLORIDE 10 MG: 10 TABLET ORAL at 22:25

## 2024-12-04 RX ADMIN — Medication 10 ML: at 22:25

## 2024-12-04 RX ADMIN — MULTIVITAMIN TABLET 1 TABLET: TABLET at 22:26

## 2024-12-04 RX ADMIN — WATER 125 MG: 1 INJECTION INTRAMUSCULAR; INTRAVENOUS; SUBCUTANEOUS at 16:42

## 2024-12-04 RX ADMIN — PANTOPRAZOLE SODIUM 40 MG: 40 TABLET, DELAYED RELEASE ORAL at 22:24

## 2024-12-04 RX ADMIN — FINASTERIDE 5 MG: 5 TABLET, FILM COATED ORAL at 22:23

## 2024-12-04 RX ADMIN — METHYLPREDNISOLONE SODIUM SUCCINATE 40 MG: 40 INJECTION INTRAMUSCULAR; INTRAVENOUS at 22:35

## 2024-12-04 RX ADMIN — CETIRIZINE HYDROCHLORIDE 10 MG: 10 TABLET ORAL at 22:24

## 2024-12-04 ASSESSMENT — PAIN SCALES - GENERAL
PAINLEVEL_OUTOF10: 10

## 2024-12-04 ASSESSMENT — PAIN DESCRIPTION - FREQUENCY: FREQUENCY: CONTINUOUS

## 2024-12-04 ASSESSMENT — PAIN - FUNCTIONAL ASSESSMENT
PAIN_FUNCTIONAL_ASSESSMENT: 0-10
PAIN_FUNCTIONAL_ASSESSMENT: 0-10

## 2024-12-04 ASSESSMENT — PAIN DESCRIPTION - PAIN TYPE: TYPE: CHRONIC PAIN

## 2024-12-04 ASSESSMENT — PAIN DESCRIPTION - LOCATION
LOCATION: OTHER (COMMENT)
LOCATION: OTHER (COMMENT)

## 2024-12-04 ASSESSMENT — PAIN DESCRIPTION - ONSET: ONSET: ON-GOING

## 2024-12-04 NOTE — ED PROVIDER NOTES
I have reviewed the below EKG. I was not otherwise involved in this patient's care.      EKG  The Ekg interpreted by myself  normal sinus rhythm with a rate of 84  Axis is   Normal  QTc is  normal  Intervals and Durations are unremarkable.      No specific ST-T wave changes appreciated.  No evidence of acute ischemia.         Sienna Bran MD  12/04/24 [id number]

## 2024-12-04 NOTE — ED NOTES
Pt ambulated around nursing station. Spo2 decreased from 96% to 88% on room air. Pt reports increased sob and requested to go back to room.

## 2024-12-04 NOTE — PROGRESS NOTES
Pt admitted to unit from ED. He is alert and oriented and accepting of care. Vitals stable. O2 in place at 2LO2. Pt oriented to room and unit and educated on plan of care. Pt offered food and water, denied at this time. Call light within reach. No new concerns at this time.

## 2024-12-04 NOTE — ED PROVIDER NOTES
[facility] ED     EMERGENCY DEPARTMENT ENCOUNTER     Location: [facility] ED  12/4/2024  Note Started: 4:37 PM EST 12/4/24      Patient Identification  Scot Hernandez is a 64 y.o. male    Patient seen with the physician assistant Lazarus Cooper    HPI:Scot Hernandez was evaluated in the Emergency Department for cough and shortness of breath rating to the back. Although initial history and physical exam information was obtained by OSMIN/NPP/MD/DO (who also dictated a record of this visit), I personally saw the patient and performed and made/approved the management plan and take responsibility for the patient management.      PHYSICAL EXAM:  Thin gentleman bilateral expiratory wheezing decreased breath sounds.        Patient seen and evaluated.  Relevant records reviewed.  MDM concerned about COPD versus CHF slightly elevated BNP.  CT was obtained because of pain going through the back to rule out aneurysm.  No signs of acute TAD, there is some hyperinflation no PE.  Patient will get DuoNeb steroids he will be admitted he was ambulated sats went down into the mid 80s.  Patient is comfortable with this plan    I independently interpreted the following studies: Imaging lab work    I personally discussed the patients care with patient, physician assistant    CLINICAL IMPRESSION  COPD exacerbation with exertional hypoxia        This chart was generated in part by using Dragon Dictation system and may contain errors related to that system including errors in grammar, punctuation, and spelling, as well as words and phrases that may be inappropriate. If there are any questions or concerns please feel free to contact the dictating provider for clarification.     Fadi Lawler MD   [employer]       Fadi Lawler MD  12/04/24 1112

## 2024-12-04 NOTE — PLAN OF CARE
Admit to 2w with telemetry     COPD AE  Intermittent chest pain, troponin negative x2, no acute EKG changes and recent LHC prior to AAA repair in June  Pulmonary consulted  Hypoxia      Dora Tam, APRN - CNP

## 2024-12-05 PROBLEM — I10 HYPERTENSION, ESSENTIAL: Status: ACTIVE | Noted: 2024-12-05

## 2024-12-05 LAB
BASOPHILS # BLD: 0 K/UL (ref 0–0.2)
BASOPHILS NFR BLD: 0 %
DEPRECATED RDW RBC AUTO: 16.5 % (ref 12.4–15.4)
EOSINOPHIL # BLD: 0 K/UL (ref 0–0.6)
EOSINOPHIL NFR BLD: 0 %
HCT VFR BLD AUTO: 38.6 % (ref 40.5–52.5)
HGB BLD-MCNC: 13 G/DL (ref 13.5–17.5)
LYMPHOCYTES # BLD: 0.4 K/UL (ref 1–5.1)
LYMPHOCYTES NFR BLD: 5.4 %
MCH RBC QN AUTO: 30.3 PG (ref 26–34)
MCHC RBC AUTO-ENTMCNC: 33.7 G/DL (ref 31–36)
MCV RBC AUTO: 90 FL (ref 80–100)
MONOCYTES # BLD: 0.1 K/UL (ref 0–1.3)
MONOCYTES NFR BLD: 2 %
NEUTROPHILS # BLD: 6.1 K/UL (ref 1.7–7.7)
NEUTROPHILS NFR BLD: 92.6 %
ORGANISM: ABNORMAL
ORGANISM: ABNORMAL
PLATELET # BLD AUTO: 166 K/UL (ref 135–450)
PMV BLD AUTO: 8.1 FL (ref 5–10.5)
RBC # BLD AUTO: 4.29 M/UL (ref 4.2–5.9)
REPORT: NORMAL
RESP PATH DNA+RNA PNL L RESP NAA+NON-PRB: ABNORMAL
RESP PATH DNA+RNA PNL L RESP NAA+NON-PRB: ABNORMAL
WBC # BLD AUTO: 6.6 K/UL (ref 4–11)

## 2024-12-05 PROCEDURE — 99221 1ST HOSP IP/OBS SF/LOW 40: CPT | Performed by: NURSE PRACTITIONER

## 2024-12-05 PROCEDURE — 36415 COLL VENOUS BLD VENIPUNCTURE: CPT

## 2024-12-05 PROCEDURE — 87205 SMEAR GRAM STAIN: CPT

## 2024-12-05 PROCEDURE — 6360000002 HC RX W HCPCS: Performed by: NURSE PRACTITIONER

## 2024-12-05 PROCEDURE — 83036 HEMOGLOBIN GLYCOSYLATED A1C: CPT

## 2024-12-05 PROCEDURE — 94640 AIRWAY INHALATION TREATMENT: CPT

## 2024-12-05 PROCEDURE — 85025 COMPLETE CBC W/AUTO DIFF WBC: CPT

## 2024-12-05 PROCEDURE — 6360000002 HC RX W HCPCS: Performed by: INTERNAL MEDICINE

## 2024-12-05 PROCEDURE — 2580000003 HC RX 258: Performed by: INTERNAL MEDICINE

## 2024-12-05 PROCEDURE — 6370000000 HC RX 637 (ALT 250 FOR IP): Performed by: INTERNAL MEDICINE

## 2024-12-05 PROCEDURE — 87070 CULTURE OTHR SPECIMN AEROBIC: CPT

## 2024-12-05 PROCEDURE — 6370000000 HC RX 637 (ALT 250 FOR IP): Performed by: NURSE PRACTITIONER

## 2024-12-05 PROCEDURE — 87077 CULTURE AEROBIC IDENTIFY: CPT

## 2024-12-05 PROCEDURE — 87633 RESP VIRUS 12-25 TARGETS: CPT

## 2024-12-05 PROCEDURE — 1200000000 HC SEMI PRIVATE

## 2024-12-05 PROCEDURE — 87186 SC STD MICRODIL/AGAR DIL: CPT

## 2024-12-05 PROCEDURE — 2700000000 HC OXYGEN THERAPY PER DAY

## 2024-12-05 PROCEDURE — 2580000003 HC RX 258: Performed by: NURSE PRACTITIONER

## 2024-12-05 PROCEDURE — 99223 1ST HOSP IP/OBS HIGH 75: CPT | Performed by: INTERNAL MEDICINE

## 2024-12-05 PROCEDURE — 94761 N-INVAS EAR/PLS OXIMETRY MLT: CPT

## 2024-12-05 RX ORDER — AZITHROMYCIN 250 MG/1
500 TABLET, FILM COATED ORAL DAILY
Status: DISCONTINUED | OUTPATIENT
Start: 2024-12-05 | End: 2024-12-07 | Stop reason: HOSPADM

## 2024-12-05 RX ORDER — ALBUTEROL SULFATE 0.83 MG/ML
2.5 SOLUTION RESPIRATORY (INHALATION) EVERY 4 HOURS PRN
Status: DISCONTINUED | OUTPATIENT
Start: 2024-12-05 | End: 2024-12-07 | Stop reason: HOSPADM

## 2024-12-05 RX ADMIN — QUETIAPINE FUMARATE 300 MG: 200 TABLET ORAL at 20:12

## 2024-12-05 RX ADMIN — Medication 10 ML: at 20:13

## 2024-12-05 RX ADMIN — DIVALPROEX SODIUM 1000 MG: 500 TABLET, DELAYED RELEASE ORAL at 20:12

## 2024-12-05 RX ADMIN — SODIUM CHLORIDE 1000 MG: 900 INJECTION INTRAVENOUS at 21:51

## 2024-12-05 RX ADMIN — TAMSULOSIN HYDROCHLORIDE 0.4 MG: 0.4 CAPSULE ORAL at 08:51

## 2024-12-05 RX ADMIN — DIVALPROEX SODIUM 500 MG: 500 TABLET, DELAYED RELEASE ORAL at 08:51

## 2024-12-05 RX ADMIN — GABAPENTIN 400 MG: 400 CAPSULE ORAL at 13:05

## 2024-12-05 RX ADMIN — BUDESONIDE AND FORMOTEROL FUMARATE DIHYDRATE 2 PUFF: 160; 4.5 AEROSOL RESPIRATORY (INHALATION) at 07:08

## 2024-12-05 RX ADMIN — MIDODRINE HYDROCHLORIDE 10 MG: 10 TABLET ORAL at 08:50

## 2024-12-05 RX ADMIN — FERROUS SULFATE TAB 325 MG (65 MG ELEMENTAL FE) 325 MG: 325 (65 FE) TAB at 08:51

## 2024-12-05 RX ADMIN — CARVEDILOL 3.12 MG: 3.12 TABLET, FILM COATED ORAL at 17:51

## 2024-12-05 RX ADMIN — METHYLPREDNISOLONE SODIUM SUCCINATE 40 MG: 40 INJECTION INTRAMUSCULAR; INTRAVENOUS at 13:05

## 2024-12-05 RX ADMIN — AZITHROMYCIN 500 MG: 250 TABLET, FILM COATED ORAL at 20:12

## 2024-12-05 RX ADMIN — CARVEDILOL 3.12 MG: 3.12 TABLET, FILM COATED ORAL at 08:51

## 2024-12-05 RX ADMIN — ENOXAPARIN SODIUM 40 MG: 100 INJECTION SUBCUTANEOUS at 08:50

## 2024-12-05 RX ADMIN — KETOTIFEN FUMARATE 1 DROP: 0.25 SOLUTION/ DROPS OPHTHALMIC at 20:13

## 2024-12-05 RX ADMIN — MULTIVITAMIN TABLET 1 TABLET: TABLET at 08:50

## 2024-12-05 RX ADMIN — AMLODIPINE BESYLATE 5 MG: 5 TABLET ORAL at 08:51

## 2024-12-05 RX ADMIN — IPRATROPIUM BROMIDE AND ALBUTEROL SULFATE 1 DOSE: 2.5; .5 SOLUTION RESPIRATORY (INHALATION) at 19:00

## 2024-12-05 RX ADMIN — FINASTERIDE 5 MG: 5 TABLET, FILM COATED ORAL at 08:51

## 2024-12-05 RX ADMIN — CETIRIZINE HYDROCHLORIDE 10 MG: 10 TABLET ORAL at 08:51

## 2024-12-05 RX ADMIN — IPRATROPIUM BROMIDE AND ALBUTEROL SULFATE 1 DOSE: 2.5; .5 SOLUTION RESPIRATORY (INHALATION) at 07:08

## 2024-12-05 RX ADMIN — BUDESONIDE AND FORMOTEROL FUMARATE DIHYDRATE 2 PUFF: 160; 4.5 AEROSOL RESPIRATORY (INHALATION) at 19:00

## 2024-12-05 RX ADMIN — PANTOPRAZOLE SODIUM 40 MG: 40 TABLET, DELAYED RELEASE ORAL at 08:50

## 2024-12-05 RX ADMIN — MIDODRINE HYDROCHLORIDE 10 MG: 10 TABLET ORAL at 13:05

## 2024-12-05 RX ADMIN — QUETIAPINE FUMARATE 100 MG: 100 TABLET ORAL at 08:50

## 2024-12-05 RX ADMIN — ATORVASTATIN CALCIUM 80 MG: 40 TABLET, FILM COATED ORAL at 20:12

## 2024-12-05 RX ADMIN — TIOTROPIUM BROMIDE INHALATION SPRAY 2 PUFF: 3.12 SPRAY, METERED RESPIRATORY (INHALATION) at 07:08

## 2024-12-05 RX ADMIN — KETOTIFEN FUMARATE 1 DROP: 0.25 SOLUTION/ DROPS OPHTHALMIC at 08:51

## 2024-12-05 RX ADMIN — Medication 10 ML: at 08:54

## 2024-12-05 RX ADMIN — ASPIRIN 81 MG: 81 TABLET, COATED ORAL at 08:51

## 2024-12-05 RX ADMIN — ALBUTEROL SULFATE 2.5 MG: 2.5 SOLUTION RESPIRATORY (INHALATION) at 23:13

## 2024-12-05 RX ADMIN — MIDODRINE HYDROCHLORIDE 10 MG: 10 TABLET ORAL at 20:12

## 2024-12-05 RX ADMIN — IPRATROPIUM BROMIDE AND ALBUTEROL SULFATE 1 DOSE: 2.5; .5 SOLUTION RESPIRATORY (INHALATION) at 13:08

## 2024-12-05 RX ADMIN — GABAPENTIN 400 MG: 400 CAPSULE ORAL at 20:12

## 2024-12-05 RX ADMIN — GABAPENTIN 400 MG: 400 CAPSULE ORAL at 08:50

## 2024-12-05 RX ADMIN — CLOPIDOGREL BISULFATE 75 MG: 75 TABLET ORAL at 08:51

## 2024-12-05 NOTE — PROGRESS NOTES
RT Inhaler-Nebulizer Bronchodilator Protocol Note    There is a bronchodilator order in the chart from a provider indicating to follow the RT Bronchodilator Protocol and there is an “Initiate RT Inhaler-Nebulizer Bronchodilator Protocol” order as well (see protocol at bottom of note).    CXR Findings:  XR CHEST PORTABLE    Result Date: 12/4/2024  No acute process.       The findings from the last RT Protocol Assessment were as follows:   History Pulmonary Disease: (P) Chronic pulmonary disease  Respiratory Pattern: (P) Dyspnea on exertion or RR 21-25 bpm  Breath Sounds: (P) Intermittent or unilateral wheezes  Cough: (P) Strong, spontaneous, non-productive  Indication for Bronchodilator Therapy: (P) Decreased or absent breath sounds, Wheezing associated with pulm disorder  Bronchodilator Assessment Score: (P) 8    Aerosolized bronchodilator medication orders have been revised according to the RT Inhaler-Nebulizer Bronchodilator Protocol below.    Respiratory Therapist to perform RT Therapy Protocol Assessment initially then follow the protocol.  Repeat RT Therapy Protocol Assessment PRN for score 0-3 or on second treatment, BID, and PRN for scores above 3.    No Indications - adjust the frequency to every 6 hours PRN wheezing or bronchospasm, if no treatments needed after 48 hours then discontinue using Per Protocol order mode.     If indication present, adjust the RT bronchodilator orders based on the Bronchodilator Assessment Score as indicated below.  Use Inhaler orders unless patient has one or more of the following: on home nebulizer, not able to hold breath for 10 seconds, is not alert and oriented, cannot activate and use MDI correctly, or respiratory rate 25 breaths per minute or more, then use the equivalent nebulizer order(s) with same Frequency and PRN reasons based on the score.  If a patient is on this medication at home then do not decrease Frequency below that used at home.    0-3 - enter or revise RT

## 2024-12-05 NOTE — ED PROVIDER NOTES
[facility] Emergency Department    CHIEF COMPLAINT  Shortness of Breath (Shortness of breath when arriving to OP clinic, chest and back pain. States has been sick for 1.5 weeks)      SHARED SERVICE VISIT  I have seen and evaluated this patient with my supervising physician, Dr. Fadi Lawler.    HISTORY OF PRESENT ILLNESS  Scot Hernandez is a 64 y.o. male who presents to the ED complaining of shortness of breath chest pain that radiates into his back.  Says that he is been sick for the past week and a half and has noticed progressively worsening shortness of breath.  Has a history of emphysema.  Describes chest pain as sharp and stabbing with radiation into his back.  Denies any cardiac history. Denies any headache, body ache, fevers or chills.  Denies any coughing or sneezing.  Denies any sore throat or congestion.  Denies any vision changes or dizziness. Denies any nausea, vomiting, or abdominal pain.  Denies any urinary symptoms.  Denies any diarrhea or bloody stools.  Denies any new onset back pain.  Denies any recent travel or sick contacts.    No other complaints, modifying factors or associated symptoms.     Nursing notes reviewed.   Past Medical History:   Diagnosis Date    AAA (abdominal aortic aneurysm) ([facility]) 2018    3.0 cm    Anxiety     Arterial stent thrombosis ([facility])     Arthritis     Asthma     Bipolar 1 disorder ([facility])     COPD (chronic obstructive pulmonary disease) ([facility])     Coronary artery disease involving native coronary artery of native heart without angina pectoris 02/08/2021    Diabetes mellitus ([facility])     Essential hypertension 02/08/2021    Hyperlipidemia     Pneumonia      Past Surgical History:   Procedure Laterality Date    ABDOMINAL AORTIC ANEURYSM REPAIR, ENDOVASCULAR N/A 7/31/2024    Endovascular Aortic Aneurysm Repair performed by Lore Cabello MD at [facility] OR    CARDIAC PROCEDURE N/A 7/15/2024    Left heart cath / coronary angiography performed by

## 2024-12-05 NOTE — PROGRESS NOTES
Pt laying in bed this AM during shift assessment. He is alert and oriented and accepting of care. Pt remains on 2L, high 90s, can be weaned if tolerated. Pt eating and drinking well. Sputum sent to the lab. Tele remains in place. Call light within reach. No further concerns at this time.     Bedside Mobility Assessment Tool (BMAT):     Assessment Level 1- Sit and Shake    1. From a semi-reclined position, ask patient to sit up and rotate to a seated position at the side of the bed. Can use the bedrail.    2. Ask patient to reach out and grab your hand and shake making sure patient reaches across his/her midline.   Pass- Patient is able to come to a seated position, maintain core strength. Maintains seated balance while reaching across midline. Move on to Assessment Level 2.     Assessment Level 2- Stretch and Point   1. With patient in seated position at the side of the bed, have patient place both feet on the floor (or stool) with knees no higher than hips.    2. Ask patient to stretch one leg and straighten the knee, then bend the ankle/flex and point the toes. If appropriate, repeat with the other leg.   Pass- Patient is able to demonstrate appropriate quad strength on intended weight bearing limb(s). Move onto Assessment Level 3.     Assessment Level 3- Stand   1. Ask patient to elevate off the bed or chair (seated to standing) using an assistive device (cane, bedrail).    2. Patient should be able to raise buttocks off be and hold for a count of five. May repeat once.   Pass- Patient maintains standing stability for at least 5 seconds, proceed to assessment level 4.    Assessment Level 4- Walk   1. Ask patient to march in place at bedside.    2. Then ask patient to advance step and return each foot. Some medical conditions may render a patient from stepping backwards, use your best clinical judgement.   Pass- Patient demonstrates balance while shifting weight and ability to step, takes independent steps, does

## 2024-12-05 NOTE — FLOWSHEET NOTE
12/04/24 2208   Vital Signs   Temp 98.2 °F (36.8 °C)   Temp Source Oral   Pulse 74   Heart Rate Source Monitor   Respirations 18   /64   MAP (Calculated) 82   BP Location Left upper arm   BP Method Automatic   Patient Position Lying right side   Pain Assessment   Pain Assessment None - Denies Pain   Opioid-Induced Sedation   POSS Score 1   RASS   Johnson Agitation Sedation Scale (RASS) 0   Oxygen Therapy   SpO2 93 %   Pulse Oximetry Type Intermittent   Pulse Oximeter Device Mode Intermittent   Pulse Oximeter Device Location Left;Finger   O2 Device Nasal cannula   O2 Flow Rate (L/min) 2 L/min      Patient A+Ox4, vitals and assessments done at this time. Bed in lowest position, call light within reach.

## 2024-12-05 NOTE — H&P
4/19/2023    COLONOSCOPY POLYPECTOMY SNARE/COLD BIOPSY performed by Mary Pozo MD at [facility] ENDOSCOPY    CYSTOSCOPY N/A 4/17/2024    CYSTOSCOPY URETHRAL DILATION performed by Salazar Sevilla MD at [facility] OR    KNEE ARTHROPLASTY      R knee x4    NASAL SINUS SURGERY      OTHER SURGICAL HISTORY      COLONOSCOPY POLYPECTOMY SNARE/COLD BIOPSY    TOTAL HIP ARTHROPLASTY Right 01/25/2023    RIGHT TOTAL HIP ARTHROPLASTY  performed by Angel Lopez MD at [facility] OR    UPPER GASTROINTESTINAL ENDOSCOPY  07/14/2011    UPPER GASTROINTESTINAL ENDOSCOPY N/A 04/04/2019    EGD BIOPSY performed by Fabian García DO at [facility] ENDOSCOPY       Medications Prior to Admission:    Prior to Admission medications    Medication Sig Start Date End Date Taking? Authorizing Provider   ketotifen fumarate (ALAWAY) 0.035 % ophthalmic solution Place 1 drop into both eyes 2 times daily   Yes New Price MD   amLODIPine (NORVASC) 5 MG tablet Take 1 tablet by mouth daily   Yes New Price MD   carboxymethylcellulose 1 % ophthalmic solution Place 1 drop into both eyes 3 times daily   Yes New Price MD   benzonatate (TESSALON) 100 MG capsule Take 1 capsule by mouth 3 times daily as needed for Cough   Yes ProviderNew MD   carvedilol (COREG) 3.125 MG tablet Take 1 tablet by mouth 2 times daily (with meals)   Yes New Price MD   cyclobenzaprine (FLEXERIL) 5 MG tablet Take 1 tablet by mouth 3 times daily as needed for Muscle spasms   Yes ProviderNew MD   diclofenac (VOLTAREN) 0.1 % ophthalmic solution Place 1 drop into both eyes 4 times daily   Yes New Price MD   ipratropium 0.5 mg-albuterol 2.5 mg (DUONEB) 0.5-2.5 (3) MG/3ML SOLN nebulizer solution Inhale 3 mLs into the lungs every 4 hours   Yes New Price MD   tamsulosin (FLOMAX) 0.4 MG capsule Take 1 capsule by mouth daily   Yes New Price MD   budesonide-formoterol (SYMBICORT) 160-4.5 MCG/ACT  crackles. No wheezes. No rhonchi. LS diminished  CV: Regular rate. Regular rhythm. No murmur.  No rub. No edema.   GI: Non-tender. Non-distended. No masses. No organomegaly. Normal bowel sounds. No hernia.   Skin: Warm and dry. No nodule on exposed extremities. No rash on exposed extremities.   M/S: No cyanosis. No joint deformity. No clubbing.   Neuro: Awake. Grossly nonfocal    Psych: Oriented x 3. No anxiety or agitation.     CBC:   Recent Labs     12/04/24  1337 12/05/24  0556   WBC 8.2 6.6   HGB 13.2* 13.0*   HCT 38.9* 38.6*   MCV 89.0 90.0    166     BMP:   Recent Labs     12/04/24  1337      K 4.2      CO2 25   BUN 28*   CREATININE 0.9     LIVER PROFILE:   Recent Labs     12/04/24  1337   AST 18   ALT <5*   BILITOT 0.3   ALKPHOS 138*       U/A:    Lab Results   Component Value Date/Time    COLORU Yellow 07/12/2024 03:39 PM    WBCUA 0-2 03/11/2023 03:28 PM    RBCUA 0-2 03/11/2023 03:28 PM    MUCUS 2+ 03/11/2023 03:28 PM    BACTERIA Rare 03/11/2023 03:28 PM    CLARITYU Clear 07/12/2024 03:39 PM    LEUKOCYTESUR Negative 07/12/2024 03:39 PM    BLOODU Negative 07/12/2024 03:39 PM    GLUCOSEU Negative 07/12/2024 03:39 PM         CULTURES  Results       Procedure Component Value Units Date/Time    Culture, Respiratory [[id number]] Collected: 12/05/24 0901    Order Status: Sent Specimen: Sputum Expectorated Updated: 12/05/24 0905    Pneumonia Panel, Molecular [[id number]] Collected: 12/05/24 0901    Order Status: Sent Specimen: Sputum Updated: 12/05/24 0905    COVID-19 & Influenza Combo [[id number]] Collected: 12/04/24 1337    Order Status: Completed Specimen: Nasopharyngeal Swab Updated: 12/04/24 1401     SARS-CoV-2 RNA, RT PCR NOT DETECTED     Comment: Not Detected results do not preclude SARS-CoV-2 infection and  should not be used as the sole basis for patient management  decisions.  Results must be combined with clinical observations,  patient history, and epidemiological information.  Testing

## 2024-12-05 NOTE — PROGRESS NOTES
RT Inhaler-Nebulizer Bronchodilator Protocol Note    There is a bronchodilator order in the chart from a provider indicating to follow the RT Bronchodilator Protocol and there is an “Initiate RT Inhaler-Nebulizer Bronchodilator Protocol” order as well (see protocol at bottom of note).    CXR Findings:  XR CHEST PORTABLE    Result Date: 12/4/2024  No acute process.       The findings from the last RT Protocol Assessment were as follows:   History Pulmonary Disease: Chronic pulmonary disease  Respiratory Pattern: Dyspnea on exertion or RR 21-25 bpm  Breath Sounds: Intermittent or unilateral wheezes  Cough: Strong, spontaneous, non-productive  Indication for Bronchodilator Therapy: Wheezing associated with pulm disorder  Bronchodilator Assessment Score: 8    Aerosolized bronchodilator medication orders have been revised according to the RT Inhaler-Nebulizer Bronchodilator Protocol below.    Respiratory Therapist to perform RT Therapy Protocol Assessment initially then follow the protocol.  Repeat RT Therapy Protocol Assessment PRN for score 0-3 or on second treatment, BID, and PRN for scores above 3.    No Indications - adjust the frequency to every 6 hours PRN wheezing or bronchospasm, if no treatments needed after 48 hours then discontinue using Per Protocol order mode.     If indication present, adjust the RT bronchodilator orders based on the Bronchodilator Assessment Score as indicated below.  Use Inhaler orders unless patient has one or more of the following: on home nebulizer, not able to hold breath for 10 seconds, is not alert and oriented, cannot activate and use MDI correctly, or respiratory rate 25 breaths per minute or more, then use the equivalent nebulizer order(s) with same Frequency and PRN reasons based on the score.  If a patient is on this medication at home then do not decrease Frequency below that used at home.    0-3 - enter or revise RT bronchodilator order(s) to equivalent RT Bronchodilator

## 2024-12-05 NOTE — CARE COORDINATION
Case Management Assessment  Initial Evaluation    Date/Time of Evaluation: 12/5/2024 9:35 AM  Assessment Completed by: Jenn Crawley    If patient is discharged prior to next notation, then this note serves as note for discharge by case management.    Patient Name: Scot Hernandez                   YOB: 1960  Diagnosis: Hypoxia [R09.02]  COPD exacerbation (HCC) [J44.1]                   Date / Time: 12/4/2024 12:27 PM    Patient Admission Status: Inpatient   Readmission Risk (Low < 19, Mod (19-27), High > 27): Readmission Risk Score: 14.3    Current PCP: Lorna Gautam PA-C  PCP verified by CM? Yes    Chart Reviewed: Yes      History Provided by: Patient  Patient Orientation: Alert and Oriented    Patient Cognition: Alert    Hospitalization in the last 30 days (Readmission):  No    If yes, Readmission Assessment in CM Navigator will be completed.    Advance Directives:      Code Status: Full Code   Patient's Primary Decision Maker is: Legal Next of Kin    Primary Decision Maker: Lena Duke Alee Mendes - [phone]    Discharge Planning:    Patient lives with: Alone Type of Home: Apartment  Primary Care Giver: Self  Patient Support Systems include: Friends/Neighbors   Current Financial resources: Medicaid  Current community resources: Transportation  Current services prior to admission: Transportation            Current DME:              Type of Home Care services:  None    ADLS  Prior functional level: Independent in ADLs/IADLs  Current functional level: Independent in ADLs/IADLs    PT AM-PAC:   /24  OT AM-PAC:   /24    Family can provide assistance at DC: No  Would you like Case Management to discuss the discharge plan with any other family members/significant others, and if so, who? No  Plans to Return to Present Housing: Yes  Other Identified Issues/Barriers to RETURNING to current housing: none  Potential Assistance needed at discharge: Transportation            Potential DME:    Patient expects

## 2024-12-05 NOTE — CONSULTS
P Pulmonary[facility]                                 Pulmonary/Critical care  Consult /Progress Note :                                                                  CC :worsening SOB   Patient is being seen at the request of Dora Tam  for a consultation for copd exacerbation       HISTORY OF PRESENT ILLNESS:     Patient known COPD who presented with cough and shortness of breath with no fever or chills  Patient has 40 pack.  Smoking history and he quit smoking 8 years ago  SOB started few days ago and get worse last 24 h   Also has been congested for the last 2 weeks  No hemoptysis        PAST MEDICAL HISTORY:  Past Medical History:   Diagnosis Date    AAA (abdominal aortic aneurysm) (Formerly [facility]) 2018    3.0 cm    Anxiety     Arterial stent thrombosis ([facility])     Arthritis     Asthma     Bipolar 1 disorder ([facility]ly [facility])     COPD (chronic obstructive pulmonary disease) (Formerly [facility])     Coronary artery disease involving native coronary artery of native heart without angina pectoris 02/08/2021    Diabetes mellitus ([facility])     Essential hypertension 02/08/2021    Hyperlipidemia     Pneumonia      PAST SURGICAL HISTORY:  Past Surgical History:   Procedure Laterality Date    ABDOMINAL AORTIC ANEURYSM REPAIR, ENDOVASCULAR N/A 7/31/2024    Endovascular Aortic Aneurysm Repair performed by Lore Cabello MD at [facility] OR    CARDIAC PROCEDURE N/A 7/15/2024    Left heart cath / coronary angiography performed by En Mccann MD at [facility] CARDIAC CATH LAB    CARDIAC SURGERY      stent placed    CARPAL TUNNEL RELEASE      CHOLECYSTECTOMY, LAPAROSCOPIC      COLONOSCOPY N/A 4/19/2023    COLONOSCOPY POLYPECTOMY SNARE/COLD BIOPSY performed by Mary Pozo MD at [facility] ENDOSCOPY    CYSTOSCOPY N/A 4/17/2024    CYSTOSCOPY URETHRAL DILATION performed by Salazar Sevilla MD at [facility] OR    KNEE ARTHROPLASTY      R knee x4    NASAL SINUS SURGERY      OTHER SURGICAL

## 2024-12-05 NOTE — PLAN OF CARE
Problem: Chronic Conditions and Co-morbidities  Goal: Patient's chronic conditions and co-morbidity symptoms are monitored and maintained or improved  12/5/2024 1109 by Melania Sutton RN  Outcome: Progressing  Flowsheets (Taken 12/5/2024 1109)  Care Plan - Patient's Chronic Conditions and Co-Morbidity Symptoms are Monitored and Maintained or Improved:   Monitor and assess patient's chronic conditions and comorbid symptoms for stability, deterioration, or improvement   Collaborate with multidisciplinary team to address chronic and comorbid conditions and prevent exacerbation or deterioration  12/5/2024 0310 by Ana Mccann RN  Outcome: Progressing     Problem: Pain  Goal: Verbalizes/displays adequate comfort level or baseline comfort level  12/5/2024 1109 by Melania Sutton RN  Outcome: Progressing  Flowsheets (Taken 12/5/2024 1109)  Verbalizes/displays adequate comfort level or baseline comfort level:   Encourage patient to monitor pain and request assistance   Assess pain using appropriate pain scale  12/5/2024 0310 by Ana Mccann RN  Outcome: Progressing     Problem: Safety - Adult  Goal: Free from fall injury  12/5/2024 1109 by Melania Sutton RN  Outcome: Progressing  Flowsheets (Taken 12/5/2024 1109)  Free From Fall Injury: Instruct family/caregiver on patient safety  12/5/2024 0310 by Ana Mccann RN  Outcome: Progressing

## 2024-12-06 LAB
ALBUMIN SERPL-MCNC: 3.6 G/DL (ref 3.4–5)
ALBUMIN/GLOB SERPL: 1.4 {RATIO} (ref 1.1–2.2)
ALP SERPL-CCNC: 120 U/L (ref 40–129)
ALT SERPL-CCNC: <5 U/L (ref 10–40)
ANION GAP SERPL CALCULATED.3IONS-SCNC: 10 MMOL/L (ref 3–16)
AST SERPL-CCNC: 14 U/L (ref 15–37)
BASOPHILS # BLD: 0 K/UL (ref 0–0.2)
BASOPHILS NFR BLD: 0.1 %
BILIRUB SERPL-MCNC: <0.2 MG/DL (ref 0–1)
BUN SERPL-MCNC: 19 MG/DL (ref 7–20)
CALCIUM SERPL-MCNC: 8.3 MG/DL (ref 8.3–10.6)
CHLORIDE SERPL-SCNC: 108 MMOL/L (ref 99–110)
CO2 SERPL-SCNC: 26 MMOL/L (ref 21–32)
CREAT SERPL-MCNC: 0.7 MG/DL (ref 0.8–1.3)
DEPRECATED RDW RBC AUTO: 16.7 % (ref 12.4–15.4)
EOSINOPHIL # BLD: 0 K/UL (ref 0–0.6)
EOSINOPHIL NFR BLD: 0 %
EST. AVERAGE GLUCOSE BLD GHB EST-MCNC: 125.5 MG/DL
GFR SERPLBLD CREATININE-BSD FMLA CKD-EPI: >90 ML/MIN/{1.73_M2}
GLUCOSE SERPL-MCNC: 110 MG/DL (ref 70–99)
HBA1C MFR BLD: 6 %
HCT VFR BLD AUTO: 39.8 % (ref 40.5–52.5)
HGB BLD-MCNC: 13.4 G/DL (ref 13.5–17.5)
LYMPHOCYTES # BLD: 0.5 K/UL (ref 1–5.1)
LYMPHOCYTES NFR BLD: 5 %
MCH RBC QN AUTO: 30.5 PG (ref 26–34)
MCHC RBC AUTO-ENTMCNC: 33.7 G/DL (ref 31–36)
MCV RBC AUTO: 90.5 FL (ref 80–100)
MONOCYTES # BLD: 0.3 K/UL (ref 0–1.3)
MONOCYTES NFR BLD: 2.7 %
NEUTROPHILS # BLD: 8.8 K/UL (ref 1.7–7.7)
NEUTROPHILS NFR BLD: 92.2 %
PLATELET # BLD AUTO: 180 K/UL (ref 135–450)
PMV BLD AUTO: 8.2 FL (ref 5–10.5)
POTASSIUM SERPL-SCNC: 4.8 MMOL/L (ref 3.5–5.1)
PROT SERPL-MCNC: 6.2 G/DL (ref 6.4–8.2)
RBC # BLD AUTO: 4.4 M/UL (ref 4.2–5.9)
SODIUM SERPL-SCNC: 144 MMOL/L (ref 136–145)
WBC # BLD AUTO: 9.6 K/UL (ref 4–11)

## 2024-12-06 PROCEDURE — 6360000002 HC RX W HCPCS: Performed by: INTERNAL MEDICINE

## 2024-12-06 PROCEDURE — 99232 SBSQ HOSP IP/OBS MODERATE 35: CPT | Performed by: INTERNAL MEDICINE

## 2024-12-06 PROCEDURE — 6370000000 HC RX 637 (ALT 250 FOR IP): Performed by: INTERNAL MEDICINE

## 2024-12-06 PROCEDURE — 94761 N-INVAS EAR/PLS OXIMETRY MLT: CPT

## 2024-12-06 PROCEDURE — 2580000003 HC RX 258: Performed by: NURSE PRACTITIONER

## 2024-12-06 PROCEDURE — 36415 COLL VENOUS BLD VENIPUNCTURE: CPT

## 2024-12-06 PROCEDURE — 80053 COMPREHEN METABOLIC PANEL: CPT

## 2024-12-06 PROCEDURE — 2700000000 HC OXYGEN THERAPY PER DAY

## 2024-12-06 PROCEDURE — 94010 BREATHING CAPACITY TEST: CPT

## 2024-12-06 PROCEDURE — 6370000000 HC RX 637 (ALT 250 FOR IP): Performed by: NURSE PRACTITIONER

## 2024-12-06 PROCEDURE — 94150 VITAL CAPACITY TEST: CPT

## 2024-12-06 PROCEDURE — 85025 COMPLETE CBC W/AUTO DIFF WBC: CPT

## 2024-12-06 PROCEDURE — 2580000003 HC RX 258: Performed by: INTERNAL MEDICINE

## 2024-12-06 PROCEDURE — 99233 SBSQ HOSP IP/OBS HIGH 50: CPT | Performed by: INTERNAL MEDICINE

## 2024-12-06 PROCEDURE — 94640 AIRWAY INHALATION TREATMENT: CPT

## 2024-12-06 PROCEDURE — 6360000002 HC RX W HCPCS: Performed by: NURSE PRACTITIONER

## 2024-12-06 PROCEDURE — 1200000000 HC SEMI PRIVATE

## 2024-12-06 RX ADMIN — TAMSULOSIN HYDROCHLORIDE 0.4 MG: 0.4 CAPSULE ORAL at 09:52

## 2024-12-06 RX ADMIN — ASPIRIN 81 MG: 81 TABLET, COATED ORAL at 09:52

## 2024-12-06 RX ADMIN — KETOTIFEN FUMARATE 1 DROP: 0.25 SOLUTION/ DROPS OPHTHALMIC at 22:49

## 2024-12-06 RX ADMIN — GABAPENTIN 400 MG: 400 CAPSULE ORAL at 22:45

## 2024-12-06 RX ADMIN — METHYLPREDNISOLONE SODIUM SUCCINATE 40 MG: 40 INJECTION INTRAMUSCULAR; INTRAVENOUS at 00:00

## 2024-12-06 RX ADMIN — TIOTROPIUM BROMIDE INHALATION SPRAY 2 PUFF: 3.12 SPRAY, METERED RESPIRATORY (INHALATION) at 06:51

## 2024-12-06 RX ADMIN — QUETIAPINE FUMARATE 100 MG: 100 TABLET ORAL at 09:52

## 2024-12-06 RX ADMIN — AZITHROMYCIN 500 MG: 250 TABLET, FILM COATED ORAL at 22:45

## 2024-12-06 RX ADMIN — SODIUM CHLORIDE 1000 MG: 900 INJECTION INTRAVENOUS at 22:40

## 2024-12-06 RX ADMIN — Medication 10 ML: at 22:50

## 2024-12-06 RX ADMIN — AMLODIPINE BESYLATE 5 MG: 5 TABLET ORAL at 09:52

## 2024-12-06 RX ADMIN — CARVEDILOL 3.12 MG: 3.12 TABLET, FILM COATED ORAL at 22:49

## 2024-12-06 RX ADMIN — KETOTIFEN FUMARATE 1 DROP: 0.25 SOLUTION/ DROPS OPHTHALMIC at 09:52

## 2024-12-06 RX ADMIN — FERROUS SULFATE TAB 325 MG (65 MG ELEMENTAL FE) 325 MG: 325 (65 FE) TAB at 09:52

## 2024-12-06 RX ADMIN — DIVALPROEX SODIUM 1000 MG: 500 TABLET, DELAYED RELEASE ORAL at 22:46

## 2024-12-06 RX ADMIN — METHYLPREDNISOLONE SODIUM SUCCINATE 40 MG: 40 INJECTION INTRAMUSCULAR; INTRAVENOUS at 13:35

## 2024-12-06 RX ADMIN — ATORVASTATIN CALCIUM 80 MG: 40 TABLET, FILM COATED ORAL at 22:46

## 2024-12-06 RX ADMIN — DIVALPROEX SODIUM 500 MG: 500 TABLET, DELAYED RELEASE ORAL at 09:52

## 2024-12-06 RX ADMIN — MIDODRINE HYDROCHLORIDE 10 MG: 10 TABLET ORAL at 22:45

## 2024-12-06 RX ADMIN — CARVEDILOL 3.12 MG: 3.12 TABLET, FILM COATED ORAL at 09:52

## 2024-12-06 RX ADMIN — CLOPIDOGREL BISULFATE 75 MG: 75 TABLET ORAL at 09:52

## 2024-12-06 RX ADMIN — CETIRIZINE HYDROCHLORIDE 10 MG: 10 TABLET ORAL at 09:52

## 2024-12-06 RX ADMIN — Medication 10 ML: at 09:55

## 2024-12-06 RX ADMIN — GABAPENTIN 400 MG: 400 CAPSULE ORAL at 13:35

## 2024-12-06 RX ADMIN — ENOXAPARIN SODIUM 40 MG: 100 INJECTION SUBCUTANEOUS at 09:52

## 2024-12-06 RX ADMIN — FINASTERIDE 5 MG: 5 TABLET, FILM COATED ORAL at 09:52

## 2024-12-06 RX ADMIN — BUDESONIDE AND FORMOTEROL FUMARATE DIHYDRATE 2 PUFF: 160; 4.5 AEROSOL RESPIRATORY (INHALATION) at 18:51

## 2024-12-06 RX ADMIN — IPRATROPIUM BROMIDE AND ALBUTEROL SULFATE 1 DOSE: 2.5; .5 SOLUTION RESPIRATORY (INHALATION) at 18:51

## 2024-12-06 RX ADMIN — MIDODRINE HYDROCHLORIDE 10 MG: 10 TABLET ORAL at 09:52

## 2024-12-06 RX ADMIN — BUDESONIDE AND FORMOTEROL FUMARATE DIHYDRATE 2 PUFF: 160; 4.5 AEROSOL RESPIRATORY (INHALATION) at 06:42

## 2024-12-06 RX ADMIN — IPRATROPIUM BROMIDE AND ALBUTEROL SULFATE 1 DOSE: 2.5; .5 SOLUTION RESPIRATORY (INHALATION) at 06:42

## 2024-12-06 RX ADMIN — PANTOPRAZOLE SODIUM 40 MG: 40 TABLET, DELAYED RELEASE ORAL at 09:52

## 2024-12-06 RX ADMIN — MIDODRINE HYDROCHLORIDE 10 MG: 10 TABLET ORAL at 13:35

## 2024-12-06 RX ADMIN — GABAPENTIN 400 MG: 400 CAPSULE ORAL at 09:52

## 2024-12-06 RX ADMIN — QUETIAPINE FUMARATE 300 MG: 200 TABLET ORAL at 22:46

## 2024-12-06 RX ADMIN — MULTIVITAMIN TABLET 1 TABLET: TABLET at 09:52

## 2024-12-06 RX ADMIN — IPRATROPIUM BROMIDE AND ALBUTEROL SULFATE 1 DOSE: 2.5; .5 SOLUTION RESPIRATORY (INHALATION) at 13:25

## 2024-12-06 ASSESSMENT — COPD QUESTIONNAIRES
QUESTION6_LEAVINGHOUSE: 1
QUESTION3_CHESTTIGHTNESS: 0
QUESTION2_CHESTPHLEGM: 1
CAT_TOTALSCORE: 13
QUESTION7_SLEEPQUALITY: 2
QUESTION8_ENERGYLEVEL: 2
QUESTION5_HOMEACTIVITIES: 0
QUESTION1_COUGHFREQUENCY: 2
QUESTION4_WALKINCLINE: 5

## 2024-12-06 ASSESSMENT — PAIN SCALES - GENERAL: PAINLEVEL_OUTOF10: 0

## 2024-12-06 NOTE — PROGRESS NOTES
ProviderNew MD   cetirizine (ZYRTEC) 10 MG tablet Take 1 tablet by mouth daily   Yes New Price MD   albuterol (PROVENTIL HFA;VENTOLIN HFA) 108 (90 BASE) MCG/ACT inhaler Inhale 1 puff into the lungs every 6 hours as needed   Yes New Price MD   fluticasone (FLONASE) 50 MCG/ACT nasal spray 1-2 sprays by Nasal route daily Indications: EACH NOSTRIL   Yes New Price MD   nitroGLYCERIN (NITROSTAT) 0.4 MG SL tablet Place 1 tablet under the tongue every 5 minutes as needed for Chest pain up to max of 3 total doses. If no relief after 1 dose, call 911.    ProviderNew MD      Objective:     ADMISSION DIAGNOSIS:   Hypoxia [R09.02]  COPD exacerbation (HCC) [J44.1]    CXR Findings:  XR CHEST PORTABLE    Result Date: 12/4/2024  No acute process.       Assessment:     Patient resting in bed at this time on  2 L O2.  Pt denies shortness of breath; no complaints of chest pain. Stated he was having symptoms of acute COPD exacerbation when at the clinic, then was sent to the hospital.    Provided COPD Nurse Resource number at [phone] for any further questions or assistance with resources.    The findings from the last RT Protocol Assessment were as follows:  Smoking: Chronic pulmonary disease  Respiratory Pattern: Dyspnea on exertion or RR 21-25 bpm  Breath Sounds: Intermittent or unilateral wheezes  Cough: Strong, spontaneous, non-productive  Indication for Bronchodilator Therapy: Decreased or absent breath sounds  Bronchodilator Assessment Score: 8      Education:     EDUCATION STATUS: Patient was receptive to education  [x]  Provided both written and verbal education on COPD signs & symptoms  [x]  Received verbal acknowledgment/understanding of COPD related causes  [x]  Provided instructions on inhaler's  [x]  Provided instructions on Nebulizer device   [x]  Provided recommendations on breathing techniques / positions  [x]  Provided recommendations on managing stress and  anxiety   [x]  Provided information on Pulmonary Rehabilitation   [x]  Provided information on Lung Cancer Screening       EDUCATIONAL MATERIALS PROVIDED:    [x]  [facility]: Managing your COPD Booklet  [x]  Follow-up Appointment Reminder  [x]  ALA: Getting the Most Out of Medication Delivery Devices  [x]  ALA: My COPD Action Plan  [x]  [facility]: [facility] Cardiopulmonary Rehabilitation   [x]  Smoking Cessation Classes  [x]  [employer]: Signs of COPD    PATIENT/CAREGIVER TEACHING:   Level of patient/caregiver understanding able to:   [x] Verbalize understanding   [] Demonstrate understanding       [] Teach back        [] Needs reinforcement     []  Other:      TEACHING TIME:  20 minutes       Recommendations:     [x]  Encourage to call COPD Nurse Resource Line [phone] with any questions or concerns.  [x]  Encourage follow-up appointment compliance.   [x]  Continue to educate on signs and symptoms of COPD exacerbation.   [x]  Review COPD Action Plan Zones: Green, Yellow, Red.   [x]  Encourage Pulmonary Rehab consult/referral for patient with [facility].   []  Encourage Smoking Cessation-pt stated he quit smoking 7-8 years ago.     Electronically signed by Ashley Moyer, MSN, RN  on 12/6/2024 at 11:30 AM

## 2024-12-06 NOTE — PROGRESS NOTES
Note a positive sputum culture has resulted for k. pneumonia.  The patient is currently on oral zithromax which doesn't appear to provide adequate coverage.  If a single oral agent is desired consider switching to Doxycycline or maybe omnicef.    Jorge Luis Ballesteros RPH PharmD 12/5/2024 8:20 PM          Procedure Component Value Units Date/Time   Pneumonia Panel, Molecular [[id number]] (Abnormal) Collected: 12/05/24 0901   Lab Status: Final result Specimen: Sputum, Suctioned Updated: 12/05/24 1712    Organism Klebsiella pneumoniae DNA Detected Abnormal     Pneumonia Panel Molecular --    1,000,000 copies/mL  See additional report for complete Pneumonia panel.    Organism Human Rhinovirus/Enterovirus by PCR Abnormal     Pneumonia Panel Molecular --    POSITIVE FOR  See additional report for complete Pneumonia panel.   Narrative:     ORDER#: [id number]                          ORDERED BY: IBIS MONGE  SOURCE: Sputum Suctioned                   COLLECTED:  12/05/24 09:01  ANTIBIOTICS AT [facility].:                      RECEIVED :  12/05/24 09:05

## 2024-12-06 NOTE — PROGRESS NOTES
P Pulmonary, Critical Care and Sleep Specialists                                 Pulmonary/Critical care  Consult /Progress Note :                                                                  CC :worsening SOB   Patient is being seen at the request of Dora Tam  for a consultation for copd exacerbation       Subjective  SOB and cough has improved some  No chesty Pain   Still congested  No fever or chills   On 2 L     PHYSICAL EXAM:  Vitals:    12/06/24 1130   BP:    Pulse:    Resp:    Temp:    SpO2: (!) 2%     Gen: No distress.   Eyes: PERRL. No sclera icterus. No conjunctival injection.   ENT: No discharge. Pharynx clear.   Neck: Trachea midline. No obvious mass.    Resp:  Rhonchi bilateral  CV: Regular rate. Regular rhythm. No murmur or rub. No edema. Peripheral pulses are 2+.  Capillary refill is less than 3 seconds.  GI: Non-tender. Non-distended. No hernia.   Skin: Warm and dry. No nodule on exposed extremities.   Lymph: No cervical LAD. No supraclavicular LAD.   M/S: No cyanosis. No joint deformity. No clubbing.   Neuro: Awake. Alert. Moves all four extremities.   Psych: Oriented x 3. No anxiety.     LABS:  CBC:   Recent Labs     12/04/24  1337 12/05/24  0556 12/06/24  0553   WBC 8.2 6.6 9.6   HGB 13.2* 13.0* 13.4*   HCT 38.9* 38.6* 39.8*   MCV 89.0 90.0 90.5    166 180     BMP:   Recent Labs     12/04/24  1337 12/06/24  0554    144   K 4.2 4.8    108   CO2 25 26   BUN 28* 19   CREATININE 0.9 0.7*     LIVER PROFILE:   Recent Labs     12/04/24  1337 12/06/24  0554   AST 18 14*   ALT <5* <5*   BILITOT 0.3 <0.2   ALKPHOS 138* 120       Microbiology:  Sent     Imaging:  Chest imaging was reviewed by me and showed no acute process  Interval placement of infrarenal EVAR with decreasing aneurysm sac size.  No  evidence of endoleak.     Moderate pulmonary emphysema with moderate chronic bronchial wall thickening.  Some superimposed bronchitis

## 2024-12-06 NOTE — PROGRESS NOTES
RT Inhaler-Nebulizer Bronchodilator Protocol Note    There is a bronchodilator order in the chart from a provider indicating to follow the RT Bronchodilator Protocol and there is an “Initiate RT Inhaler-Nebulizer Bronchodilator Protocol” order as well (see protocol at bottom of note).    CXR Findings:  XR CHEST PORTABLE    Result Date: 12/4/2024  No acute process.       The findings from the last RT Protocol Assessment were as follows:   History Pulmonary Disease: (P) Chronic pulmonary disease  Respiratory Pattern: (P) Dyspnea on exertion or RR 21-25 bpm  Breath Sounds: (P) Intermittent or unilateral wheezes  Cough: (P) Strong, spontaneous, non-productive  Indication for Bronchodilator Therapy: (P) Decreased or absent breath sounds  Bronchodilator Assessment Score: (P) 8    Aerosolized bronchodilator medication orders have been revised according to the RT Inhaler-Nebulizer Bronchodilator Protocol below.    Respiratory Therapist to perform RT Therapy Protocol Assessment initially then follow the protocol.  Repeat RT Therapy Protocol Assessment PRN for score 0-3 or on second treatment, BID, and PRN for scores above 3.    No Indications - adjust the frequency to every 6 hours PRN wheezing or bronchospasm, if no treatments needed after 48 hours then discontinue using Per Protocol order mode.     If indication present, adjust the RT bronchodilator orders based on the Bronchodilator Assessment Score as indicated below.  Use Inhaler orders unless patient has one or more of the following: on home nebulizer, not able to hold breath for 10 seconds, is not alert and oriented, cannot activate and use MDI correctly, or respiratory rate 25 breaths per minute or more, then use the equivalent nebulizer order(s) with same Frequency and PRN reasons based on the score.  If a patient is on this medication at home then do not decrease Frequency below that used at home.    0-3 - enter or revise RT bronchodilator order(s) to equivalent RT

## 2024-12-06 NOTE — PROGRESS NOTES
Pt laying in bed this AM during shift assessment. He is alert and oriented and accepting of care. Pt continues to require oxygen, with sats dropping below 90s this AM without oxygen during ambulation. Tele in place. Incentive spirometery ordered. IV intact. Call light within reach. No new concerns at this time.     Bedside Mobility Assessment Tool (BMAT):     Assessment Level 1- Sit and Shake    1. From a semi-reclined position, ask patient to sit up and rotate to a seated position at the side of the bed. Can use the bedrail.    2. Ask patient to reach out and grab your hand and shake making sure patient reaches across his/her midline.   Pass- Patient is able to come to a seated position, maintain core strength. Maintains seated balance while reaching across midline. Move on to Assessment Level 2.     Assessment Level 2- Stretch and Point   1. With patient in seated position at the side of the bed, have patient place both feet on the floor (or stool) with knees no higher than hips.    2. Ask patient to stretch one leg and straighten the knee, then bend the ankle/flex and point the toes. If appropriate, repeat with the other leg.   Pass- Patient is able to demonstrate appropriate quad strength on intended weight bearing limb(s). Move onto Assessment Level 3.     Assessment Level 3- Stand   1. Ask patient to elevate off the bed or chair (seated to standing) using an assistive device (cane, bedrail).    2. Patient should be able to raise buttocks off be and hold for a count of five. May repeat once.   Pass- Patient maintains standing stability for at least 5 seconds, proceed to assessment level 4.    Assessment Level 4- Walk   1. Ask patient to march in place at bedside.    2. Then ask patient to advance step and return each foot. Some medical conditions may render a patient from stepping backwards, use your best clinical judgement.   Pass- Patient demonstrates balance while shifting weight and ability to step, takes

## 2024-12-06 NOTE — PLAN OF CARE
Problem: Chronic Conditions and Co-morbidities  Goal: Patient's chronic conditions and co-morbidity symptoms are monitored and maintained or improved  Outcome: Progressing  Flowsheets (Taken 12/5/2024 2011 by Aury Wilson RN)  Care Plan - Patient's Chronic Conditions and Co-Morbidity Symptoms are Monitored and Maintained or Improved: Monitor and assess patient's chronic conditions and comorbid symptoms for stability, deterioration, or improvement  Note: Pulmonology

## 2024-12-06 NOTE — PLAN OF CARE
Problem: Chronic Conditions and Co-morbidities  Goal: Patient's chronic conditions and co-morbidity symptoms are monitored and maintained or improved  12/5/2024 2104 by Aury Wilson, RN  Outcome: Progressing  Flowsheets (Taken 12/5/2024 2011)  Care Plan - Patient's Chronic Conditions and Co-Morbidity Symptoms are Monitored and Maintained or Improved: Monitor and assess patient's chronic conditions and comorbid symptoms for stability, deterioration, or improvement  12/5/2024 1109 by Melania Sutton RN  Outcome: Progressing  Flowsheets (Taken 12/5/2024 1109)  Care Plan - Patient's Chronic Conditions and Co-Morbidity Symptoms are Monitored and Maintained or Improved:   Monitor and assess patient's chronic conditions and comorbid symptoms for stability, deterioration, or improvement   Collaborate with multidisciplinary team to address chronic and comorbid conditions and prevent exacerbation or deterioration     Problem: Discharge Planning  Goal: Discharge to home or other facility with appropriate resources  Outcome: Progressing  Flowsheets (Taken 12/5/2024 2011)  Discharge to home or other facility with appropriate resources: Identify barriers to discharge with patient and caregiver

## 2024-12-06 NOTE — PROGRESS NOTES
enoxaparin  40 mg SubCUTAneous Daily    methylPREDNISolone  40 mg IntraVENous Q12H    Followed by    [START ON 12/7/2024] predniSONE  40 mg Oral Daily    divalproex  1,000 mg Oral QHS    ipratropium 0.5 mg-albuterol 2.5 mg  1 Dose Inhalation TID     I   sodium chloride       albuterol, albuterol sulfate HFA, benzonatate, cyclobenzaprine, fluticasone, sodium chloride flush, sodium chloride, ondansetron **OR** ondansetron, polyethylene glycol, acetaminophen **OR** acetaminophen    Lab Data:  Recent Labs     12/04/24  1337 12/05/24  0556 12/06/24  0553   WBC 8.2 6.6 9.6   HGB 13.2* 13.0* 13.4*   HCT 38.9* 38.6* 39.8*   MCV 89.0 90.0 90.5    166 180     Recent Labs     12/04/24  1337 12/06/24  0554    144   K 4.2 4.8    108   CO2 25 26   BUN 28* 19   CREATININE 0.9 0.7*     No results for input(s): \"CKTOTAL\", \"CKMB\", \"CKMBINDEX\", \"TROPONINI\" in the last 72 hours.    Coagulation:   Lab Results   Component Value Date/Time    INR 1.09 07/31/2024 09:20 AM    APTT 32.2 02/27/2023 03:55 AM     Cardiac markers:   Lab Results   Component Value Date/Time    TROPONINI <0.01 02/27/2023 06:03 AM         Lab Results   Component Value Date    ALT <5 (L) 12/06/2024    AST 14 (L) 12/06/2024    ALKPHOS 120 12/06/2024    BILITOT <0.2 12/06/2024       Lab Results   Component Value Date    INR 1.09 07/31/2024    INR 1.01 07/13/2024    INR 1.00 02/27/2023    PROTIME 14.3 07/31/2024    PROTIME 13.5 07/13/2024    PROTIME 13.1 02/27/2023       Radiology      CTA CHEST ABDOMEN PELVIS W CONTRAST   Final Result   Negative for aortic dissection.      Interval placement of infrarenal EVAR with decreasing aneurysm sac size.  No   evidence of endoleak.      Moderate pulmonary emphysema with moderate chronic bronchial wall thickening.   Some superimposed bronchitis is not excluded.      Diverticulosis and other chronic findings as above.         XR CHEST PORTABLE   Final Result   No acute process.           Hemoglobin A1C   Date

## 2024-12-06 NOTE — CARE COORDINATION
Reviewed chart, discussed in rounds. Pt remains on IV abx and O2, none at baseline. Plan to Dc in 1-2 days. CM following.

## 2024-12-06 NOTE — PROGRESS NOTES
Pt evening shift assessment complete. VSS and medication given as ordered. Pt assessed for comfort needs, requesting drink and snack. Pt does not express any additional needs at this time, resting comfortably in bed.

## 2024-12-07 VITALS
SYSTOLIC BLOOD PRESSURE: 142 MMHG | OXYGEN SATURATION: 93 % | HEART RATE: 74 BPM | HEIGHT: 68 IN | RESPIRATION RATE: 18 BRPM | WEIGHT: 166 LBS | DIASTOLIC BLOOD PRESSURE: 90 MMHG | TEMPERATURE: 97.6 F | BODY MASS INDEX: 25.16 KG/M2

## 2024-12-07 LAB
BACTERIA SPEC RESP CULT: ABNORMAL
BACTERIA SPEC RESP CULT: ABNORMAL
BASOPHILS # BLD: 0 K/UL (ref 0–0.2)
BASOPHILS NFR BLD: 0.2 %
DEPRECATED RDW RBC AUTO: 16.8 % (ref 12.4–15.4)
EOSINOPHIL # BLD: 0 K/UL (ref 0–0.6)
EOSINOPHIL NFR BLD: 0.1 %
GRAM STN SPEC: ABNORMAL
HCT VFR BLD AUTO: 38 % (ref 40.5–52.5)
HGB BLD-MCNC: 13 G/DL (ref 13.5–17.5)
LYMPHOCYTES # BLD: 1.6 K/UL (ref 1–5.1)
LYMPHOCYTES NFR BLD: 19.9 %
MCH RBC QN AUTO: 30.4 PG (ref 26–34)
MCHC RBC AUTO-ENTMCNC: 34.1 G/DL (ref 31–36)
MCV RBC AUTO: 89.1 FL (ref 80–100)
MONOCYTES # BLD: 0.5 K/UL (ref 0–1.3)
MONOCYTES NFR BLD: 6.4 %
NEUTROPHILS # BLD: 6.1 K/UL (ref 1.7–7.7)
NEUTROPHILS NFR BLD: 73.4 %
ORGANISM: ABNORMAL
PLATELET # BLD AUTO: 201 K/UL (ref 135–450)
PMV BLD AUTO: 8.2 FL (ref 5–10.5)
RBC # BLD AUTO: 4.27 M/UL (ref 4.2–5.9)
WBC # BLD AUTO: 8.3 K/UL (ref 4–11)

## 2024-12-07 PROCEDURE — 6360000002 HC RX W HCPCS: Performed by: NURSE PRACTITIONER

## 2024-12-07 PROCEDURE — 6370000000 HC RX 637 (ALT 250 FOR IP): Performed by: NURSE PRACTITIONER

## 2024-12-07 PROCEDURE — 36415 COLL VENOUS BLD VENIPUNCTURE: CPT

## 2024-12-07 PROCEDURE — 99232 SBSQ HOSP IP/OBS MODERATE 35: CPT | Performed by: INTERNAL MEDICINE

## 2024-12-07 PROCEDURE — 94640 AIRWAY INHALATION TREATMENT: CPT

## 2024-12-07 PROCEDURE — 6370000000 HC RX 637 (ALT 250 FOR IP): Performed by: INTERNAL MEDICINE

## 2024-12-07 PROCEDURE — 85025 COMPLETE CBC W/AUTO DIFF WBC: CPT

## 2024-12-07 PROCEDURE — 2700000000 HC OXYGEN THERAPY PER DAY

## 2024-12-07 PROCEDURE — 94761 N-INVAS EAR/PLS OXIMETRY MLT: CPT

## 2024-12-07 PROCEDURE — 99238 HOSP IP/OBS DSCHRG MGMT 30/<: CPT | Performed by: INTERNAL MEDICINE

## 2024-12-07 PROCEDURE — 2580000003 HC RX 258: Performed by: NURSE PRACTITIONER

## 2024-12-07 RX ORDER — CIPROFLOXACIN 500 MG/1
500 TABLET, FILM COATED ORAL 2 TIMES DAILY
Qty: 14 TABLET | Refills: 0 | Status: SHIPPED | OUTPATIENT
Start: 2024-12-07 | End: 2024-12-14

## 2024-12-07 RX ORDER — PREDNISONE 10 MG/1
TABLET ORAL
Qty: 18 TABLET | Refills: 0 | Status: SHIPPED | OUTPATIENT
Start: 2024-12-07

## 2024-12-07 RX ADMIN — DIVALPROEX SODIUM 500 MG: 500 TABLET, DELAYED RELEASE ORAL at 08:28

## 2024-12-07 RX ADMIN — AMLODIPINE BESYLATE 5 MG: 5 TABLET ORAL at 08:29

## 2024-12-07 RX ADMIN — TAMSULOSIN HYDROCHLORIDE 0.4 MG: 0.4 CAPSULE ORAL at 08:29

## 2024-12-07 RX ADMIN — FINASTERIDE 5 MG: 5 TABLET, FILM COATED ORAL at 08:28

## 2024-12-07 RX ADMIN — FERROUS SULFATE TAB 325 MG (65 MG ELEMENTAL FE) 325 MG: 325 (65 FE) TAB at 08:28

## 2024-12-07 RX ADMIN — MULTIVITAMIN TABLET 1 TABLET: TABLET at 08:29

## 2024-12-07 RX ADMIN — TIOTROPIUM BROMIDE INHALATION SPRAY 2 PUFF: 3.12 SPRAY, METERED RESPIRATORY (INHALATION) at 07:16

## 2024-12-07 RX ADMIN — PREDNISONE 40 MG: 20 TABLET ORAL at 08:29

## 2024-12-07 RX ADMIN — GABAPENTIN 400 MG: 400 CAPSULE ORAL at 08:29

## 2024-12-07 RX ADMIN — KETOTIFEN FUMARATE 1 DROP: 0.25 SOLUTION/ DROPS OPHTHALMIC at 08:29

## 2024-12-07 RX ADMIN — IPRATROPIUM BROMIDE AND ALBUTEROL SULFATE 1 DOSE: 2.5; .5 SOLUTION RESPIRATORY (INHALATION) at 11:06

## 2024-12-07 RX ADMIN — CARVEDILOL 3.12 MG: 3.12 TABLET, FILM COATED ORAL at 08:28

## 2024-12-07 RX ADMIN — Medication 10 ML: at 08:30

## 2024-12-07 RX ADMIN — CLOPIDOGREL BISULFATE 75 MG: 75 TABLET ORAL at 08:29

## 2024-12-07 RX ADMIN — ASPIRIN 81 MG: 81 TABLET, COATED ORAL at 08:28

## 2024-12-07 RX ADMIN — CETIRIZINE HYDROCHLORIDE 10 MG: 10 TABLET ORAL at 08:29

## 2024-12-07 RX ADMIN — IPRATROPIUM BROMIDE AND ALBUTEROL SULFATE 1 DOSE: 2.5; .5 SOLUTION RESPIRATORY (INHALATION) at 07:16

## 2024-12-07 RX ADMIN — BUDESONIDE AND FORMOTEROL FUMARATE DIHYDRATE 2 PUFF: 160; 4.5 AEROSOL RESPIRATORY (INHALATION) at 07:16

## 2024-12-07 RX ADMIN — PANTOPRAZOLE SODIUM 40 MG: 40 TABLET, DELAYED RELEASE ORAL at 08:28

## 2024-12-07 RX ADMIN — ENOXAPARIN SODIUM 40 MG: 100 INJECTION SUBCUTANEOUS at 08:28

## 2024-12-07 RX ADMIN — QUETIAPINE FUMARATE 100 MG: 100 TABLET ORAL at 08:29

## 2024-12-07 NOTE — PROGRESS NOTES
[facility]                                 Pulmonary/Critical care  Consult /Progress Note :                                                                  CC :worsening SOB   Patient is being seen at the request of Dora Tam  for a consultation for copd exacerbation       Subjective  SOB and cough has improved some  No chesty Pain   Still congested  No fever or chills   On RA    PHYSICAL EXAM:  Vitals:    12/07/24 1107   BP:    Pulse:    Resp:    Temp:    SpO2: 91%     Gen: No distress.   Eyes: PERRL. No sclera icterus. No conjunctival injection.   ENT: No discharge. Pharynx clear.   Neck: Trachea midline. No obvious mass.    Resp:  Rhonchi bilateral  CV: Regular rate. Regular rhythm. No murmur or rub. No edema. Peripheral pulses are 2+.  Capillary refill is less than 3 seconds.  GI: Non-tender. Non-distended. No hernia.   Skin: Warm and dry. No nodule on exposed extremities.   Lymph: No cervical LAD. No supraclavicular LAD.   M/S: No cyanosis. No joint deformity. No clubbing.   Neuro: Awake. Alert. Moves all four extremities.   Psych: Oriented x 3. No anxiety.     LABS:  CBC:   Recent Labs     12/05/24  0556 12/06/24  0553 12/07/24  0609   WBC 6.6 9.6 8.3   HGB 13.0* 13.4* 13.0*   HCT 38.6* 39.8* 38.0*   MCV 90.0 90.5 89.1    180 201     BMP:   Recent Labs     12/04/24  1337 12/06/24  0554    144   K 4.2 4.8    108   CO2 25 26   BUN 28* 19   CREATININE 0.9 0.7*     LIVER PROFILE:   Recent Labs     12/04/24  1337 12/06/24  0554   AST 18 14*   ALT <5* <5*   BILITOT 0.3 <0.2   ALKPHOS 138* 120       Microbiology:  Sent     Imaging:  Chest imaging was reviewed by me and showed no acute process  Interval placement of infrarenal EVAR with decreasing aneurysm sac size.  No  evidence of endoleak.     Moderate pulmonary emphysema with moderate chronic bronchial wall thickening.  Some superimposed bronchitis is  not excluded.      Sputum culture  Klebsiella ++  Human Rhino virus     ASSESSMENT:   Acute COPD exacerbation   H/o very severe copd,Last FEV1 19   Moderate hypoxia  Pneumonia /HR virus /Klebs      PLAN:    *- O2 goal 92-95   *-PO prednisone ,taper ovr 7 days  *-IV abx  Rocephin and Azithromycin Day 3 change Levaquin X 7 days  *-pneumonia panel + kleb   *_If in distress ,will use CPAP/BiPAP  *-BD  ICS if wheezing   Incentive spirmetery and flutter valve   Avoid fluid over load   CT chest :ct reviewed ,possible early pneumonia  Check o2 at ambulation before discharge and if sat less than 88 ,call for O2 at home   DVt px

## 2024-12-07 NOTE — FLOWSHEET NOTE
12/07/24 0814   Vital Signs   Temp 97.3 °F (36.3 °C)   Temp Source Oral   Pulse 66   Heart Rate Source Monitor   Respirations 18   BP (!) 145/73   MAP (Calculated) 97   BP Location Left upper arm   BP Method Automatic   Patient Position Sitting   Pain Assessment   Pain Assessment None - Denies Pain   Oxygen Therapy   SpO2 95 %   O2 Device None (Room air)     AM assessment complete. Gave am meds due see mar. A/O x 4. Denies pain. Up as tolerated-independent. Dietary notified low fiber diet for breakfast. Bed locked/lowest position. Call light within reach.

## 2024-12-07 NOTE — PROGRESS NOTES
or increased work of breathing using Per Protocol order mode.        4-6 - enter or revise RT Bronchodilator order(s) to two equivalent RT bronchodilator orders with one order with BID Frequency and one order with Frequency of every 4 hours PRN wheezing or increased work of breathing using Per Protocol order mode.        7-10 - enter or revise RT Bronchodilator order(s) to two equivalent RT bronchodilator orders with one order with TID Frequency and one order with Frequency of every 4 hours PRN wheezing or increased work of breathing using Per Protocol order mode.       11-13 - enter or revise RT Bronchodilator order(s) to one equivalent RT bronchodilator order with QID Frequency and an Albuterol order with Frequency of every 4 hours PRN wheezing or increased work of breathing using Per Protocol order mode.      Greater than 13 - enter or revise RT Bronchodilator order(s) to one equivalent RT bronchodilator order with every 4 hours Frequency and an Albuterol order with Frequency of every 2 hours PRN wheezing or increased work of breathing using Per Protocol order mode.       Electronically signed by Arely Connell RCP on 12/7/2024 at 11:09 AM

## 2024-12-07 NOTE — PROGRESS NOTES
Prescriptions: Cipro, prednisone-[place]  and discharge instructions given. Pt verbalized understanding denies any questions/ needs at this time. PCA transport pt by w/c to [employer] vehicle for discharge home. Awaiting Taxi. Taxi arrived 1346.

## 2024-12-07 NOTE — CARE COORDINATION
DISCHARGE ORDER  Date/Time 2024 11:08 AM  Completed by: Teresa Boeck, RN, Case Management    Patient Name: Scot Hernandez      : 1960  Admitting Diagnosis: Hypoxia [R09.02]  COPD exacerbation (HCC) [J44.1]      Admit order Date and Status:24  (verify MD's last order for status of admission)      Noted discharge order.   If applicable PT/OT recommendation at Discharge: NA  DME recommendation by PT/OT:NA  Confirmed discharge plan: Yes  with whom Ila GRIGSBY  If pt confirmed DC plan does family need to be contacted by CM Yes if yes who - RN contacting POA  Discharge Plan: The patient is discharging to home on RA and on PO antibiotics    Date of Last IMM Given: Medicaid    Reviewed chart.  Role of discharge planner explained and patient verbalized understanding. Discharge order is noted.    Has Home O2 in place on admit:  No  Informed of need to bring portable home O2 tank on day of discharge for nursing to connect prior to leaving:   Not Indicated  Verbalized agreement/Understanding:   Not Indicated  Pt is being d/c'd to home today. Pt's O2 sats are 93% on RA.    Discharge timeout done with Ila GRIGSBY. All discharge needs and concerns addressed.

## 2024-12-07 NOTE — PROGRESS NOTES
40 mg SubCUTAneous Daily    predniSONE  40 mg Oral Daily    divalproex  1,000 mg Oral QHS    ipratropium 0.5 mg-albuterol 2.5 mg  1 Dose Inhalation TID     I   sodium chloride       albuterol, albuterol sulfate HFA, benzonatate, cyclobenzaprine, fluticasone, sodium chloride flush, sodium chloride, ondansetron **OR** ondansetron, polyethylene glycol, acetaminophen **OR** acetaminophen    Lab Data:  Recent Labs     12/05/24  0556 12/06/24  0553 12/07/24  0609   WBC 6.6 9.6 8.3   HGB 13.0* 13.4* 13.0*   HCT 38.6* 39.8* 38.0*   MCV 90.0 90.5 89.1    180 201     Recent Labs     12/04/24  1337 12/06/24  0554    144   K 4.2 4.8    108   CO2 25 26   BUN 28* 19   CREATININE 0.9 0.7*     No results for input(s): \"CKTOTAL\", \"CKMB\", \"CKMBINDEX\", \"TROPONINI\" in the last 72 hours.    Coagulation:   Lab Results   Component Value Date/Time    INR 1.09 07/31/2024 09:20 AM    APTT 32.2 02/27/2023 03:55 AM     Cardiac markers:   Lab Results   Component Value Date/Time    TROPONINI <0.01 02/27/2023 06:03 AM         Lab Results   Component Value Date    ALT <5 (L) 12/06/2024    AST 14 (L) 12/06/2024    ALKPHOS 120 12/06/2024    BILITOT <0.2 12/06/2024       Lab Results   Component Value Date    INR 1.09 07/31/2024    INR 1.01 07/13/2024    INR 1.00 02/27/2023    PROTIME 14.3 07/31/2024    PROTIME 13.5 07/13/2024    PROTIME 13.1 02/27/2023       Radiology      CTA CHEST ABDOMEN PELVIS W CONTRAST   Final Result   Negative for aortic dissection.      Interval placement of infrarenal EVAR with decreasing aneurysm sac size.  No   evidence of endoleak.      Moderate pulmonary emphysema with moderate chronic bronchial wall thickening.   Some superimposed bronchitis is not excluded.      Diverticulosis and other chronic findings as above.         XR CHEST PORTABLE   Final Result   No acute process.           Hemoglobin A1C   Date Value Ref Range Status   12/05/2024 6.0 See comment % Final     Comment:     Comment:  Diagnosis

## 2024-12-12 ENCOUNTER — TELEPHONE (OUTPATIENT)
Dept: ORTHOPEDIC SURGERY | Age: 64
End: 2024-12-12

## 2024-12-12 NOTE — TELEPHONE ENCOUNTER
General Question     Subject: CANCEL SX/ RESCHEDULE SX   Patient and /or Facility Request: Scot Hernandez   Contact Number: [phone]     PATIENT CANCEL SX ON HIS HIP..    WOULD LIKE TO RESCHEDULE..    PLEASE ADVISE

## 2024-12-12 NOTE — TELEPHONE ENCOUNTER
General Question     Subject: LT HIP REPLACEMENT  Patient and /or Facility Request: Scot Hernandez   Contact Number: [phone]     [facility] [place] CALLING REGARDING THE PATIENT WAS RELEASED ON SATURDAY DECEMBER 7, 2024 WITH COPD.    PATIENT HAS NOT BEEN CLEARED TO HAVE SURGERY SCHEDULED ON  DECEMBER 23, 2024.    REP DISCONNECTED CALL BEFORE I COULD GET A CALLBACK NUMBER.

## 2024-12-13 ENCOUNTER — TELEPHONE (OUTPATIENT)
Dept: ORTHOPEDIC SURGERY | Age: 64
End: 2024-12-13

## 2024-12-13 NOTE — TELEPHONE ENCOUNTER
Orthopedic Nurse Navigator Summary    Patient Name:  Scot Hernandez  Anticipated Date of Surgery:  12/23/24  Attended Pre-op Education Class:    PCP: PRESTON Morales  Date of PCP visit for H&P: 12/04/24  Is patient in a Pain Management program:  Review of Medical history reveals history of: COPD, HTN, Hyperlipidemia, CAD, Inferior MI- s/p balloon angioplasty, AAA- s/p repair 07/31/24, Angina, Diabetes, CHF, GERD, PVD, BPH, Bipolar, Diverticulitis, Panic disorder, Anxiety    Critical Lab Values  - Hemoglobin (g/dL):  Date: 12/07/24 Value 13.0  - Hematocrit(%): Date: 12/07/24  Value 38.0  - HgbA1C:  Date: 12/05/24 Value 6.0  - Albumin:  Date: 12/06/24  Value 3.6  - BUN:  Date: 12/06/24  Value 19  - Creatinine:  Date: 12/06/24  Value 0.7    Coronary Artery Disease/HTN/CHF history: Yes  Does the patient see a Cardiologist: Selvin Jade MD  Date of most recent cardiac appt: 08/01/24  On any anticoagulation:  Plavix 75 mg QD, Aspirin 81 mg QD    Diabetes History:  Yes  Most recent HgbA1C: 6.0  Pulmonary:  COPD/Emphysema/Use of home oxygen: COPD  Alcohol use: No    BMI greater than 40 at time of scheduling:  BMI 25.24  Additional medical concerns:  Additional recommendations for above concerns:  Attended Pre-Hab program:    Anticipated Discharge Disposition:  Home with OPT  Who will be with patient at home following discharge:    Equipment patient already has:  walker  Bedroom on first or second floor:  first  Bathroom on first or second floor:  first  Weight bearing status:  wbat  Pre-op ambulatory status: painful ambulation  Number of entry steps:  3  Caregiver assistance:      Ruth Robles RN  Date:  12/13/24

## 2024-12-30 ENCOUNTER — TELEPHONE (OUTPATIENT)
Dept: ORTHOPEDIC SURGERY | Age: 64
End: 2024-12-30

## 2024-12-30 NOTE — TELEPHONE ENCOUNTER
Left VM with surgery date of 1/27. Won't know exact time till 24-48 hours prior too. Will receive a phone call at that time.

## 2024-12-30 NOTE — TELEPHONE ENCOUNTER
General Question     Subject: LT HIP SX ARRIVAL TIME/REQ A CALL BACK   Patient and /or Facility Request: Scot Hernandez   Contact Number: [phone]     PATIENT CALLED IN TO SEE WHAT TIME AND DATE OF HIS LT HIP SX ON HIS LT HIP...     REQ A CALL BACK…     PLEASE ADVISE

## 2025-01-13 ENCOUNTER — TELEPHONE (OUTPATIENT)
Dept: ORTHOPEDIC SURGERY | Age: 65
End: 2025-01-13

## 2025-01-13 NOTE — TELEPHONE ENCOUNTER
Surgery and/or Procedure Scheduling     Contact Name: Scot Hernandez   Surgical/Procedure Request: CANCEL SURGERY  Patient Contact Number: [phone]     PATIENT WANTING TO CANCEL SURGERY

## 2025-01-14 ENCOUNTER — HOSPITAL ENCOUNTER (OUTPATIENT)
Age: 65
Discharge: HOME | End: 2025-01-14
Payer: MEDICAID

## 2025-01-14 ENCOUNTER — HOSPITAL ENCOUNTER (OUTPATIENT)
Dept: GENERAL RADIOLOGY | Age: 65
Discharge: HOME | End: 2025-01-14
Payer: MEDICAID

## 2025-01-14 DIAGNOSIS — J44.1 COPD EXACERBATION (HCC): ICD-10-CM

## 2025-01-14 PROCEDURE — 71046 X-RAY EXAM CHEST 2 VIEWS: CPT

## 2025-01-16 ENCOUNTER — TELEPHONE (OUTPATIENT)
Dept: PULMONOLOGY | Age: 65
End: 2025-01-16

## 2025-01-16 NOTE — TELEPHONE ENCOUNTER
Patient called and spoke with Selin about a referral. She asked me to look into it. I did not get a referral for this patient but this patient was dismissed from this practice due to no show policy. He then went to [facility] pulmonology.   I let him know that his DrAndi needed to make a new referral for [facility] and call them to make an appointment. Patient understood.

## 2025-01-29 ENCOUNTER — OFFICE VISIT (OUTPATIENT)
Dept: PULMONOLOGY | Age: 65
End: 2025-01-29
Payer: MEDICAID

## 2025-01-29 VITALS
HEIGHT: 68 IN | OXYGEN SATURATION: 89 % | SYSTOLIC BLOOD PRESSURE: 130 MMHG | DIASTOLIC BLOOD PRESSURE: 70 MMHG | BODY MASS INDEX: 26.46 KG/M2 | RESPIRATION RATE: 18 BRPM | TEMPERATURE: 96.8 F | HEART RATE: 84 BPM | WEIGHT: 174.6 LBS

## 2025-01-29 DIAGNOSIS — J44.9 STAGE 4 VERY SEVERE COPD BY GOLD CLASSIFICATION (HCC): Primary | ICD-10-CM

## 2025-01-29 DIAGNOSIS — J96.11 CHRONIC RESPIRATORY FAILURE WITH HYPOXIA: ICD-10-CM

## 2025-01-29 PROCEDURE — G8427 DOCREV CUR MEDS BY ELIG CLIN: HCPCS | Performed by: INTERNAL MEDICINE

## 2025-01-29 PROCEDURE — 3078F DIAST BP <80 MM HG: CPT | Performed by: INTERNAL MEDICINE

## 2025-01-29 PROCEDURE — 3075F SYST BP GE 130 - 139MM HG: CPT | Performed by: INTERNAL MEDICINE

## 2025-01-29 PROCEDURE — 1036F TOBACCO NON-USER: CPT | Performed by: INTERNAL MEDICINE

## 2025-01-29 PROCEDURE — 99204 OFFICE O/P NEW MOD 45 MIN: CPT | Performed by: INTERNAL MEDICINE

## 2025-01-29 PROCEDURE — 3017F COLORECTAL CA SCREEN DOC REV: CPT | Performed by: INTERNAL MEDICINE

## 2025-01-29 PROCEDURE — 3023F SPIROM DOC REV: CPT | Performed by: INTERNAL MEDICINE

## 2025-01-29 PROCEDURE — G8419 CALC BMI OUT NRM PARAM NOF/U: HCPCS | Performed by: INTERNAL MEDICINE

## 2025-01-29 RX ORDER — FLUTICASONE FUROATE, UMECLIDINIUM BROMIDE AND VILANTEROL TRIFENATATE 100; 62.5; 25 UG/1; UG/1; UG/1
1 POWDER RESPIRATORY (INHALATION) DAILY
Qty: 1 EACH | Refills: 0 | Status: SHIPPED | COMMUNITY
Start: 2025-01-29

## 2025-01-29 NOTE — PROGRESS NOTES
Pulmonary Critical Care Consult           REASON FOR CONSULTATION:  Chief Complaint   Patient presents with    New Patient    COPD        Consult at request of Lorna Gautam PA-C     PCP: Lorna Gautam PA-C        Assessment and Plan:   Diagnosis Orders   1. Stage 4 very severe COPD by GOLD classification ([facility])  6 Minute Walk Test    fluticasone-umeclidin-vilant (TRELEGY ELLIPTA) 100-62.5-25 MCG/ACT AEPB inhaler      2. Chronic respiratory failure with hypoxia  6 Minute Walk Test          Plan:  We will give a trial of Trelegy instead of his current inhalers.  6-minute walk to qualify for oxygen his O2 saturation at rest today was 89%.            HISTORY OF PRESENT ILLNESS: Scot Hernandez is very pleasant 64 y.o. year old gentleman with medical history stated below significant for former smoker quit about 7 years ago, was referred to us for COPD.  He has shortness of breath with any exertion especially during the winter months.  He is on Spiriva and Symbicort and as needed albuterol.    Was recently admitted to the hospital with Klebsiella pneumonia.      Past Medical History:   Diagnosis Date    AAA (abdominal aortic aneurysm) ([facility]) 2018    3.0 cm    Anxiety     Arterial stent thrombosis ([facility])     Arthritis     Asthma     Bipolar 1 disorder ([facility])     COPD (chronic obstructive pulmonary disease) ([facility])     Coronary artery disease involving native coronary artery of native heart without angina pectoris 02/08/2021    Diabetes mellitus ([facility])     Essential hypertension 02/08/2021    Hyperlipidemia     Pneumonia        Past Surgical History:   Procedure Laterality Date    ABDOMINAL AORTIC ANEURYSM REPAIR, ENDOVASCULAR N/A 7/31/2024    Endovascular Aortic Aneurysm Repair performed by Lore Cabello MD at [facility] OR    CARDIAC PROCEDURE N/A 7/15/2024    Left heart cath / coronary angiography performed by En Mccann MD

## 2025-02-06 DIAGNOSIS — J44.9 STAGE 4 VERY SEVERE COPD BY GOLD CLASSIFICATION (HCC): ICD-10-CM

## 2025-02-07 RX ORDER — FLUTICASONE FUROATE, UMECLIDINIUM BROMIDE AND VILANTEROL TRIFENATATE 100; 62.5; 25 UG/1; UG/1; UG/1
1 POWDER RESPIRATORY (INHALATION) DAILY
Qty: 1 EACH | Refills: 5 | Status: SHIPPED | OUTPATIENT
Start: 2025-02-07

## 2025-02-11 DIAGNOSIS — J44.9 STAGE 4 VERY SEVERE COPD BY GOLD CLASSIFICATION (HCC): ICD-10-CM

## 2025-02-11 RX ORDER — FLUTICASONE FUROATE, UMECLIDINIUM BROMIDE AND VILANTEROL TRIFENATATE 100; 62.5; 25 UG/1; UG/1; UG/1
1 POWDER RESPIRATORY (INHALATION) DAILY
Qty: 1 EACH | Refills: 5 | Status: SHIPPED | OUTPATIENT
Start: 2025-02-11

## 2025-02-17 ENCOUNTER — TELEPHONE (OUTPATIENT)
Dept: PULMONOLOGY | Age: 65
End: 2025-02-17

## 2025-02-17 NOTE — TELEPHONE ENCOUNTER
Patient called to say that his insurance will not cover the TRELEGY. He wants to know if he can get something that his insurance will cover. Please call

## 2025-02-18 NOTE — TELEPHONE ENCOUNTER
LM on Pt;s VM that different inhaler was sent to pharmacy. And call insurance to see what is on Formulary if inhaler in not covered.

## 2025-02-26 ENCOUNTER — OFFICE VISIT (OUTPATIENT)
Dept: PULMONOLOGY | Age: 65
End: 2025-02-26
Payer: MEDICAID

## 2025-02-26 VITALS
HEIGHT: 68 IN | OXYGEN SATURATION: 92 % | HEART RATE: 80 BPM | SYSTOLIC BLOOD PRESSURE: 100 MMHG | TEMPERATURE: 97.9 F | BODY MASS INDEX: 26.37 KG/M2 | DIASTOLIC BLOOD PRESSURE: 60 MMHG | WEIGHT: 174 LBS | RESPIRATION RATE: 18 BRPM

## 2025-02-26 DIAGNOSIS — J44.9 STAGE 4 VERY SEVERE COPD BY GOLD CLASSIFICATION (HCC): Primary | ICD-10-CM

## 2025-02-26 PROCEDURE — 3074F SYST BP LT 130 MM HG: CPT | Performed by: INTERNAL MEDICINE

## 2025-02-26 PROCEDURE — 3017F COLORECTAL CA SCREEN DOC REV: CPT | Performed by: INTERNAL MEDICINE

## 2025-02-26 PROCEDURE — 3078F DIAST BP <80 MM HG: CPT | Performed by: INTERNAL MEDICINE

## 2025-02-26 PROCEDURE — 1036F TOBACCO NON-USER: CPT | Performed by: INTERNAL MEDICINE

## 2025-02-26 PROCEDURE — 99214 OFFICE O/P EST MOD 30 MIN: CPT | Performed by: INTERNAL MEDICINE

## 2025-02-26 PROCEDURE — G2211 COMPLEX E/M VISIT ADD ON: HCPCS | Performed by: INTERNAL MEDICINE

## 2025-02-26 PROCEDURE — 3023F SPIROM DOC REV: CPT | Performed by: INTERNAL MEDICINE

## 2025-02-26 PROCEDURE — G8419 CALC BMI OUT NRM PARAM NOF/U: HCPCS | Performed by: INTERNAL MEDICINE

## 2025-02-26 PROCEDURE — G8427 DOCREV CUR MEDS BY ELIG CLIN: HCPCS | Performed by: INTERNAL MEDICINE

## 2025-02-26 RX ORDER — AZITHROMYCIN 250 MG/1
TABLET, FILM COATED ORAL
Qty: 6 TABLET | Refills: 0 | Status: SHIPPED | OUTPATIENT
Start: 2025-02-26 | End: 2025-03-08

## 2025-02-26 NOTE — PROGRESS NOTES
Pulmonary Critical Care Consult           REASON FOR CONSULTATION:  Chief Complaint   Patient presents with    Follow-up    COPD        Consult at request of Lorna Gautam PA-C     PCP: Lorna Gautam PA-C        Assessment and Plan:   Diagnosis Orders   1. Stage 4 very severe COPD by GOLD classification ([facility])              Plan:  Patient cleared from pulmonary standpoint for hip replacement, no further workup is needed.  Advised to use incentive spirometry and early ambulation postsurgically.  Zpack before surgery  Trelegy daily.  Albuterol as needed.            HISTORY OF PRESENT ILLNESS: Scot Hernandez is very pleasant 64 y.o. year old gentleman with medical history stated below significant for former smoker quit about 7 years ago, was referred to us for COPD.  He has shortness of breath with any exertion especially during the winter months.  He is on Spiriva and Symbicort and as needed albuterol.    History of Klebsiella pneumonia.    Here today for follow-up  Has been compliant with Trelegy with no recent flareups  Has significant left hip pain currently using motorized scooter  They are evaluating him for potential hip replacement  He would like pulmonary clearance for the    Past Medical History:   Diagnosis Date    AAA (abdominal aortic aneurysm) 2018    3.0 cm    Anxiety     Arterial stent thrombosis     Arthritis     Asthma     Bipolar 1 disorder ([facility])     COPD (chronic obstructive pulmonary disease) ([facility])     Coronary artery disease involving native coronary artery of native heart without angina pectoris 02/08/2021    Diabetes mellitus ([facility])     Essential hypertension 02/08/2021    Hyperlipidemia     Pneumonia        Past Surgical History:   Procedure Laterality Date    ABDOMINAL AORTIC ANEURYSM REPAIR, ENDOVASCULAR N/A 7/31/2024    Endovascular Aortic Aneurysm Repair performed by Lore Cabello MD at [facility]

## 2025-03-10 ENCOUNTER — HOSPITAL ENCOUNTER (OUTPATIENT)
Age: 65
Discharge: HOME | End: 2025-03-10
Payer: MEDICAID

## 2025-03-10 ENCOUNTER — HOSPITAL ENCOUNTER (OUTPATIENT)
Dept: GENERAL RADIOLOGY | Age: 65
Discharge: HOME | End: 2025-03-10
Payer: MEDICAID

## 2025-03-10 DIAGNOSIS — K59.00 COLONIC CONSTIPATION: ICD-10-CM

## 2025-03-10 PROCEDURE — 74018 RADEX ABDOMEN 1 VIEW: CPT

## 2025-04-17 NOTE — PROGRESS NOTES
[facility] Chemo Progress Note    Date: 2025    Name: Robert Gastelum    MRN: [id number]         : 1944    Mr. Gastelum arrived ambulatory and in no distress for cycle 1 day 1 of Padcev and Keytruda.      Assessment was completed and documented in flowsheets. No acute concerns at this time. Port accessed without difficulty, labs drawn and processed.    Follow Up: Proceed with treatment      Mr. Gastelum's vitals were reviewed.  Patient Vitals for the past 12 hrs:   Temp Pulse Resp BP SpO2   25 1526 98.1 °F (36.7 °C) 91 16 116/71 93 %       Lab results were obtained and reviewed.  Labs within parameter for treatment.   Recent Results (from the past 12 hours)   CBC With Auto Differential    Collection Time: 25  9:59 AM   Result Value Ref Range    WBC 6.1 4.1 - 11.1 K/uL    RBC 3.78 (L) 4.10 - 5.70 M/uL    Hemoglobin 11.8 (L) 12.1 - 17.0 g/dL    Hematocrit 36.4 (L) 36.6 - 50.3 %    MCV 96.3 80.0 - 99.0 FL    MCH 31.2 26.0 - 34.0 PG    MCHC 32.4 30.0 - 36.5 g/dL    RDW 14.0 11.5 - 14.5 %    Platelets 288 150 - 400 K/uL    MPV 8.9 8.9 - 12.9 FL    Nucleated RBCs 0.0 0  WBC    nRBC 0.00 0.00 - 0.01 K/uL    Neutrophils % 71.6 32.0 - 75.0 %    Lymphocytes % 7.7 (L) 12.0 - 49.0 %    Monocytes % 9.5 5.0 - 13.0 %    Eosinophils % 10.1 (H) 0.0 - 7.0 %    Basophils % 0.8 0.0 - 1.0 %    Immature Granulocytes % 0.3 0.0 - 0.5 %    Neutrophils Absolute 4.36 1.80 - 8.00 K/UL    Lymphocytes Absolute 0.47 (L) 0.80 - 3.50 K/UL    Monocytes Absolute 0.58 0.00 - 1.00 K/UL    Eosinophils Absolute 0.62 (H) 0.00 - 0.40 K/UL    Basophils Absolute 0.05 0.00 - 0.10 K/UL    Immature Granulocytes Absolute 0.02 0.00 - 0.04 K/UL    Differential Type SMEAR SCANNED      RBC Comment NORMOCYTIC, NORMOCHROMIC     Comprehensive metabolic panel    Collection Time: 25  9:59 AM   Result Value Ref Range    Sodium 135 (L) 136 - 145 mmol/L    Potassium 3.8 3.5 - 5.1 mmol/L    Chloride 108 97 - 108 mmol/L    CO2 24 21 - 32  RT Inhaler-Nebulizer Bronchodilator Protocol Note    There is a bronchodilator order in the chart from a provider indicating to follow the RT Bronchodilator Protocol and there is an “Initiate RT Inhaler-Nebulizer Bronchodilator Protocol” order as well (see protocol at bottom of note).    CXR Findings:  XR CHEST PORTABLE    Result Date: 12/4/2024  No acute process.       The findings from the last RT Protocol Assessment were as follows:   History Pulmonary Disease: Chronic pulmonary disease  Respiratory Pattern: Dyspnea on exertion or RR 21-25 bpm  Breath Sounds: Intermittent or unilateral wheezes  Cough: Strong, spontaneous, non-productive  Indication for Bronchodilator Therapy: Decreased or absent breath sounds  Bronchodilator Assessment Score: 8    Aerosolized bronchodilator medication orders have been revised according to the RT Inhaler-Nebulizer Bronchodilator Protocol below.    Respiratory Therapist to perform RT Therapy Protocol Assessment initially then follow the protocol.  Repeat RT Therapy Protocol Assessment PRN for score 0-3 or on second treatment, BID, and PRN for scores above 3.    No Indications - adjust the frequency to every 6 hours PRN wheezing or bronchospasm, if no treatments needed after 48 hours then discontinue using Per Protocol order mode.     If indication present, adjust the RT bronchodilator orders based on the Bronchodilator Assessment Score as indicated below.  Use Inhaler orders unless patient has one or more of the following: on home nebulizer, not able to hold breath for 10 seconds, is not alert and oriented, cannot activate and use MDI correctly, or respiratory rate 25 breaths per minute or more, then use the equivalent nebulizer order(s) with same Frequency and PRN reasons based on the score.  If a patient is on this medication at home then do not decrease Frequency below that used at home.    0-3 - enter or revise RT bronchodilator order(s) to equivalent RT Bronchodilator order  with Frequency of every 4 hours PRN for wheezing or increased work of breathing using Per Protocol order mode.        4-6 - enter or revise RT Bronchodilator order(s) to two equivalent RT bronchodilator orders with one order with BID Frequency and one order with Frequency of every 4 hours PRN wheezing or increased work of breathing using Per Protocol order mode.        7-10 - enter or revise RT Bronchodilator order(s) to two equivalent RT bronchodilator orders with one order with TID Frequency and one order with Frequency of every 4 hours PRN wheezing or increased work of breathing using Per Protocol order mode.       11-13 - enter or revise RT Bronchodilator order(s) to one equivalent RT bronchodilator order with QID Frequency and an Albuterol order with Frequency of every 4 hours PRN wheezing or increased work of breathing using Per Protocol order mode.      Greater than 13 - enter or revise RT Bronchodilator order(s) to one equivalent RT bronchodilator order with every 4 hours Frequency and an Albuterol order with Frequency of every 2 hours PRN wheezing or increased work of breathing using Per Protocol order mode.         Electronically signed by Beronica Light RCP on 12/5/2024 at 7:03 PM

## 2025-05-01 ENCOUNTER — OFFICE VISIT (OUTPATIENT)
Dept: ORTHOPEDIC SURGERY | Age: 65
End: 2025-05-01
Payer: MEDICAID

## 2025-05-01 DIAGNOSIS — M87.052 AVASCULAR NECROSIS OF BONE OF HIP, LEFT (HCC): Primary | ICD-10-CM

## 2025-05-01 PROCEDURE — G8428 CUR MEDS NOT DOCUMENT: HCPCS | Performed by: ORTHOPAEDIC SURGERY

## 2025-05-01 PROCEDURE — G8419 CALC BMI OUT NRM PARAM NOF/U: HCPCS | Performed by: ORTHOPAEDIC SURGERY

## 2025-05-01 PROCEDURE — 99214 OFFICE O/P EST MOD 30 MIN: CPT | Performed by: ORTHOPAEDIC SURGERY

## 2025-05-01 PROCEDURE — 3017F COLORECTAL CA SCREEN DOC REV: CPT | Performed by: ORTHOPAEDIC SURGERY

## 2025-05-01 PROCEDURE — 1036F TOBACCO NON-USER: CPT | Performed by: ORTHOPAEDIC SURGERY

## 2025-05-02 ENCOUNTER — PREP FOR PROCEDURE (OUTPATIENT)
Dept: ORTHOPEDIC SURGERY | Age: 65
End: 2025-05-02

## 2025-05-02 DIAGNOSIS — M87.052 AVASCULAR NECROSIS OF FEMUR HEAD, LEFT (HCC): Primary | ICD-10-CM

## 2025-05-02 DIAGNOSIS — M87.052 AVASCULAR NECROSIS OF FEMUR HEAD, LEFT (HCC): ICD-10-CM

## 2025-05-02 DIAGNOSIS — M87.052 AVASCULAR NECROSIS OF BONE OF HIP, LEFT (HCC): Primary | ICD-10-CM

## 2025-05-02 RX ORDER — MUPIROCIN 20 MG/G
OINTMENT TOPICAL
Qty: 22 G | Refills: 0 | OUTPATIENT
Start: 2025-05-02

## 2025-05-02 NOTE — PROGRESS NOTES
ORTHOPAEDIC SURGERY FOLLOWUP VISIT     CHIEF COMPLAINT: Left hip pain     HISTORY OF PRESENT ILLNESS:  65-year-old male presents for repeat evaluation of his left hip.  He has had insidiously worsening left hip pain.  This has been ongoing for several years.  He underwent a right total hip arthroplasty in January 2023 related to the same issue of avascular necrosis with femoral head collapse/fragmentation.  He has been previously seen for this issue.  At last visit, it was recommended that he undergo total hip arthroplasty.  He has had some other medical issues including an aortic aneurysm repair which delayed his hip replacement.  At this time, he feels his pain has worsened to the point where he is unable to bear weight appropriately.  He is struggling to get around.  His pain is severe.  This is mostly across the groin and sometimes will radiate to the lateral hip or buttock.  He has upper thigh pain as well.  He desires proceeding with total hip arthroplasty.    PHYSICAL EXAM:  Left lower extremity:  No cuts, open wounds, or abrasions to the left hip.  There is moderate trochanteric tenderness.  There is pain with logroll.  There is pain with axial loading of the limb.  There is pain experienced across the groin with maximal hip flexion.  There is pain with rotational movements of the hip in 90 degrees of flexion.  There is pain at extremes of HARRY and FADIR. Sensation is intact to light touch in deep peroneal, superficial peroneal, tibial, sural, and saphenous nerve distributions.  Motor function is intact to EHL, FHL, tibialis anterior, and gastroc.  There is brisk capillary refill to the toes and a strong palpable dorsalis pedis pulse.  Compartments are soft and compressible.  There is no calf tenderness and a negative Homans' sign.  Gait: Ambulatory without assist devices.     RADIOGRAPHIC EXAM:   AP pelvis as well as AP and frog lateral views of the left hip demonstrate progressive avascular necrosis

## 2025-05-05 ENCOUNTER — TELEPHONE (OUTPATIENT)
Dept: ORTHOPEDIC SURGERY | Age: 65
End: 2025-05-05

## 2025-05-05 NOTE — TELEPHONE ENCOUNTER
Orthopedic Nurse Navigator Summary    Patient Name: Scot Hernandez  Anticipated Date of Surgery: 6/6/25  Attended Pre-op Education Class: Scheduled 5/13/25-Confirmed.     PCP: Lorna Gautam PA-C  Date of PCP visit for H&P: 5/12/25    Is patient in a Pain Management program: No  Review of Medical history reveals history of: Abnormal Stress Test, CAD, Essential HTN, AAA, Unstable Angina, COPD-Severe Stage 4, Hypoxia, SOB, Chest Pain, Centrilobular Emphysema, DM, Bilateral Carpal Tunnel, Fx-Right Femur, OA- Right Knee, Mass- Right Knee, Lateral Epicondylitis-Left Elbow, Atrophy of Quadriceps Femoris Muscle, Avascular Necrosis- Bilateral Hips, S/P Right THR, Traumatic Seroma Right Lower Leg, Anxiety, Hyperlipidemia, Covid.    Critical Lab Values  - Hemoglobin (g/dL):  Date: Value:   - Hematocrit(%): Date:   Value:   - HgbA1C:  Date:  Value:  - PT:      Date:     Value:  - INR:    Date:     Value:  - aPTT:  Date:     Value:  - Albumin:  Date:   Value:  - BUN:  Date:   Value:   - Creatinine:  Date:   Value:   - Vit D:  Date: N/A    Value: N/A  -Urine Culture:   Date:    Value:    -CXR: Date: N/A     Impression: N/A  -EKG: Date:     Result:     Coronary Artery Disease/HTN/CHF history: Yes- CAD, HTN, AAA, Unstable Angina  Does the patient see a Cardiologist: Yes- Patient to schedule appt for cardiac clearance.  Date of most recent cardiac appt: 3/28/25   Anticoagulation/Blood Thinners: ASA. Patient states he is no longer taking Plavix.   Cardiologist: Dr. Rudolph  Cardiology Note: 3/28/25  Plan: Cardiac-wise and hemodynamically he is stable for his hip surgery.    5/7/25: Per Jayce Fernandez- Vascular clearance is not needed per Dr. Lopez.    Diabetes History: DM-Type 2  Most recent HgbA1C:    Pulmonary:  COPD/Emphysema/Use of home oxygen: COPD-Severe Stage 4, Hypoxia, SOB, Centrilobular Emphysema. Denies use of home oxygen.  Pulmonologist: Francisca Hawkins MD  Pulmonary Note: 2/26/25  Plan:  Patient cleared from

## 2025-05-06 ENCOUNTER — ANESTHESIA EVENT (OUTPATIENT)
Dept: OPERATING ROOM | Age: 65
End: 2025-05-06
Payer: MEDICAID

## 2025-05-13 ENCOUNTER — TELEPHONE (OUTPATIENT)
Dept: ORTHOPEDIC SURGERY | Age: 65
End: 2025-05-13

## 2025-05-13 ENCOUNTER — HOSPITAL ENCOUNTER (OUTPATIENT)
Age: 65
Discharge: HOME | End: 2025-05-13
Payer: MEDICAID

## 2025-05-13 LAB
BILIRUB UR QL STRIP.AUTO: NEGATIVE
CLARITY UR: CLEAR
COLOR UR: YELLOW
EKG ATRIAL RATE: 77 BPM
EKG DIAGNOSIS: NORMAL
EKG P AXIS: 98 DEGREES
EKG P-R INTERVAL: 126 MS
EKG Q-T INTERVAL: 402 MS
EKG QRS DURATION: 82 MS
EKG QTC CALCULATION (BAZETT): 454 MS
EKG R AXIS: 109 DEGREES
EKG T AXIS: 108 DEGREES
EKG VENTRICULAR RATE: 77 BPM
GLUCOSE UR STRIP.AUTO-MCNC: NEGATIVE MG/DL
HGB UR QL STRIP.AUTO: NEGATIVE
KETONES UR STRIP.AUTO-MCNC: NEGATIVE MG/DL
LEUKOCYTE ESTERASE UR QL STRIP.AUTO: NEGATIVE
NITRITE UR QL STRIP.AUTO: NEGATIVE
PH UR STRIP.AUTO: 6 [PH] (ref 5–8)
PROT UR STRIP.AUTO-MCNC: NEGATIVE MG/DL
SP GR UR STRIP.AUTO: 1.02 (ref 1–1.03)
UA DIPSTICK W REFLEX MICRO PNL UR: NORMAL
URN SPEC COLLECT METH UR: NORMAL
UROBILINOGEN UR STRIP-ACNC: 1 E.U./DL

## 2025-05-13 PROCEDURE — 87086 URINE CULTURE/COLONY COUNT: CPT

## 2025-05-13 PROCEDURE — 93010 ELECTROCARDIOGRAM REPORT: CPT | Performed by: INTERNAL MEDICINE

## 2025-05-13 PROCEDURE — 81003 URINALYSIS AUTO W/O SCOPE: CPT

## 2025-05-13 PROCEDURE — 93005 ELECTROCARDIOGRAM TRACING: CPT | Performed by: CHIROPRACTOR

## 2025-05-13 RX ORDER — AZITHROMYCIN 250 MG/1
250 TABLET, FILM COATED ORAL DAILY
Status: ON HOLD | COMMUNITY
End: 2025-06-07 | Stop reason: HOSPADM

## 2025-05-13 NOTE — TELEPHONE ENCOUNTER
PATIENT IS REQ BACTROBAN OINTMENT BE SENT TO HIS PHARMACY    [facility] - [place], [place] - [facility] [address] [phone] - F [phone] [[id number]]       PLEASE RETURN CALL [phone]

## 2025-05-14 ENCOUNTER — TELEPHONE (OUTPATIENT)
Dept: ORTHOPEDIC SURGERY | Age: 65
End: 2025-05-14

## 2025-05-14 LAB — BACTERIA UR CULT: NORMAL

## 2025-05-14 RX ORDER — MUPIROCIN 20 MG/G
OINTMENT TOPICAL
Qty: 22 G | Refills: 0 | OUTPATIENT
Start: 2025-05-14

## 2025-05-14 NOTE — PROGRESS NOTES
PRE OP INSTRUCTION SHEET   1. Do not eat or drink anything after 12 midnight  prior to surgery. This includes no water, chewing gum or mints.   2. Take the following pills will a small sip of water (see MAR)                                        3. Aspirin, Ibuprofen, Advil, Naproxen, Vitamin E, fish oil and other Anti-inflammatory products should be stopped for 5 days before surgery or as directed by your physician.   4. Check with your Doctor regarding stopping Plavix, Coumadin, Lovenox, Fragmin or other blood thinners   5. Do not smoke, and do not drink any alcoholic beverages 24 hours prior to surgery.  This includes NA Beer.   6. You may brush your teeth and gargle the morning of surgery.  DO NOT SWALLOW WATER   7. You MUST make arrangements for a responsible adult to take you home after your surgery. You will not be allowed to leave alone or drive yourself home.  It is strongly suggested someone stay with you the first 24 hrs. Your surgery will be cancelled if you do not have a ride home.   8. A parent/legal guardian must accompany a child scheduled for surgery and plan to stay at the hospital until the child is discharged.  Please do not bring other children with you.   9. Please wear simple, loose fitting clothing to the hospital.  Do not bring valuables (money, credit cards, checkbooks, etc.) Do not wear any makeup (including no eye makeup) or nail polish on your fingers or toes.   10. DO NOT wear any jewelry or piercings on day of surgery.  All body piercing jewelry must be removed.   11. If you have dentures,glasses, or contacts they will be removed before going to the OR; we will provide you a container.    12. Please see your family doctor/and cardiologist for a history & physical and/or concerning medications.  Bring any test results/reports from your physician's office. Have history and labs faxed to [phone]    13. Remember to bring Blood Bank bracelet on the day of  surgery.   14. If you have a Living Will and Durable Power of  for Healthcare, please bring in a copy.   15. Notify your Surgeon if you develop any illness between now and surgery  time, cough, cold, fever, sore throat, nausea, vomiting, etc.  Please notify your surgeon if you experience dizziness, shortness of breath or blurred vision between now & the time of your surgery   16. DO NOT shave your operative site 96 hours prior to surgery. For face & neck surgery, men may use an electric razor 48 hours prior to surgery.   17. Shower with _x__Antibacterial soap (x_chlorhexidine for total joint  Pt's) shower two times before surgery.(the morning of and the night before.   18. To provide excellent care visitors will be limited to one in the room at any given time.  Please call pre admission testing if you any further questions [phone] or [phone]

## 2025-05-14 NOTE — TELEPHONE ENCOUNTER
Patient states his pharmacy has not received a prescription for Bactroban Ointment. Dr. Lopez's office notified and will send to pharmacy. Will notify patient once pharmacy receives prescription. Patient also had questions regarding his medications. Alea Gomez (CAROLYN RN) notified and contacted patient to review medications.

## 2025-05-14 NOTE — TELEPHONE ENCOUNTER
Contacted patient to inform him Dr. Lopez will send the Bactroban order to the pharmacy on 5/15/25. No answer. Message left. Will follow-up tomorrow.

## 2025-05-14 NOTE — TELEPHONE ENCOUNTER
Harinder for patient advising that  re-sent over the prescription. Asked him to give me a call back if his pharmacy still has not received.

## 2025-05-15 ENCOUNTER — TELEPHONE (OUTPATIENT)
Dept: ORTHOPEDIC SURGERY | Age: 65
End: 2025-05-15

## 2025-05-15 NOTE — TELEPHONE ENCOUNTER
Contacted patient to inform him that Bactroban Ointment had been called into his pharmacy this morning. He states he has it. Instructed on use. Verbalized understanding.

## 2025-06-02 RX ORDER — SODIUM CHLORIDE 0.9 % (FLUSH) 0.9 %
5-40 SYRINGE (ML) INJECTION EVERY 12 HOURS SCHEDULED
Status: CANCELLED | OUTPATIENT
Start: 2025-06-02

## 2025-06-02 RX ORDER — SODIUM CHLORIDE 9 MG/ML
INJECTION, SOLUTION INTRAVENOUS PRN
Status: CANCELLED | OUTPATIENT
Start: 2025-06-02

## 2025-06-02 RX ORDER — SODIUM CHLORIDE 0.9 % (FLUSH) 0.9 %
5-40 SYRINGE (ML) INJECTION PRN
Status: CANCELLED | OUTPATIENT
Start: 2025-06-02

## 2025-06-05 NOTE — PROGRESS NOTES
Spoke with patient and confirmed medicine to take day of surgery with a sip of water. Also confirmed pt was doing the bactroban ointment and using the hibiclens soap.

## 2025-06-06 ENCOUNTER — APPOINTMENT (OUTPATIENT)
Dept: GENERAL RADIOLOGY | Age: 65
DRG: 324 | End: 2025-06-06
Attending: ORTHOPAEDIC SURGERY
Payer: MEDICAID

## 2025-06-06 ENCOUNTER — HOSPITAL ENCOUNTER (INPATIENT)
Age: 65
LOS: 5 days | Discharge: NURSING, REHAB OR LONG-TERM CARE | DRG: 324 | End: 2025-06-11
Attending: ORTHOPAEDIC SURGERY | Admitting: ORTHOPAEDIC SURGERY
Payer: MEDICAID

## 2025-06-06 ENCOUNTER — ANESTHESIA (OUTPATIENT)
Dept: OPERATING ROOM | Age: 65
End: 2025-06-06
Payer: MEDICAID

## 2025-06-06 DIAGNOSIS — M87.052 AVASCULAR NECROSIS OF BONE OF LEFT HIP (HCC): Primary | ICD-10-CM

## 2025-06-06 PROBLEM — E78.2 MIXED HYPERLIPIDEMIA: Status: ACTIVE | Noted: 2021-02-08

## 2025-06-06 LAB
ANION GAP SERPL CALCULATED.3IONS-SCNC: 15 MMOL/L (ref 3–16)
APTT BLD: 27.7 SEC (ref 22.1–36.4)
BUN SERPL-MCNC: 19 MG/DL (ref 7–20)
CALCIUM SERPL-MCNC: 9.1 MG/DL (ref 8.3–10.6)
CHLORIDE SERPL-SCNC: 103 MMOL/L (ref 99–110)
CO2 SERPL-SCNC: 23 MMOL/L (ref 21–32)
CREAT SERPL-MCNC: 0.8 MG/DL (ref 0.8–1.3)
DEPRECATED RDW RBC AUTO: 16.9 % (ref 12.4–15.4)
EKG ATRIAL RATE: 64 BPM
EKG DIAGNOSIS: NORMAL
EKG P AXIS: 74 DEGREES
EKG P-R INTERVAL: 126 MS
EKG Q-T INTERVAL: 412 MS
EKG QRS DURATION: 88 MS
EKG QTC CALCULATION (BAZETT): 425 MS
EKG R AXIS: 61 DEGREES
EKG T AXIS: 71 DEGREES
EKG VENTRICULAR RATE: 64 BPM
GFR SERPLBLD CREATININE-BSD FMLA CKD-EPI: >90 ML/MIN/{1.73_M2}
GLUCOSE BLD-MCNC: 105 MG/DL (ref 70–99)
GLUCOSE BLD-MCNC: 114 MG/DL (ref 70–99)
GLUCOSE SERPL-MCNC: 92 MG/DL (ref 70–99)
HCT VFR BLD AUTO: 40.6 % (ref 40.5–52.5)
HGB BLD-MCNC: 13.8 G/DL (ref 13.5–17.5)
INR PPP: 0.99 (ref 0.85–1.15)
MCH RBC QN AUTO: 29.8 PG (ref 26–34)
MCHC RBC AUTO-ENTMCNC: 34 G/DL (ref 31–36)
MCV RBC AUTO: 87.6 FL (ref 80–100)
PERFORMED ON: ABNORMAL
PERFORMED ON: ABNORMAL
PLATELET # BLD AUTO: 168 K/UL (ref 135–450)
PMV BLD AUTO: 7.9 FL (ref 5–10.5)
POTASSIUM SERPL-SCNC: 4.3 MMOL/L (ref 3.5–5.1)
PROTHROMBIN TIME: 13.3 SEC (ref 11.9–14.9)
RBC # BLD AUTO: 4.64 M/UL (ref 4.2–5.9)
SODIUM SERPL-SCNC: 141 MMOL/L (ref 136–145)
WBC # BLD AUTO: 5.6 K/UL (ref 4–11)

## 2025-06-06 PROCEDURE — 3700000000 HC ANESTHESIA ATTENDED CARE: Performed by: ORTHOPAEDIC SURGERY

## 2025-06-06 PROCEDURE — 7100000000 HC PACU RECOVERY - FIRST 15 MIN: Performed by: ORTHOPAEDIC SURGERY

## 2025-06-06 PROCEDURE — 2580000003 HC RX 258: Performed by: ORTHOPAEDIC SURGERY

## 2025-06-06 PROCEDURE — 0SRB0JA REPLACEMENT OF LEFT HIP JOINT WITH SYNTHETIC SUBSTITUTE, UNCEMENTED, OPEN APPROACH: ICD-10-PCS | Performed by: ORTHOPAEDIC SURGERY

## 2025-06-06 PROCEDURE — 2500000003 HC RX 250 WO HCPCS: Performed by: ORTHOPAEDIC SURGERY

## 2025-06-06 PROCEDURE — 3600000004 HC SURGERY LEVEL 4 BASE: Performed by: ORTHOPAEDIC SURGERY

## 2025-06-06 PROCEDURE — 6360000002 HC RX W HCPCS: Performed by: ORTHOPAEDIC SURGERY

## 2025-06-06 PROCEDURE — 4A03X5D MEASUREMENT OF ARTERIAL FLOW, INTRACRANIAL, EXTERNAL APPROACH: ICD-10-PCS | Performed by: ORTHOPAEDIC SURGERY

## 2025-06-06 PROCEDURE — 2500000003 HC RX 250 WO HCPCS

## 2025-06-06 PROCEDURE — 97530 THERAPEUTIC ACTIVITIES: CPT

## 2025-06-06 PROCEDURE — 3700000001 HC ADD 15 MINUTES (ANESTHESIA): Performed by: ORTHOPAEDIC SURGERY

## 2025-06-06 PROCEDURE — 8E0YXBF COMPUTER ASSISTED PROCEDURE OF LOWER EXTREMITY, WITH FLUOROSCOPY: ICD-10-PCS | Performed by: ORTHOPAEDIC SURGERY

## 2025-06-06 PROCEDURE — 93005 ELECTROCARDIOGRAM TRACING: CPT | Performed by: ANESTHESIOLOGY

## 2025-06-06 PROCEDURE — 1200000000 HC SEMI PRIVATE

## 2025-06-06 PROCEDURE — 2700000000 HC OXYGEN THERAPY PER DAY

## 2025-06-06 PROCEDURE — 97162 PT EVAL MOD COMPLEX 30 MIN: CPT

## 2025-06-06 PROCEDURE — 71045 X-RAY EXAM CHEST 1 VIEW: CPT

## 2025-06-06 PROCEDURE — 94640 AIRWAY INHALATION TREATMENT: CPT

## 2025-06-06 PROCEDURE — 99233 SBSQ HOSP IP/OBS HIGH 50: CPT

## 2025-06-06 PROCEDURE — 2709999900 HC NON-CHARGEABLE SUPPLY: Performed by: ORTHOPAEDIC SURGERY

## 2025-06-06 PROCEDURE — 97116 GAIT TRAINING THERAPY: CPT

## 2025-06-06 PROCEDURE — 2580000003 HC RX 258: Performed by: ANESTHESIOLOGY

## 2025-06-06 PROCEDURE — 73502 X-RAY EXAM HIP UNI 2-3 VIEWS: CPT

## 2025-06-06 PROCEDURE — 80048 BASIC METABOLIC PNL TOTAL CA: CPT

## 2025-06-06 PROCEDURE — 97166 OT EVAL MOD COMPLEX 45 MIN: CPT

## 2025-06-06 PROCEDURE — 6360000002 HC RX W HCPCS

## 2025-06-06 PROCEDURE — 27130 TOTAL HIP ARTHROPLASTY: CPT | Performed by: ORTHOPAEDIC SURGERY

## 2025-06-06 PROCEDURE — 6370000000 HC RX 637 (ALT 250 FOR IP): Performed by: ANESTHESIOLOGY

## 2025-06-06 PROCEDURE — 85730 THROMBOPLASTIN TIME PARTIAL: CPT

## 2025-06-06 PROCEDURE — 6370000000 HC RX 637 (ALT 250 FOR IP): Performed by: ORTHOPAEDIC SURGERY

## 2025-06-06 PROCEDURE — 6360000002 HC RX W HCPCS: Performed by: ANESTHESIOLOGY

## 2025-06-06 PROCEDURE — C1776 JOINT DEVICE (IMPLANTABLE): HCPCS | Performed by: ORTHOPAEDIC SURGERY

## 2025-06-06 PROCEDURE — 7100000001 HC PACU RECOVERY - ADDTL 15 MIN: Performed by: ORTHOPAEDIC SURGERY

## 2025-06-06 PROCEDURE — 85610 PROTHROMBIN TIME: CPT

## 2025-06-06 PROCEDURE — 2500000003 HC RX 250 WO HCPCS: Performed by: ANESTHESIOLOGY

## 2025-06-06 PROCEDURE — 97535 SELF CARE MNGMENT TRAINING: CPT

## 2025-06-06 PROCEDURE — 85027 COMPLETE CBC AUTOMATED: CPT

## 2025-06-06 PROCEDURE — 94761 N-INVAS EAR/PLS OXIMETRY MLT: CPT

## 2025-06-06 PROCEDURE — 3600000014 HC SURGERY LEVEL 4 ADDTL 15MIN: Performed by: ORTHOPAEDIC SURGERY

## 2025-06-06 PROCEDURE — 93010 ELECTROCARDIOGRAM REPORT: CPT | Performed by: INTERNAL MEDICINE

## 2025-06-06 PROCEDURE — 36415 COLL VENOUS BLD VENIPUNCTURE: CPT

## 2025-06-06 DEVICE — CUP ACET DIA58MM HIP GRIPTION PRI CEMENTLESS FIX SECT SER: Type: IMPLANTABLE DEVICE | Site: HIP | Status: FUNCTIONAL

## 2025-06-06 DEVICE — STEM FEM SZ 7 L109MM 12/14 TAPR STD OFFSET HIP DUOFIX CLLRD: Type: IMPLANTABLE DEVICE | Site: HIP | Status: FUNCTIONAL

## 2025-06-06 DEVICE — SCREW BNE L30MM DIA6.5MM CANC HIP S STL GRIPTION FULL THRD: Type: IMPLANTABLE DEVICE | Site: HIP | Status: FUNCTIONAL

## 2025-06-06 DEVICE — HEAD FEM DIA40MM +8.5MM OFFSET 12/14 TAPR HIP CERAMIC TI SL: Type: IMPLANTABLE DEVICE | Site: HIP | Status: FUNCTIONAL

## 2025-06-06 DEVICE — SCREW BNE L35MM DIA6.5MM CANC HIP DOME PINN: Type: IMPLANTABLE DEVICE | Site: HIP | Status: FUNCTIONAL

## 2025-06-06 DEVICE — ALTRX NEUT 40IDX58OD: Type: IMPLANTABLE DEVICE | Site: HIP | Status: FUNCTIONAL

## 2025-06-06 RX ORDER — CARVEDILOL 3.12 MG/1
3.12 TABLET ORAL 2 TIMES DAILY WITH MEALS
Status: DISCONTINUED | OUTPATIENT
Start: 2025-06-06 | End: 2025-06-11 | Stop reason: HOSPADM

## 2025-06-06 RX ORDER — OXYCODONE HYDROCHLORIDE 5 MG/1
10 TABLET ORAL EVERY 4 HOURS PRN
Status: DISCONTINUED | OUTPATIENT
Start: 2025-06-06 | End: 2025-06-11 | Stop reason: HOSPADM

## 2025-06-06 RX ORDER — ALBUTEROL SULFATE 0.83 MG/ML
2.5 SOLUTION RESPIRATORY (INHALATION)
Status: DISCONTINUED | OUTPATIENT
Start: 2025-06-06 | End: 2025-06-06

## 2025-06-06 RX ORDER — QUETIAPINE FUMARATE 100 MG/1
100 TABLET, FILM COATED ORAL DAILY
Status: DISCONTINUED | OUTPATIENT
Start: 2025-06-07 | End: 2025-06-08

## 2025-06-06 RX ORDER — DEXAMETHASONE SODIUM PHOSPHATE 4 MG/ML
INJECTION, SOLUTION INTRA-ARTICULAR; INTRALESIONAL; INTRAMUSCULAR; INTRAVENOUS; SOFT TISSUE
Status: DISCONTINUED | OUTPATIENT
Start: 2025-06-06 | End: 2025-06-06 | Stop reason: SDUPTHER

## 2025-06-06 RX ORDER — ALBUTEROL SULFATE 90 UG/1
2 INHALANT RESPIRATORY (INHALATION)
Status: DISCONTINUED | OUTPATIENT
Start: 2025-06-06 | End: 2025-06-06

## 2025-06-06 RX ORDER — SODIUM CHLORIDE 9 MG/ML
INJECTION, SOLUTION INTRAVENOUS
Status: DISPENSED
Start: 2025-06-06 | End: 2025-06-06

## 2025-06-06 RX ORDER — OXYCODONE HYDROCHLORIDE 5 MG/1
5 TABLET ORAL PRN
Status: DISCONTINUED | OUTPATIENT
Start: 2025-06-06 | End: 2025-06-06 | Stop reason: HOSPADM

## 2025-06-06 RX ORDER — DIVALPROEX SODIUM 500 MG/1
1000 TABLET, DELAYED RELEASE ORAL EVERY EVENING
Status: DISCONTINUED | OUTPATIENT
Start: 2025-06-07 | End: 2025-06-11 | Stop reason: HOSPADM

## 2025-06-06 RX ORDER — SODIUM CHLORIDE 0.9 % (FLUSH) 0.9 %
5-40 SYRINGE (ML) INJECTION PRN
Status: DISCONTINUED | OUTPATIENT
Start: 2025-06-06 | End: 2025-06-06 | Stop reason: HOSPADM

## 2025-06-06 RX ORDER — SODIUM CHLORIDE 0.9 % (FLUSH) 0.9 %
5-40 SYRINGE (ML) INJECTION PRN
Status: DISCONTINUED | OUTPATIENT
Start: 2025-06-06 | End: 2025-06-11 | Stop reason: HOSPADM

## 2025-06-06 RX ORDER — LIDOCAINE HYDROCHLORIDE 20 MG/ML
INJECTION, SOLUTION INFILTRATION; PERINEURAL
Status: DISCONTINUED | OUTPATIENT
Start: 2025-06-06 | End: 2025-06-06 | Stop reason: SDUPTHER

## 2025-06-06 RX ORDER — DIVALPROEX SODIUM 500 MG/1
500 TABLET, DELAYED RELEASE ORAL 2 TIMES DAILY
Status: DISCONTINUED | OUTPATIENT
Start: 2025-06-06 | End: 2025-06-06 | Stop reason: SDUPTHER

## 2025-06-06 RX ORDER — ONDANSETRON 2 MG/ML
4 INJECTION INTRAMUSCULAR; INTRAVENOUS EVERY 6 HOURS PRN
Status: DISCONTINUED | OUTPATIENT
Start: 2025-06-06 | End: 2025-06-11 | Stop reason: HOSPADM

## 2025-06-06 RX ORDER — PANTOPRAZOLE SODIUM 40 MG/1
40 TABLET, DELAYED RELEASE ORAL DAILY
Status: DISCONTINUED | OUTPATIENT
Start: 2025-06-07 | End: 2025-06-11 | Stop reason: HOSPADM

## 2025-06-06 RX ORDER — MAGNESIUM HYDROXIDE 1200 MG/15ML
LIQUID ORAL CONTINUOUS PRN
Status: COMPLETED | OUTPATIENT
Start: 2025-06-06 | End: 2025-06-06

## 2025-06-06 RX ORDER — SODIUM CHLORIDE 0.9 % (FLUSH) 0.9 %
5-40 SYRINGE (ML) INJECTION EVERY 12 HOURS SCHEDULED
Status: DISCONTINUED | OUTPATIENT
Start: 2025-06-06 | End: 2025-06-06 | Stop reason: HOSPADM

## 2025-06-06 RX ORDER — HYDROMORPHONE HYDROCHLORIDE 2 MG/ML
INJECTION, SOLUTION INTRAMUSCULAR; INTRAVENOUS; SUBCUTANEOUS
Status: DISCONTINUED | OUTPATIENT
Start: 2025-06-06 | End: 2025-06-06 | Stop reason: SDUPTHER

## 2025-06-06 RX ORDER — TIOTROPIUM BROMIDE 18 UG/1
18 CAPSULE ORAL; RESPIRATORY (INHALATION) DAILY
COMMUNITY

## 2025-06-06 RX ORDER — FENTANYL CITRATE 50 UG/ML
INJECTION, SOLUTION INTRAMUSCULAR; INTRAVENOUS
Status: DISCONTINUED | OUTPATIENT
Start: 2025-06-06 | End: 2025-06-06 | Stop reason: SDUPTHER

## 2025-06-06 RX ORDER — DOCUSATE SODIUM 100 MG/1
100 CAPSULE, LIQUID FILLED ORAL 2 TIMES DAILY
COMMUNITY

## 2025-06-06 RX ORDER — POLYVINYL ALCOHOL 14 MG/ML
1 SOLUTION/ DROPS OPHTHALMIC PRN
COMMUNITY

## 2025-06-06 RX ORDER — AMLODIPINE BESYLATE 5 MG/1
5 TABLET ORAL DAILY
Status: DISCONTINUED | OUTPATIENT
Start: 2025-06-07 | End: 2025-06-06

## 2025-06-06 RX ORDER — TAMSULOSIN HYDROCHLORIDE 0.4 MG/1
0.4 CAPSULE ORAL DAILY
Status: DISCONTINUED | OUTPATIENT
Start: 2025-06-07 | End: 2025-06-11 | Stop reason: HOSPADM

## 2025-06-06 RX ORDER — VANCOMYCIN HYDROCHLORIDE 1 G/20ML
INJECTION, POWDER, LYOPHILIZED, FOR SOLUTION INTRAVENOUS PRN
Status: DISCONTINUED | OUTPATIENT
Start: 2025-06-06 | End: 2025-06-06 | Stop reason: ALTCHOICE

## 2025-06-06 RX ORDER — DIVALPROEX SODIUM 500 MG/1
500 TABLET, DELAYED RELEASE ORAL EVERY MORNING
Status: DISCONTINUED | OUTPATIENT
Start: 2025-06-07 | End: 2025-06-11 | Stop reason: HOSPADM

## 2025-06-06 RX ORDER — IPRATROPIUM BROMIDE AND ALBUTEROL SULFATE 2.5; .5 MG/3ML; MG/3ML
1 SOLUTION RESPIRATORY (INHALATION) ONCE
Status: COMPLETED | OUTPATIENT
Start: 2025-06-06 | End: 2025-06-06

## 2025-06-06 RX ORDER — MEPERIDINE HYDROCHLORIDE 25 MG/ML
12.5 INJECTION INTRAMUSCULAR; INTRAVENOUS; SUBCUTANEOUS EVERY 5 MIN PRN
Status: DISCONTINUED | OUTPATIENT
Start: 2025-06-06 | End: 2025-06-06 | Stop reason: RX

## 2025-06-06 RX ORDER — METHOCARBAMOL 500 MG/1
750 TABLET, FILM COATED ORAL EVERY 8 HOURS PRN
Status: DISCONTINUED | OUTPATIENT
Start: 2025-06-06 | End: 2025-06-11 | Stop reason: HOSPADM

## 2025-06-06 RX ORDER — EPHEDRINE SULFATE 50 MG/ML
INJECTION INTRAVENOUS
Status: DISCONTINUED | OUTPATIENT
Start: 2025-06-06 | End: 2025-06-06 | Stop reason: SDUPTHER

## 2025-06-06 RX ORDER — ALBUTEROL SULFATE 90 UG/1
1 INHALANT RESPIRATORY (INHALATION)
Status: DISCONTINUED | OUTPATIENT
Start: 2025-06-06 | End: 2025-06-06

## 2025-06-06 RX ORDER — ACETAMINOPHEN 325 MG/1
650 TABLET ORAL EVERY 6 HOURS
Status: DISCONTINUED | OUTPATIENT
Start: 2025-06-06 | End: 2025-06-11 | Stop reason: HOSPADM

## 2025-06-06 RX ORDER — SODIUM CHLORIDE 9 MG/ML
INJECTION, SOLUTION INTRAVENOUS PRN
Status: DISCONTINUED | OUTPATIENT
Start: 2025-06-06 | End: 2025-06-06 | Stop reason: HOSPADM

## 2025-06-06 RX ORDER — ACETAMINOPHEN 500 MG
1000 TABLET ORAL ONCE
Status: COMPLETED | OUTPATIENT
Start: 2025-06-06 | End: 2025-06-06

## 2025-06-06 RX ORDER — DEXMEDETOMIDINE HYDROCHLORIDE 100 UG/ML
INJECTION, SOLUTION INTRAVENOUS
Status: DISCONTINUED | OUTPATIENT
Start: 2025-06-06 | End: 2025-06-06 | Stop reason: SDUPTHER

## 2025-06-06 RX ORDER — ONDANSETRON 4 MG/1
4 TABLET, ORALLY DISINTEGRATING ORAL EVERY 8 HOURS PRN
Status: DISCONTINUED | OUTPATIENT
Start: 2025-06-06 | End: 2025-06-11 | Stop reason: HOSPADM

## 2025-06-06 RX ORDER — CELECOXIB 200 MG/1
200 CAPSULE ORAL ONCE
Status: COMPLETED | OUTPATIENT
Start: 2025-06-06 | End: 2025-06-06

## 2025-06-06 RX ORDER — NITROGLYCERIN 80 MG/1
1 PATCH TRANSDERMAL DAILY
Status: ON HOLD | COMMUNITY
End: 2025-06-10 | Stop reason: ALTCHOICE

## 2025-06-06 RX ORDER — ALBUTEROL SULFATE 90 UG/1
2 INHALANT RESPIRATORY (INHALATION)
Status: DISCONTINUED | OUTPATIENT
Start: 2025-06-07 | End: 2025-06-11

## 2025-06-06 RX ORDER — GABAPENTIN 400 MG/1
400 CAPSULE ORAL 3 TIMES DAILY
Status: DISCONTINUED | OUTPATIENT
Start: 2025-06-06 | End: 2025-06-11 | Stop reason: HOSPADM

## 2025-06-06 RX ORDER — PREGABALIN 25 MG/1
75 CAPSULE ORAL ONCE
Status: COMPLETED | OUTPATIENT
Start: 2025-06-06 | End: 2025-06-06

## 2025-06-06 RX ORDER — SODIUM CHLORIDE, SODIUM LACTATE, POTASSIUM CHLORIDE, CALCIUM CHLORIDE 600; 310; 30; 20 MG/100ML; MG/100ML; MG/100ML; MG/100ML
INJECTION, SOLUTION INTRAVENOUS CONTINUOUS
Status: DISCONTINUED | OUTPATIENT
Start: 2025-06-06 | End: 2025-06-06 | Stop reason: HOSPADM

## 2025-06-06 RX ORDER — FINASTERIDE 5 MG/1
5 TABLET, FILM COATED ORAL DAILY
Status: DISCONTINUED | OUTPATIENT
Start: 2025-06-07 | End: 2025-06-11 | Stop reason: HOSPADM

## 2025-06-06 RX ORDER — SODIUM CHLORIDE 0.9 % (FLUSH) 0.9 %
5-40 SYRINGE (ML) INJECTION EVERY 12 HOURS SCHEDULED
Status: DISCONTINUED | OUTPATIENT
Start: 2025-06-06 | End: 2025-06-11 | Stop reason: HOSPADM

## 2025-06-06 RX ORDER — MIDODRINE HYDROCHLORIDE 10 MG/1
10 TABLET ORAL
Status: DISCONTINUED | OUTPATIENT
Start: 2025-06-07 | End: 2025-06-06

## 2025-06-06 RX ORDER — SENNOSIDES 8.6 MG
325 CAPSULE ORAL DAILY
Status: DISCONTINUED | OUTPATIENT
Start: 2025-06-07 | End: 2025-06-11 | Stop reason: HOSPADM

## 2025-06-06 RX ORDER — BUDESONIDE AND FORMOTEROL FUMARATE DIHYDRATE 80; 4.5 UG/1; UG/1
2 AEROSOL RESPIRATORY (INHALATION)
Status: DISCONTINUED | OUTPATIENT
Start: 2025-06-06 | End: 2025-06-06 | Stop reason: DRUGHIGH

## 2025-06-06 RX ORDER — ROCURONIUM BROMIDE 10 MG/ML
INJECTION, SOLUTION INTRAVENOUS
Status: DISCONTINUED | OUTPATIENT
Start: 2025-06-06 | End: 2025-06-06 | Stop reason: SDUPTHER

## 2025-06-06 RX ORDER — ONDANSETRON 2 MG/ML
INJECTION INTRAMUSCULAR; INTRAVENOUS
Status: DISCONTINUED | OUTPATIENT
Start: 2025-06-06 | End: 2025-06-06 | Stop reason: SDUPTHER

## 2025-06-06 RX ORDER — ALBUTEROL SULFATE 0.83 MG/ML
2.5 SOLUTION RESPIRATORY (INHALATION) EVERY 4 HOURS PRN
Status: DISCONTINUED | OUTPATIENT
Start: 2025-06-06 | End: 2025-06-11

## 2025-06-06 RX ORDER — BUDESONIDE AND FORMOTEROL FUMARATE DIHYDRATE 160; 4.5 UG/1; UG/1
2 AEROSOL RESPIRATORY (INHALATION)
Status: DISCONTINUED | OUTPATIENT
Start: 2025-06-06 | End: 2025-06-11 | Stop reason: HOSPADM

## 2025-06-06 RX ORDER — ATORVASTATIN CALCIUM 40 MG/1
80 TABLET, FILM COATED ORAL DAILY
Status: DISCONTINUED | OUTPATIENT
Start: 2025-06-07 | End: 2025-06-11 | Stop reason: HOSPADM

## 2025-06-06 RX ORDER — MIDODRINE HYDROCHLORIDE 5 MG/1
5 TABLET ORAL
Status: DISCONTINUED | OUTPATIENT
Start: 2025-06-07 | End: 2025-06-08

## 2025-06-06 RX ORDER — SODIUM CHLORIDE 9 MG/ML
INJECTION, SOLUTION INTRAVENOUS PRN
Status: DISCONTINUED | OUTPATIENT
Start: 2025-06-06 | End: 2025-06-11 | Stop reason: HOSPADM

## 2025-06-06 RX ORDER — OXYCODONE HYDROCHLORIDE 5 MG/1
5 TABLET ORAL EVERY 4 HOURS PRN
Status: DISCONTINUED | OUTPATIENT
Start: 2025-06-06 | End: 2025-06-11 | Stop reason: HOSPADM

## 2025-06-06 RX ORDER — PROPOFOL 10 MG/ML
INJECTION, EMULSION INTRAVENOUS
Status: DISCONTINUED | OUTPATIENT
Start: 2025-06-06 | End: 2025-06-06 | Stop reason: SDUPTHER

## 2025-06-06 RX ORDER — OXYCODONE HYDROCHLORIDE 5 MG/1
10 TABLET ORAL PRN
Status: DISCONTINUED | OUTPATIENT
Start: 2025-06-06 | End: 2025-06-06 | Stop reason: HOSPADM

## 2025-06-06 RX ORDER — ONDANSETRON 2 MG/ML
4 INJECTION INTRAMUSCULAR; INTRAVENOUS
Status: COMPLETED | OUTPATIENT
Start: 2025-06-06 | End: 2025-06-06

## 2025-06-06 RX ADMIN — BUDESONIDE AND FORMOTEROL FUMARATE DIHYDRATE 2 PUFF: 160; 4.5 AEROSOL RESPIRATORY (INHALATION) at 20:45

## 2025-06-06 RX ADMIN — CARVEDILOL 3.12 MG: 3.12 TABLET, FILM COATED ORAL at 21:18

## 2025-06-06 RX ADMIN — PROPOFOL 100 MG: 10 INJECTION, EMULSION INTRAVENOUS at 07:56

## 2025-06-06 RX ADMIN — SUGAMMADEX 200 MG: 100 INJECTION, SOLUTION INTRAVENOUS at 09:59

## 2025-06-06 RX ADMIN — PROPOFOL 30 MG: 10 INJECTION, EMULSION INTRAVENOUS at 07:57

## 2025-06-06 RX ADMIN — GABAPENTIN 400 MG: 400 CAPSULE ORAL at 21:18

## 2025-06-06 RX ADMIN — EPHEDRINE SULFATE 5 MG: 50 INJECTION INTRAVENOUS at 09:38

## 2025-06-06 RX ADMIN — SODIUM CHLORIDE, POTASSIUM CHLORIDE, SODIUM LACTATE AND CALCIUM CHLORIDE: 600; 310; 30; 20 INJECTION, SOLUTION INTRAVENOUS at 07:02

## 2025-06-06 RX ADMIN — HYDROMORPHONE HYDROCHLORIDE 0.5 MG: 2 INJECTION, SOLUTION INTRAMUSCULAR; INTRAVENOUS; SUBCUTANEOUS at 08:32

## 2025-06-06 RX ADMIN — ONDANSETRON 4 MG: 4 TABLET, ORALLY DISINTEGRATING ORAL at 18:47

## 2025-06-06 RX ADMIN — HYDROMORPHONE HYDROCHLORIDE 0.5 MG: 2 INJECTION, SOLUTION INTRAMUSCULAR; INTRAVENOUS; SUBCUTANEOUS at 10:14

## 2025-06-06 RX ADMIN — DEXMEDETOMIDINE HYDROCHLORIDE 4 MCG: 100 INJECTION, SOLUTION INTRAVENOUS at 08:36

## 2025-06-06 RX ADMIN — SODIUM CHLORIDE, POTASSIUM CHLORIDE, SODIUM LACTATE AND CALCIUM CHLORIDE: 600; 310; 30; 20 INJECTION, SOLUTION INTRAVENOUS at 08:47

## 2025-06-06 RX ADMIN — CELECOXIB 200 MG: 200 CAPSULE ORAL at 06:59

## 2025-06-06 RX ADMIN — CEFAZOLIN 2000 MG: 2 INJECTION, POWDER, FOR SOLUTION INTRAVENOUS at 14:37

## 2025-06-06 RX ADMIN — HYDROMORPHONE HYDROCHLORIDE 0.5 MG: 1 INJECTION, SOLUTION INTRAMUSCULAR; INTRAVENOUS; SUBCUTANEOUS at 12:21

## 2025-06-06 RX ADMIN — SODIUM CHLORIDE, PRESERVATIVE FREE 10 ML: 5 INJECTION INTRAVENOUS at 21:18

## 2025-06-06 RX ADMIN — GABAPENTIN 400 MG: 400 CAPSULE ORAL at 14:37

## 2025-06-06 RX ADMIN — ONDANSETRON 4 MG: 2 INJECTION, SOLUTION INTRAMUSCULAR; INTRAVENOUS at 09:15

## 2025-06-06 RX ADMIN — EPHEDRINE SULFATE 5 MG: 50 INJECTION INTRAVENOUS at 08:52

## 2025-06-06 RX ADMIN — EPHEDRINE SULFATE 5 MG: 50 INJECTION INTRAVENOUS at 08:50

## 2025-06-06 RX ADMIN — DEXAMETHASONE SODIUM PHOSPHATE 8 MG: 4 INJECTION INTRA-ARTICULAR; INTRALESIONAL; INTRAMUSCULAR; INTRAVENOUS; SOFT TISSUE at 08:20

## 2025-06-06 RX ADMIN — CEFAZOLIN 2000 MG: 2 INJECTION, POWDER, FOR SOLUTION INTRAVENOUS at 23:49

## 2025-06-06 RX ADMIN — FENTANYL CITRATE 50 MCG: 50 INJECTION INTRAMUSCULAR; INTRAVENOUS at 08:06

## 2025-06-06 RX ADMIN — DEXMEDETOMIDINE HYDROCHLORIDE 2 MCG: 100 INJECTION, SOLUTION INTRAVENOUS at 09:57

## 2025-06-06 RX ADMIN — ALBUTEROL SULFATE 2 PUFF: 90 AEROSOL, METERED RESPIRATORY (INHALATION) at 20:44

## 2025-06-06 RX ADMIN — ACETAMINOPHEN 650 MG: 325 TABLET ORAL at 14:37

## 2025-06-06 RX ADMIN — ONDANSETRON 4 MG: 2 INJECTION, SOLUTION INTRAMUSCULAR; INTRAVENOUS at 12:46

## 2025-06-06 RX ADMIN — ACETAMINOPHEN 1000 MG: 500 TABLET ORAL at 06:59

## 2025-06-06 RX ADMIN — PREGABALIN 75 MG: 25 CAPSULE ORAL at 06:59

## 2025-06-06 RX ADMIN — IPRATROPIUM BROMIDE AND ALBUTEROL SULFATE 1 DOSE: 2.5; .5 SOLUTION RESPIRATORY (INHALATION) at 07:09

## 2025-06-06 RX ADMIN — ROCURONIUM BROMIDE 50 MG: 10 INJECTION, SOLUTION INTRAVENOUS at 07:57

## 2025-06-06 RX ADMIN — DIVALPROEX SODIUM 500 MG: 500 TABLET, DELAYED RELEASE ORAL at 21:18

## 2025-06-06 RX ADMIN — FENTANYL CITRATE 50 MCG: 50 INJECTION INTRAMUSCULAR; INTRAVENOUS at 08:16

## 2025-06-06 RX ADMIN — HYDROMORPHONE HYDROCHLORIDE 0.5 MG: 1 INJECTION, SOLUTION INTRAMUSCULAR; INTRAVENOUS; SUBCUTANEOUS at 11:23

## 2025-06-06 RX ADMIN — HYDROMORPHONE HYDROCHLORIDE 0.5 MG: 1 INJECTION, SOLUTION INTRAMUSCULAR; INTRAVENOUS; SUBCUTANEOUS at 10:29

## 2025-06-06 RX ADMIN — LIDOCAINE HYDROCHLORIDE 100 MG: 20 INJECTION, SOLUTION INFILTRATION; PERINEURAL at 07:56

## 2025-06-06 RX ADMIN — ACETAMINOPHEN 650 MG: 325 TABLET ORAL at 21:18

## 2025-06-06 RX ADMIN — QUETIAPINE FUMARATE 300 MG: 200 TABLET ORAL at 21:18

## 2025-06-06 RX ADMIN — TRANEXAMIC ACID 1000 MG: 100 INJECTION, SOLUTION INTRAVENOUS at 08:06

## 2025-06-06 RX ADMIN — FAMOTIDINE 20 MG: 10 INJECTION, SOLUTION INTRAVENOUS at 07:00

## 2025-06-06 RX ADMIN — EPHEDRINE SULFATE 5 MG: 50 INJECTION INTRAVENOUS at 09:05

## 2025-06-06 RX ADMIN — SODIUM CHLORIDE 2000 MG: 9 INJECTION, SOLUTION INTRAVENOUS at 07:53

## 2025-06-06 ASSESSMENT — PAIN DESCRIPTION - ORIENTATION
ORIENTATION: LEFT

## 2025-06-06 ASSESSMENT — PAIN DESCRIPTION - LOCATION
LOCATION: HIP
LOCATION: HIP;LEG

## 2025-06-06 ASSESSMENT — PAIN SCALES - GENERAL
PAINLEVEL_OUTOF10: 10
PAINLEVEL_OUTOF10: 8
PAINLEVEL_OUTOF10: 0
PAINLEVEL_OUTOF10: 8
PAINLEVEL_OUTOF10: 0
PAINLEVEL_OUTOF10: 8

## 2025-06-06 ASSESSMENT — LIFESTYLE VARIABLES: SMOKING_STATUS: 0

## 2025-06-06 ASSESSMENT — PAIN DESCRIPTION - DESCRIPTORS
DESCRIPTORS: THROBBING
DESCRIPTORS: THROBBING

## 2025-06-06 ASSESSMENT — ENCOUNTER SYMPTOMS: SHORTNESS OF BREATH: 1

## 2025-06-06 ASSESSMENT — PAIN - FUNCTIONAL ASSESSMENT: PAIN_FUNCTIONAL_ASSESSMENT: PREVENTS OR INTERFERES SOME ACTIVE ACTIVITIES AND ADLS

## 2025-06-06 NOTE — DISCHARGE INSTR - COC
recorded.    Safety Concerns:     At Risk for Falls    Impairments/Disabilities:      Left total hip repair     Nutrition Therapy:  Current Nutrition Therapy:   - Oral Diet:  General    Routes of Feeding: Oral  Liquids: Thin Liquids  Daily Fluid Restriction: no  Last Modified Barium Swallow with Video (Video Swallowing Test): not done    Treatments at the Time of Hospital Discharge:   Respiratory Treatments: .  Oxygen Therapy:  is on oxygen at 2 L/min per nasal cannula.  Ventilator:    - No ventilator support    Rehab Therapies: Physical Therapy and Occupational Therapy  Weight Bearing Status/Restrictions: No weight bearing restrictions  Other Medical Equipment (for information only, NOT a DME order):  walker  Other Treatments: .    Patient's personal belongings (please select all that are sent with patient):  Glasses    RN SIGNATURE:  Electronically signed by Bonny Ballard RN on 6/9/25 at 10:58 AM EDT    CASE MANAGEMENT/SOCIAL WORK SECTION    Inpatient Status Date: 6/6/25 IP    Readmission Risk Assessment Score:14%  [facility] RISK OF UNPLANNED READMISSION 2.0             13.5 Total Score        Discharging to Facility/ Agency   Name: [facility]  Address:  Phone: [phone]  Fax:      / signature: Electronically signed by Adama Blake RN on 6/9/25 at 10:23 AM EDT    PHYSICIAN SECTION    Prognosis: Good    Condition at Discharge: Stable    Rehab Potential (if transferring to Rehab): Good    Recommended Labs or Other Treatments After Discharge:   WBAT RLE  Anterolateral hip precautions  Pain control  PT/OT  DVT pps: full dose aspirin 325mg daily to begin POD#1 x 6 weeks  Wound care: mepilex dressing to be changed every 5-7 days, sooner if saturation.      Physician Certification: I certify the above information and transfer of Scot Hernandez  is necessary for the continuing treatment of the diagnosis listed and that he requires Skilled Nursing Facility for less 30 days.     Update  Admission H&P: No change in H&P    PHYSICIAN SIGNATURE:  Electronically signed by PRESTON Parra on 6/9/25 at 10:20 AM EDT

## 2025-06-06 NOTE — OP NOTE
Operative Note      Patient: Scot Hernandez  YOB: 1960  MRN: [id number]    Date of Procedure: 6/6/2025    Pre-Op Diagnosis Codes:      * Avascular necrosis of femur head, left (HCC) [M87.052]    Post-Op Diagnosis: Same       Procedure(s):  LEFT TOTAL HIP ARTHOPLASTY, LATERAL APPROACH    Surgeon(s):  Angel Lopez MD    Assistant:   Surgical Assistant: Alison Hernández    Anesthesia: General    Estimated Blood Loss (mL): 300     Complications: None    Specimens:   * No specimens in log *    Implants:  Implant Name Type Inv. Item Serial No.  Lot No. LRB No. Used Action   CUP ACET [id number] HIP GRIPTION HAIDER CEMENTLESS FIX SECT SER - [id number]  CUP ACET [id number] HIP GRIPTION HAIDER CEMENTLESS FIX SECT SER  [facility] [employer] [facility] [id number] Left 1 Implanted   SCREW BNE L30MM DIA6.5MM CANC HIP S STL GRIPTION FULL THRD - [id number]  SCREW BNE L30MM DIA6.5MM CANC HIP S STL GRIPTION FULL THRD  [facility] [id number] Left 1 Implanted   SCREW BNE L35MM DIA6.5MM CANC HIP DOME PINN - [id number]  SCREW BNE L35MM DIA6.5MM CANC HIP DOME PINN  [facility] [employer] [facility] [id number] Left 1 Implanted   ALTRX NEUT [id number] - [id number]  ALTRX NEUT [id number]  [facility] [id number] Left 1 Implanted   HEAD FEM [id number] +8.5MM OFFSET 12/14 TAPR HIP CERAMIC TI SL - [id number]  HEAD FEM [id number] +8.5MM OFFSET 12/14 TAPR HIP CERAMIC TI SL  [facility] [id number] Left 1 Implanted   STEM FEM SZ 7 L109MM 12/14 TAPR STD OFFSET HIP DUOFIX CLLRD - [id number]  STEM FEM SZ 7 L109MM 12/14 TAPR STD OFFSET HIP DUOFIX CLLRD  [facility] [id number] Left 1 Implanted         Drains: * No LDAs found *    Findings:  Infection Present At Time Of Surgery (PATOS) (choose all levels that have infection present):  No infection present  Other Findings:  flattening of femoral head, softening of bone involving weightbearing surface consistent with   condition.  There were no immediate complications.  All sponge and needle counts were correct.     Postoperative Plan    WBAT RLE  Anterolateral hip precautions  Pain control  PT/OT  Ancef IV x 24 hours postop  DVT pps: full dose aspirin 325mg daily to begin POD#1 x 6 weeks  Wound care: mepilex dressing to be changed every 5-7 days, sooner if saturation.  Anticipate d/c in 1-3 days pending pain control, medical stability, progress with PT.      Electronically signed by Angel Lopez MD on 6/6/2025 at 10:20 AM

## 2025-06-06 NOTE — PROGRESS NOTES
Inpatient Occupational Therapy Evaluation & Treatment    Unit: [facility]  Date:  6/6/2025  Patient Name:    Scot Hernandez  Admitting diagnosis:  Avascular necrosis of femur head, left (HCC) [M87.052]  Avascular necrosis of bone of left hip (HCC) [M87.052]  Admit Date:  6/6/2025  Precautions/Restrictions/WB Status/ Lines/ Wounds/ Oxygen: Fall risk, Bed/chair alarm, Lines (IV and Supplemental O2 (3L)), and Specific Ortho Precautions: WBAT RLE, anterolateral hip precautions (\"no crossing legs\")    Pt seen for cotreatment this date due to patient safety, patient endurance, and acute illness/injury    Treatment Time:  7933-5377  Treatment Number:  1  Timed Code Treatment Minutes: 39 minutes  Total Treatment Minutes:  49  minutes    Patient Goals for Therapy: none stated          Discharge Recommendations: SNF  DME needs for discharge: Defer to facility       Therapy recommendations for staff:   Assist of 1 for transfers with use of rolling walker (RW) and gait belt to/from BSC  to/from chair  to/from bathroom    History of Present Illness: Pt is a 66 y/o male with diagnosis of avascular necrosis of femur head.  LEFT TOTAL HIP ARTHOPLASTY, LATERAL APPROACH on 6/6/25 with Dr. Lopez.    Preadmission Environment:   Pt lives with                                         alone  Home environment:                            apartment   Steps to enter first floor:                     N/A  Steps to second floor/basement:        N/A  Laundry:                                              Laundromat  Bathroom:                                           tub/shower unit and comfort height toilet  Pt sleeps in a:                                     Flat bed  Equipment owned:                              RW, electric scooter, and reacher     Preadmission Status:  Pt able to drive: Yes  Pt fully independent with ADLs: Yes  Pt receives assistance from family for: Independent PTA  Pt independent for functional transfers and utilized Rollator

## 2025-06-06 NOTE — INTERVAL H&P NOTE
Update History & Physical    The patient's History and Physical of  5/14/2025  was reviewed with the patient, and I examined the patient. There were no changes - see below for exam of affected area.  Today's exam of affected area, in reference to the planned operation today, is unchanged from surgeon's office note on 5/1/2025 (see note)    Both the patient and I confirmed the surgical site.    Plan: The risks, benefits, expected outcome, and alternative to the recommended procedure have been discussed with the patient / family. Patient understands and wants to proceed with the procedure.      Electronically signed by Angel Lopez MD on 6/6/2025 at 6:51 AM

## 2025-06-06 NOTE — PROGRESS NOTES
Inpatient Physical Therapy Evaluation & Treatment    Unit: [facility]  Date:  6/6/2025  Patient Name:    Scot Hernandez  Admitting diagnosis:  Avascular necrosis of femur head, left (HCC) [M87.052]  Avascular necrosis of bone of left hip (HCC) [M87.052]  Admit Date:  6/6/2025  Precautions/Restrictions/WB Status/ Lines/ Wounds/ Oxygen: Fall risk, Bed/chair alarm, Lines (IV and Supplemental O2 (3L)), WB Restrictions (WBAT), and Specific Ortho Precautions: \"no crossing legs\"      Pt seen for cotreatment this date due to patient safety, patient endurance, acute illness/injury, and limited functional status information    Treatment Time:  1430- 1868  Treatment Number:  1   Timed Code Treatment Minutes: 39 minutes  Total Treatment Minutes:  49  minutes    Patient Stated Goals for Therapy: \" to go to rehab \"          Discharge Recommendations: SNF  DME needs for discharge: Needs Met       Therapy recommendation for EMS Transport: can transport by wheelchair    Therapy recommendations for staff:   Assist of 1 for ambulation with use of rolling walker (RW) and gait belt to/from BSC  to/from chair  to/from bathroom    History of Present Illness:   Pt is a 65 male who presented this date for an elective L MI.    Preadmission Environment:   Pt lives with    alone  Home environment:    apartment   Steps to enter first floor:   N/A  Steps to second floor/basement: N/A  Laundry:     Laundromat  Bathroom:     tub/shower unit and comfort height toilet  Pt sleeps in a:    Flat bed  Equipment owned:    RW, electric scooter, and reacher    Preadmission Status:  Pt able to drive: Yes  Pt fully independent with ADLs: Yes  Pt receives assistance from family for: Independent PTA  Pt independent for functional transfers and utilized Rollator and SPC for mobility in home and Rollator and SPC out in community  History of falls: No  Home Health Services:None    AM-PAC Mobility Score    AM-PAC Inpatient Mobility Raw Score : 17

## 2025-06-06 NOTE — PROGRESS NOTES
RT Inhaler-Nebulizer Bronchodilator Protocol Note    There is a bronchodilator order in the chart from a provider indicating to follow the RT Bronchodilator Protocol and there is an “Initiate RT Inhaler-Nebulizer Bronchodilator Protocol” order as well (see protocol at bottom of note).    CXR Findings:  No results found.    The findings from the last RT Protocol Assessment were as follows:   History Pulmonary Disease: Chronic pulmonary disease  Respiratory Pattern: Regular pattern and RR 12-20 bpm  Breath Sounds: Slightly diminished and/or crackles  Cough: Strong, spontaneous, non-productive  Indication for Bronchodilator Therapy: Decreased or absent breath sounds  Bronchodilator Assessment Score: 4    Aerosolized bronchodilator medication orders have been revised according to the RT Inhaler-Nebulizer Bronchodilator Protocol below.    Respiratory Therapist to perform RT Therapy Protocol Assessment initially then follow the protocol.  Repeat RT Therapy Protocol Assessment PRN for score 0-3 or on second treatment, BID, and PRN for scores above 3.    No Indications - adjust the frequency to every 6 hours PRN wheezing or bronchospasm, if no treatments needed after 48 hours then discontinue using Per Protocol order mode.     If indication present, adjust the RT bronchodilator orders based on the Bronchodilator Assessment Score as indicated below.  Use Inhaler orders unless patient has one or more of the following: on home nebulizer, not able to hold breath for 10 seconds, is not alert and oriented, cannot activate and use MDI correctly, or respiratory rate 25 breaths per minute or more, then use the equivalent nebulizer order(s) with same Frequency and PRN reasons based on the score.  If a patient is on this medication at home then do not decrease Frequency below that used at home.    0-3 - enter or revise RT bronchodilator order(s) to equivalent RT Bronchodilator order with Frequency of every 4 hours PRN for wheezing or  increased work of breathing using Per Protocol order mode.        4-6 - enter or revise RT Bronchodilator order(s) to two equivalent RT bronchodilator orders with one order with BID Frequency and one order with Frequency of every 4 hours PRN wheezing or increased work of breathing using Per Protocol order mode.        7-10 - enter or revise RT Bronchodilator order(s) to two equivalent RT bronchodilator orders with one order with TID Frequency and one order with Frequency of every 4 hours PRN wheezing or increased work of breathing using Per Protocol order mode.       11-13 - enter or revise RT Bronchodilator order(s) to one equivalent RT bronchodilator order with QID Frequency and an Albuterol order with Frequency of every 4 hours PRN wheezing or increased work of breathing using Per Protocol order mode.      Greater than 13 - enter or revise RT Bronchodilator order(s) to one equivalent RT bronchodilator order with every 4 hours Frequency and an Albuterol order with Frequency of every 2 hours PRN wheezing or increased work of breathing using Per Protocol order mode.     RT to enter RT Home Evaluation for COPD & MDI Assessment order using Per Protocol order mode.    Electronically signed by Lula Back on 6/6/2025 at 3:28 PM

## 2025-06-06 NOTE — ANESTHESIA PRE PROCEDURE
Department of Anesthesiology  Preprocedure Note       Name:  Scot Hernandez   Age:  65 y.o.  :  1960                                          MRN:  [id number]         Date:  2025      Surgeon: Surgeon(s):  Angel Lopez MD    Procedure: Procedure(s):  LEFT TOTAL HIP ARTHOPLASTY, LATERAL APPROACH / 23 HOUR OBSERVATION / 2.5 HOURS    Medications prior to admission:   Prior to Admission medications    Medication Sig Start Date End Date Taking? Authorizing Provider   azithromycin (ZITHROMAX) 250 MG tablet Take 1 tablet by mouth daily Start taking 5 days before surgery per pulmonologist   Yes New Price MD   mupirocin (BACTROBAN) 2 % ointment Apply 2cm to each nare BID for 5 days prior to surgery 25   Angel Lopez MD   mupirocin (BACTROBAN) 2 % ointment Apply 2cm to each nare BID for 5 days prior to surgery 25   Angel Lopez MD   fluticasone-umeclidin-vilant (TRELEGY ELLIPTA) 100-62.5-25 MCG/ACT AEPB inhaler Inhale 1 puff into the lungs daily 25   Francisca Hawkins MD   predniSONE (DELTASONE) 10 MG tablet 30 mg x 3 days, 20 mg x 3 days, 10 mg x 3 days then stop  Patient not taking: Reported on 2025   Pancho Atkinson MD   amLODIPine (NORVASC) 5 MG tablet Take 1 tablet by mouth daily    New Price MD   benzonatate (TESSALON) 100 MG capsule Take 1 capsule by mouth 3 times daily as needed for Cough    New Price MD   carvedilol (COREG) 3.125 MG tablet Take 1 tablet by mouth 2 times daily (with meals)    New Price MD   tamsulosin (FLOMAX) 0.4 MG capsule Take 1 capsule by mouth daily    New Price MD   budesonide-formoterol (SYMBICORT) 160-4.5 MCG/ACT AERO Inhale 2 puffs into the lungs 2 times daily    New Price MD   QUEtiapine (SEROQUEL) 100 MG tablet Take 1 tablet by mouth daily    New Price MD   nitroGLYCERIN (NITROSTAT) 0.4 MG SL tablet Place 1 tablet under the tongue every 5 minutes as

## 2025-06-06 NOTE — PROGRESS NOTES
Pt admitted from pacu to room 232. Pt is alert and oriented. Pt complains of pain in left hip, provided scheduled medication. Provided pt with meal tray with in orders. Pt denies any other needs, pt/ot at bedside to assess pt's mobility. Vitals stable. No new concerns at this time. Bed alarm in place. Call light in reach.

## 2025-06-06 NOTE — ANESTHESIA POSTPROCEDURE EVALUATION
Department of Anesthesiology  Postprocedure Note    Patient: Scot Hernandez  MRN: [id number]  YOB: 1960  Date of evaluation: 6/6/2025    Procedure Summary       Date: 06/06/25 Room / Location: [address]    Anesthesia Start: 0752 Anesthesia Stop: 1013    Procedure: LEFT TOTAL HIP ARTHOPLASTY, LATERAL APPROACH (Left: Hip) Diagnosis:       Avascular necrosis of femur head, left (HCC)      (Avascular necrosis of femur head, left (HCC) [M87.052])    Surgeons: Angel Lopez MD Responsible Provider: Jasper Al MD    Anesthesia Type: general ASA Status: 3            Anesthesia Type: No value filed.    Kadeem Phase I: Kadeem Score: 8    Kadeem Phase II:      Anesthesia Post Evaluation    Patient location during evaluation: bedside  Patient participation: complete - patient participated  Level of consciousness: awake and alert  Airway patency: patent  Nausea & Vomiting: no nausea  Cardiovascular status: hemodynamically stable  Respiratory status: acceptable  Hydration status: euvolemic  Pain management: adequate      Vitals:    06/06/25 1029 06/06/25 1030 06/06/25 1035 06/06/25 1040   BP: (!) 153/84 (!) 154/86     Pulse: 74 68 68 65   Resp: 20 11 10 (!) 8   Temp:       TempSrc:       SpO2: 93% 94% 91% 97%   Weight:       Height:          No notable events documented.

## 2025-06-06 NOTE — PROGRESS NOTES
Consult has been called to Dr. lópez on 6/6/25. Left a note with dr lópez . 2:37 PM    Ial Brandt  6/6/2025

## 2025-06-06 NOTE — CONSULTS
Hospital Medicine Consult Note      PCP: Lorna Gautam PA-C    Date of Admission: 6/6/2025    Date of Service: Pt seen/examined on 06/06/25    Chief Complaint:  No chief complaint on file.        History Of Present Illness:      The patient is a 65 y.o. male with a PMHx of severe COPD, HTN, HLD, CAD, bipolar disorder, depression/anxiety, neuropathy, GERD, COPD, AAA, BPH who presents to [facility] for total left hip arthroplasty for AVN of left femur head. Post-operatively, patient with difficulty staying away and some mild hypoxia, so was placed on supplemental O2. He denies any shortness of breath or coughing. Seen sitting upright in bed eating dinner. He was working with PT/OT prior to examination when he reports he got lightheaded and nauseas. Currently, denies any abdominal pain, diarrhea, syncope, headache.  History obtained from the patient and review of EMR.     Past Medical History:        Diagnosis Date    AAA (abdominal aortic aneurysm) 2018    3.0 cm    Anxiety     Arterial stent thrombosis     Arthritis     Asthma     Bipolar 1 disorder (HCC)     COPD (chronic obstructive pulmonary disease) (HCC)     Coronary artery disease involving native coronary artery of native heart without angina pectoris 02/08/2021    Diabetes mellitus (HCC)     Essential hypertension 02/08/2021    Hyperlipidemia     Pneumonia        Past Surgical History:        Procedure Laterality Date    ABDOMINAL AORTIC ANEURYSM REPAIR, ENDOVASCULAR N/A 7/31/2024    Endovascular Aortic Aneurysm Repair performed by Lore Cabello MD at [facility] OR    CARDIAC PROCEDURE N/A 7/15/2024    Left heart cath / coronary angiography performed by En Mccann MD at [facility] CARDIAC CATH LAB    CARDIAC SURGERY      stent placed    CARPAL TUNNEL RELEASE      CHOLECYSTECTOMY, LAPAROSCOPIC      COLONOSCOPY N/A 4/19/2023    COLONOSCOPY POLYPECTOMY SNARE/COLD BIOPSY performed by Mary Pozo MD at [facility] ENDOSCOPY    CYSTOSCOPY N/A 4/17/2024       GI: Non-tender. Non-distended. No masses. No organomegaly. Normal bowel sounds. No hernia.   Skin: Warm and dry. No nodule on exposed extremities. No rash on exposed extremities.   M/S: No cyanosis. No joint deformity. No clubbing. SCDs in place.  Neuro: Awake. Grossly nonfocal. Cranial nerves II through XII intact.   Psych: Oriented x 3. No anxiety or agitation.     CBC:   Recent Labs     06/06/25  0645   WBC 5.6   HGB 13.8   HCT 40.6   MCV 87.6        BMP:   Recent Labs     06/06/25  0645      K 4.3      CO2 23   BUN 19   CREATININE 0.8     LIVER PROFILE: No results for input(s): \"AST\", \"ALT\", \"LIPASE\", \"AMYLASE\", \"BILIDIR\", \"BILITOT\", \"ALKPHOS\" in the last 72 hours.    Invalid input(s): \"ALB\"  PT/INR:   Recent Labs     06/06/25  0645   PROTIME 13.3   INR 0.99     APTT:   Recent Labs     06/06/25  0645   APTT 27.7     UA:No results for input(s): \"NITRITE\", \"COLORU\", \"PHUR\", \"LABCAST\", \"WBCUA\", \"RBCUA\", \"MUCUS\", \"TRICHOMONAS\", \"YEAST\", \"BACTERIA\", \"CLARITYU\", \"SPECGRAV\", \"LEUKOCYTESUR\", \"UROBILINOGEN\", \"BILIRUBINUR\", \"BLOODU\", \"GLUCOSEU\", \"AMORPHOUS\" in the last 72 hours.    Invalid input(s): \"KETONESU\"         CARDIAC ENZYMES  No results for input(s): \"CKTOTAL\", \"CKMB\", \"CKMBINDEX\", \"TROPONINI\" in the last 72 hours.    U/A:    Lab Results   Component Value Date/Time    COLORU Yellow 05/13/2025 12:27 PM    WBCUA 0-2 03/11/2023 03:28 PM    RBCUA 0-2 03/11/2023 03:28 PM    MUCUS 2+ 03/11/2023 03:28 PM    BACTERIA Rare 03/11/2023 03:28 PM    CLARITYU Clear 05/13/2025 12:27 PM    LEUKOCYTESUR Negative 05/13/2025 12:27 PM    BLOODU Negative 05/13/2025 12:27 PM    GLUCOSEU Negative 05/13/2025 12:27 PM       ABG  No results found for: \"VVC7JSA\", \"BEART\", \"K9HEGWRD\", \"PHART\", \"THGBART\", \"HIX3QQP\", \"PO2ART\", \"HSS1YUX\"    CULTURES  Results       ** No results found for the last 336 hours. **            EKG:  I have reviewed the EKG with the following interpretation:   Encounter Date: 06/06/25   EKG 12

## 2025-06-06 NOTE — PROGRESS NOTES
4 Eyes Skin Assessment     NAME:  Scot Hernandez  YOB: 1960  MEDICAL RECORD NUMBER:  [id number]    The patient is being assessed for  Admission    I agree that at least one RN has performed a thorough Head to Toe Skin Assessment on the patient. ALL assessment sites listed below have been assessed.      Areas assessed by both nurses:    Head, Face, Ears, Shoulders, Back, Chest, Arms, Elbows, Hands, Sacrum. Buttock, Coccyx, Ischium, Legs. Feet and Heels, and Under Medical Devices         Does the Patient have a Wound? Yes wound(s) were present on assessment. LDA wound assessment was Initiated and completed by RN       Roscoe Prevention initiated by RN: Yes  Wound Care Orders initiated by RN: No    Pressure Injury (Stage 3,4, Unstageable, DTI, NWPT, and Complex wounds) if present, place Wound referral order by RN under : No    New Ostomies, if present place, Ostomy referral order under : No     Nurse 1 eSignature: Electronically signed by IZAIAH PORTILLO RN on 6/6/25 at 7:54 PM EDT    **SHARE this note so that the co-signing nurse can place an eSignature**    Nurse 2 eSignature: {Esignature:[id number]}

## 2025-06-06 NOTE — PROGRESS NOTES
Pt complains of nausea and vomiting after dinner. Provided pt with PRN zofran. Pt denies any other needs at this time.

## 2025-06-06 NOTE — ACP (ADVANCE CARE PLANNING)
Advance Care Planning     General Advance Care Planning (ACP) Conversation    Date of Conversation: 6/6/2025  Conducted with: Patient with Decision Making Capacity  Other persons present: None    Healthcare Decision Maker:   Primary Decision Maker: Lena Duke - [phone]       Content/Action Overview:  DECLINED ACP Conversation - will revisit periodically  Reviewed DNR/DNI and patient elects Full Code (Attempt Resuscitation)        Length of Voluntary ACP Conversation in minutes:  <16 minutes (Non-Billable)    Adama Blake RN

## 2025-06-06 NOTE — CARE COORDINATION
Case Management Assessment  Initial Evaluation    Date/Time of Evaluation: 6/6/2025 2:54 PM  Assessment Completed by: Adama Blake RN    If patient is discharged prior to next notation, then this note serves as note for discharge by case management.    Patient Name: Scot Hernandez                   YOB: 1960  Diagnosis: Avascular necrosis of femur head, left (HCC) [M87.052]  Avascular necrosis of bone of left hip (HCC) [M87.052]                   Date / Time: 6/6/2025  6:13 AM    Patient Admission Status: Inpatient   Readmission Risk (Low < 19, Mod (19-27), High > 27): Readmission Risk Score: 13.5    Current PCP: Lorna Gautam PA-C  PCP verified by CM? Yes    Chart Reviewed: Yes      History Provided by: Patient  Patient Orientation: Alert and Oriented    Patient Cognition: Alert    Hospitalization in the last 30 days (Readmission):  No    If yes, Readmission Assessment in CM Navigator will be completed.    Advance Directives:      Code Status: Full Code   Patient's Primary Decision Maker is: Legal Next of Kin    Primary Decision Maker: Lena Duke Alee Cinthya - [phone]    Discharge Planning:    Patient lives with: Alone Type of Home: Apartment  Primary Care Giver: Self  Patient Support Systems include: Friends/Neighbors   Current Financial resources: Medicaid  Current community resources: None  Current services prior to admission: Durable Medical Equipment            Current DME: Walker            Type of Home Care services:  None    ADLS  Prior functional level: Assistance with the following:, Mobility, Shopping, Housework  Current functional level: Assistance with the following:, Mobility, Shopping, Housework    PT AM-PAC:   /24  OT AM-PAC:   /24    Family can provide assistance at DC: No  Would you like Case Management to discuss the discharge plan with any other family members/significant others, and if so, who? No  Plans to Return to Present Housing: Unknown at present  Other Identified

## 2025-06-07 LAB
ANION GAP SERPL CALCULATED.3IONS-SCNC: 12 MMOL/L (ref 3–16)
BUN SERPL-MCNC: 17 MG/DL (ref 7–20)
CALCIUM SERPL-MCNC: 8.3 MG/DL (ref 8.3–10.6)
CHLORIDE SERPL-SCNC: 104 MMOL/L (ref 99–110)
CO2 SERPL-SCNC: 24 MMOL/L (ref 21–32)
CREAT SERPL-MCNC: 0.8 MG/DL (ref 0.8–1.3)
DEPRECATED RDW RBC AUTO: 17 % (ref 12.4–15.4)
GFR SERPLBLD CREATININE-BSD FMLA CKD-EPI: >90 ML/MIN/{1.73_M2}
GLUCOSE SERPL-MCNC: 109 MG/DL (ref 70–99)
HCT VFR BLD AUTO: 34.3 % (ref 40.5–52.5)
HGB BLD-MCNC: 11.7 G/DL (ref 13.5–17.5)
MCH RBC QN AUTO: 29.9 PG (ref 26–34)
MCHC RBC AUTO-ENTMCNC: 34.2 G/DL (ref 31–36)
MCV RBC AUTO: 87.2 FL (ref 80–100)
PLATELET # BLD AUTO: 136 K/UL (ref 135–450)
PMV BLD AUTO: 8.2 FL (ref 5–10.5)
POTASSIUM SERPL-SCNC: 4.7 MMOL/L (ref 3.5–5.1)
RBC # BLD AUTO: 3.93 M/UL (ref 4.2–5.9)
SODIUM SERPL-SCNC: 140 MMOL/L (ref 136–145)
WBC # BLD AUTO: 8 K/UL (ref 4–11)

## 2025-06-07 PROCEDURE — 1200000000 HC SEMI PRIVATE

## 2025-06-07 PROCEDURE — 6370000000 HC RX 637 (ALT 250 FOR IP): Performed by: ORTHOPAEDIC SURGERY

## 2025-06-07 PROCEDURE — 94761 N-INVAS EAR/PLS OXIMETRY MLT: CPT

## 2025-06-07 PROCEDURE — 97110 THERAPEUTIC EXERCISES: CPT

## 2025-06-07 PROCEDURE — 99024 POSTOP FOLLOW-UP VISIT: CPT | Performed by: ORTHOPAEDIC SURGERY

## 2025-06-07 PROCEDURE — 6370000000 HC RX 637 (ALT 250 FOR IP)

## 2025-06-07 PROCEDURE — 80048 BASIC METABOLIC PNL TOTAL CA: CPT

## 2025-06-07 PROCEDURE — 99232 SBSQ HOSP IP/OBS MODERATE 35: CPT | Performed by: INTERNAL MEDICINE

## 2025-06-07 PROCEDURE — 2500000003 HC RX 250 WO HCPCS: Performed by: ORTHOPAEDIC SURGERY

## 2025-06-07 PROCEDURE — 36415 COLL VENOUS BLD VENIPUNCTURE: CPT

## 2025-06-07 PROCEDURE — 94640 AIRWAY INHALATION TREATMENT: CPT

## 2025-06-07 PROCEDURE — 97116 GAIT TRAINING THERAPY: CPT

## 2025-06-07 PROCEDURE — 2700000000 HC OXYGEN THERAPY PER DAY

## 2025-06-07 PROCEDURE — 85027 COMPLETE CBC AUTOMATED: CPT

## 2025-06-07 RX ORDER — SENNOSIDES 8.6 MG
325 CAPSULE ORAL DAILY
Qty: 42 TABLET | Refills: 0 | Status: SHIPPED | OUTPATIENT
Start: 2025-06-08 | End: 2025-06-11

## 2025-06-07 RX ORDER — ALBUTEROL SULFATE 90 UG/1
2 INHALANT RESPIRATORY (INHALATION) EVERY 4 HOURS PRN
Status: DISCONTINUED | OUTPATIENT
Start: 2025-06-07 | End: 2025-06-11 | Stop reason: ALTCHOICE

## 2025-06-07 RX ORDER — METHOCARBAMOL 750 MG/1
750 TABLET, FILM COATED ORAL EVERY 8 HOURS PRN
Qty: 30 TABLET | Refills: 0 | Status: SHIPPED | OUTPATIENT
Start: 2025-06-07 | End: 2025-06-11

## 2025-06-07 RX ORDER — OXYCODONE HYDROCHLORIDE 5 MG/1
5 TABLET ORAL EVERY 4 HOURS PRN
Qty: 40 TABLET | Refills: 0 | Status: SHIPPED | OUTPATIENT
Start: 2025-06-07 | End: 2025-06-11

## 2025-06-07 RX ADMIN — GABAPENTIN 400 MG: 400 CAPSULE ORAL at 21:02

## 2025-06-07 RX ADMIN — GABAPENTIN 400 MG: 400 CAPSULE ORAL at 14:00

## 2025-06-07 RX ADMIN — FINASTERIDE 5 MG: 5 TABLET, FILM COATED ORAL at 08:24

## 2025-06-07 RX ADMIN — ACETAMINOPHEN 650 MG: 325 TABLET ORAL at 08:24

## 2025-06-07 RX ADMIN — SODIUM CHLORIDE, PRESERVATIVE FREE 10 ML: 5 INJECTION INTRAVENOUS at 08:27

## 2025-06-07 RX ADMIN — TAMSULOSIN HYDROCHLORIDE 0.4 MG: 0.4 CAPSULE ORAL at 08:23

## 2025-06-07 RX ADMIN — DIVALPROEX SODIUM 1000 MG: 500 TABLET, DELAYED RELEASE ORAL at 15:55

## 2025-06-07 RX ADMIN — ACETAMINOPHEN 650 MG: 325 TABLET ORAL at 21:02

## 2025-06-07 RX ADMIN — CARVEDILOL 3.12 MG: 3.12 TABLET, FILM COATED ORAL at 08:24

## 2025-06-07 RX ADMIN — CARVEDILOL 3.12 MG: 3.12 TABLET, FILM COATED ORAL at 15:54

## 2025-06-07 RX ADMIN — DIVALPROEX SODIUM 500 MG: 500 TABLET, DELAYED RELEASE ORAL at 08:30

## 2025-06-07 RX ADMIN — BUDESONIDE AND FORMOTEROL FUMARATE DIHYDRATE 2 PUFF: 160; 4.5 AEROSOL RESPIRATORY (INHALATION) at 19:59

## 2025-06-07 RX ADMIN — GABAPENTIN 400 MG: 400 CAPSULE ORAL at 08:30

## 2025-06-07 RX ADMIN — ACETAMINOPHEN 650 MG: 325 TABLET ORAL at 14:00

## 2025-06-07 RX ADMIN — MIDODRINE HYDROCHLORIDE 5 MG: 5 TABLET ORAL at 08:24

## 2025-06-07 RX ADMIN — ALBUTEROL SULFATE 2 PUFF: 90 AEROSOL, METERED RESPIRATORY (INHALATION) at 08:05

## 2025-06-07 RX ADMIN — ALBUTEROL SULFATE 2 PUFF: 90 AEROSOL, METERED RESPIRATORY (INHALATION) at 22:12

## 2025-06-07 RX ADMIN — QUETIAPINE FUMARATE 100 MG: 100 TABLET ORAL at 08:24

## 2025-06-07 RX ADMIN — BUDESONIDE AND FORMOTEROL FUMARATE DIHYDRATE 2 PUFF: 160; 4.5 AEROSOL RESPIRATORY (INHALATION) at 08:05

## 2025-06-07 RX ADMIN — ALBUTEROL SULFATE 2 PUFF: 90 AEROSOL, METERED RESPIRATORY (INHALATION) at 19:59

## 2025-06-07 RX ADMIN — MIDODRINE HYDROCHLORIDE 5 MG: 5 TABLET ORAL at 15:55

## 2025-06-07 RX ADMIN — SODIUM CHLORIDE, PRESERVATIVE FREE 10 ML: 5 INJECTION INTRAVENOUS at 21:01

## 2025-06-07 RX ADMIN — ASPIRIN 325 MG: 325 TABLET, COATED ORAL at 08:24

## 2025-06-07 RX ADMIN — MIDODRINE HYDROCHLORIDE 5 MG: 5 TABLET ORAL at 12:08

## 2025-06-07 RX ADMIN — OXYCODONE 10 MG: 5 TABLET ORAL at 17:06

## 2025-06-07 RX ADMIN — QUETIAPINE FUMARATE 300 MG: 200 TABLET ORAL at 21:01

## 2025-06-07 RX ADMIN — ATORVASTATIN CALCIUM 80 MG: 40 TABLET, FILM COATED ORAL at 08:24

## 2025-06-07 RX ADMIN — PANTOPRAZOLE SODIUM 40 MG: 40 TABLET, DELAYED RELEASE ORAL at 08:25

## 2025-06-07 ASSESSMENT — PAIN SCALES - GENERAL
PAINLEVEL_OUTOF10: 0
PAINLEVEL_OUTOF10: 0
PAINLEVEL_OUTOF10: 8
PAINLEVEL_OUTOF10: 3

## 2025-06-07 ASSESSMENT — PAIN SCALES - WONG BAKER: WONGBAKER_NUMERICALRESPONSE: NO HURT

## 2025-06-07 ASSESSMENT — PAIN DESCRIPTION - LOCATION: LOCATION: LEG

## 2025-06-07 ASSESSMENT — PAIN DESCRIPTION - ORIENTATION: ORIENTATION: LEFT

## 2025-06-07 ASSESSMENT — PAIN DESCRIPTION - DESCRIPTORS: DESCRIPTORS: ACHING;THROBBING

## 2025-06-07 ASSESSMENT — PAIN - FUNCTIONAL ASSESSMENT: PAIN_FUNCTIONAL_ASSESSMENT: ACTIVITIES ARE NOT PREVENTED

## 2025-06-07 NOTE — PROGRESS NOTES
RT Inhaler-Nebulizer Bronchodilator Protocol Note    There is a bronchodilator order in the chart from a provider indicating to follow the RT Bronchodilator Protocol and there is an “Initiate RT Inhaler-Nebulizer Bronchodilator Protocol” order as well (see protocol at bottom of note).    CXR Findings:  XR CHEST PORTABLE  Result Date: 6/6/2025  No acute process.       The findings from the last RT Protocol Assessment were as follows:   History Pulmonary Disease: Chronic pulmonary disease  Respiratory Pattern: Regular pattern and RR 12-20 bpm  Breath Sounds: Slightly diminished and/or crackles  Cough: Strong, spontaneous, non-productive  Indication for Bronchodilator Therapy: Decreased or absent breath sounds  Bronchodilator Assessment Score: 4    Aerosolized bronchodilator medication orders have been revised according to the RT Inhaler-Nebulizer Bronchodilator Protocol below.    Respiratory Therapist to perform RT Therapy Protocol Assessment initially then follow the protocol.  Repeat RT Therapy Protocol Assessment PRN for score 0-3 or on second treatment, BID, and PRN for scores above 3.    No Indications - adjust the frequency to every 6 hours PRN wheezing or bronchospasm, if no treatments needed after 48 hours then discontinue using Per Protocol order mode.     If indication present, adjust the RT bronchodilator orders based on the Bronchodilator Assessment Score as indicated below.  Use Inhaler orders unless patient has one or more of the following: on home nebulizer, not able to hold breath for 10 seconds, is not alert and oriented, cannot activate and use MDI correctly, or respiratory rate 25 breaths per minute or more, then use the equivalent nebulizer order(s) with same Frequency and PRN reasons based on the score.  If a patient is on this medication at home then do not decrease Frequency below that used at home.    0-3 - enter or revise RT bronchodilator order(s) to equivalent RT Bronchodilator order with  Frequency of every 4 hours PRN for wheezing or increased work of breathing using Per Protocol order mode.        4-6 - enter or revise RT Bronchodilator order(s) to two equivalent RT bronchodilator orders with one order with BID Frequency and one order with Frequency of every 4 hours PRN wheezing or increased work of breathing using Per Protocol order mode.        7-10 - enter or revise RT Bronchodilator order(s) to two equivalent RT bronchodilator orders with one order with TID Frequency and one order with Frequency of every 4 hours PRN wheezing or increased work of breathing using Per Protocol order mode.       11-13 - enter or revise RT Bronchodilator order(s) to one equivalent RT bronchodilator order with QID Frequency and an Albuterol order with Frequency of every 4 hours PRN wheezing or increased work of breathing using Per Protocol order mode.      Greater than 13 - enter or revise RT Bronchodilator order(s) to one equivalent RT bronchodilator order with every 4 hours Frequency and an Albuterol order with Frequency of every 2 hours PRN wheezing or increased work of breathing using Per Protocol order mode.     Electronically signed by Mo Jernigan RCP on 6/6/2025 at 8:49 PM

## 2025-06-07 NOTE — PROGRESS NOTES
IM Progress Note    Admit Date:  6/6/2025    S/p left THR   POD #1    Subjective:  Mr. Hernandez seen    Stable. Awake, alert. Pain controlled    O2 weaned down to 1 L     Objective:   BP 93/63   Pulse 68   Temp 98.2 °F (36.8 °C) (Oral)   Resp 18   Ht 1.727 m (5' 8\")   Wt 73.7 kg (162 lb 6.4 oz)   SpO2 93%   BMI 24.69 kg/m²     Intake/Output Summary (Last 24 hours) at 6/7/2025 1248  Last data filed at 6/7/2025 0847  Gross per 24 hour   Intake 240 ml   Output 25 ml   Net 215 ml         Physical Exam:  General:  Awake, alert, NAD  Skin:  Warm and dry  Neck:  JVD absent. Neck supple  Chest:  diminished breath sounds, mild rhonchi  Cardiovascular:  RRR ,S1S2 normal  Abdomen:  Soft, non tender, non distended, BS +  Extremities:  No edema.  Intact peripheral pulses. Brisk cap refill, < 2 secs  Neuro: non focal      Medications:   Scheduled Meds:   QUEtiapine  300 mg Oral Nightly    QUEtiapine  100 mg Oral Daily    tamsulosin  0.4 mg Oral Daily    pantoprazole  40 mg Oral Daily    gabapentin  400 mg Oral TID    finasteride  5 mg Oral Daily    carvedilol  3.125 mg Oral BID WC    budesonide-formoterol  2 puff Inhalation BID RT    atorvastatin  80 mg Oral Daily    tiotropium  2 puff Inhalation Daily RT    sodium chloride flush  5-40 mL IntraVENous 2 times per day    acetaminophen  650 mg Oral Q6H    aspirin  325 mg Oral Daily    albuterol sulfate HFA  2 puff Inhalation BID RT    midodrine  5 mg Oral TID WC    divalproex  500 mg Oral QAM    And    divalproex  1,000 mg Oral QPM       Continuous Infusions:   sodium chloride         Data:  CBC:   Recent Labs     06/06/25  0645 06/07/25  0601   WBC 5.6 8.0   RBC 4.64 3.93*   HGB 13.8 11.7*   HCT 40.6 34.3*   MCV 87.6 87.2   RDW 16.9* 17.0*    136     BMP:   Recent Labs     06/06/25  0645 06/07/25  0601    140   K 4.3 4.7    104   CO2 23 24   BUN 19 17   CREATININE 0.8 0.8     BNP: No results for input(s): \"BNP\" in the last 72 hours.  PT/INR:   Recent

## 2025-06-07 NOTE — PLAN OF CARE
Problem: Discharge Planning  Goal: Discharge to home or other facility with appropriate resources  6/7/2025 0918 by Melissa Carl RN  Outcome: Progressing  6/6/2025 2257 by Joe Thakkar RN  Outcome: Progressing     Problem: Safety - Adult  Goal: Free from fall injury  6/7/2025 0918 by Melissa Carl RN  Outcome: Progressing  6/6/2025 2257 by Joe Thakkar RN  Outcome: Progressing     Problem: Pain  Goal: Verbalizes/displays adequate comfort level or baseline comfort level  6/7/2025 0918 by Melissa Carl RN  Outcome: Progressing  6/6/2025 2257 by Joe Thakkar RN  Outcome: Progressing     Problem: Chronic Conditions and Co-morbidities  Goal: Patient's chronic conditions and co-morbidity symptoms are monitored and maintained or improved  6/7/2025 0918 by Melissa Carl RN  Outcome: Progressing  6/6/2025 2257 by Joe Thakkar RN  Outcome: Progressing     Problem: ABCDS Injury Assessment  Goal: Absence of physical injury  6/7/2025 0918 by Melissa Carl RN  Outcome: Progressing  6/6/2025 2257 by Joe Thakkar RN  Outcome: Progressing

## 2025-06-07 NOTE — PROGRESS NOTES
Shift report given to Melissa at bedside. Patient is stable. All end of shift needs have been met. No further assistance needed at this time.

## 2025-06-07 NOTE — PROGRESS NOTES
Inpatient Physical Therapy Treatment    Unit: [facility]  Date:  6/7/2025  Patient Name:    Scot Hernandez  Admitting diagnosis:  Avascular necrosis of femur head, left (HCC) [M87.052]  Avascular necrosis of bone of left hip (HCC) [M87.052]  Admit Date:  6/6/2025  Precautions/Restrictions/WB Status/ Lines/ Wounds/ Oxygen: Fall risk, Bed/chair alarm, Lines (IV and Supplemental O2 (3L)), WB Restrictions (WBAT), and Specific Ortho Precautions: \"no crossing legs\"      Pt seen for cotreatment this date due to patient safety, patient endurance, acute illness/injury, and limited functional status information    Treatment Time:  8:28-8:57  Treatment Number:  2   Timed Code Treatment Minutes: 29 minutes  Total Treatment Minutes:  29  minutes    Patient Stated Goals for Therapy: \"go to the bathroom \"          Discharge Recommendations: SNF  DME needs for discharge: Needs Met       Therapy recommendation for EMS Transport: can transport by wheelchair    Therapy recommendations for staff:   Assist of 1 for ambulation with use of rolling walker (RW) and gait belt to/from BSC  to/from chair  to/from bathroom    History of Present Illness:   Pt is a 65 male who presented this date for an elective L MI.    Preadmission Environment:   Pt lives with    alone  Home environment:    apartment   Steps to enter first floor:   N/A  Steps to second floor/basement: N/A  Laundry:     Laundromat  Bathroom:     tub/shower unit and comfort height toilet  Pt sleeps in a:    Flat bed  Equipment owned:    RW, electric scooter, and reacher    Preadmission Status:  Pt able to drive: Yes  Pt fully independent with ADLs: Yes  Pt receives assistance from family for: Independent PTA  Pt independent for functional transfers and utilized Rollator and SPC for mobility in home and Rollator and SPC out in community  History of falls: No  Home Health Services:None    AM-PAC Mobility Score    AM-PAC Inpatient Mobility Raw Score : 17       Subjective  Patient  handrail and LRAD (least restrictive assistive device)    Rehabilitation Potential: Good  Strengths for achieving goals include:   Pt motivated, PLOF, Family Support, and Pt cooperative   Barriers to achieving goals include:    Complexity of condition, Pain, and Weakness    Plan    To be seen 7 x/ week BID (ortho) while in acute care setting for therapeutic exercises/activities, bed mobility training, functional transfer training, family/patient education, balance training, gait training, and stair training.    Signature: Dionne Marroquin PT     If patient discharges from this facility prior to next visit, this note will serve as the Discharge Summary.    CHF Education  N/A

## 2025-06-07 NOTE — FLOWSHEET NOTE
06/07/25 0815   Vital Signs   Temp 98 °F (36.7 °C)   Temp Source Oral   Pulse 100   Heart Rate Source Monitor   Respirations 18   /77   MAP (Calculated) 91   BP Location Left upper arm   BP Method Automatic   Patient Position Semi fowlers   Pain Assessment   Pain Assessment None - Denies Pain   Pain Level 0   Opioid-Induced Sedation   POSS Score 1   Oxygen Therapy   SpO2 94 %   O2 Device Nasal cannula   O2 Flow Rate (L/min) 2 L/min     AM assessment completed, see flow sheet. Pt is alert and oriented. Vital signs are WNL. Respirations are even & easy. No complaints voiced. Pt denies needs at this time. SR up x 2, and bed in low position. Call light is within reach.

## 2025-06-07 NOTE — PROGRESS NOTES
Met with Pt per his request for a Bible. Bible provided, as well as support and prayer.    Adonay Zhao

## 2025-06-07 NOTE — PROGRESS NOTES
RT Inhaler-Nebulizer Bronchodilator Protocol Note    There is a bronchodilator order in the chart from a provider indicating to follow the RT Bronchodilator Protocol and there is an “Initiate RT Inhaler-Nebulizer Bronchodilator Protocol” order as well (see protocol at bottom of note).    CXR Findings:  XR CHEST PORTABLE  Result Date: 6/6/2025  No acute process.       The findings from the last RT Protocol Assessment were as follows:   History Pulmonary Disease: Chronic pulmonary disease  Respiratory Pattern: Regular pattern and RR 12-20 bpm  Breath Sounds: Slightly diminished and/or crackles  Cough: Strong, spontaneous, non-productive  Indication for Bronchodilator Therapy: Decreased or absent breath sounds  Bronchodilator Assessment Score: 4    Aerosolized bronchodilator medication orders have been revised according to the RT Inhaler-Nebulizer Bronchodilator Protocol below.    Respiratory Therapist to perform RT Therapy Protocol Assessment initially then follow the protocol.  Repeat RT Therapy Protocol Assessment PRN for score 0-3 or on second treatment, BID, and PRN for scores above 3.    No Indications - adjust the frequency to every 6 hours PRN wheezing or bronchospasm, if no treatments needed after 48 hours then discontinue using Per Protocol order mode.     If indication present, adjust the RT bronchodilator orders based on the Bronchodilator Assessment Score as indicated below.  Use Inhaler orders unless patient has one or more of the following: on home nebulizer, not able to hold breath for 10 seconds, is not alert and oriented, cannot activate and use MDI correctly, or respiratory rate 25 breaths per minute or more, then use the equivalent nebulizer order(s) with same Frequency and PRN reasons based on the score.  If a patient is on this medication at home then do not decrease Frequency below that used at home.    0-3 - enter or revise RT bronchodilator order(s) to equivalent RT Bronchodilator order with  Frequency of every 4 hours PRN for wheezing or increased work of breathing using Per Protocol order mode.        4-6 - enter or revise RT Bronchodilator order(s) to two equivalent RT bronchodilator orders with one order with BID Frequency and one order with Frequency of every 4 hours PRN wheezing or increased work of breathing using Per Protocol order mode.        7-10 - enter or revise RT Bronchodilator order(s) to two equivalent RT bronchodilator orders with one order with TID Frequency and one order with Frequency of every 4 hours PRN wheezing or increased work of breathing using Per Protocol order mode.       11-13 - enter or revise RT Bronchodilator order(s) to one equivalent RT bronchodilator order with QID Frequency and an Albuterol order with Frequency of every 4 hours PRN wheezing or increased work of breathing using Per Protocol order mode.      Greater than 13 - enter or revise RT Bronchodilator order(s) to one equivalent RT bronchodilator order with every 4 hours Frequency and an Albuterol order with Frequency of every 2 hours PRN wheezing or increased work of breathing using Per Protocol order mode.     RT to enter RT Home Evaluation for COPD & MDI Assessment order using Per Protocol order mode.    Electronically signed by Lula Back on 6/7/2025 at 8:10 AM

## 2025-06-07 NOTE — FLOWSHEET NOTE
06/06/25 2009   Vital Signs   Temp 97.5 °F (36.4 °C)   Temp Source Oral   Pulse 64   Heart Rate Source Monitor   Respirations 16   BP (!) 168/91   MAP (Calculated) 117   BP Location Left upper arm   BP Method Automatic   Patient Position High fowlers   Pain Assessment   Pain Assessment None - Denies Pain   Opioid-Induced Sedation   POSS Score 1   RASS   Johnson Agitation Sedation Scale (RASS) 0   Oxygen Therapy   SpO2 93 %   O2 Device Nasal cannula   O2 Flow Rate (L/min) 3 L/min     Shift assessment complete.  Patient is alert and oriented.  Vital signs are stable.  Respirations are even and easy, no signs or symptoms of distress.  Pt denies any needs at this time.  Call light within reach.

## 2025-06-07 NOTE — PROGRESS NOTES
ORTHO NOTE    S: Pain well-controlled postoperatively.  No complaints about current medication management.  Denies numbness or tingling.  Reports deep-seated groin pain present prior to surgery has resolved.  Denies fever, chills, night sweats.  Intends to discharge to a rehab facility.    O:  Vitals:    06/06/25 2348 06/07/25 0428 06/07/25 0809 06/07/25 0815   BP: 108/72 138/84  118/77   Pulse: 70 74 70 100   Resp: 16 18 18 18   Temp: 97.1 °F (36.2 °C) 97.3 °F (36.3 °C)  98 °F (36.7 °C)   TempSrc: Axillary Axillary  Oral   SpO2: 97% 95% 97% 94%   Weight:       Height:         LLE:   Dressing clean, dry, and intact.  No saturation or shadowing.  No surrounding erythema.  Limb length and rotational alignment is appropriate. Sensation is intact to light touch in deep peroneal, superficial peroneal, tibial, sural, and saphenous nerve distributions.  Motor function is intact to EHL, FHL, tibialis anterior, and gastroc.  There is brisk capillary refill to the toes and a strong palpable dorsalis pedis pulse.  Compartments are soft and compressible.  There is no calf tenderness and a negative Homans' sign.    Radiographic exam:  AP pelvis as well as AP and frog lateral views of the left hip demonstrate anatomically positioned total hip arthroplasty with excellent restoration of limb length and offset.  Acetabular abduction angle 45 degrees.  No dislocation.  No jennie-implant fractures.    Labs:  WBC 8.0  Hemoglobin 11.7  Platelets 136  Creatinine 0.8    A/P:  POD #1 status post left primary total hip arthroplasty    Weightbearing as tolerated left lower extremity  Anterolateral hip precautions  PT/OT  Pain control  IV Ancef x 24 hours postoperatively completed  DVT prevention: Full dose aspirin 325 mg daily x 6 weeks postoperatively  Wound care: Change Mepilex dressing every 5 to 7 days postoperatively.  May shower with Mepilex dressing in place.  Change sooner if saturation.  Return to clinic in 2 weeks for wound check,  repeat x-rays  Okay to discharge from orthopedic standpoint once placement is arranged.    Angel Lopez MD

## 2025-06-07 NOTE — DISCHARGE INSTRUCTIONS
Total joint discharge instructions  Weightbearing as tolerated to the left leg.  Observe hip precautions as described by your PT (don't cross legs, don't point toes outward to the extreme, don't put the leg behind you)  Use walker/cane to assist.  Use pain as a guide to activity.  Use ice to limit swelling.  Take pain medications as prescribed.  Leave dressing in place x 7 days postop. Apply new mepilex dressing. May change sooner if saturation. May shower with dressing in place. Allow water to run over dressing. No tub baths. Do not submerge.  Recommend taking a full dose aspirin 325mg daily for 6 weeks to prevent blood clots.  Look out for worsening pain, increasing redness, fevers/chills, numbness, chest pain, shortness of breath.  Call the office at [phone] if you have questions/concerns.  Followup in 2 weeks as scheduled for wound check.                      no

## 2025-06-07 NOTE — PROGRESS NOTES
Inpatient Physical Therapy Treatment    Unit: [facility]  Date:  6/7/2025  Patient Name:    Scot Hernandez  Admitting diagnosis:  Avascular necrosis of femur head, left (HCC) [M87.052]  Avascular necrosis of bone of left hip (HCC) [M87.052]  Admit Date:  6/6/2025  Precautions/Restrictions/WB Status/ Lines/ Wounds/ Oxygen: Fall risk, Bed/chair alarm, Lines (IV and Supplemental O2 (3L)), WB Restrictions (WBAT), and Specific Ortho Precautions: \"no crossing legs\"      Pt seen for cotreatment this date due to patient safety, patient endurance, acute illness/injury, and limited functional status information    Treatment Time:  15:02-15:30  Treatment Number:  3   Timed Code Treatment Minutes: 28 minutes  Total Treatment Minutes:  28  minutes    Patient Stated Goals for Therapy: pt did not stated, pt agreeable to working with PT         Discharge Recommendations: SNF  DME needs for discharge: Needs Met       Therapy recommendation for EMS Transport: can transport by wheelchair    Therapy recommendations for staff:   Assist of 1 for ambulation with use of rolling walker (RW) and gait belt to/from BSC  to/from chair  to/from bathroom    History of Present Illness:   Pt is a 65 male who presented this date for an elective L MI.    Preadmission Environment:   Pt lives with    alone  Home environment:    apartment   Steps to enter first floor:   N/A  Steps to second floor/basement: N/A  Laundry:     Laundromat  Bathroom:     tub/shower unit and comfort height toilet  Pt sleeps in a:    Flat bed  Equipment owned:    RW, electric scooter, and reacher    Preadmission Status:  Pt able to drive: Yes  Pt fully independent with ADLs: Yes  Pt receives assistance from family for: Independent PTA  Pt independent for functional transfers and utilized Rollator and SPC for mobility in home and Rollator and SPC out in community  History of falls: No  Home Health Services:None    AM-PAC Mobility Score    AM-PAC Inpatient Mobility Raw Score :

## 2025-06-08 PROCEDURE — 6370000000 HC RX 637 (ALT 250 FOR IP): Performed by: ORTHOPAEDIC SURGERY

## 2025-06-08 PROCEDURE — 2500000003 HC RX 250 WO HCPCS: Performed by: ORTHOPAEDIC SURGERY

## 2025-06-08 PROCEDURE — 99024 POSTOP FOLLOW-UP VISIT: CPT | Performed by: SPECIALIST/TECHNOLOGIST

## 2025-06-08 PROCEDURE — 6360000002 HC RX W HCPCS: Performed by: ORTHOPAEDIC SURGERY

## 2025-06-08 PROCEDURE — APPNB45 APP NON BILLABLE 31-45 MINUTES: Performed by: SPECIALIST/TECHNOLOGIST

## 2025-06-08 PROCEDURE — 99232 SBSQ HOSP IP/OBS MODERATE 35: CPT | Performed by: INTERNAL MEDICINE

## 2025-06-08 PROCEDURE — 1200000000 HC SEMI PRIVATE

## 2025-06-08 PROCEDURE — 6370000000 HC RX 637 (ALT 250 FOR IP)

## 2025-06-08 PROCEDURE — 94640 AIRWAY INHALATION TREATMENT: CPT

## 2025-06-08 PROCEDURE — 94761 N-INVAS EAR/PLS OXIMETRY MLT: CPT

## 2025-06-08 PROCEDURE — 2700000000 HC OXYGEN THERAPY PER DAY

## 2025-06-08 PROCEDURE — 97116 GAIT TRAINING THERAPY: CPT

## 2025-06-08 PROCEDURE — 97110 THERAPEUTIC EXERCISES: CPT

## 2025-06-08 PROCEDURE — 97530 THERAPEUTIC ACTIVITIES: CPT

## 2025-06-08 PROCEDURE — 6370000000 HC RX 637 (ALT 250 FOR IP): Performed by: SPECIALIST/TECHNOLOGIST

## 2025-06-08 RX ORDER — MIDODRINE HYDROCHLORIDE 10 MG/1
10 TABLET ORAL
Status: DISCONTINUED | OUTPATIENT
Start: 2025-06-08 | End: 2025-06-11 | Stop reason: HOSPADM

## 2025-06-08 RX ORDER — MIDODRINE HYDROCHLORIDE 5 MG/1
5 TABLET ORAL ONCE
Status: COMPLETED | OUTPATIENT
Start: 2025-06-08 | End: 2025-06-08

## 2025-06-08 RX ADMIN — BUDESONIDE AND FORMOTEROL FUMARATE DIHYDRATE 2 PUFF: 160; 4.5 AEROSOL RESPIRATORY (INHALATION) at 07:34

## 2025-06-08 RX ADMIN — CARVEDILOL 3.12 MG: 3.12 TABLET, FILM COATED ORAL at 07:37

## 2025-06-08 RX ADMIN — GABAPENTIN 400 MG: 400 CAPSULE ORAL at 13:30

## 2025-06-08 RX ADMIN — ASPIRIN 325 MG: 325 TABLET, COATED ORAL at 07:36

## 2025-06-08 RX ADMIN — CARVEDILOL 3.12 MG: 3.12 TABLET, FILM COATED ORAL at 16:12

## 2025-06-08 RX ADMIN — ACETAMINOPHEN 650 MG: 325 TABLET ORAL at 07:36

## 2025-06-08 RX ADMIN — GABAPENTIN 400 MG: 400 CAPSULE ORAL at 08:30

## 2025-06-08 RX ADMIN — MIDODRINE HYDROCHLORIDE 5 MG: 5 TABLET ORAL at 11:00

## 2025-06-08 RX ADMIN — OXYCODONE 10 MG: 5 TABLET ORAL at 22:22

## 2025-06-08 RX ADMIN — OXYCODONE 10 MG: 5 TABLET ORAL at 17:53

## 2025-06-08 RX ADMIN — BUDESONIDE AND FORMOTEROL FUMARATE DIHYDRATE 2 PUFF: 160; 4.5 AEROSOL RESPIRATORY (INHALATION) at 20:37

## 2025-06-08 RX ADMIN — GABAPENTIN 400 MG: 400 CAPSULE ORAL at 21:23

## 2025-06-08 RX ADMIN — OXYCODONE 10 MG: 5 TABLET ORAL at 07:04

## 2025-06-08 RX ADMIN — DIVALPROEX SODIUM 1000 MG: 500 TABLET, DELAYED RELEASE ORAL at 16:12

## 2025-06-08 RX ADMIN — ALBUTEROL SULFATE 2.5 MG: 0.83 SOLUTION RESPIRATORY (INHALATION) at 16:23

## 2025-06-08 RX ADMIN — FINASTERIDE 5 MG: 5 TABLET, FILM COATED ORAL at 07:36

## 2025-06-08 RX ADMIN — QUETIAPINE FUMARATE 100 MG: 100 TABLET ORAL at 07:36

## 2025-06-08 RX ADMIN — QUETIAPINE FUMARATE 300 MG: 200 TABLET ORAL at 21:23

## 2025-06-08 RX ADMIN — TIOTROPIUM BROMIDE INHALATION SPRAY 2 PUFF: 3.12 SPRAY, METERED RESPIRATORY (INHALATION) at 07:34

## 2025-06-08 RX ADMIN — ALBUTEROL SULFATE 2 PUFF: 90 AEROSOL, METERED RESPIRATORY (INHALATION) at 07:31

## 2025-06-08 RX ADMIN — SODIUM CHLORIDE, PRESERVATIVE FREE 5 ML: 5 INJECTION INTRAVENOUS at 07:40

## 2025-06-08 RX ADMIN — ACETAMINOPHEN 650 MG: 325 TABLET ORAL at 13:30

## 2025-06-08 RX ADMIN — ACETAMINOPHEN 650 MG: 325 TABLET ORAL at 21:23

## 2025-06-08 RX ADMIN — ALBUTEROL SULFATE 2.5 MG: 0.83 SOLUTION RESPIRATORY (INHALATION) at 20:37

## 2025-06-08 RX ADMIN — PANTOPRAZOLE SODIUM 40 MG: 40 TABLET, DELAYED RELEASE ORAL at 07:36

## 2025-06-08 RX ADMIN — MIDODRINE HYDROCHLORIDE 5 MG: 5 TABLET ORAL at 07:36

## 2025-06-08 RX ADMIN — TAMSULOSIN HYDROCHLORIDE 0.4 MG: 0.4 CAPSULE ORAL at 07:37

## 2025-06-08 RX ADMIN — MIDODRINE HYDROCHLORIDE 10 MG: 10 TABLET ORAL at 16:12

## 2025-06-08 RX ADMIN — MIDODRINE HYDROCHLORIDE 5 MG: 5 TABLET ORAL at 11:50

## 2025-06-08 RX ADMIN — DIVALPROEX SODIUM 500 MG: 500 TABLET, DELAYED RELEASE ORAL at 08:30

## 2025-06-08 RX ADMIN — ATORVASTATIN CALCIUM 80 MG: 40 TABLET, FILM COATED ORAL at 07:36

## 2025-06-08 ASSESSMENT — PAIN DESCRIPTION - DESCRIPTORS
DESCRIPTORS: THROBBING
DESCRIPTORS: ACHING;DISCOMFORT
DESCRIPTORS: ACHING;DISCOMFORT
DESCRIPTORS: ACHING;THROBBING

## 2025-06-08 ASSESSMENT — PAIN SCALES - WONG BAKER
WONGBAKER_NUMERICALRESPONSE: NO HURT
WONGBAKER_NUMERICALRESPONSE: HURTS EVEN MORE
WONGBAKER_NUMERICALRESPONSE: NO HURT

## 2025-06-08 ASSESSMENT — PAIN - FUNCTIONAL ASSESSMENT
PAIN_FUNCTIONAL_ASSESSMENT: ACTIVITIES ARE NOT PREVENTED

## 2025-06-08 ASSESSMENT — PAIN DESCRIPTION - ONSET: ONSET: ON-GOING

## 2025-06-08 ASSESSMENT — PAIN SCALES - GENERAL
PAINLEVEL_OUTOF10: 3
PAINLEVEL_OUTOF10: 8
PAINLEVEL_OUTOF10: 8
PAINLEVEL_OUTOF10: 4
PAINLEVEL_OUTOF10: 7
PAINLEVEL_OUTOF10: 4
PAINLEVEL_OUTOF10: 5
PAINLEVEL_OUTOF10: 8

## 2025-06-08 ASSESSMENT — PAIN DESCRIPTION - LOCATION
LOCATION: HIP

## 2025-06-08 ASSESSMENT — PAIN DESCRIPTION - PAIN TYPE
TYPE: SURGICAL PAIN

## 2025-06-08 ASSESSMENT — PAIN DESCRIPTION - ORIENTATION
ORIENTATION: LEFT

## 2025-06-08 ASSESSMENT — PAIN DESCRIPTION - FREQUENCY: FREQUENCY: CONTINUOUS

## 2025-06-08 NOTE — PROGRESS NOTES
Department of Orthopedic Surgery  Physician Assistant   Progress Note    Subjective:       Systemic or Specific Complaints:No Complaints and sleeping.  Patient states he worked with therapy this morning and is tired.  Answers questions but does not open eyes.  On nasal cannula oxygen, baseline is room air.  Tolerating p.o., voiding.  No family at bedside    Objective:     Patient Vitals for the past 24 hrs:   BP Temp Temp src Pulse Resp SpO2   06/08/25 1100 (!) 91/58 98.1 °F (36.7 °C) Oral 63 18 91 %   06/08/25 0734 90/62 97.9 °F (36.6 °C) Oral 77 18 92 %   06/08/25 0731 -- -- -- -- -- 96 %   06/08/25 0704 -- -- -- -- 18 --   06/08/25 0418 (!) 90/58 98.1 °F (36.7 °C) Oral 72 16 91 %   06/07/25 2212 -- -- -- -- -- 93 %   06/07/25 2058 110/72 98.4 °F (36.9 °C) Oral 73 16 92 %   06/07/25 1736 -- -- -- -- 16 --   06/07/25 1706 -- -- -- -- 18 --   06/07/25 1542 102/60 98.6 °F (37 °C) Oral 73 18 94 %   06/07/25 1321 -- -- -- 73 18 94 %       General: alert, appears stated age, cooperative, and no distress   Wound: Wound clean and dry no evidence of infection., No Erythema, No Drainage, and Positive for Edema   Motion: Painful range of Motion in affected extremity   DVT Exam: No evidence of DVT seen on physical exam.  No cords or calf tenderness.  No significant calf/ankle edema.     Additional exam: Patient seen laying in bed at time of interview  Leg lengths equal, rotational alignment external rotation bilaterally.  Patient is able to correct to neutral alignment with verbal cueing  Thigh swelling as expected, compartments compressible  EHL, FHL, gastroc, and anterior tibialis motor intact  Sensation intact to light touch  DP and PT pulses 2+      Data Review  CBC:   Lab Results   Component Value Date/Time    WBC 8.0 06/07/2025 06:01 AM    RBC 3.93 06/07/2025 06:01 AM    HGB 11.7 06/07/2025 06:01 AM    HCT 34.3 06/07/2025 06:01 AM     06/07/2025 06:01 AM       Renal:   Lab Results   Component Value Date/Time

## 2025-06-08 NOTE — PROGRESS NOTES
IM Progress Note    Admit Date:  6/6/2025    S/p left THR   POD #2    Subjective:  Mr. Hernandez seen    Stable. Awake, alert. Pain controlled    O2 weaned down to 1 L     Objective:   BP 98/61   Pulse 63   Temp 98.1 °F (36.7 °C) (Oral)   Resp 18   Ht 1.727 m (5' 8\")   Wt 73.7 kg (162 lb 6.4 oz)   SpO2 91%   BMI 24.69 kg/m²     Intake/Output Summary (Last 24 hours) at 6/8/2025 1438  Last data filed at 6/7/2025 2041  Gross per 24 hour   Intake 480 ml   Output --   Net 480 ml         Physical Exam:  General:  Awake, alert, NAD  Skin:  Warm and dry  Neck:  JVD absent. Neck supple  Chest:  diminished breath sounds, mild rhonchi  Cardiovascular:  RRR ,S1S2 normal  Abdomen:  Soft, non tender, non distended, BS +  Extremities:  No edema.  Intact peripheral pulses. Brisk cap refill, < 2 secs  Neuro: non focal      Medications:   Scheduled Meds:   midodrine  10 mg Oral TID WC    QUEtiapine  300 mg Oral Nightly    tamsulosin  0.4 mg Oral Daily    pantoprazole  40 mg Oral Daily    gabapentin  400 mg Oral TID    finasteride  5 mg Oral Daily    carvedilol  3.125 mg Oral BID WC    budesonide-formoterol  2 puff Inhalation BID RT    atorvastatin  80 mg Oral Daily    tiotropium  2 puff Inhalation Daily RT    sodium chloride flush  5-40 mL IntraVENous 2 times per day    acetaminophen  650 mg Oral Q6H    aspirin  325 mg Oral Daily    albuterol sulfate HFA  2 puff Inhalation BID RT    divalproex  500 mg Oral QAM    And    divalproex  1,000 mg Oral QPM       Continuous Infusions:   sodium chloride         Data:  CBC:   Recent Labs     06/06/25  0645 06/07/25  0601   WBC 5.6 8.0   RBC 4.64 3.93*   HGB 13.8 11.7*   HCT 40.6 34.3*   MCV 87.6 87.2   RDW 16.9* 17.0*    136     BMP:   Recent Labs     06/06/25  0645 06/07/25  0601    140   K 4.3 4.7    104   CO2 23 24   BUN 19 17   CREATININE 0.8 0.8     BNP: No results for input(s): \"BNP\" in the last 72 hours.  PT/INR:   Recent Labs     06/06/25  0645   PROTIME 13.3

## 2025-06-08 NOTE — FLOWSHEET NOTE
06/07/25 2058   Vital Signs   Temp 98.4 °F (36.9 °C)   Temp Source Oral   Pulse 73   Heart Rate Source Monitor   Respirations 16   /72   MAP (Calculated) 85   BP Location Right upper arm   BP Method Automatic   Patient Position Lying right side   Pain Assessment   Pain Assessment None - Denies Pain   Opioid-Induced Sedation   POSS Score 1   RASS   Johnson Agitation Sedation Scale (RASS) 0   Oxygen Therapy   SpO2 92 %   O2 Device Nasal cannula   O2 Flow Rate (L/min) 1 L/min     Shift assessment complete.  Patient is alert and oriented.  Vital signs are stable.  Respirations are even and easy, no signs or symptoms of distress.  Pt denies any needs at this time.  Call light within reach.

## 2025-06-08 NOTE — FLOWSHEET NOTE
06/08/25 0734   Vital Signs   Temp 97.9 °F (36.6 °C)   Temp Source Oral   Pulse 77   Heart Rate Source Monitor   Respirations 18   BP 90/62   MAP (Calculated) 71   BP Location Right upper arm   BP Method Automatic   Patient Position Semi fowlers   Opioid-Induced Sedation   POSS Score 1   Oxygen Therapy   SpO2 92 %   O2 Device Nasal cannula   O2 Flow Rate (L/min) 1 L/min     AM assessment completed, see flow sheet. Pt is alert and oriented. Vital signs are WNL. Respirations are even & easy. No complaints voiced. Pt denies needs at this time. SR up x 2, and bed in low position. Call light is within reach.

## 2025-06-08 NOTE — PROGRESS NOTES

## 2025-06-08 NOTE — PROGRESS NOTES
RT Inhaler-Nebulizer Bronchodilator Protocol Note    There is a bronchodilator order in the chart from a provider indicating to follow the RT Bronchodilator Protocol and there is an “Initiate RT Inhaler-Nebulizer Bronchodilator Protocol” order as well (see protocol at bottom of note).    CXR Findings:  XR CHEST PORTABLE  Result Date: 6/6/2025  No acute process.       The findings from the last RT Protocol Assessment were as follows:   History Pulmonary Disease: (P) Chronic pulmonary disease  Respiratory Pattern: (P) Regular pattern and RR 12-20 bpm  Breath Sounds: (P) Slightly diminished and/or crackles  Cough: (P) Strong, spontaneous, non-productive  Indication for Bronchodilator Therapy: (P) Decreased or absent breath sounds  Bronchodilator Assessment Score: (P) 4    Aerosolized bronchodilator medication orders have been revised according to the RT Inhaler-Nebulizer Bronchodilator Protocol below.    Respiratory Therapist to perform RT Therapy Protocol Assessment initially then follow the protocol.  Repeat RT Therapy Protocol Assessment PRN for score 0-3 or on second treatment, BID, and PRN for scores above 3.    No Indications - adjust the frequency to every 6 hours PRN wheezing or bronchospasm, if no treatments needed after 48 hours then discontinue using Per Protocol order mode.     If indication present, adjust the RT bronchodilator orders based on the Bronchodilator Assessment Score as indicated below.  Use Inhaler orders unless patient has one or more of the following: on home nebulizer, not able to hold breath for 10 seconds, is not alert and oriented, cannot activate and use MDI correctly, or respiratory rate 25 breaths per minute or more, then use the equivalent nebulizer order(s) with same Frequency and PRN reasons based on the score.  If a patient is on this medication at home then do not decrease Frequency below that used at home.    0-3 - enter or revise RT bronchodilator order(s) to equivalent RT

## 2025-06-08 NOTE — PLAN OF CARE
Problem: Discharge Planning  Goal: Discharge to home or other facility with appropriate resources  6/8/2025 1005 by Melissa Carl RN  Outcome: Progressing  6/7/2025 2251 by Joe Thakkar RN  Outcome: Progressing     Problem: Safety - Adult  Goal: Free from fall injury  6/8/2025 1005 by Melissa Carl RN  Outcome: Progressing  6/7/2025 2251 by Joe Thakkar RN  Outcome: Progressing     Problem: Pain  Goal: Verbalizes/displays adequate comfort level or baseline comfort level  6/8/2025 1005 by Melissa Carl RN  Outcome: Progressing  6/7/2025 2251 by Joe Thakkar RN  Outcome: Progressing     Problem: Chronic Conditions and Co-morbidities  Goal: Patient's chronic conditions and co-morbidity symptoms are monitored and maintained or improved  6/8/2025 1005 by Melissa Carl RN  Outcome: Progressing  6/7/2025 2251 by Joe Thakkar RN  Outcome: Progressing     Problem: ABCDS Injury Assessment  Goal: Absence of physical injury  6/8/2025 1005 by Melissa Carl RN  Outcome: Progressing  6/7/2025 2251 by Joe Thakkar RN  Outcome: Progressing

## 2025-06-08 NOTE — PROGRESS NOTES
Inpatient Physical Therapy Treatment    Unit: [facility]  Date:  6/8/2025  Patient Name:    Scot Hernandez  Admitting diagnosis:  Avascular necrosis of femur head, left (HCC) [M87.052]  Avascular necrosis of bone of left hip (HCC) [M87.052]  Admit Date:  6/6/2025  Precautions/Restrictions/WB Status/ Lines/ Wounds/ Oxygen: Fall risk, Bed/chair alarm, Lines (IV and Supplemental O2 (3L)), WB Restrictions (WBAT), and Specific Ortho Precautions: \"no crossing legs\"      Pt seen for cotreatment this date due to patient safety, patient endurance, acute illness/injury, and limited functional status information    Treatment Time: 8:58- 9:55  Treatment Number:  4   Timed Code Treatment Minutes:  57 minutes  Total Treatment Minutes:   57 minutes    Patient Stated Goals for Therapy: \"to go to the bathroom\"        Discharge Recommendations: SNF  DME needs for discharge: Needs Met       Therapy recommendation for EMS Transport: can transport by wheelchair    Therapy recommendations for staff:   Assist of 1 for ambulation with use of rolling walker (RW) and gait belt to/from BSC  to/from chair  to/from bathroom    History of Present Illness:   Pt is a 65 male who presented this date for an elective L MI.    Preadmission Environment:   Pt lives with    alone  Home environment:    apartment   Steps to enter first floor:   N/A  Steps to second floor/basement: N/A  Laundry:     Laundromat  Bathroom:     tub/shower unit and comfort height toilet  Pt sleeps in a:    Flat bed  Equipment owned:    RW, electric scooter, and reacher    Preadmission Status:  Pt able to drive: Yes  Pt fully independent with ADLs: Yes  Pt receives assistance from family for: Independent PTA  Pt independent for functional transfers and utilized Rollator and SPC for mobility in home and Rollator and SPC out in community  History of falls: No  Home Health Services:None    AM-PAC Mobility Score    AM-PAC Inpatient Mobility Raw Score : 17       Subjective  Patient  and use of gait belt and rolling walker (RW)  3).  Tolerate B LE exercises 3 sets of 10-15 reps  4).  Ascend/descend curb step with Independent with use of no handrail and LRAD (least restrictive assistive device)    Rehabilitation Potential: Good  Strengths for achieving goals include:   Pt motivated, PLOF, Family Support, and Pt cooperative   Barriers to achieving goals include:    Complexity of condition, Pain, and Weakness    Plan    To be seen 7 x/ week BID (ortho) while in acute care setting for therapeutic exercises/activities, bed mobility training, functional transfer training, family/patient education, balance training, gait training, and stair training.    Signature: Dionne Marroquin PT     If patient discharges from this facility prior to next visit, this note will serve as the Discharge Summary.    CHF Education  N/A

## 2025-06-08 NOTE — PROGRESS NOTES
foot clearance bilaterally, and decreased step length bilaterally  Assistive Device Used:    gait belt and rolling walker (RW)  Level of Assist:    SBA-CGA   Comment: Assistance for O2 line management    Stair Training deferred, pt unsafe/ not appropriate to complete stairs at this time    Therapeutic Exercises Initiated  all completed bilaterally unless indicated  Ankle pumps 2x10  LAQ  2x10  Standing HR 5  Quad sets 10      Positioning Needs   Pt in bed and alarm set  Call light provided and all needs within reach  RN aware of pt position/status    Other Activities  NA    Patient/Family Education   Pt educated on role of inpatient PT, POC, importance of continued activity, DC recommendations, functional transfer/mobility safety, transfer techniques, and calling for assist with mobility.    Assessment  Pt seen today for physical therapy Treatment. Pt demonstrated decreased Activity tolerance, Balance, ROM, Safety, and Strength as well as decreased independence with Ambulation, Bed Mobility , and Transfers. Pt continues to function below his baseline. Pt progressed to SBA for all functional transfers and mobility, pt continues at times with decreased safety and decreased insight into deficits. Pt would continue to benefit from skilled PT services to promote increased strength, balance and functional activity tolerance. Recommend continued skilled PT services upon discharge. Pt slightly limited by O2 desat during ambulation this date. Pt initially on 1L O2, RN in the room at conclusion of session increased O2 and sats returned to 92% <1 minute.      Recommending SNF upon discharge as patient functioning below baseline, demonstrates good rehab potential and unable to return home due to limited or no family support, inability to negotiate stairs to enter home/bedroom/bathroom, burden of care beyond caregiver ability, home environment not conducive to patient recovery, and limited safety awareness.    Goals :   To be

## 2025-06-08 NOTE — PLAN OF CARE
Problem: Discharge Planning  Goal: Discharge to home or other facility with appropriate resources  6/7/2025 2251 by Joe Thakkar RN  Outcome: Progressing  6/7/2025 0918 by Melissa Carl RN  Outcome: Progressing     Problem: Safety - Adult  Goal: Free from fall injury  6/7/2025 2251 by Joe Thakkar RN  Outcome: Progressing  6/7/2025 0918 by Melissa Carl RN  Outcome: Progressing     Problem: Pain  Goal: Verbalizes/displays adequate comfort level or baseline comfort level  6/7/2025 2251 by Joe Thakkar RN  Outcome: Progressing  6/7/2025 0918 by Melissa Carl RN  Outcome: Progressing     Problem: Chronic Conditions and Co-morbidities  Goal: Patient's chronic conditions and co-morbidity symptoms are monitored and maintained or improved  6/7/2025 2251 by Joe Thakkar RN  Outcome: Progressing  6/7/2025 0918 by Melissa Carl RN  Outcome: Progressing     Problem: ABCDS Injury Assessment  Goal: Absence of physical injury  6/7/2025 2251 by Joe Thakkar RN  Outcome: Progressing  6/7/2025 0918 by Melissa Carl RN  Outcome: Progressing

## 2025-06-09 PROCEDURE — 97535 SELF CARE MNGMENT TRAINING: CPT

## 2025-06-09 PROCEDURE — 97110 THERAPEUTIC EXERCISES: CPT

## 2025-06-09 PROCEDURE — 2700000000 HC OXYGEN THERAPY PER DAY

## 2025-06-09 PROCEDURE — 94640 AIRWAY INHALATION TREATMENT: CPT

## 2025-06-09 PROCEDURE — 6370000000 HC RX 637 (ALT 250 FOR IP): Performed by: INTERNAL MEDICINE

## 2025-06-09 PROCEDURE — 97116 GAIT TRAINING THERAPY: CPT

## 2025-06-09 PROCEDURE — 6360000002 HC RX W HCPCS: Performed by: ORTHOPAEDIC SURGERY

## 2025-06-09 PROCEDURE — 1200000000 HC SEMI PRIVATE

## 2025-06-09 PROCEDURE — 6370000000 HC RX 637 (ALT 250 FOR IP): Performed by: ORTHOPAEDIC SURGERY

## 2025-06-09 PROCEDURE — 94761 N-INVAS EAR/PLS OXIMETRY MLT: CPT

## 2025-06-09 PROCEDURE — 99024 POSTOP FOLLOW-UP VISIT: CPT | Performed by: PHYSICIAN ASSISTANT

## 2025-06-09 PROCEDURE — 97530 THERAPEUTIC ACTIVITIES: CPT

## 2025-06-09 PROCEDURE — 2500000003 HC RX 250 WO HCPCS: Performed by: ORTHOPAEDIC SURGERY

## 2025-06-09 PROCEDURE — 6370000000 HC RX 637 (ALT 250 FOR IP): Performed by: SPECIALIST/TECHNOLOGIST

## 2025-06-09 PROCEDURE — 99232 SBSQ HOSP IP/OBS MODERATE 35: CPT | Performed by: INTERNAL MEDICINE

## 2025-06-09 RX ORDER — PREDNISONE 20 MG/1
40 TABLET ORAL DAILY
DISCHARGE
Start: 2025-06-10 | End: 2025-06-14

## 2025-06-09 RX ORDER — PREDNISONE 20 MG/1
40 TABLET ORAL DAILY
Status: DISCONTINUED | OUTPATIENT
Start: 2025-06-09 | End: 2025-06-11 | Stop reason: HOSPADM

## 2025-06-09 RX ADMIN — OXYCODONE 10 MG: 5 TABLET ORAL at 20:36

## 2025-06-09 RX ADMIN — TAMSULOSIN HYDROCHLORIDE 0.4 MG: 0.4 CAPSULE ORAL at 09:44

## 2025-06-09 RX ADMIN — TIOTROPIUM BROMIDE INHALATION SPRAY 2 PUFF: 3.12 SPRAY, METERED RESPIRATORY (INHALATION) at 07:48

## 2025-06-09 RX ADMIN — MIDODRINE HYDROCHLORIDE 10 MG: 10 TABLET ORAL at 17:41

## 2025-06-09 RX ADMIN — PANTOPRAZOLE SODIUM 40 MG: 40 TABLET, DELAYED RELEASE ORAL at 09:44

## 2025-06-09 RX ADMIN — MIDODRINE HYDROCHLORIDE 10 MG: 10 TABLET ORAL at 09:44

## 2025-06-09 RX ADMIN — GABAPENTIN 400 MG: 400 CAPSULE ORAL at 13:56

## 2025-06-09 RX ADMIN — CARVEDILOL 3.12 MG: 3.12 TABLET, FILM COATED ORAL at 09:44

## 2025-06-09 RX ADMIN — GABAPENTIN 400 MG: 400 CAPSULE ORAL at 20:36

## 2025-06-09 RX ADMIN — FINASTERIDE 5 MG: 5 TABLET, FILM COATED ORAL at 09:44

## 2025-06-09 RX ADMIN — GABAPENTIN 400 MG: 400 CAPSULE ORAL at 09:44

## 2025-06-09 RX ADMIN — SODIUM CHLORIDE, PRESERVATIVE FREE 10 ML: 5 INJECTION INTRAVENOUS at 09:45

## 2025-06-09 RX ADMIN — OXYCODONE 10 MG: 5 TABLET ORAL at 10:25

## 2025-06-09 RX ADMIN — PREDNISONE 40 MG: 20 TABLET ORAL at 13:55

## 2025-06-09 RX ADMIN — ACETAMINOPHEN 650 MG: 325 TABLET ORAL at 09:44

## 2025-06-09 RX ADMIN — CARVEDILOL 3.12 MG: 3.12 TABLET, FILM COATED ORAL at 17:40

## 2025-06-09 RX ADMIN — DIVALPROEX SODIUM 500 MG: 500 TABLET, DELAYED RELEASE ORAL at 09:43

## 2025-06-09 RX ADMIN — ALBUTEROL SULFATE 2.5 MG: 0.83 SOLUTION RESPIRATORY (INHALATION) at 07:46

## 2025-06-09 RX ADMIN — MIDODRINE HYDROCHLORIDE 10 MG: 10 TABLET ORAL at 13:56

## 2025-06-09 RX ADMIN — ALBUTEROL SULFATE 2.5 MG: 0.83 SOLUTION RESPIRATORY (INHALATION) at 20:42

## 2025-06-09 RX ADMIN — ACETAMINOPHEN 650 MG: 325 TABLET ORAL at 20:35

## 2025-06-09 RX ADMIN — QUETIAPINE FUMARATE 300 MG: 200 TABLET ORAL at 20:35

## 2025-06-09 RX ADMIN — ATORVASTATIN CALCIUM 80 MG: 40 TABLET, FILM COATED ORAL at 09:43

## 2025-06-09 RX ADMIN — DIVALPROEX SODIUM 1000 MG: 500 TABLET, DELAYED RELEASE ORAL at 17:40

## 2025-06-09 RX ADMIN — BUDESONIDE AND FORMOTEROL FUMARATE DIHYDRATE 2 PUFF: 160; 4.5 AEROSOL RESPIRATORY (INHALATION) at 20:43

## 2025-06-09 RX ADMIN — ACETAMINOPHEN 650 MG: 325 TABLET ORAL at 13:55

## 2025-06-09 RX ADMIN — BUDESONIDE AND FORMOTEROL FUMARATE DIHYDRATE 2 PUFF: 160; 4.5 AEROSOL RESPIRATORY (INHALATION) at 07:48

## 2025-06-09 RX ADMIN — ASPIRIN 325 MG: 325 TABLET, COATED ORAL at 09:43

## 2025-06-09 ASSESSMENT — PAIN DESCRIPTION - LOCATION
LOCATION: HIP
LOCATION: HIP

## 2025-06-09 ASSESSMENT — PAIN DESCRIPTION - ORIENTATION
ORIENTATION: LEFT
ORIENTATION: LEFT

## 2025-06-09 ASSESSMENT — PAIN SCALES - GENERAL
PAINLEVEL_OUTOF10: 4
PAINLEVEL_OUTOF10: 8
PAINLEVEL_OUTOF10: 0
PAINLEVEL_OUTOF10: 7
PAINLEVEL_OUTOF10: 4

## 2025-06-09 ASSESSMENT — PAIN DESCRIPTION - PAIN TYPE: TYPE: SURGICAL PAIN

## 2025-06-09 ASSESSMENT — PAIN DESCRIPTION - DESCRIPTORS
DESCRIPTORS: ACHING;DISCOMFORT
DESCRIPTORS: THROBBING;TIGHTNESS

## 2025-06-09 ASSESSMENT — PAIN - FUNCTIONAL ASSESSMENT
PAIN_FUNCTIONAL_ASSESSMENT: ACTIVITIES ARE NOT PREVENTED
PAIN_FUNCTIONAL_ASSESSMENT: PREVENTS OR INTERFERES SOME ACTIVE ACTIVITIES AND ADLS

## 2025-06-09 NOTE — PROGRESS NOTES
IM Progress Note    Admit Date:  6/6/2025    S/p left THR   POD #2    Subjective:  Mr. Hernandez seen    Stable. Awake, alert. Pain controlled but he does complain of hip pain. He is still on oxygen. He has used ICS on a couple of occasions. He reports he has trouble sitting forward due to hip pain. Reports occasional cough.     Objective:   BP (!) 94/56   Pulse 83   Temp 98 °F (36.7 °C) (Oral)   Resp 18   Ht 1.727 m (5' 8\")   Wt 73.7 kg (162 lb 6.4 oz)   SpO2 95%   BMI 24.69 kg/m²     Intake/Output Summary (Last 24 hours) at 6/9/2025 1228  Last data filed at 6/8/2025 2129  Gross per 24 hour   Intake 960 ml   Output --   Net 960 ml       Physical Exam:  General:  Awake, alert, NAD  Skin:  Warm and dry  Neck:  JVD absent. Neck supple  Chest:  diminished breath sounds, mild rhonchi  Cardiovascular:  RRR ,S1S2 normal  Abdomen:  Soft, non tender, non distended, BS +  Extremities:  No edema.  Intact peripheral pulses. Brisk cap refill, < 2 secs  Neuro: non focal      Medications:   Scheduled Meds:   midodrine  10 mg Oral TID WC    QUEtiapine  300 mg Oral Nightly    tamsulosin  0.4 mg Oral Daily    pantoprazole  40 mg Oral Daily    gabapentin  400 mg Oral TID    finasteride  5 mg Oral Daily    carvedilol  3.125 mg Oral BID WC    budesonide-formoterol  2 puff Inhalation BID RT    atorvastatin  80 mg Oral Daily    tiotropium  2 puff Inhalation Daily RT    sodium chloride flush  5-40 mL IntraVENous 2 times per day    acetaminophen  650 mg Oral Q6H    aspirin  325 mg Oral Daily    albuterol sulfate HFA  2 puff Inhalation BID RT    divalproex  500 mg Oral QAM    And    divalproex  1,000 mg Oral QPM       Continuous Infusions:   sodium chloride         Data:  CBC:   Recent Labs     06/07/25  0601   WBC 8.0   RBC 3.93*   HGB 11.7*   HCT 34.3*   MCV 87.2   RDW 17.0*        BMP:   Recent Labs     06/07/25  0601      K 4.7      CO2 24   BUN 17   CREATININE 0.8     BNP: No results for input(s): \"BNP\" in

## 2025-06-09 NOTE — CARE COORDINATION
Chart reviewed. Precert to be started this am by Lay at [facility]. HENS completed as requested. CM following.

## 2025-06-09 NOTE — NURSE NAVIGATOR
Met with patient at bedside. Patient sitting on side of bed, working with PT. Oxygen per NC. Bruising and swelling noted to left hip. Encouraged to ice. Dressing dry and intact. Patient states pain is managed with medication. Discussed role of nurse navigator and provided contact information. Reviewed reasons to call with questions or concerns. Plan to discharge to SNF when appropriate. Will follow-up with patient tomorrow.    Tari Lozada  Orthopedic Nurse Navigator  Phone number: [phone]

## 2025-06-09 NOTE — DISCHARGE INSTR - DIET

## 2025-06-09 NOTE — PROGRESS NOTES
Inpatient Occupational Therapy Treatment    Unit: [facility]  Date:  6/9/2025  Patient Name:    Scot Hernandez  Admitting diagnosis:  Avascular necrosis of femur head, left (HCC) [M87.052]  Avascular necrosis of bone of left hip (HCC) [M87.052]  Admit Date:  6/6/2025  Precautions/Restrictions/WB Status/ Lines/ Wounds/ Oxygen: Fall risk, Bed/chair alarm, and Lines (IV and Supplemental O2 (2L)); Lateral hip precautions- No active abduction, no extension past midline, no external rotation (updated per ortho note 6/9/25)    Treatment Time:  1937-2125  Treatment Number:  2  Timed Code Treatment Minutes: 24 minutes  Total Treatment Minutes:  24  minutes    Patient Goals for Therapy: \"I want to put lotion on my back\"          Discharge Recommendations: SNF  DME needs for discharge: Needs Met       Therapy recommendations for staff:   Assist of 1 for transfers with use of rolling walker (RW) and gait belt to/from chair  to/from bathroom    History of Present Illness: Pt is a 64 y/o male with diagnosis of avascular necrosis of femur head.  LEFT TOTAL HIP ARTHOPLASTY, LATERAL APPROACH on 6/6/25 with Dr. Lopez.     Preadmission Environment:   Pt lives with                                         alone  Home environment:                            apartment   Steps to enter first floor:                     N/A  Steps to second floor/basement:        N/A  Laundry:                                              Laundromat  Bathroom:                                           tub/shower unit and comfort height toilet  Pt sleeps in a:                                     Flat bed  Equipment owned:                              RW, electric scooter, and reacher     Preadmission Status:  Pt able to drive: Yes  Pt fully independent with ADLs: Yes  Pt receives assistance from family for: Independent PTA  Pt independent for functional transfers and utilized Rollator and SPC for mobility in home and Rollator and SPC out in community  History  of falls:            No  Home Health Services:None    AM-PAC Score: AM-PAC Inpatient Daily Activity Raw Score: 18     Subjective:  Patient lying reclined in bed with no family present.   Pt agreeable to this OT session.     Cognition:    A&O orientation not directly assessed.    Able to follow 1 step commands    Pain:   No    Activity Tolerance:   Pt completed therapy session with no adverse symptoms     BP (mmHg) HR (bpm) SpO2 (%) on 2L Comments   Supine at rest       Seated at EOB       Standing       End of session   95      Objective:  Does this pt have an acute or acute on chronic diagnosis of CHF? No    Balance:  Sitting:    Supervision static in long sitting in bed without back support  Standing:    Not Tested    Bed Mobility:   Supine to Sit:    Not Tested  Sit to Supine:   Not Tested  Rolling:   Not Tested  Scooting in sitting: Not Tested  Scooting in supine:  Not Tested    Transfers:  Pt declined out of bed activity; pt just finished with PT session where he was completing ambulation in room  Sit to stand:    Not Tested  Stand to sit:    Not Tested  Bed to chair:     Not Tested  Bed/ chair to standard toilet:  Not Tested  Bed/chair to BSC:   Not Tested  Functional Mobility:   Not Tested    ADLs:  Dressing:      UE:   Not Tested  LE:    Not Tested    Bathing:    UE:  Not Tested  LE:  Not Tested    Eating:   Independent beverage mgmt    Toileting:  Not Tested    Grooming/hygiene: Max A to put lotion on back; independent oral care while seated    Ther Ex / Activities Initiated:   N/A    Positioning Needs:   Pt in bed and alarm set  Call light provided and all needs within reach    Patient/Family Education:   Pt educated on role of inpatient OT, plan of care, importance of continued activity, DC recommendations, functional transfer/mobility safety, transfer techniques, and calling for assist with mobility    CHF Education  N/A    Assessment:  Pt seen for occupational therapy followup session in the acute care

## 2025-06-09 NOTE — FLOWSHEET NOTE
06/09/25 0615   Oxygen Therapy   SpO2 (!) 85 %   O2 Device Nasal cannula   O2 Flow Rate (L/min) 1 L/min     Pt sleeping with mouth breathing. Increased 02 to 2lnc. Arely Valderrama RN

## 2025-06-09 NOTE — FLOWSHEET NOTE
06/09/25 0618   Vital Signs   Temp 98 °F (36.7 °C)   Temp Source Oral   Pulse 71   Heart Rate Source Monitor   Respirations 16   BP 99/68   MAP (Calculated) 78   BP Location Left upper arm   Pain Assessment   Pain Assessment None - Denies Pain   Opioid-Induced Sedation   POSS Score S   Oxygen Therapy   SpO2 92 %   O2 Device Nasal cannula   O2 Flow Rate (L/min) 2 L/min     Pt resting in bed, eyes closed. 02 increased to 92% on 2lnc. Arely Valderrama RN

## 2025-06-09 NOTE — PROGRESS NOTES
Department of Orthopedic Surgery  Physician Assistant   Progress Note    Subjective:     Post op day 3 left total hip   Systemic or Specific Complaints:No Complaints.  Patient states he worked with therapy this morning and is tired.  Answers questions.  On nasal cannula oxygen, baseline is room air.  Tolerating p.o., voiding.  No family at bedside    Objective:     Patient Vitals for the past 24 hrs:   BP Temp Temp src Pulse Resp SpO2   06/09/25 1025 -- -- -- -- 18 --   06/09/25 0930 (!) 94/56 98 °F (36.7 °C) Oral 83 16 95 %   06/09/25 0746 -- -- -- -- -- 93 %   06/09/25 0618 99/68 98 °F (36.7 °C) Oral 71 16 92 %   06/09/25 0615 -- -- -- -- -- (!) 85 %   06/08/25 2252 -- -- -- -- 16 --   06/08/25 2222 -- -- -- -- 16 --   06/08/25 2122 119/74 97.7 °F (36.5 °C) Oral 86 16 94 %   06/08/25 2040 -- -- -- -- -- 91 %   06/08/25 1823 -- -- -- -- 17 --   06/08/25 1753 -- -- -- -- 18 --   06/08/25 1623 -- -- -- -- -- 96 %   06/08/25 1612 107/60 -- -- 84 -- --   06/08/25 1437 113/70 97.9 °F (36.6 °C) Oral 70 16 93 %   06/08/25 1245 98/61 -- -- -- -- --       General: alert, appears stated age, cooperative, and no distress   Wound: Wound clean and dry no evidence of infection., No Erythema, No Drainage, and Positive for Edema   Motion: Painful range of Motion in affected extremity   DVT Exam: No evidence of DVT seen on physical exam.  No cords or calf tenderness.  No significant calf/ankle edema.     Additional exam: Patient seen laying in bed at time of interview  Leg lengths equal, rotational alignment external rotation bilaterally.  Patient is able to correct to neutral alignment with verbal cueing  Thigh swelling as expected, compartments compressible  EHL, FHL, gastroc, and anterior tibialis motor intact  Sensation intact to light touch  DP and PT pulses 2+      Data Review  CBC:   Lab Results   Component Value Date/Time    WBC 8.0 06/07/2025 06:01 AM    RBC 3.93 06/07/2025 06:01 AM    HGB 11.7 06/07/2025 06:01 AM    HCT

## 2025-06-09 NOTE — PROGRESS NOTES
increased work of breathing using Per Protocol order mode.        4-6 - enter or revise RT Bronchodilator order(s) to two equivalent RT bronchodilator orders with one order with BID Frequency and one order with Frequency of every 4 hours PRN wheezing or increased work of breathing using Per Protocol order mode.        7-10 - enter or revise RT Bronchodilator order(s) to two equivalent RT bronchodilator orders with one order with TID Frequency and one order with Frequency of every 4 hours PRN wheezing or increased work of breathing using Per Protocol order mode.       11-13 - enter or revise RT Bronchodilator order(s) to one equivalent RT bronchodilator order with QID Frequency and an Albuterol order with Frequency of every 4 hours PRN wheezing or increased work of breathing using Per Protocol order mode.      Greater than 13 - enter or revise RT Bronchodilator order(s) to one equivalent RT bronchodilator order with every 4 hours Frequency and an Albuterol order with Frequency of every 2 hours PRN wheezing or increased work of breathing using Per Protocol order mode.     Electronically signed by Mo Jernigan RCP on 6/8/2025 at 8:41 PM

## 2025-06-09 NOTE — FLOWSHEET NOTE
06/09/25 0930   Vital Signs   Temp 98 °F (36.7 °C)   Temp Source Oral   Pulse 83   Heart Rate Source Monitor   Respirations 16   BP (!) 94/56   MAP (Calculated) 69   BP Location Left upper arm   BP Method Automatic   Patient Position Lying right side   Pain Assessment   Pain Assessment None - Denies Pain   Opioid-Induced Sedation   POSS Score S   Oxygen Therapy   SpO2 95 %   O2 Device Nasal cannula   O2 Flow Rate (L/min) 2 L/min     AM assessment completed. No signs or symptoms of distress noted. Patient tolerated AM medications well. Respirations easy and even. Bed in lowest position, bed rails x2 up,  Call light within reach.     Bedside Mobility Assessment Tool (BMAT):     Assessment Level 1- Sit and Shake    1. From a semi-reclined position, ask patient to sit up and rotate to a seated position at the side of the bed. Can use the bedrail.    2. Ask patient to reach out and grab your hand and shake making sure patient reaches across his/her midline.   Pass- Patient is able to come to a seated position, maintain core strength. Maintains seated balance while reaching across midline. Move on to Assessment Level 2.     Assessment Level 2- Stretch and Point   1. With patient in seated position at the side of the bed, have patient place both feet on the floor (or stool) with knees no higher than hips.    2. Ask patient to stretch one leg and straighten the knee, then bend the ankle/flex and point the toes. If appropriate, repeat with the other leg.   Pass- Patient is able to demonstrate appropriate quad strength on intended weight bearing limb(s). Move onto Assessment Level 3.     Assessment Level 3- Stand   1. Ask patient to elevate off the bed or chair (seated to standing) using an assistive device (cane, bedrail).    2. Patient should be able to raise buttocks off be and hold for a count of five. May repeat once.   Pass- Patient maintains standing stability for at least 5 seconds, proceed to assessment level

## 2025-06-09 NOTE — PROGRESS NOTES
Physical Therapy  Inpatient Physical Therapy Treatment    Unit: [facility]  Date:  6/9/2025  Patient Name:    Scot Hernandez  Admitting diagnosis:  Avascular necrosis of femur head, left (HCC) [M87.052]  Avascular necrosis of bone of left hip (HCC) [M87.052]  Admit Date:  6/6/2025  Precautions/Restrictions/WB Status/ Lines/ Wounds/ Oxygen: Fall risk, Bed/chair alarm, Lines (IV and Supplemental O2 (2L)), WB Restrictions (WBAT), and Specific Ortho Precautions: No active Hip ABD, no extension past midline, and no ER     Treatment Time:  2544-6537  Treatment Number:  6 (progress note)   Timed Code Treatment Minutes: 48 minutes  Total Treatment Minutes:  48  minutes    Patient Stated Goals for Therapy: \" To walk \"          Discharge Recommendations: SNF  DME needs for discharge: Needs Met       Therapy recommendation for EMS Transport: can transport by wheelchair    Therapy recommendations for staff:   Assist of 1 for ambulation with use of rolling walker (RW) and gait belt to/from bathroom    History of Present Illness:   Pt is a 65 male who presented this for an elective L MI.      AM-PAC Mobility Score    AM-PAC Inpatient Mobility Raw Score : 17       Subjective  Patient lying reclined in bed with no family present.  Pt agreeable to this PT session. Yes    Cognition    A&O orientation not directly assessed.    Able to follow 1 step commands    Pain   Yes  Location: L Hip  Rating: mild /10  Pain Medicine Status: No request made    Activity Tolerance   During therapy session noted pt with SOB/ZAMARRIPA  desaturation of SpO2    Pt Position BP (mmHg) HR (bpm) SpO2 (%) on 2L  Comments   Supine at rest 138/67 69 94    Seated at EOB   Dropped to 84% following ambulation from bathroom     Standing       End of session         Objective  Does this pt have an acute or acute on chronic diagnosis of CHF? No    Balance  Static Sitting:  SBA  Dynamic Sitting:  SBA     Static Standing: SBA  Dynamic Standing:  SBA    Posture  Seated:  mobility training, functional transfer training, family/patient education, balance training, neuromuscular reeducation , gait training, and stair training.    Signature: Alex Tompkins, PT, DPT     If patient discharges from this facility prior to next visit, this note will serve as the Discharge Summary.    CHF Education  N/A

## 2025-06-09 NOTE — PLAN OF CARE
Problem: Discharge Planning  Goal: Discharge to home or other facility with appropriate resources  Outcome: Progressing     Problem: Safety - Adult  Goal: Free from fall injury  Outcome: Progressing     Problem: Pain  Goal: Verbalizes/displays adequate comfort level or baseline comfort level  Outcome: Progressing     Problem: Chronic Conditions and Co-morbidities  Goal: Patient's chronic conditions and co-morbidity symptoms are monitored and maintained or improved  Outcome: Progressing     Problem: ABCDS Injury Assessment  Goal: Absence of physical injury  Outcome: Progressing     Problem: Respiratory - Adult  Goal: Achieves optimal ventilation and oxygenation  Outcome: Progressing     Problem: Skin/Tissue Integrity - Adult  Goal: Skin integrity remains intact  Outcome: Progressing     Problem: Musculoskeletal - Adult  Goal: Return mobility to safest level of function  Outcome: Progressing

## 2025-06-09 NOTE — PLAN OF CARE
Problem: Discharge Planning  Goal: Discharge to home or other facility with appropriate resources  6/8/2025 2343 by Arely Valderrama RN  Outcome: Progressing  6/8/2025 1202 by Melissa Carl RN  Outcome: Progressing  6/8/2025 1005 by Melissa Carl RN  Outcome: Progressing     Problem: Safety - Adult  Goal: Free from fall injury  6/8/2025 2343 by Arely Valderrama RN  Outcome: Progressing  6/8/2025 1202 by Melissa Carl RN  Outcome: Progressing  6/8/2025 1005 by Melissa Carl RN  Outcome: Progressing     Problem: Pain  Goal: Verbalizes/displays adequate comfort level or baseline comfort level  6/8/2025 2343 by Arely Valderrama RN  Outcome: Progressing  6/8/2025 1202 by Melissa Carl RN  Outcome: Progressing  6/8/2025 1005 by Melissa Carl RN  Outcome: Progressing

## 2025-06-09 NOTE — FLOWSHEET NOTE
06/08/25 2122   Vital Signs   Temp 97.7 °F (36.5 °C)   Temp Source Oral   Pulse 86   Heart Rate Source Monitor   Respirations 16   /74   MAP (Calculated) 89   BP Location Right upper arm   Pain Assessment   Pain Assessment 0-10   Pain Level 3   Pain Location Hip   Pain Orientation Left   Pain Descriptors Aching;Discomfort   Functional Pain Assessment Activities are not prevented   Pain Type Surgical pain   Non-Pharmaceutical Pain Intervention(s) Rest   Opioid-Induced Sedation   POSS Score 1   Oxygen Therapy   SpO2 94 %   O2 Device Nasal cannula   O2 Flow Rate (L/min) 1 L/min     Pt awake/alert and oriented times 4. Tylenol and other scheduled meds given at this time. Pain level 3/10 at this time. Pt sitting up drinking coffee, no other needs voiced. Arely Valderrama RN

## 2025-06-09 NOTE — FLOWSHEET NOTE
06/08/25 2222   Vital Signs   Respirations 16   Pain Assessment   Pain Assessment 0-10   Pain Level 8   Pain Location Hip   Pain Orientation Left   Pain Descriptors Aching;Discomfort   Functional Pain Assessment Activities are not prevented   Pain Type Surgical pain   Non-Pharmaceutical Pain Intervention(s) Rest   Opioid-Induced Sedation   POSS Score 1     Pt back to bed, Oxycodone 10 mg po given for left hip pain. Bilat guido hose removed at this time per order. Bed alarm on. No other needs voiced. Arely Valderrama RN

## 2025-06-10 ENCOUNTER — APPOINTMENT (OUTPATIENT)
Dept: CT IMAGING | Age: 65
DRG: 324 | End: 2025-06-10
Attending: ORTHOPAEDIC SURGERY
Payer: MEDICAID

## 2025-06-10 ENCOUNTER — APPOINTMENT (OUTPATIENT)
Dept: GENERAL RADIOLOGY | Age: 65
DRG: 324 | End: 2025-06-10
Attending: ORTHOPAEDIC SURGERY
Payer: MEDICAID

## 2025-06-10 PROBLEM — R20.0 LEFT SIDED NUMBNESS: Status: ACTIVE | Noted: 2025-06-10

## 2025-06-10 LAB
ALBUMIN SERPL-MCNC: 2.9 G/DL (ref 3.4–5)
ANION GAP SERPL CALCULATED.3IONS-SCNC: 10 MMOL/L (ref 3–16)
BASE EXCESS BLDV CALC-SCNC: 3.5 MMOL/L (ref -3–3)
BASOPHILS # BLD: 0 K/UL (ref 0–0.2)
BASOPHILS NFR BLD: 0.6 %
BUN SERPL-MCNC: 15 MG/DL (ref 7–20)
CALCIUM SERPL-MCNC: 8.4 MG/DL (ref 8.3–10.6)
CHLORIDE SERPL-SCNC: 101 MMOL/L (ref 99–110)
CO2 BLDV-SCNC: 31 MMOL/L
CO2 SERPL-SCNC: 28 MMOL/L (ref 21–32)
COHGB MFR BLDV: 1.1 % (ref 0–1.5)
CREAT SERPL-MCNC: 0.8 MG/DL (ref 0.8–1.3)
DEPRECATED RDW RBC AUTO: 16.7 % (ref 12.4–15.4)
EOSINOPHIL # BLD: 0 K/UL (ref 0–0.6)
EOSINOPHIL NFR BLD: 0.3 %
GFR SERPLBLD CREATININE-BSD FMLA CKD-EPI: >90 ML/MIN/{1.73_M2}
GLUCOSE BLD-MCNC: 144 MG/DL (ref 70–99)
GLUCOSE SERPL-MCNC: 136 MG/DL (ref 70–99)
HCO3 BLDV-SCNC: 29.7 MMOL/L (ref 23–29)
HCT VFR BLD AUTO: 30.1 % (ref 40.5–52.5)
HGB BLD-MCNC: 10.2 G/DL (ref 13.5–17.5)
LYMPHOCYTES # BLD: 0.3 K/UL (ref 1–5.1)
LYMPHOCYTES NFR BLD: 3.9 %
MAGNESIUM SERPL-MCNC: 2.41 MG/DL (ref 1.8–2.4)
MCH RBC QN AUTO: 30.1 PG (ref 26–34)
MCHC RBC AUTO-ENTMCNC: 34.1 G/DL (ref 31–36)
MCV RBC AUTO: 88.3 FL (ref 80–100)
METHGB MFR BLDV: 0 %
MONOCYTES # BLD: 0.6 K/UL (ref 0–1.3)
MONOCYTES NFR BLD: 7.8 %
NEUTROPHILS # BLD: 7 K/UL (ref 1.7–7.7)
NEUTROPHILS NFR BLD: 87.4 %
O2 CT VFR BLDV CALC: 11 VOL %
O2 THERAPY: ABNORMAL
PCO2 BLDV: 53.2 MMHG (ref 40–50)
PERFORMED ON: ABNORMAL
PH BLDV: 7.37 [PH] (ref 7.35–7.45)
PHOSPHATE SERPL-MCNC: 3.4 MG/DL (ref 2.5–4.9)
PLATELET # BLD AUTO: 150 K/UL (ref 135–450)
PMV BLD AUTO: 8.1 FL (ref 5–10.5)
PO2 BLDV: 38.3 MMHG (ref 25–40)
POTASSIUM SERPL-SCNC: 4.6 MMOL/L (ref 3.5–5.1)
RBC # BLD AUTO: 3.4 M/UL (ref 4.2–5.9)
SAO2 % BLDV: 70 %
SODIUM SERPL-SCNC: 139 MMOL/L (ref 136–145)
WBC # BLD AUTO: 8 K/UL (ref 4–11)

## 2025-06-10 PROCEDURE — 97535 SELF CARE MNGMENT TRAINING: CPT

## 2025-06-10 PROCEDURE — 92610 EVALUATE SWALLOWING FUNCTION: CPT

## 2025-06-10 PROCEDURE — 97530 THERAPEUTIC ACTIVITIES: CPT

## 2025-06-10 PROCEDURE — 85025 COMPLETE CBC W/AUTO DIFF WBC: CPT

## 2025-06-10 PROCEDURE — 6370000000 HC RX 637 (ALT 250 FOR IP): Performed by: ORTHOPAEDIC SURGERY

## 2025-06-10 PROCEDURE — 71045 X-RAY EXAM CHEST 1 VIEW: CPT

## 2025-06-10 PROCEDURE — 6360000004 HC RX CONTRAST MEDICATION: Performed by: INTERNAL MEDICINE

## 2025-06-10 PROCEDURE — 97116 GAIT TRAINING THERAPY: CPT

## 2025-06-10 PROCEDURE — 70496 CT ANGIOGRAPHY HEAD: CPT

## 2025-06-10 PROCEDURE — 97110 THERAPEUTIC EXERCISES: CPT

## 2025-06-10 PROCEDURE — 94640 AIRWAY INHALATION TREATMENT: CPT

## 2025-06-10 PROCEDURE — 80069 RENAL FUNCTION PANEL: CPT

## 2025-06-10 PROCEDURE — 36415 COLL VENOUS BLD VENIPUNCTURE: CPT

## 2025-06-10 PROCEDURE — 94761 N-INVAS EAR/PLS OXIMETRY MLT: CPT

## 2025-06-10 PROCEDURE — 6360000002 HC RX W HCPCS: Performed by: ORTHOPAEDIC SURGERY

## 2025-06-10 PROCEDURE — 2500000003 HC RX 250 WO HCPCS: Performed by: ORTHOPAEDIC SURGERY

## 2025-06-10 PROCEDURE — 99024 POSTOP FOLLOW-UP VISIT: CPT | Performed by: PHYSICIAN ASSISTANT

## 2025-06-10 PROCEDURE — 1200000000 HC SEMI PRIVATE

## 2025-06-10 PROCEDURE — 82803 BLOOD GASES ANY COMBINATION: CPT

## 2025-06-10 PROCEDURE — 83735 ASSAY OF MAGNESIUM: CPT

## 2025-06-10 PROCEDURE — 99233 SBSQ HOSP IP/OBS HIGH 50: CPT | Performed by: INTERNAL MEDICINE

## 2025-06-10 PROCEDURE — 2700000000 HC OXYGEN THERAPY PER DAY

## 2025-06-10 PROCEDURE — 70450 CT HEAD/BRAIN W/O DYE: CPT

## 2025-06-10 RX ORDER — IOPAMIDOL 755 MG/ML
75 INJECTION, SOLUTION INTRAVASCULAR
Status: COMPLETED | OUTPATIENT
Start: 2025-06-10 | End: 2025-06-10

## 2025-06-10 RX ADMIN — DIVALPROEX SODIUM 1000 MG: 500 TABLET, DELAYED RELEASE ORAL at 17:16

## 2025-06-10 RX ADMIN — ALBUTEROL SULFATE 2 PUFF: 90 AEROSOL, METERED RESPIRATORY (INHALATION) at 11:29

## 2025-06-10 RX ADMIN — GABAPENTIN 400 MG: 400 CAPSULE ORAL at 20:18

## 2025-06-10 RX ADMIN — ALBUTEROL SULFATE 2.5 MG: 0.83 SOLUTION RESPIRATORY (INHALATION) at 20:21

## 2025-06-10 RX ADMIN — ACETAMINOPHEN 650 MG: 325 TABLET ORAL at 20:18

## 2025-06-10 RX ADMIN — SODIUM CHLORIDE, PRESERVATIVE FREE 10 ML: 5 INJECTION INTRAVENOUS at 09:00

## 2025-06-10 RX ADMIN — TIOTROPIUM BROMIDE INHALATION SPRAY 2 PUFF: 3.12 SPRAY, METERED RESPIRATORY (INHALATION) at 11:29

## 2025-06-10 RX ADMIN — QUETIAPINE FUMARATE 300 MG: 200 TABLET ORAL at 20:18

## 2025-06-10 RX ADMIN — ACETAMINOPHEN 650 MG: 325 TABLET ORAL at 17:16

## 2025-06-10 RX ADMIN — BUDESONIDE AND FORMOTEROL FUMARATE DIHYDRATE 2 PUFF: 160; 4.5 AEROSOL RESPIRATORY (INHALATION) at 20:21

## 2025-06-10 RX ADMIN — OXYCODONE 10 MG: 5 TABLET ORAL at 20:18

## 2025-06-10 RX ADMIN — SODIUM CHLORIDE, PRESERVATIVE FREE 10 ML: 5 INJECTION INTRAVENOUS at 20:42

## 2025-06-10 RX ADMIN — OXYCODONE 10 MG: 5 TABLET ORAL at 00:48

## 2025-06-10 RX ADMIN — GABAPENTIN 400 MG: 400 CAPSULE ORAL at 17:16

## 2025-06-10 RX ADMIN — BUDESONIDE AND FORMOTEROL FUMARATE DIHYDRATE 2 PUFF: 160; 4.5 AEROSOL RESPIRATORY (INHALATION) at 11:28

## 2025-06-10 RX ADMIN — IOPAMIDOL 75 ML: 755 INJECTION, SOLUTION INTRAVENOUS at 10:41

## 2025-06-10 RX ADMIN — CARVEDILOL 3.12 MG: 3.12 TABLET, FILM COATED ORAL at 17:16

## 2025-06-10 ASSESSMENT — PAIN DESCRIPTION - DESCRIPTORS
DESCRIPTORS: ACHING;DISCOMFORT
DESCRIPTORS: THROBBING;BURNING

## 2025-06-10 ASSESSMENT — PAIN SCALES - GENERAL
PAINLEVEL_OUTOF10: 7
PAINLEVEL_OUTOF10: 0
PAINLEVEL_OUTOF10: 0
PAINLEVEL_OUTOF10: 7
PAINLEVEL_OUTOF10: 0

## 2025-06-10 ASSESSMENT — PAIN DESCRIPTION - ORIENTATION
ORIENTATION: LEFT
ORIENTATION: LEFT

## 2025-06-10 ASSESSMENT — PAIN DESCRIPTION - LOCATION
LOCATION: HIP
LOCATION: HIP

## 2025-06-10 ASSESSMENT — PAIN - FUNCTIONAL ASSESSMENT
PAIN_FUNCTIONAL_ASSESSMENT: ACTIVITIES ARE NOT PREVENTED
PAIN_FUNCTIONAL_ASSESSMENT: ACTIVITIES ARE NOT PREVENTED

## 2025-06-10 ASSESSMENT — PAIN DESCRIPTION - PAIN TYPE: TYPE: SURGICAL PAIN

## 2025-06-10 ASSESSMENT — PAIN DESCRIPTION - ONSET: ONSET: ON-GOING

## 2025-06-10 ASSESSMENT — PAIN DESCRIPTION - FREQUENCY: FREQUENCY: CONTINUOUS

## 2025-06-10 NOTE — PROGRESS NOTES
Speech Language Pathology  Swallowing Disorders and Dysphagia  Clinical Bedside Swallow Assessment  Facility/Department: [address] MEDICAL-SURGICAL    Instrumentation: Not clinically indicated at this time  Diet recommendation: IDDSI 7 Regular Solids; IDDSI 0 Thin Liquids; Meds PO as tolerated  Risk management: upright for all intake, stay upright for at least 30 mins after intake, small bites/sips, oral care 2-3x/day to reduce adverse affects in the event of aspiration, increase physical mobility as able, slow rate of intake, general aspiration precautions, and hold PO and contact SLP if s/s of aspiration or worsening respiratory status develop.      NAME:Scot Hernandez  : 1960 (65 y.o.)   MRN: [id number]  [facility]  ADMISSION DATE: 2025  No chief complaint on file.    Past Medical History:   Diagnosis Date    AAA (abdominal aortic aneurysm) 2018    3.0 cm    Anxiety     Arterial stent thrombosis     Arthritis     Asthma     Bipolar 1 disorder (HCC)     COPD (chronic obstructive pulmonary disease) ([facility])     Coronary artery disease involving native coronary artery of native heart without angina pectoris 2021    Diabetes mellitus ([facility])     Essential hypertension 2021    Hyperlipidemia     Pneumonia      Past Surgical History:   Procedure Laterality Date    ABDOMINAL AORTIC ANEURYSM REPAIR, ENDOVASCULAR N/A 2024    Endovascular Aortic Aneurysm Repair performed by Lore Cabello MD at [facility] OR    CARDIAC PROCEDURE N/A 7/15/2024    Left heart cath / coronary angiography performed by En Mccann MD at [facility] CARDIAC CATH LAB    CARDIAC SURGERY      stent placed    CARPAL TUNNEL RELEASE      CHOLECYSTECTOMY, LAPAROSCOPIC      COLONOSCOPY N/A 2023    COLONOSCOPY POLYPECTOMY SNARE/COLD BIOPSY performed by Mary Pozo MD at [facility] ENDOSCOPY    CYSTOSCOPY N/A 2024    CYSTOSCOPY URETHRAL DILATION performed by Salazar Sevilla MD at [facility] OR    KNEE  clinical s/s of aspiration  - Cup: no anterior bolus loss , suspect functional A-P bolus transit, swallow timing subjectively appears timely, oral clearance grossly WFL, and no clinical s/s of aspiration  - Straw: no anterior bolus loss , suspect functional A-P bolus transit, swallow timing subjectively appears timely, oral clearance grossly WFL, and no clinical s/s of aspiration    IDDSI 2 (mildly thick): DNT    IDDSI 3 (moderately thick): DNT    IDDSI 4 (puree): no anterior bolus loss , suspect functional A-P bolus transit, swallow timing subjectively appears timely, oral clearance grossly WFL, and no clinical s/s of aspiration    IDDSI 5 (minced and moist): DNT    IDDSI 6 (soft and bite sized): DNT    IDDSI 7 (regular): no anterior bolus loss , suspect functional A-P bolus transit, swallow timing subjectively appears timely, oral clearance grossly WFL, and no clinical s/s of aspiration  TOMASS (Test of Mastication and Swallowing Solids):   Number discrete bites to finish 1 cracker: 2  Number of masticatory cycles: 33  Number of swallow per whole cracker: 2  Total time to complete the whole cracker: 34.46 sec    Results of TOMASS average compared to men his age.      3 oz water: PASS    RR following trials: 16    Impressions:    Patient presents as low aspiration risk and low PNA / adverse pulmonary event risk given the following factors via BOLUS Framework (JOSHUA Gardiner. & Parham, A.H. (2021):  reduced ambulation     No clinical signs of oropharyngeal dysphagia observed with thin liquids puree, and solids. Swallow prognosis is good. Instrumental swallow study is not indicated.  Given lack of clinical s/s of aspiration at bedside, clear chest radiography, and good oral care status, pt is safe for oral diet at this time from SLP standpoint. Consider possibility of transient changes which resulted in Code Stroke being called earlier this day. Await neuro consult. Correlate with MD.     Instrumentation: Not

## 2025-06-10 NOTE — PROGRESS NOTES
Physical Therapy  Inpatient Physical Therapy Treatment    Unit: [facility]  Date:  6/10/2025  Patient Name:    Scot Hernandez  Admitting diagnosis:  Avascular necrosis of femur head, left (HCC) [M87.052]  Avascular necrosis of bone of left hip (HCC) [M87.052]  Admit Date:  6/6/2025  Precautions/Restrictions/WB Status/ Lines/ Wounds/ Oxygen: Fall risk, Bed/chair alarm, and Lines (IV and Supplemental O2 (2L)); Lateral hip precautions- No active abduction, no extension past midline, no external rotation (updated per ortho note 6/9/25)     Treatment Time:  6157-4467  Treatment Number:  7   Timed Code Treatment Minutes: 39 minutes  Total Treatment Minutes:  39  minutes    Patient Stated Goals for Therapy: \" To go to the bathroom \"           Discharge Recommendations: SNF  DME needs for discharge: Needs Met         Therapy recommendation for EMS Transport: can transport by wheelchair     Therapy recommendations for staff:   Assist of 1 for ambulation with use of rolling walker (RW) and gait belt to/from bathroom     History of Present Illness:   Pt is a 65 male who presented this for an elective L MI.        AM-PAC Mobility Score    -PAC Inpatient Mobility Raw Score : 17     Subjective  Patient lying reclined in bed with no family present.  Pt agreeable to this PT session. Yes     Cognition    A&O orientation not directly assessed.    Able to follow 1 step commands     Pain   Yes  Location: L Hip  Rating: mild /10  Pain Medicine Status: No request made     AM-PAC Mobility Score    AM-PAC Inpatient Mobility Raw Score : 17     Activity Tolerance   During therapy session noted pt with SOB/ZAMARRIPA    Pt Position BP (mmHg) HR (bpm) SpO2 (%) on 2L  Comments   Supine at rest 112/69 66 96    Seated at EOB       Standing       End of session         Objective  Does this pt have an acute or acute on chronic diagnosis of CHF? No    Balance  Static Sitting:               SBA  Dynamic Sitting:          SBA      Static Standing:         achieving goals include:              Pt motivated, PLOF, Family Support, and Pt cooperative   Barriers to achieving goals include:               Complexity of condition, Pain, Weakness, and Impaired cognition    Plan    To be seen 5-7 x/ week  while in acute care setting for therapeutic exercises/activities, bed mobility training, functional transfer training, family/patient education, balance training, neuromuscular reeducation , gait training, and stair training.    Signature: Alex Tompkins PT, DPT     If patient discharges from this facility prior to next visit, this note will serve as the Discharge Summary.    CHF Education  N/A

## 2025-06-10 NOTE — SIGNIFICANT EVENT
Called to code stroke. Patient noted to have difficulty moving his left leg and noted numbness and tingling to his left leg also seemed to be a little off, seemed to not be following commands well. Last dose of pain medication around midnight. Symptoms started around 930 this am. Patient of note has had recent surgery to the left hip. However patient then noted he also had numbness and tingling of the left arm. Code stroke was called. Came to assess the patient. At time of my exam patient able to lift all extremities, denies numbness or tingling. He has already taken aspirin today.     Blood sugar checked  CT head  CTA head and neck  CBC, BMP, VBG  Results appear negative    Neurology consulted for further assessment    Patient also noted to have some concern for dysphagia this am, SLP Consulted.   Repeat CXR obtained - no acute process

## 2025-06-10 NOTE — PROGRESS NOTES
Physical Therapy    PT attempted to see patient this morning, chart reviewed, RN cleared. Upon entering pt declining therapy at this time stating, \"My hip is hurting and jerking. My leg was trying to give out earlier and I don't feel like doing anything with it right now. I just want to rest.\" PT attempted to edu pt on OOB mobility and importance of therapy but pt continues to decline at this time and asks therapy to come back later. PT will continue to follow up as pt is compliant, medically appropriate, and schedule allows. Thank you.     Fadi Tompkins, PT, DPT.

## 2025-06-10 NOTE — FLOWSHEET NOTE
06/10/25 1015   Vital Signs   Temp 97.7 °F (36.5 °C)   Temp Source Oral   Pulse 66   Heart Rate Source Monitor   Respirations 16   /79   MAP (Calculated) 98   BP Location Right upper arm   BP Method Automatic   Patient Position Semi fowlers   Pain Assessment   Pain Assessment None - Denies Pain   Pain Level 0   Opioid-Induced Sedation   POSS Score 1   Oxygen Therapy   SpO2 96 %   O2 Device Nasal cannula   O2 Flow Rate (L/min) 2 L/min         Bedside Mobility Assessment Tool (BMAT):     Assessment Level 1- Sit and Shake    1. From a semi-reclined position, ask patient to sit up and rotate to a seated position at the side of the bed. Can use the bedrail.    2. Ask patient to reach out and grab your hand and shake making sure patient reaches across his/her midline.   Pass- Patient is able to come to a seated position, maintain core strength. Maintains seated balance while reaching across midline. Move on to Assessment Level 2.     Assessment Level 2- Stretch and Point   1. With patient in seated position at the side of the bed, have patient place both feet on the floor (or stool) with knees no higher than hips.    2. Ask patient to stretch one leg and straighten the knee, then bend the ankle/flex and point the toes. If appropriate, repeat with the other leg.   Pass- Patient is able to demonstrate appropriate quad strength on intended weight bearing limb(s). Move onto Assessment Level 3.     Assessment Level 3- Stand   1. Ask patient to elevate off the bed or chair (seated to standing) using an assistive device (cane, bedrail).    2. Patient should be able to raise buttocks off be and hold for a count of five. May repeat once.   Pass- Patient maintains standing stability for at least 5 seconds, proceed to assessment level 4.    Assessment Level 4- Walk   1. Ask patient to march in place at bedside.    2. Then ask patient to advance step and return each foot. Some medical conditions may render a patient from

## 2025-06-10 NOTE — PLAN OF CARE
Problem: Discharge Planning  Goal: Discharge to home or other facility with appropriate resources  6/9/2025 2313 by Arely Valderrama RN  Outcome: Progressing  6/9/2025 1845 by Bonny Ballard RN  Outcome: Progressing     Problem: Safety - Adult  Goal: Free from fall injury  6/9/2025 2313 by Arely Valderrama RN  Outcome: Progressing  6/9/2025 1845 by Bonny Ballard RN  Outcome: Progressing

## 2025-06-10 NOTE — FLOWSHEET NOTE
06/09/25 2034   Vital Signs   Temp 98.4 °F (36.9 °C)   Temp Source Oral   Pulse 68   Heart Rate Source Monitor   Respirations 16   BP 96/61   MAP (Calculated) 73   BP Location Right upper arm   Oxygen Therapy   SpO2 94 %   O2 Device Nasal cannula   O2 Flow Rate (L/min) 3 L/min     Pt c/o left hip pain. Oxycodone 2 po given. Arely Valderrama RN

## 2025-06-10 NOTE — FLOWSHEET NOTE
06/10/25 0420   Vital Signs   Temp 97.7 °F (36.5 °C)   Temp Source Axillary   Pulse 60   Heart Rate Source Monitor   Respirations 16   BP 92/61   MAP (Calculated) 71   BP Location Left upper arm   Pain Assessment   Pain Assessment None - Denies Pain   Opioid-Induced Sedation   POSS Score S   Oxygen Therapy   SpO2 98 %   O2 Device Nasal cannula   O2 Flow Rate (L/min) 2.5 L/min     Resting in bed, no acute distress. Arely Valderrama, RN

## 2025-06-10 NOTE — PROGRESS NOTES
Inpatient Occupational Therapy Evaluation & Treatment    Unit: [facility]  Date:  6/10/2025  Patient Name:    Scot Hernandez  Admitting diagnosis:  Avascular necrosis of femur head, left (HCC) [M87.052]  Avascular necrosis of bone of left hip (HCC) [M87.052]  Admit Date:  6/6/2025  Precautions/Restrictions/WB Status/ Lines/ Wounds/ Oxygen: Fall risk, Bed/chair alarm, and Lines (IV and Supplemental O2 (2L)); Lateral hip precautions- No active abduction, no extension past midline, no external rotation (updated per ortho note 6/9/25)     Treatment Time:  8431-4104  Treatment Number:  3  Timed Code Treatment Minutes: 29 minutes  Total Treatment Minutes:  29  minutes    Patient Goals for Therapy: \"I need to go to the bathroom\"          Discharge Recommendations: SNF  DME needs for discharge: Defer to facility       Therapy recommendations for staff:   Assist of 1 for transfers with use of rolling walker (RW) and gait belt to/from chair  to/from bathroom  within room    History of Present Illness:  Pt is a 64 y/o male with diagnosis of avascular necrosis of femur head.  LEFT TOTAL HIP ARTHOPLASTY, LATERAL APPROACH on 6/6/25 with Dr. Lopez.     Preadmission Environment:   Pt lives with                                         alone  Home environment:                            apartment   Steps to enter first floor:                     N/A  Steps to second floor/basement:        N/A  Laundry:                                              Laundromat  Bathroom:                                           tub/shower unit and comfort height toilet  Pt sleeps in a:                                     Flat bed  Equipment owned:                              RW, electric scooter, and reacher     Preadmission Status:  Pt able to drive: Yes  Pt fully independent with ADLs: Yes  Pt receives assistance from family for: Independent PTA  Pt independent for functional transfers and utilized Rollator and SPC for mobility in home and Rollator  PM      If patient discharges from this facility prior to next visit, this note will serve as the Discharge Summary

## 2025-06-10 NOTE — PROGRESS NOTES
Pt called staff into room due to \"leaking\" from surgical dressing. Serosanguineous drainage noted under surgical dressing, dressing removed and burst blister like area found to hip under sticky part of dressing.

## 2025-06-10 NOTE — PROGRESS NOTES
IM Progress Note    Admit Date:  6/6/2025    S/p left THR   POD #2    Subjective:  Mr. Hernandez seen. No weakness, numbness or tingling when seen. He is lifting the left leg well.     Objective:   /80   Pulse 67   Temp 97.9 °F (36.6 °C) (Oral)   Resp 18   Ht 1.727 m (5' 8\")   Wt 73.7 kg (162 lb 6.4 oz)   SpO2 95%   BMI 24.69 kg/m²     Intake/Output Summary (Last 24 hours) at 6/10/2025 1953  Last data filed at 6/9/2025 2227  Gross per 24 hour   Intake 960 ml   Output --   Net 960 ml       Physical Exam:  General:  Awake, alert, NAD  Skin:  Warm and dry  Neck:  JVD absent. Neck supple  Chest:  diminished breath sounds, mild rhonchi  Cardiovascular:  RRR ,S1S2 normal  Abdomen:  Soft, non tender, non distended, BS +  Extremities:  No edema.  Intact peripheral pulses. Brisk cap refill, < 2 secs  Neuro: non focal    Medications:   Scheduled Meds:   predniSONE  40 mg Oral Daily    midodrine  10 mg Oral TID WC    QUEtiapine  300 mg Oral Nightly    tamsulosin  0.4 mg Oral Daily    pantoprazole  40 mg Oral Daily    gabapentin  400 mg Oral TID    finasteride  5 mg Oral Daily    carvedilol  3.125 mg Oral BID WC    budesonide-formoterol  2 puff Inhalation BID RT    atorvastatin  80 mg Oral Daily    tiotropium  2 puff Inhalation Daily RT    sodium chloride flush  5-40 mL IntraVENous 2 times per day    acetaminophen  650 mg Oral Q6H    aspirin  325 mg Oral Daily    albuterol sulfate HFA  2 puff Inhalation BID RT    divalproex  500 mg Oral QAM    And    divalproex  1,000 mg Oral QPM       Continuous Infusions:   sodium chloride         Data:  CBC:   Recent Labs     06/10/25  1019   WBC 8.0   RBC 3.40*   HGB 10.2*   HCT 30.1*   MCV 88.3   RDW 16.7*        BMP:   Recent Labs     06/10/25  1019      K 4.6      CO2 28   PHOS 3.4   BUN 15   CREATININE 0.8     BNP: No results for input(s): \"BNP\" in the last 72 hours.  PT/INR:   No results for input(s): \"PROTIME\", \"INR\" in the last 72 hours.    APTT:   No

## 2025-06-10 NOTE — NURSE NAVIGATOR
Met with patient at bedside. Patient is alert/oriented and resting comfortably in bed. Code Stroke had been called earlier today due to patient having difficulty swallowing, left sided weakness, and decreased sensation. Spoke to Stroke Navigator. Testing negative. Symptoms resolved. Neuro consult ordered. Swallow evaluation completed. Akash JOHNSTON aware. Patient states he is feeling better. Mepilex dressing to left hip is dry and intact. Bruising noted to left hip. Ice pack in place. Pain is being managed. Patient denied further needs at this time. Discussed role of orthopedic nurse navigator and provided contact information. Reviewed reasons to call with questions or concerns. Awaiting discharge to SNF. Will follow up with patient tomorrow.  Tari Lozada  Orthopedic Nurse Navigator  Phone number: [phone]

## 2025-06-10 NOTE — CODE DOCUMENTATION
Returned from CT. Pt alert and oriented; conversing appropriately with staff and states he 'feels fine.' Speech clear and moving all extremities. SEB Draper noted pt having trouble swallowing earlier this morning and a chest xray was completed. When checking blood sugar @ 0936, Bonny, RN stated she noticed weaker on left side and pt reported decreased sensation, which was resolved by 10:10 after code stroke called. No call to  stroke team due to symptoms completely resolved; ok per Dr. Zapata.

## 2025-06-10 NOTE — PROGRESS NOTES
Department of Orthopedic Surgery  Physician Assistant   Progress Note    Subjective:     Post op day 4 left total hip   Systemic or Specific Complaints:No Complaints.  Patient states he worked with therapy this morning. Notes that he is having more muscle spasms this morning, he has muscle relaxer as a PRN option but hasn't taken any.  Nursing staff mentioned a new cough that developed after a asipration/chocking event but noted that lung sounds were clear.   On nasal cannula oxygen, baseline is room air.  Tolerating p.o., voiding.  No family at bedside. Awaiting SNF precert.     Objective:     Patient Vitals for the past 24 hrs:   BP Temp Temp src Pulse Resp SpO2   06/10/25 0420 92/61 97.7 °F (36.5 °C) Axillary 60 16 98 %   06/10/25 0118 -- -- -- -- 16 --   06/10/25 0048 -- -- -- -- 16 --   06/09/25 2106 -- -- -- -- 16 --   06/09/25 2043 -- -- -- -- -- 94 %   06/09/25 2034 96/61 98.4 °F (36.9 °C) Oral 68 16 94 %   06/09/25 1730 111/70 -- -- 79 -- --   06/09/25 1345 107/71 97.8 °F (36.6 °C) Oral 73 18 91 %   06/09/25 1025 -- -- -- -- 18 --   06/09/25 0930 (!) 94/56 98 °F (36.7 °C) Oral 83 16 95 %       General: alert, appears stated age, cooperative, and no distress   Wound: Wound clean and dry no evidence of infection., No Erythema, No Drainage, and Positive for Edema   Motion: Painful range of Motion in affected extremity   DVT Exam: No evidence of DVT seen on physical exam.  No cords or calf tenderness.  No significant calf/ankle edema.     Additional exam: Patient seen laying in bed at time of interview  Leg lengths equal, rotational alignment external rotation bilaterally.  Patient is able to correct to neutral alignment with verbal cueing  Thigh swelling as expected, compartments compressible  EHL, FHL, gastroc, and anterior tibialis motor intact  Sensation intact to light touch  DP and PT pulses 2+      Data Review  CBC:   Lab Results   Component Value Date/Time    WBC 8.0 06/07/2025 06:01 AM    RBC 3.93

## 2025-06-11 VITALS
HEIGHT: 68 IN | TEMPERATURE: 98.6 F | BODY MASS INDEX: 24.61 KG/M2 | RESPIRATION RATE: 18 BRPM | SYSTOLIC BLOOD PRESSURE: 111 MMHG | WEIGHT: 162.4 LBS | HEART RATE: 71 BPM | OXYGEN SATURATION: 97 % | DIASTOLIC BLOOD PRESSURE: 62 MMHG

## 2025-06-11 PROBLEM — R20.0 NUMBNESS AND TINGLING OF LEFT LOWER EXTREMITY: Status: ACTIVE | Noted: 2025-06-11

## 2025-06-11 PROBLEM — R20.2 NUMBNESS AND TINGLING OF LEFT LOWER EXTREMITY: Status: ACTIVE | Noted: 2025-06-11

## 2025-06-11 PROCEDURE — 99232 SBSQ HOSP IP/OBS MODERATE 35: CPT | Performed by: INTERNAL MEDICINE

## 2025-06-11 PROCEDURE — 99222 1ST HOSP IP/OBS MODERATE 55: CPT | Performed by: NEUROMUSCULOSKELETAL MEDICINE & OMM

## 2025-06-11 PROCEDURE — 6370000000 HC RX 637 (ALT 250 FOR IP): Performed by: INTERNAL MEDICINE

## 2025-06-11 PROCEDURE — 94640 AIRWAY INHALATION TREATMENT: CPT

## 2025-06-11 PROCEDURE — 6360000002 HC RX W HCPCS: Performed by: ORTHOPAEDIC SURGERY

## 2025-06-11 PROCEDURE — 2700000000 HC OXYGEN THERAPY PER DAY

## 2025-06-11 PROCEDURE — 6360000002 HC RX W HCPCS: Performed by: INTERNAL MEDICINE

## 2025-06-11 PROCEDURE — 2500000003 HC RX 250 WO HCPCS: Performed by: ORTHOPAEDIC SURGERY

## 2025-06-11 PROCEDURE — 94761 N-INVAS EAR/PLS OXIMETRY MLT: CPT

## 2025-06-11 PROCEDURE — 6370000000 HC RX 637 (ALT 250 FOR IP): Performed by: SPECIALIST/TECHNOLOGIST

## 2025-06-11 PROCEDURE — 99024 POSTOP FOLLOW-UP VISIT: CPT | Performed by: PHYSICIAN ASSISTANT

## 2025-06-11 PROCEDURE — 6370000000 HC RX 637 (ALT 250 FOR IP): Performed by: ORTHOPAEDIC SURGERY

## 2025-06-11 PROCEDURE — APPSS45 APP SPLIT SHARED TIME 31-45 MINUTES

## 2025-06-11 RX ORDER — METHOCARBAMOL 750 MG/1
750 TABLET, FILM COATED ORAL EVERY 8 HOURS PRN
Qty: 30 TABLET | Refills: 0 | Status: SHIPPED | OUTPATIENT
Start: 2025-06-11

## 2025-06-11 RX ORDER — ALBUTEROL SULFATE 0.83 MG/ML
2.5 SOLUTION RESPIRATORY (INHALATION) EVERY 6 HOURS PRN
Status: DISCONTINUED | OUTPATIENT
Start: 2025-06-11 | End: 2025-06-11 | Stop reason: ALTCHOICE

## 2025-06-11 RX ORDER — ALBUTEROL SULFATE 0.83 MG/ML
2.5 SOLUTION RESPIRATORY (INHALATION) EVERY 6 HOURS PRN
Status: DISCONTINUED | OUTPATIENT
Start: 2025-06-11 | End: 2025-06-11 | Stop reason: HOSPADM

## 2025-06-11 RX ORDER — OXYCODONE HYDROCHLORIDE 5 MG/1
5 TABLET ORAL EVERY 4 HOURS PRN
Qty: 40 TABLET | Refills: 0 | Status: SHIPPED | OUTPATIENT
Start: 2025-06-11 | End: 2025-06-18

## 2025-06-11 RX ORDER — ALBUTEROL SULFATE 0.83 MG/ML
2.5 SOLUTION RESPIRATORY (INHALATION)
Status: DISCONTINUED | OUTPATIENT
Start: 2025-06-11 | End: 2025-06-11 | Stop reason: HOSPADM

## 2025-06-11 RX ORDER — SENNOSIDES 8.6 MG
325 CAPSULE ORAL DAILY
Qty: 42 TABLET | Refills: 0 | Status: SHIPPED | OUTPATIENT
Start: 2025-06-11

## 2025-06-11 RX ADMIN — GABAPENTIN 400 MG: 400 CAPSULE ORAL at 08:34

## 2025-06-11 RX ADMIN — ALBUTEROL SULFATE 2.5 MG: 0.83 SOLUTION RESPIRATORY (INHALATION) at 15:06

## 2025-06-11 RX ADMIN — FINASTERIDE 5 MG: 5 TABLET, FILM COATED ORAL at 08:34

## 2025-06-11 RX ADMIN — CARVEDILOL 3.12 MG: 3.12 TABLET, FILM COATED ORAL at 15:32

## 2025-06-11 RX ADMIN — MIDODRINE HYDROCHLORIDE 10 MG: 10 TABLET ORAL at 08:34

## 2025-06-11 RX ADMIN — ATORVASTATIN CALCIUM 80 MG: 40 TABLET, FILM COATED ORAL at 08:34

## 2025-06-11 RX ADMIN — TAMSULOSIN HYDROCHLORIDE 0.4 MG: 0.4 CAPSULE ORAL at 08:34

## 2025-06-11 RX ADMIN — PANTOPRAZOLE SODIUM 40 MG: 40 TABLET, DELAYED RELEASE ORAL at 08:34

## 2025-06-11 RX ADMIN — ALBUTEROL SULFATE 2.5 MG: 0.83 SOLUTION RESPIRATORY (INHALATION) at 07:48

## 2025-06-11 RX ADMIN — DIVALPROEX SODIUM 1000 MG: 500 TABLET, DELAYED RELEASE ORAL at 15:32

## 2025-06-11 RX ADMIN — ACETAMINOPHEN 650 MG: 325 TABLET ORAL at 15:32

## 2025-06-11 RX ADMIN — DIVALPROEX SODIUM 500 MG: 500 TABLET, DELAYED RELEASE ORAL at 08:34

## 2025-06-11 RX ADMIN — MIDODRINE HYDROCHLORIDE 10 MG: 10 TABLET ORAL at 15:32

## 2025-06-11 RX ADMIN — TIOTROPIUM BROMIDE INHALATION SPRAY 2 PUFF: 3.12 SPRAY, METERED RESPIRATORY (INHALATION) at 07:50

## 2025-06-11 RX ADMIN — BUDESONIDE AND FORMOTEROL FUMARATE DIHYDRATE 2 PUFF: 160; 4.5 AEROSOL RESPIRATORY (INHALATION) at 07:49

## 2025-06-11 RX ADMIN — GABAPENTIN 400 MG: 400 CAPSULE ORAL at 15:32

## 2025-06-11 RX ADMIN — CARVEDILOL 3.12 MG: 3.12 TABLET, FILM COATED ORAL at 08:34

## 2025-06-11 RX ADMIN — SODIUM CHLORIDE, PRESERVATIVE FREE 10 ML: 5 INJECTION INTRAVENOUS at 08:34

## 2025-06-11 RX ADMIN — ACETAMINOPHEN 650 MG: 325 TABLET ORAL at 02:23

## 2025-06-11 RX ADMIN — PREDNISONE 40 MG: 20 TABLET ORAL at 08:34

## 2025-06-11 RX ADMIN — ASPIRIN 325 MG: 325 TABLET, COATED ORAL at 08:34

## 2025-06-11 RX ADMIN — ACETAMINOPHEN 650 MG: 325 TABLET ORAL at 08:34

## 2025-06-11 ASSESSMENT — PAIN - FUNCTIONAL ASSESSMENT: PAIN_FUNCTIONAL_ASSESSMENT: ACTIVITIES ARE NOT PREVENTED

## 2025-06-11 ASSESSMENT — PAIN DESCRIPTION - LOCATION: LOCATION: HIP

## 2025-06-11 ASSESSMENT — PAIN DESCRIPTION - ONSET: ONSET: ON-GOING

## 2025-06-11 ASSESSMENT — PAIN DESCRIPTION - DESCRIPTORS: DESCRIPTORS: SHARP;BURNING

## 2025-06-11 ASSESSMENT — PAIN SCALES - GENERAL
PAINLEVEL_OUTOF10: 0
PAINLEVEL_OUTOF10: 0
PAINLEVEL_OUTOF10: 7

## 2025-06-11 ASSESSMENT — PAIN DESCRIPTION - FREQUENCY: FREQUENCY: CONTINUOUS

## 2025-06-11 ASSESSMENT — PAIN DESCRIPTION - PAIN TYPE: TYPE: SURGICAL PAIN

## 2025-06-11 ASSESSMENT — PAIN DESCRIPTION - ORIENTATION: ORIENTATION: LEFT

## 2025-06-11 NOTE — PLAN OF CARE
Problem: Discharge Planning  Goal: Discharge to home or other facility with appropriate resources  6/11/2025 0202 by Shanika Alfredo RN  Outcome: Progressing  Flowsheets (Taken 6/10/2025 2007)  Discharge to home or other facility with appropriate resources: Identify barriers to discharge with patient and caregiver  6/10/2025 1738 by Bonny Ballard RN  Outcome: Progressing     Problem: Safety - Adult  Goal: Free from fall injury  6/11/2025 0202 by Shanika Alfredo RN  Outcome: Progressing  6/10/2025 1738 by Bonny Ballard RN  Outcome: Progressing     Problem: Pain  Goal: Verbalizes/displays adequate comfort level or baseline comfort level  Recent Flowsheet Documentation  Taken 6/10/2025 2007 by Shanika Alfredo RN  Verbalizes/displays adequate comfort level or baseline comfort level: Encourage patient to monitor pain and request assistance  6/10/2025 1738 by Bonny Ballard RN  Outcome: Progressing     Problem: Chronic Conditions and Co-morbidities  Goal: Patient's chronic conditions and co-morbidity symptoms are monitored and maintained or improved  Recent Flowsheet Documentation  Taken 6/10/2025 2007 by Shanika Alfredo RN  Care Plan - Patient's Chronic Conditions and Co-Morbidity Symptoms are Monitored and Maintained or Improved: Monitor and assess patient's chronic conditions and comorbid symptoms for stability, deterioration, or improvement  6/10/2025 1738 by Bonny Ballard RN  Outcome: Progressing     Problem: ABCDS Injury Assessment  Goal: Absence of physical injury  Recent Flowsheet Documentation  Taken 6/11/2025 0200 by Shanika Alfredo RN  Absence of Physical Injury: Implement safety measures based on patient assessment  6/10/2025 1738 by Bonny Ballard RN  Outcome: Progressing     Problem: Respiratory - Adult  Goal: Achieves optimal ventilation and oxygenation  Recent Flowsheet Documentation  Taken 6/10/2025 2007 by Shanika Alfredo RN  Achieves optimal ventilation and oxygenation: Assess for  breakdown  6/10/2025 1738 by Bonny Ballard, RN  Outcome: Progressing

## 2025-06-11 NOTE — FLOWSHEET NOTE
06/11/25 0830   Vital Signs   Temp 97.3 °F (36.3 °C)   Temp Source Oral   Pulse 63   Heart Rate Source Monitor   Respirations 18   /64   MAP (Calculated) 78   BP Location Left upper arm   BP Method Automatic   Patient Position Semi wlers   Pain Assessment   Pain Assessment None - Denies Pain   Pain Level 0   Opioid-Induced Sedation   POSS Score 1   Oxygen Therapy   SpO2 95 %   O2 Device Nasal cannula   O2 Flow Rate (L/min) 1 L/min     AM assessment completed. No complaints of pain or discomfort voiced. No signs or symptoms of distress noted. Patient tolerated AM medications well. Respirations easy and even. Bed in lowest position, bed alarm in place and functioning properly, bed rails x2 up,  Call light within reach.     Bedside Mobility Assessment Tool (BMAT):     Assessment Level 1- Sit and Shake    1. From a semi-reclined position, ask patient to sit up and rotate to a seated position at the side of the bed. Can use the bedrail.    2. Ask patient to reach out and grab your hand and shake making sure patient reaches across his/her midline.   Pass- Patient is able to come to a seated position, maintain core strength. Maintains seated balance while reaching across midline. Move on to Assessment Level 2.     Assessment Level 2- Stretch and Point   1. With patient in seated position at the side of the bed, have patient place both feet on the floor (or stool) with knees no higher than hips.    2. Ask patient to stretch one leg and straighten the knee, then bend the ankle/flex and point the toes. If appropriate, repeat with the other leg.   Pass- Patient is able to demonstrate appropriate quad strength on intended weight bearing limb(s). Move onto Assessment Level 3.     Assessment Level 3- Stand   1. Ask patient to elevate off the bed or chair (seated to standing) using an assistive device (cane, bedrail).    2. Patient should be able to raise buttocks off be and hold for a count of five. May repeat once.    Pass- Patient maintains standing stability for at least 5 seconds, proceed to assessment level 4.    Assessment Level 4- Walk   1. Ask patient to march in place at bedside.    2. Then ask patient to advance step and return each foot. Some medical conditions may render a patient from stepping backwards, use your best clinical judgement.   Fail- Patient not able to complete tasks OR requires use of assistive device. Patient is MOBILITY LEVEL 3.       Mobility Level- 3

## 2025-06-11 NOTE — NURSE NAVIGATOR
Met with patient at bedside. Patient alert/oriented and resting comfortably in bed. Oxygen at 1L via NC. Mepilex dressing to left hip is dry and intact. Mild bruising and swelling noted. Wound care evaluated patient for a ruptured blister under dressing and adjacent to incision. Reported incision to be well approximated. Patient states pain has improved. Awaiting neuro consult/clearance. Plan to discharge to SNF once clearance is obtained. Discussed role of nurse navigator and provided contact information. Reviewed reasons to call with questions or concerns. Will follow up with call to patient tomorrow.    Tari Lozada  Orthopedic Nurse Navigator  Phone number: [phone]

## 2025-06-11 NOTE — CARE COORDINATION
DISCHARGE ORDER  Date/Time 2025 3:19 PM  Completed by: Adama Blake RN, Case Management    Patient Name: cSot Hernandez    : 1960      Admit order Date and Status:25 IP  Noted discharge order. (verify MD's last order for status of admission/Traditional Medicare 3 [place] Inpatient qualifying stay required for SNF)    Confirmed discharge plan with:              Patient:  Yes              When pt confirms DC plan does any support person need to be contacted by CM No pt will notify friend                      Discharge to Facility: [facility]   Facility phone number for staff giving report: [phone]   Pre-certification completed: yes able to accept today per [facility] Exemption Notification (HENS) completed: yes   Discharge orders and Continuity of Care faxed to facility:  [facility]      Transportation:               Medical Transport explained with choice list offered to pt/family.                Choice:(no preference)  Agency used: [facility]    time:   1715      Pt/family/Nursing/Facility aware of  time: 1715  Yes Names: AdamaScot, Alejandro, Bonny, RICKEY  Ambulance form completed:  yes:      Date Last IMM Given: na    Comments:    Pt is being d/c'd to [facility] today. Pt's O2 sats are 97% on 1L.    Discharge timeout done with Pt,RN,CM. All discharge needs and concerns addressed.    Discharging nurse to complete CELESTINE, reconcile AVS, and place final copy with patient's discharge packet. Discharging RN to ensure that written prescriptions for  Level II medications are sent with patient to the facility as per protocol.

## 2025-06-11 NOTE — PROGRESS NOTES
Transferred care to SEB Draper. Face to face bedside report given, no need voiced at this time. Pt in bed with eyes closed.  No signs of distress noted.  Call light within reach.   Denies needs.

## 2025-06-11 NOTE — WOUND CARE
Wound Referral Progress Note       NAME:  Scot Hernandez  MEDICAL RECORD NUMBER:  [id number]  AGE: 65 y.o.   GENDER: male  : 1960  TODAY'S DATE:  2025    Subjective Pt sleeping, awakens easily, drowsy   Reason for WOCN Evaluation and Assessment: Left hip      Scot Hernandez is a 65 y.o. male referred by:   Nursing    Wound Identification:  Wound Type: Omayra-incisional skin  Contributing Factors: shear force    Pt seen for wound care.  Pt is s/p LEFT TOTAL HIP ARTHOPLASTY, LATERAL APPROACH on  per Dr Lopez.  Pt to be discharged today.  Staff RN expresses concern with open skin near incision.  Ortho has signed off while inpatient.  Incision remains well approximated.  Adjacent skin is ecchymotic, a blister formed and ruptured.  Wound base is dusky, red and dry.  Staff RN states area was not there previously.  No signs of infection    Patient Care Goal:  Wound Healing        PAST MEDICAL HISTORY        Diagnosis Date    AAA (abdominal aortic aneurysm) 2018    3.0 cm    Anxiety     Arterial stent thrombosis     Arthritis     Asthma     Bipolar 1 disorder (HCC)     COPD (chronic obstructive pulmonary disease) (HCC)     Coronary artery disease involving native coronary artery of native heart without angina pectoris 2021    Diabetes mellitus (HCC)     Essential hypertension 2021    Hyperlipidemia     Pneumonia        PAST SURGICAL HISTORY    Past Surgical History:   Procedure Laterality Date    ABDOMINAL AORTIC ANEURYSM REPAIR, ENDOVASCULAR N/A 2024    Endovascular Aortic Aneurysm Repair performed by Lore Cabello MD at [facility] OR    CARDIAC PROCEDURE N/A 7/15/2024    Left heart cath / coronary angiography performed by En Mccann MD at [facility] CARDIAC CATH LAB    CARDIAC SURGERY      stent placed    CARPAL TUNNEL RELEASE      CHOLECYSTECTOMY, LAPAROSCOPIC      COLONOSCOPY N/A 2023    COLONOSCOPY POLYPECTOMY SNARE/COLD BIOPSY performed by Mary Pozo MD at [facility]

## 2025-06-11 NOTE — CONSULTS
Inpatient Neurology consult        [facility] Neurology      Scot Hernandez  1960    Date of Service: 6/11/2025    Referring Physician: Angel Lopez MD      Reason for the consult and CC: Stroke alert called     HPI:   The patient is a 65 y.o. male with a past medical history of bipolar disorder, anxiety, diabetes, hypertension, hyperlipidemia, CAD, COPD, and left total hip replacement approximately 5 days ago.  Yesterday morning (6/10) around 1109, patient was ambulating to the restroom and back when he began to develop numbness/tingling in his left leg and increased weakness.  Patient reports symptoms were fleeting and only lasted a couple of minutes.  Patient reported to staff members that he was having some numbness and tingling in his left arm as well and staff became concerned for possible stroke.  Patient denies any slurred speech, dizziness, blurred vision, double vision, headache, or palpitations.  Neurology has been consulted for further evaluation and management of possible stroke.       Constitutional:   Vitals:    06/11/25 1207 06/11/25 1356 06/11/25 1507 06/11/25 1530   BP: 132/75 113/72  111/62   Pulse: 62 64 80 71   Resp:  18 18    Temp:  98.6 °F (37 °C)     TempSrc:  Oral     SpO2:  95% 97%    Weight:       Height:         I personally reviewed and updated social history, past medical history, medications, allergy, surgical history, and family history as documented in the patient's electronic health records.     ROS: 10-14 ROS reviewed with the patient/nurse/family which were unremarkable except mentioned in H&P.    General appearance:  Normal development and appear in no acute distress.   Mental Status:   Orientation to person, place, time, situation  Memory good immediate recall and intact remote memory    Attention intact as able to attend well to the exam     Language fluent in conversation   Comprehension intact; follows simple commands    Cranial Nerves:   II: Visual fields:  abnormality.  No flow limiting stenosis or occlusion within the head or neck.    Impression:  Left lower extremity numbness/tingling  Patient is alert and oriented in no acute distress.  No focal neurological deficits noted on examination.  Patient reports symptoms were fleeting lasting approximately 3 to 5 minutes and believes secondary to overexerting self after recent left total hip replacement.  Patient denies any left upper extremity involvement at time of onset or currently.     Recommendation:  No further indication for neurological examination at this time  Neurology will sign off no other concerns    Thank you for referring such patient. If you have any questions regarding my consult note, please don't hesitate to call me.     Time spent with patient today 55 minutes.     SHANTHI Kapadia - MAINOR

## 2025-06-11 NOTE — PROGRESS NOTES
RT Inhaler-Nebulizer Bronchodilator Protocol Note    There is a bronchodilator order in the chart from a provider indicating to follow the RT Bronchodilator Protocol and there is an “Initiate RT Inhaler-Nebulizer Bronchodilator Protocol” order as well (see protocol at bottom of note).    CXR Findings:  XR CHEST PORTABLE  Result Date: 6/10/2025  1. No acute cardiopulmonary process. 2. Hyperinflation.       The findings from the last RT Protocol Assessment were as follows:   History Pulmonary Disease: Chronic pulmonary disease  Respiratory Pattern: Regular pattern and RR 12-20 bpm  Breath Sounds: Intermittent or unilateral wheezes  Cough: Strong, spontaneous, non-productive  Indication for Bronchodilator Therapy: Wheezing associated with pulm disorder  Bronchodilator Assessment Score: 6    Aerosolized bronchodilator medication orders have been revised according to the RT Inhaler-Nebulizer Bronchodilator Protocol below.    Respiratory Therapist to perform RT Therapy Protocol Assessment initially then follow the protocol.  Repeat RT Therapy Protocol Assessment PRN for score 0-3 or on second treatment, BID, and PRN for scores above 3.    No Indications - adjust the frequency to every 6 hours PRN wheezing or bronchospasm, if no treatments needed after 48 hours then discontinue using Per Protocol order mode.     If indication present, adjust the RT bronchodilator orders based on the Bronchodilator Assessment Score as indicated below.  Use Inhaler orders unless patient has one or more of the following: on home nebulizer, not able to hold breath for 10 seconds, is not alert and oriented, cannot activate and use MDI correctly, or respiratory rate 25 breaths per minute or more, then use the equivalent nebulizer order(s) with same Frequency and PRN reasons based on the score.  If a patient is on this medication at home then do not decrease Frequency below that used at home.    0-3 - enter or revise RT bronchodilator order(s) to  equivalent RT Bronchodilator order with Frequency of every 4 hours PRN for wheezing or increased work of breathing using Per Protocol order mode.        4-6 - enter or revise RT Bronchodilator order(s) to two equivalent RT bronchodilator orders with one order with BID Frequency and one order with Frequency of every 4 hours PRN wheezing or increased work of breathing using Per Protocol order mode.        7-10 - enter or revise RT Bronchodilator order(s) to two equivalent RT bronchodilator orders with one order with TID Frequency and one order with Frequency of every 4 hours PRN wheezing or increased work of breathing using Per Protocol order mode.       11-13 - enter or revise RT Bronchodilator order(s) to one equivalent RT bronchodilator order with QID Frequency and an Albuterol order with Frequency of every 4 hours PRN wheezing or increased work of breathing using Per Protocol order mode.      Greater than 13 - enter or revise RT Bronchodilator order(s) to one equivalent RT bronchodilator order with every 4 hours Frequency and an Albuterol order with Frequency of every 2 hours PRN wheezing or increased work of breathing using Per Protocol order mode.     Electronically signed by Anamika Campbell RCP on 6/10/2025 at 8:27 PM

## 2025-06-11 NOTE — PROGRESS NOTES
Called and gave hand off report to concepción nurse at [facility] ,

## 2025-06-11 NOTE — DISCHARGE SUMMARY
Department of Orthopedic Surgery  Physician Assistant   Discharge Summary    The Scot Hernandez is a 65 y.o. male admitted for left hip osteoarthritis.  Scot Hernandez was admitted to the floor following His recovery in the PACU.     Discharge Diagnosis  left total hip arthroplasty    Current Inpatient Medications    Current Facility-Administered Medications: albuterol (PROVENTIL) (2.5 MG/3ML) 0.083% nebulizer solution 2.5 mg, 2.5 mg, Nebulization, BID RT  predniSONE (DELTASONE) tablet 40 mg, 40 mg, Oral, Daily  midodrine (PROAMATINE) tablet 10 mg, 10 mg, Oral, TID WC  albuterol sulfate HFA (PROVENTIL;VENTOLIN;PROAIR) 108 (90 Base) MCG/ACT inhaler 2 puff, 2 puff, Inhalation, Q4H PRN  QUEtiapine (SEROQUEL) tablet 300 mg, 300 mg, Oral, Nightly  tamsulosin (FLOMAX) capsule 0.4 mg, 0.4 mg, Oral, Daily  pantoprazole (PROTONIX) tablet 40 mg, 40 mg, Oral, Daily  gabapentin (NEURONTIN) capsule 400 mg, 400 mg, Oral, TID  finasteride (PROSCAR) tablet 5 mg, 5 mg, Oral, Daily  carvedilol (COREG) tablet 3.125 mg, 3.125 mg, Oral, BID WC  budesonide-formoterol (SYMBICORT) 160-4.5 MCG/ACT inhaler 2 puff, 2 puff, Inhalation, BID RT  atorvastatin (LIPITOR) tablet 80 mg, 80 mg, Oral, Daily  [DISCONTINUED] budesonide-formoterol (SYMBICORT) 80-4.5 MCG/ACT inhaler 2 puff, 2 puff, Inhalation, BID RT **AND** tiotropium (SPIRIVA RESPIMAT) 2.5 MCG/ACT inhaler 2 puff, 2 puff, Inhalation, Daily RT  sodium chloride flush 0.9 % injection 5-40 mL, 5-40 mL, IntraVENous, 2 times per day  sodium chloride flush 0.9 % injection 5-40 mL, 5-40 mL, IntraVENous, PRN  0.9 % sodium chloride infusion, , IntraVENous, PRN  acetaminophen (TYLENOL) tablet 650 mg, 650 mg, Oral, Q6H  ondansetron (ZOFRAN-ODT) disintegrating tablet 4 mg, 4 mg, Oral, Q8H PRN **OR** ondansetron (ZOFRAN) injection 4 mg, 4 mg, IntraVENous, Q6H PRN  oxyCODONE (ROXICODONE) immediate release tablet 5 mg, 5 mg, Oral, Q4H PRN **OR** oxyCODONE (ROXICODONE) immediate release tablet

## 2025-06-11 NOTE — PROGRESS NOTES
Department of Orthopedic Surgery  Physician Assistant   Progress Note    Subjective:     Post op day 5 left total hip   Systemic or Specific Complaints:No Complaints.  Patient states he is doing much better today notes spasming has reduced and pain is better controlled. He is more tired today but responds appropriately to questions. After significant event yesterday with code stroke being called imaging has been negative thus far but awaiting neurology consultation for clearance or further eval pending their examination.  On nasal cannula oxygen down to 1L via NC, baseline is room air.  Tolerating p.o., voiding.  No family at bedside. We have obtained SNF precert awaiting neurology clearance.     Objective:     Patient Vitals for the past 24 hrs:   BP Temp Temp src Pulse Resp SpO2   06/11/25 0830 107/64 97.3 °F (36.3 °C) Oral 63 18 95 %   06/11/25 0750 -- -- -- 78 18 97 %   06/11/25 0603 95/64 97.7 °F (36.5 °C) Axillary 55 15 100 %   06/10/25 2026 -- -- -- -- -- 97 %   06/10/25 2007 139/77 97.5 °F (36.4 °C) Oral 68 20 99 %   06/10/25 1700 134/80 97.9 °F (36.6 °C) Oral 67 18 95 %   06/10/25 1253 127/76 -- -- 65 -- --       General: alert, appears stated age, cooperative, and no distress   Wound: Wound clean and dry no evidence of infection., No Erythema, No Drainage, and Positive for Edema   Motion: Painful range of Motion in affected extremity   DVT Exam: No evidence of DVT seen on physical exam.  No cords or calf tenderness.  No significant calf/ankle edema.     Additional exam: Patient seen laying in bed at time of interview  Leg lengths equal, rotational alignment external rotation bilaterally.  Patient is able to correct to neutral alignment with verbal cueing  Thigh swelling as expected, compartments compressible  EHL, FHL, gastroc, and anterior tibialis motor intact  Sensation intact to light touch  DP and PT pulses 2+      Data Review  CBC:   Lab Results   Component Value Date/Time    WBC 8.0 06/10/2025  10:19 AM    RBC 3.40 06/10/2025 10:19 AM    HGB 10.2 06/10/2025 10:19 AM    HCT 30.1 06/10/2025 10:19 AM     06/10/2025 10:19 AM       Renal:   Lab Results   Component Value Date/Time     06/10/2025 10:19 AM    K 4.6 06/10/2025 10:19 AM    K 4.3 06/06/2025 06:45 AM     06/10/2025 10:19 AM    CO2 28 06/10/2025 10:19 AM    BUN 15 06/10/2025 10:19 AM    CREATININE 0.8 06/10/2025 10:19 AM    GLUCOSE 136 06/10/2025 10:19 AM    CALCIUM 8.4 06/10/2025 10:19 AM          Assessment:      left total hip arthroplasty, lateral approach  Date of Surgery 6/6/2025 by Dr Angel Lopez    Plan:     1: Weightbearing as tolerated to the left lower extremity with assistive device.  Lateral hip precautions- No active abduction, no extension past midline, no external rotation  2:  Continue Deep venous thrombosis prophylaxis-aspirin 325 mg daily for 6 weeks postop  3:  Continue Pain Control  4:  PT/OT  5:  Two doses IV ancef completed post op   6:  Discharge appropriate from orthopedic standpoint, pending placement and medical stability awaiting SNF Precert.   7: ordered CXR to ensure no aspiration pneumonia or CHF or other pulmonary abnormalities with new onset cough.   8: discussed with patient that if the spasm are bothering him he should try the muscle relaxer to see if that helps to settle that.   9: placed DC order for this afternoon if neurology does not clear for DC then cancel order.     PRESTON Parra

## 2025-06-11 NOTE — PROGRESS NOTES
IM Progress Note    Admit Date:  6/6/2025    S/p left THR   POD #2    Subjective:  Mr. Hernandez seen. No weakness or numbness says he is doing fine.     Objective:   /62   Pulse 71   Temp 98.6 °F (37 °C) (Oral)   Resp 18   Ht 1.727 m (5' 8\")   Wt 73.7 kg (162 lb 6.4 oz)   SpO2 97%   BMI 24.69 kg/m²     Intake/Output Summary (Last 24 hours) at 6/11/2025 1622  Last data filed at 6/11/2025 0042  Gross per 24 hour   Intake 350 ml   Output --   Net 350 ml       Physical Exam:  General:  Awake, alert, NAD  Skin:  Warm and dry  Neck:  JVD absent. Neck supple  Chest:  diminished breath sounds, mild rhonchi  Cardiovascular:  RRR ,S1S2 normal  Abdomen:  Soft, non tender, non distended, BS +  Extremities:  No edema.  Intact peripheral pulses. Brisk cap refill, < 2 secs  Neuro: non focal    Medications:   Scheduled Meds:   albuterol  2.5 mg Nebulization BID RT    predniSONE  40 mg Oral Daily    midodrine  10 mg Oral TID WC    QUEtiapine  300 mg Oral Nightly    tamsulosin  0.4 mg Oral Daily    pantoprazole  40 mg Oral Daily    gabapentin  400 mg Oral TID    finasteride  5 mg Oral Daily    carvedilol  3.125 mg Oral BID WC    budesonide-formoterol  2 puff Inhalation BID RT    atorvastatin  80 mg Oral Daily    tiotropium  2 puff Inhalation Daily RT    sodium chloride flush  5-40 mL IntraVENous 2 times per day    acetaminophen  650 mg Oral Q6H    aspirin  325 mg Oral Daily    divalproex  500 mg Oral QAM    And    divalproex  1,000 mg Oral QPM       Continuous Infusions:   sodium chloride         Data:  CBC:   Recent Labs     06/10/25  1019   WBC 8.0   RBC 3.40*   HGB 10.2*   HCT 30.1*   MCV 88.3   RDW 16.7*        BMP:   Recent Labs     06/10/25  1019      K 4.6      CO2 28   PHOS 3.4   BUN 15   CREATININE 0.8     BNP: No results for input(s): \"BNP\" in the last 72 hours.  PT/INR:   No results for input(s): \"PROTIME\", \"INR\" in the last 72 hours.    APTT:   No results for input(s): \"APTT\" in the last

## 2025-06-11 NOTE — PLAN OF CARE
Problem: Discharge Planning  Goal: Discharge to home or other facility with appropriate resources  6/11/2025 1517 by Bonny Ballard RN  Outcome: Adequate for Discharge  6/11/2025 1029 by Bonny Ballard RN  Outcome: Progressing  6/11/2025 0202 by Shanika Alfredo RN  Outcome: Progressing  Flowsheets (Taken 6/10/2025 2007)  Discharge to home or other facility with appropriate resources: Identify barriers to discharge with patient and caregiver     Problem: Safety - Adult  Goal: Free from fall injury  6/11/2025 1517 by Bonny Ballard RN  Outcome: Adequate for Discharge  6/11/2025 1029 by Bonny Ballard RN  Outcome: Progressing  6/11/2025 0202 by Shanika Alfredo RN  Outcome: Progressing     Problem: Pain  Goal: Verbalizes/displays adequate comfort level or baseline comfort level  6/11/2025 1517 by Bonny Ballard RN  Outcome: Adequate for Discharge  6/11/2025 1029 by Bonny Ballard RN  Outcome: Progressing     Problem: Chronic Conditions and Co-morbidities  Goal: Patient's chronic conditions and co-morbidity symptoms are monitored and maintained or improved  6/11/2025 1517 by Bonny Ballard RN  Outcome: Adequate for Discharge  6/11/2025 1029 by Bonny Ballard RN  Outcome: Progressing     Problem: ABCDS Injury Assessment  Goal: Absence of physical injury  6/11/2025 1517 by Bonny Ballard RN  Outcome: Adequate for Discharge  6/11/2025 1029 by Bonny Ballard RN  Outcome: Progressing  Flowsheets (Taken 6/11/2025 0200 by Shanika Alfredo RN)  Absence of Physical Injury: Implement safety measures based on patient assessment     Problem: Respiratory - Adult  Goal: Achieves optimal ventilation and oxygenation  6/11/2025 1517 by Bonny Ballard RN  Outcome: Adequate for Discharge  6/11/2025 1029 by Bonny Ballard RN  Outcome: Progressing     Problem: Skin/Tissue Integrity - Adult  Goal: Skin integrity remains intact  Description: 1.  Monitor for areas of redness and/or skin breakdown2.  Assess  vascular access sites hourly3.  Every 4-6 hours minimum:  Change oxygen saturation probe site4.  Every 4-6 hours:  If on nasal continuous positive airway pressure, respiratory therapy assess nares and determine need for appliance change or resting period  6/11/2025 1517 by Bonny Ballard RN  Outcome: Adequate for Discharge  6/11/2025 1029 by Bonny Ballard RN  Outcome: Progressing  Flowsheets (Taken 6/11/2025 0200 by Shanika Alfredo, RN)  Skin Integrity Remains Intact: Monitor for areas of redness and/or skin breakdown     Problem: Musculoskeletal - Adult  Goal: Return mobility to safest level of function  6/11/2025 1517 by Bonny Ballard RN  Outcome: Adequate for Discharge  6/11/2025 1029 by Bonny Ballard RN  Outcome: Progressing     Problem: Skin/Tissue Integrity  Goal: Skin integrity remains intact  Description: 1.  Monitor for areas of redness and/or skin breakdown2.  Assess vascular access sites hourly3.  Every 4-6 hours minimum:  Change oxygen saturation probe site4.  Every 4-6 hours:  If on nasal continuous positive airway pressure, respiratory therapy assess nares and determine need for appliance change or resting period  6/11/2025 1517 by Bonny Ballard RN  Outcome: Adequate for Discharge  6/11/2025 1029 by Bonny Ballard RN  Outcome: Progressing  Flowsheets (Taken 6/11/2025 0200 by Shanika Alfredo, RN)  Skin Integrity Remains Intact: Monitor for areas of redness and/or skin breakdown

## 2025-06-11 NOTE — PROGRESS NOTES
PM Assessment completed. Pt c/o pain 7/10, meds given, see MAR. Respirations are even & easy. No complaints voiced. Pt denies needs at this time. SR up x 2, and bed in low position. Call light is within reach.    Bedside Mobility Assessment Tool (BMAT):     Assessment Level 1- Sit and Shake    1. From a semi-reclined position, ask patient to sit up and rotate to a seated position at the side of the bed. Can use the bedrail.    2. Ask patient to reach out and grab your hand and shake making sure patient reaches across his/her midline.   Pass- Patient is able to come to a seated position, maintain core strength. Maintains seated balance while reaching across midline. Move on to Assessment Level 2.     Assessment Level 2- Stretch and Point   1. With patient in seated position at the side of the bed, have patient place both feet on the floor (or stool) with knees no higher than hips.    2. Ask patient to stretch one leg and straighten the knee, then bend the ankle/flex and point the toes. If appropriate, repeat with the other leg.   Pass- Patient is able to demonstrate appropriate quad strength on intended weight bearing limb(s). Move onto Assessment Level 3.     Assessment Level 3- Stand   1. Ask patient to elevate off the bed or chair (seated to standing) using an assistive device (cane, bedrail).    2. Patient should be able to raise buttocks off be and hold for a count of five. May repeat once.   Pass- Patient maintains standing stability for at least 5 seconds, proceed to assessment level 4.    Assessment Level 4- Walk   1. Ask patient to march in place at bedside.    2. Then ask patient to advance step and return each foot. Some medical conditions may render a patient from stepping backwards, use your best clinical judgement.   Pass- Patient demonstrates balance while shifting weight and ability to step, takes independent steps, does not use assistive device patient is MOBILITY LEVEL 4.      Mobility Level- 4

## 2025-06-11 NOTE — PLAN OF CARE
Problem: Discharge Planning  Goal: Discharge to home or other facility with appropriate resources  6/11/2025 1029 by Bonny Ballard RN  Outcome: Progressing     Problem: Safety - Adult  Goal: Free from fall injury  6/11/2025 1029 by Bonny Ballard RN  Outcome: Progressing     Problem: Pain  Goal: Verbalizes/displays adequate comfort level or baseline comfort level  Outcome: Progressing     Problem: Chronic Conditions and Co-morbidities  Goal: Patient's chronic conditions and co-morbidity symptoms are monitored and maintained or improved  Outcome: Progressing     Problem: ABCDS Injury Assessment  Goal: Absence of physical injury  Outcome: Progressing  Flowsheets (Taken 6/11/2025 0200 by Shanika Alfredo, RN)  Absence of Physical Injury: Implement safety measures based on patient assessment     Problem: Respiratory - Adult  Goal: Achieves optimal ventilation and oxygenation  Outcome: Progressing     Problem: Musculoskeletal - Adult  Goal: Return mobility to safest level of function  Outcome: Progressing     Problem: Skin/Tissue Integrity  Goal: Skin integrity remains intact  Description: 1.  Monitor for areas of redness and/or skin breakdown2.  Assess vascular access sites hourly3.  Every 4-6 hours minimum:  Change oxygen saturation probe site4.  Every 4-6 hours:  If on nasal continuous positive airway pressure, respiratory therapy assess nares and determine need for appliance change or resting period  Outcome: Progressing  Flowsheets (Taken 6/11/2025 0200 by Shanika Alfredo, RN)  Skin Integrity Remains Intact: Monitor for areas of redness and/or skin breakdown

## 2025-06-11 NOTE — PROGRESS NOTES
Prn Oxycodone given at this time for pain. P.t tolerated medication well. No further needs at this time. Call light within reach.

## 2025-06-11 NOTE — PROGRESS NOTES
Patient provided a COPD Educational Folder that includes the following materials:     [x]  [employer] Medical Management Booklet: Managing your COPD  [x]  ALA: Getting the Most Out of Medication Delivery Devices  [x]  ALA: My COPD Action Plan  [x]  Better Breathers [facility]: [facility]   [x]  Smoking Cessation Classes  [x]  Outpatient Spiritual Care Services  [x]  Magnet: Signs of COPD    PATIENT/CAREGIVER TEACHING:   Level of patient/caregiver understanding able to:   [x] Verbalize understanding   [] Demonstrate understanding       [] Teach back        [] Needs reinforcement     []  Other:     Electronically signed by Shanika Alfredo RN on 6/11/2025 at 2:03 AM

## 2025-06-11 NOTE — PROGRESS NOTES
RT Inhaler-Nebulizer Bronchodilator Protocol Note    There is a bronchodilator order in the chart from a provider indicating to follow the RT Bronchodilator Protocol and there is an “Initiate RT Inhaler-Nebulizer Bronchodilator Protocol” order as well (see protocol at bottom of note).    CXR Findings:  XR CHEST PORTABLE  Result Date: 6/10/2025  1. No acute cardiopulmonary process. 2. Hyperinflation.       The findings from the last RT Protocol Assessment were as follows:   History Pulmonary Disease: Chronic pulmonary disease  Respiratory Pattern: Regular pattern and RR 12-20 bpm  Breath Sounds: Slightly diminished and/or crackles  Cough: Strong, spontaneous, non-productive  Indication for Bronchodilator Therapy: Decreased or absent breath sounds  Bronchodilator Assessment Score: 4    Aerosolized bronchodilator medication orders have been revised according to the RT Inhaler-Nebulizer Bronchodilator Protocol below.    Respiratory Therapist to perform RT Therapy Protocol Assessment initially then follow the protocol.  Repeat RT Therapy Protocol Assessment PRN for score 0-3 or on second treatment, BID, and PRN for scores above 3.    No Indications - adjust the frequency to every 6 hours PRN wheezing or bronchospasm, if no treatments needed after 48 hours then discontinue using Per Protocol order mode.     If indication present, adjust the RT bronchodilator orders based on the Bronchodilator Assessment Score as indicated below.  Use Inhaler orders unless patient has one or more of the following: on home nebulizer, not able to hold breath for 10 seconds, is not alert and oriented, cannot activate and use MDI correctly, or respiratory rate 25 breaths per minute or more, then use the equivalent nebulizer order(s) with same Frequency and PRN reasons based on the score.  If a patient is on this medication at home then do not decrease Frequency below that used at home.    0-3 - enter or revise RT bronchodilator order(s) to

## 2025-06-12 ENCOUNTER — TELEPHONE (OUTPATIENT)
Dept: ORTHOPEDIC SURGERY | Age: 65
End: 2025-06-12

## 2025-06-12 NOTE — TELEPHONE ENCOUNTER
Attempted to contact patient for post-op call. No answer. Left message for return call. Contact information provided.

## 2025-06-12 NOTE — TELEPHONE ENCOUNTER
Spoke with patient.    Incision status: Patient reported Mepilex dressing dry and intact. Small area of redness reported near the edge of the dressing where the blister ruptured.     Edema/Swelling/Teds: Patient states swelling to left hip has improved. Continues to ice PRN.    Pain level and status: 4-5/10. Patient states pain improves with pain medication.    Use of pain medications: Robaxin 750mg every 8 hours PRN. Roxicodone 5mg every 4 hours PRN.      Blood thinner: Aspirin 325mg daily.    Bowels: Last BM-6/12/25. No issues reported.    Home Care Agency active: No    Outpatient therapy: No    SNF: Patient is currently at the [facility].    Do you have all of your medications: Yes     Changes in medications: No    Other: Patient remains on oxygen via NC.     Instructed pt to call Nurse Navigator or surgeon's office with any questions or concerns.     Follow up appointments:    Future Appointments   Date Time Provider Department Center   6/19/2025 10:45 AM Angel Lopez MD [facility]

## 2025-06-13 ENCOUNTER — TELEPHONE (OUTPATIENT)
Dept: ORTHOPEDIC SURGERY | Age: 65
End: 2025-06-13

## 2025-06-13 NOTE — TELEPHONE ENCOUNTER
VM from patient requesting to reschedule his post-op appointment due to needing more time to arrange transportation once he is home from the [facility]. Encouraged him to keep appointment and to see if the [facility] could arrange transport for him. Office number provided if he still needs to reschedule.

## 2025-06-19 ENCOUNTER — TELEPHONE (OUTPATIENT)
Dept: ORTHOPEDIC SURGERY | Age: 65
End: 2025-06-19

## 2025-06-19 NOTE — TELEPHONE ENCOUNTER
Spoke with patient.    Patient discharged from the [facility] on 6/18/25.    Incision status: No drainage or redness. Patient reported blistered area is healing. Patient stated the HC nurse was there to see him today.     Edema/Swelling/Teds: Patient reported mild swelling to left hip. Encouraged to continue to ice as needed.    Pain level and status: 4-5/10.    Use of pain medications: Patient stated he was unsure of the name of the pain medication that was prescribed. States he will call me back later to inform me because his medications are in his bedroom. Patient reports he is taking the pain medication as needed.     Blood thinner: Aspirin 325mg daily.    Bowels: Colace 100mg BID. Last BM 6/18/25- no issues reported.    Home Care Agency active: Yes. Patient reported [facility] Agency was there today and will be calling to schedule additional visits. Patient unsure of the name of the [facility].     Outpatient therapy: No    Do you have all of your medications: Patient stated they did not give him Tylenol. Instructed patient to call me back to review the medications prescribed to him.     Changes in medications: No    Instructed pt to call Nurse Navigator or surgeon's office with any questions or concerns.     Follow up appointments:    Future Appointments   Date Time Provider Department Center   7/3/2025  3:15 PM Angel Lopez MD [facility]

## 2025-06-19 NOTE — TELEPHONE ENCOUNTER
Patient called back to review medications. Robaxin 750mg every 8 hours PRN was prescribed. Patient requesting pain medication. Instructed to contact Dr. Lopez's office. Verbalized understanding.

## 2025-06-25 ENCOUNTER — TELEPHONE (OUTPATIENT)
Dept: ORTHOPEDIC SURGERY | Age: 65
End: 2025-06-25

## 2025-06-25 NOTE — TELEPHONE ENCOUNTER
Alcira from the [facility] called me back. She contacted [facility]. They informed her that PT would be there to see patient today and OT/Nursing would be there to see him tomorrow. She also contacted [facility] [employer] [facility] but had to leave a message. She will call me and the patient back as soon as she hears from them. Patient informed of the above. Verbalized understanding. Patient confirmed that he does have food available, as a friend was able to bring him food. Will continue to follow up with patient as needed.

## 2025-06-25 NOTE — TELEPHONE ENCOUNTER
Patient contacted me with concerns regarding HC. He stated that a nurse came to his home on 6/19/25 and was supposed to call to schedule further visits. He stated that no one else has been there and that no one from [facility] has been there either. I contacted Dulce with Case Management at [facility] who instructed me to contact the [facility]. I contacted Alcira (Social Work) at the [facility] and informed her of the situation. She is looking into this and will call me back. Will follow-up with patient after I have more information to provide.

## 2025-06-26 ENCOUNTER — TELEPHONE (OUTPATIENT)
Dept: ORTHOPEDIC SURGERY | Age: 65
End: 2025-06-26

## 2025-06-27 NOTE — TELEPHONE ENCOUNTER
Spoke with patient.    Incision status: No drainage or redness reported.    Edema/Swelling/Teds: Patient reported mild swelling. Continues to ice as needed.    Pain level and status: 4/10    Use of pain medications: Robaxin 750mg every 8 hours PRN.    Blood thinner: Aspirin 325mg daily.     Bowels: Colace 100mg BID. Last BM: 6/26/25- no issues.    [facility] Agency active: [facility]. Patient reported PT and Nursing came to his house yesterday.    Outpatient therapy: Will transition to OP PT when appropriate.     Do you have all of your medications: Yes    Changes in medications: No    Instructed pt to call Nurse Navigator or surgeon's office with any questions or concerns.     Follow up appointments:    Future Appointments   Date Time Provider Department Center   7/3/2025  3:15 PM Angel Lopez MD [facility]

## 2025-07-03 ENCOUNTER — TELEPHONE (OUTPATIENT)
Dept: ORTHOPEDIC SURGERY | Age: 65
End: 2025-07-03

## 2025-07-03 NOTE — TELEPHONE ENCOUNTER
Spoke with patient.    Patient cancelled post-op appointment today due to issues with transportation. Rescheduled for 7/17.    Incision status: HC nurse saw patient today and changed dressing. No drainage or redness reported.     Edema/Swelling/Teds: Mild swelling to left hip reported. Encouraged to continue to ice and elevate.    Pain level and status: 3/10    Use of pain medications: Robaxin 750mg every 8 hours PRN.    Blood thinner: Aspirin 325mg daily.    Bowels: Last BM: 7/3/25- no issues.    Home Care Agency active: [facility]    Outpatient therapy: Will transition to OP PT when appropriate.     Do you have all of your medications: Yes    Changes in medications: No    Instructed pt to call Nurse Navigator or surgeon's office with any questions or concerns.     Follow up appointments:    Future Appointments   Date Time Provider Department Center   7/17/2025 11:15 AM Angel Lopez MD [facility]

## 2025-07-10 ENCOUNTER — TELEPHONE (OUTPATIENT)
Dept: ORTHOPEDIC SURGERY | Age: 65
End: 2025-07-10

## 2025-07-11 NOTE — TELEPHONE ENCOUNTER
Spoke with patient.    Incision status: Mepilex dressing remains in place. No drainage or redness reported.    Edema/Swelling/Teds: Patient reported mild swelling. Encouraged to ice as needed.    Pain level and status: 3/10    Use of pain medications: Robaxin 750mg every 8 hours PRN.    Blood thinner: Aspirin 325mg daily.    Bowels: Last BM: 7/10/25- no issues. Patient has Colace 100mg BID as needed but reported he hasn't taken it in a couple days.    Home Care Agency active: [facility].    Outpatient therapy: Will transition to OP PT when appropriate.    Do you have all of your medications: Yes    Changes in medications: No    Instructed pt to call Nurse Navigator or surgeon's office with any questions or concerns.     Follow up appointments:    Future Appointments   Date Time Provider Department Center   7/17/2025 11:15 AM Angel Lopez MD [facility]

## 2025-07-17 ENCOUNTER — TELEPHONE (OUTPATIENT)
Dept: ORTHOPEDIC SURGERY | Age: 65
End: 2025-07-17

## 2025-07-17 ENCOUNTER — OFFICE VISIT (OUTPATIENT)
Dept: ORTHOPEDIC SURGERY | Age: 65
End: 2025-07-17

## 2025-07-17 DIAGNOSIS — Z96.642 STATUS POST TOTAL REPLACEMENT OF LEFT HIP: Primary | ICD-10-CM

## 2025-07-17 DIAGNOSIS — M25.512 LEFT SHOULDER PAIN, UNSPECIFIED CHRONICITY: ICD-10-CM

## 2025-07-17 PROCEDURE — 99024 POSTOP FOLLOW-UP VISIT: CPT | Performed by: ORTHOPAEDIC SURGERY

## 2025-07-17 NOTE — PROGRESS NOTES
ORTHOPAEDIC SURGERY FOLLOWUP VISIT    CHIEF COMPLAINT: Left hip    DATE OF SURGERY: 6/6/2025, left total hip arthroplasty    HISTORY OF PRESENT ILLNESS:  65-year-old male now 6 weeks status post left primary total hip arthroplasty.  Overall he is doing very well.  He denies significant pain.  He denies wound healing problems.  He is ambulatory without assist devices.  For long distances, he is currently using a motorized scooter.  Denies numbness or tingling.  No fevers, chills, night sweats.    PHYSICAL EXAM:  General: Well-appearing.  No distress.  Left lower extremity: Mepilex dressing is clean, dry, and intact.  No saturation or shadowing.  Underlying incisional site has minor scabbing.  There is no signs of dehiscence.  No open areas.  No surrounding erythema.  No active drainage.  Sensation is intact to light touch in deep peroneal, superficial peroneal, tibial, sural, and saphenous nerve distributions.  Motor function is intact to EHL, FHL, tibialis anterior, and gastroc.  There is brisk capillary refill to the toes and a strong palpable dorsalis pedis pulse.  Compartments are soft and compressible.  There is no calf tenderness and a negative Homans' sign.    RADIOGRAPHIC EXAM:  AP pelvis as well as AP and frog lateral views of the left hip demonstrate appropriately positioned total hip arthroplasty.  No evidence of dislocation.  No jennie-implant fractures.  There is overall excellent restoration of limb length and offset.      ASSESSMENT AND PLAN:  6 weeks status post left primary total hip arthroplasty.    Doing very well  Full weightbearing as tolerated left lower extremity  Pain control  Anterolateral hip precautions  DVT prevention: May discontinue  Wound care: May shower but not submerge incisional site.  May leave the incision open to air at this time point.  Return to clinic in 6 weeks    Angel Lopez MD      .

## 2025-07-18 ENCOUNTER — TELEPHONE (OUTPATIENT)
Dept: ORTHOPEDIC SURGERY | Age: 65
End: 2025-07-18

## 2025-07-18 NOTE — TELEPHONE ENCOUNTER
Spoke with patient.    Incision status: Patient had post-op appointment 7/17/25. Dr. Lopez noted Mepilex dressing is clean, dry, and intact. No saturation or shadowing. Underlying incisional site has minor scabbing. There is no signs of dehiscence. No open areas. No surrounding erythema. No active drainage. Patient stated a new Mepilex dressing was applied in office yesterday. Dressing remains dry and intact. He was instructed per office staff to remove it next week.     Edema/Swelling/Teds: Patient reported mild swelling to left lower extremity. Encouraged to continue icing and elevating as needed.    Pain level and status: 5-6/10 with activity. Patient reported that pain is relieved by rest and Robaxin as needed.    Use of pain medications: Robaxin 750mg every 8 hours PRN.     Blood thinner: Aspirin 325mg daily.     Bowels: Colace 100mg daily BID. Last BM: 7/18/25- No issues.     Home Care Agency active: [facility]    Outpatient therapy: Will transition to OP PT when appropriate.    Do you have all of your medications: Yes    Changes in medications: No    Instructed pt to call Nurse Navigator or surgeon's office with any questions or concerns.     Follow up appointments:    Future Appointments   Date Time Provider Department Center   8/8/2025  1:45 PM Angel Lopez MD [facility]

## 2025-07-18 NOTE — TELEPHONE ENCOUNTER
Signed off from patient. Instructed patient to call nurse navigator or surgeon's office with any issues or concerns. Patient verbalized understanding.

## 2025-07-24 ENCOUNTER — TELEPHONE (OUTPATIENT)
Dept: ORTHOPEDIC SURGERY | Age: 65
End: 2025-07-24

## 2025-07-24 NOTE — TELEPHONE ENCOUNTER
Patient called to inform me that he had not received his food from [facility] today and questioned how long that was supposed to continue. Attempted to contact [facility] but had to leave a message for a return call. Patient states he does currently have food available. Will await a return call and keep patient informed of any information provided.

## 2025-07-24 NOTE — TELEPHONE ENCOUNTER
Attempted to contact [facility] again. They stated that patient was not a client there. Contacted [employer] to speak to  who made original arrangements.  was gone for the day but a message was left for her to contact patient tomorrow. Contacted patient again and he informed me that it was actually [employer]. He stated that he had received two deliveries. I contacted [employer] and was informed that patient was authorized for 28 meals, 14 for each delivery. They stated that if he required additional meals, he would have to contact his insurance company. Patient informed and verbalized understanding. Patient states he has enough food at this time. Contact information provided if patient had further questions for them.

## 2025-08-21 ENCOUNTER — TELEPHONE (OUTPATIENT)
Dept: ORTHOPEDIC SURGERY | Age: 65
End: 2025-08-21

## 2025-09-02 ENCOUNTER — OFFICE VISIT (OUTPATIENT)
Dept: ORTHOPEDIC SURGERY | Age: 65
End: 2025-09-02

## 2025-09-02 VITALS — WEIGHT: 162 LBS | BODY MASS INDEX: 24.55 KG/M2 | HEIGHT: 68 IN

## 2025-09-02 DIAGNOSIS — M25.512 LEFT SHOULDER PAIN, UNSPECIFIED CHRONICITY: Primary | ICD-10-CM

## 2025-09-02 RX ORDER — METHYLPREDNISOLONE ACETATE 40 MG/ML
80 INJECTION, SUSPENSION INTRA-ARTICULAR; INTRALESIONAL; INTRAMUSCULAR; SOFT TISSUE ONCE
Status: COMPLETED | OUTPATIENT
Start: 2025-09-02 | End: 2025-09-02

## 2025-09-02 RX ORDER — LIDOCAINE HYDROCHLORIDE 10 MG/ML
4 INJECTION, SOLUTION INFILTRATION; PERINEURAL ONCE
Status: COMPLETED | OUTPATIENT
Start: 2025-09-02 | End: 2025-09-02

## 2025-09-02 RX ADMIN — LIDOCAINE HYDROCHLORIDE 4 ML: 10 INJECTION, SOLUTION INFILTRATION; PERINEURAL at 15:33

## 2025-09-02 RX ADMIN — METHYLPREDNISOLONE ACETATE 80 MG: 40 INJECTION, SUSPENSION INTRA-ARTICULAR; INTRALESIONAL; INTRAMUSCULAR; SOFT TISSUE at 15:34

## 2025-09-04 ENCOUNTER — OFFICE VISIT (OUTPATIENT)
Dept: ORTHOPEDIC SURGERY | Age: 65
End: 2025-09-04

## 2025-09-04 DIAGNOSIS — M77.10 LATERAL EPICONDYLITIS, UNSPECIFIED LATERALITY: ICD-10-CM

## 2025-09-04 DIAGNOSIS — Z96.642 STATUS POST TOTAL REPLACEMENT OF LEFT HIP: Primary | ICD-10-CM

## 2025-09-04 RX ORDER — TRIAMCINOLONE ACETONIDE 40 MG/ML
40 INJECTION, SUSPENSION INTRA-ARTICULAR; INTRAMUSCULAR ONCE
Status: COMPLETED | OUTPATIENT
Start: 2025-09-04 | End: 2025-09-04

## 2025-09-04 RX ORDER — LIDOCAINE HYDROCHLORIDE 10 MG/ML
2 INJECTION, SOLUTION INFILTRATION; PERINEURAL ONCE
Status: COMPLETED | OUTPATIENT
Start: 2025-09-04 | End: 2025-09-04

## 2025-09-04 RX ADMIN — TRIAMCINOLONE ACETONIDE 40 MG: 40 INJECTION, SUSPENSION INTRA-ARTICULAR; INTRAMUSCULAR at 15:41

## 2025-09-04 RX ADMIN — LIDOCAINE HYDROCHLORIDE 2 ML: 10 INJECTION, SOLUTION INFILTRATION; PERINEURAL at 15:40

## (undated) DEVICE — PRESSURE MONITORING SET: Brand: TRUWAVE, VAMP PLUS

## (undated) DEVICE — PACK,UNIVERSAL,NO GOWNS: Brand: MEDLINE

## (undated) DEVICE — GLOVE ORANGE PI 7 1/2   MSG9075

## (undated) DEVICE — SUTURE PERMAHAND SZ 3-0 L30IN NONABSORBABLE BLK SILK BRAID A304H

## (undated) DEVICE — HANDPIECE IRRIG BTTRY PWR W/ SUCT HI FLO TIP INTERPULSE

## (undated) DEVICE — HOOD SURG PEELWY FLYTE STERI-SHIELD

## (undated) DEVICE — DRAPE EQUIP CARM 120X42 IN ELASTIC BND CLP ASMBLY CLR DISP

## (undated) DEVICE — 260 CM J TIP WIRE .035

## (undated) DEVICE — 3M™ IOBAN™ 2 ANTIMICROBIAL INCISE DRAPE 6640EZ: Brand: IOBAN™ 2

## (undated) DEVICE — NEEDLE HYPO 20GA L1.5IN YEL POLYPR HUB S STL REG BVL STR

## (undated) DEVICE — SNAP KOVER: Brand: UNBRANDED

## (undated) DEVICE — GLOVE SURG SZ 65 L12IN FNGR THK79MIL GRN LTX FREE

## (undated) DEVICE — Device: Brand: DISPOSABLE ELECTROSURGICAL SNARE

## (undated) DEVICE — CANNULA VES L25IN RADPQ BODY W 1 W VLV 3MM BLNT TIP DLP

## (undated) DEVICE — DRAPE C ARM W46XL120IN XLN

## (undated) DEVICE — TRAP FLUID

## (undated) DEVICE — SUTURE PERMAHAND SZ 2-0 L18IN NONABSORBABLE BLK L26MM SH C012D

## (undated) DEVICE — SYRINGE MED 20ML STD CLR PLAS LUERLOCK TIP N CTRL DISP

## (undated) DEVICE — BLADE ES L2.5IN PTFE STD TIP BOVIE E-Z CLN

## (undated) DEVICE — SUTURE VCRL + SZ 2-0 L36IN ABSRB UD L36MM CT-1 1/2 CIR VCP945H

## (undated) DEVICE — SYRINGE MED 10ML LUERLOCK TIP W/O SFTY DISP

## (undated) DEVICE — NEEDLE HYPO 16GA L1.5IN PUR POLYPR HUB S STL REG BVL STR

## (undated) DEVICE — SHEATH INTRO 12FR L33CM OD4.7MM ID4MM HYDRPHLC KINK

## (undated) DEVICE — LINE INJ L48IN OD0.14IN ID0.07IN 1200PSI BRAID POLYUR FEM

## (undated) DEVICE — SUTURE VCRL SZ 0 L36IN ABSRB UD CT-1 L36MM 1/2 CIR TAPR PNT VCP946H

## (undated) DEVICE — TWIST & GO  SYRINGE KIT, 150 ML(ANGIO - ARTERION, TWIST & GO150ML SYR W/QFT, MC)(60766817): Brand: MEDRAD® TWIST & GO DISPOSABLE SYRINGE 150 ML WITH QUICK FILL TUBE

## (undated) DEVICE — TAPE SUT SM CTX 2.2 MM 40 IN 1/2 CIR TAPER WHT XBRAID TT

## (undated) DEVICE — SHIELD RAD 16X14 IN RADIAL W/ ABSORBENT YEL RADPAD

## (undated) DEVICE — PERCLOSE PROGLIDE™ SUTURE-MEDIATED CLOSURE SYSTEM: Brand: PERCLOSE PROGLIDE™

## (undated) DEVICE — ADHESIVE SKIN CLSR 0.7ML TOP DERMBND ADV

## (undated) DEVICE — SPONGE GZ W4XL4IN COT 12 PLY TYP VII WVN C FLD DSGN STERILE

## (undated) DEVICE — PAD, DEFIB, ADULT, RADIOTRANS, PHYSIO: Brand: MEDLINE

## (undated) DEVICE — SYRINGE MED 50ML LUERLOCK TIP

## (undated) DEVICE — STAPLER SKIN H3.9MM WIRE DIA0.58MM CRWN 6.9MM 35 STPL ROT

## (undated) DEVICE — RADIFOCUS GLIDECATH: Brand: GLIDECATH

## (undated) DEVICE — DECANTER BAG 9": Brand: MEDLINE INDUSTRIES, INC.

## (undated) DEVICE — AGENT HEMOSTATIC SURG ORIGINAL ABS 2X14IN LOOSE KNIT 12/CA

## (undated) DEVICE — OPTIFOAM GENTLE SA, POSTOP, 4X12: Brand: MEDLINE

## (undated) DEVICE — SOLUTION IRRIGATION STRL H2O 1000 ML UROMATIC CONTAINER

## (undated) DEVICE — LOOP,VESSEL,MAXI,BLUE,2/PK,STERILE: Brand: MEDLINE

## (undated) DEVICE — MHCZ CYSTO: Brand: MEDLINE INDUSTRIES, INC.

## (undated) DEVICE — BAG PRSS INFUS 500ML NYL NETTED BK VISIBLE COLOR-CODED G L

## (undated) DEVICE — ADHESIVE SKIN CLSR 4ML TOP 2-OCTYL CYNOACRLT HI VISC LIQ

## (undated) DEVICE — BLADE CLIPPER GEN PURP NS

## (undated) DEVICE — DEVICE CTRL FLOWSWITCH 24 PER BX

## (undated) DEVICE — SOLUTION IRRIG 1000ML H2O PIC PLAS SHATTERPROOF CONTAINER

## (undated) DEVICE — GLIDESHEATH SLENDER STAINLESS STEEL KIT: Brand: GLIDESHEATH SLENDER

## (undated) DEVICE — Z INACTIVE USE 2635508 SOLUTION IRRIG 500ML 0.9% SOD CHL USP POUR PLAS BTL

## (undated) DEVICE — NEPTUNE E-SEP SMOKE EVACUATION PENCIL, COATED, 70MM BLADE, PUSH BUTTON SWITCH: Brand: NEPTUNE E-SEP

## (undated) DEVICE — GLOVE SURG SZ 75 L12IN FNGR THK79MIL GRN LTX FREE

## (undated) DEVICE — SUTURE MONOCRYL + SZ 4-0 L27IN ABSRB UD L19MM PS-2 3/8 CIR MCP426H

## (undated) DEVICE — SUTURE MCRYL SZ 3-0 L27IN ABSRB UD L19MM PS-2 3/8 CIR PRIM Y427H

## (undated) DEVICE — CATHETER ANGIO 5FR L70CM 0.035IN SEG 20CM PGTL FLSH 20 1

## (undated) DEVICE — SUTURE STRATAFIX SYMMETRIC SZ 1 L18IN ABSRB VLT CT1 L36CM SXPP1A404

## (undated) DEVICE — SUTURE N ABSRB BRAIDED 5-0 CTX 39 IN 48 MM WHT BLK XBRAID S 3910900052

## (undated) DEVICE — CLIP LIG M BLU TI HRT SHP WIRE HORZ 24 CLIPS/PK 25PK/CA

## (undated) DEVICE — SUTURE PROL SZ 7-0 L24IN NONABSORBABLE BLU BV-1 L9.3MM 3/8 8304H

## (undated) DEVICE — SUTURE BOOT: Brand: DEROYAL

## (undated) DEVICE — KIT CATHETER 20GA L12CM ART CUST

## (undated) DEVICE — SHEETS TRANSFER  LATERAL 34 IN X 46 IN

## (undated) DEVICE — DRESSING FOAM W8XL12IN THN ABSRB SELF ADH BORD MEPILEX

## (undated) DEVICE — APPLICATOR VISTASEAL DUAL

## (undated) DEVICE — SCALPEL BLADES, FIG. 11, CARBON STEEL, STERILE, DISPOSABLE, DISPENSER PACKAGING, PACKAGE OF 100 PIECES: Brand: AESCULAP

## (undated) DEVICE — OLCOTT TORQUE DEVICE: Brand: OLCOTT

## (undated) DEVICE — SLEEVE CMPR SM STD CALF SCD ANEMB LF

## (undated) DEVICE — SUTURE MONOCRYL + SZ 4-0 L18IN ABSRB UD L19MM PS-2 3/8 CIR MCP496G

## (undated) DEVICE — COVER LT HNDL BLU PLAS

## (undated) DEVICE — FOOT SWITCH DRAPE: Brand: UNBRANDED

## (undated) DEVICE — LOOP VES W13MM THK09MM MINI RED SIL FLD REPELLENT

## (undated) DEVICE — TOWEL,OR,DSP,ST,BLUE,DLX,8/PK,10PK/CS: Brand: MEDLINE

## (undated) DEVICE — GLOVE ORTHO 7   MSG9470

## (undated) DEVICE — MARKER SURG SKIN UTIL BLK REG TIP NONSMEARING W/ 6IN RUL

## (undated) DEVICE — TR BAND RADIAL ARTERY COMPRESSION DEVICE: Brand: TR BAND

## (undated) DEVICE — CATH LAB PACK: Brand: MEDLINE INDUSTRIES, INC.

## (undated) DEVICE — HOOD: Brand: FLYTE

## (undated) DEVICE — DRESSING FOAM 4X10 IN OPTIFOAM GENTLE POSTOP

## (undated) DEVICE — SOLUTION IRRIG 1000ML 09% SOD CHL USP PIC PLAS CONTAINER

## (undated) DEVICE — TWIST & GO, HPCT, COEX, 60''(ANGIO - ART, HPCT, TWIST & GO, CO-EX, 60")(60766876): Brand: MEDRAD® TWIST & GO STERILE HIGH PRESSURE CONNECTOR TUBING, 150CM (60IN)

## (undated) DEVICE — RADIFOCUS GLIDEWIRE: Brand: GLIDEWIRE

## (undated) DEVICE — PRESSURE TUBING: Brand: TRUWAVE

## (undated) DEVICE — Device

## (undated) DEVICE — 3M™ IOBAN™ 2 ANTIMICROBIAL INCISE DRAPE 6648EZ: Brand: IOBAN™ 2

## (undated) DEVICE — HOOD, PEEL-AWAY: Brand: FLYTE

## (undated) DEVICE — TRAP POLYP ETRAP

## (undated) DEVICE — ENDO CARRY-ON PROCEDURE KIT INCLUDES SUCTION TUBING, LUBRICANT, GAUZE, BIOHAZARD STICKER, TRANSPORT PAD AND INTERCEPT BEDSIDE KIT.: Brand: ENDO CARRY-ON PROCEDURE KIT

## (undated) DEVICE — SOLUTION IV 1000ML 0.9% SOD CHL

## (undated) DEVICE — SUTURE VICRYL + SZ 2-0 L18IN ABSRB VLT L26MM SH 1/2 CIR VCP775D

## (undated) DEVICE — SYRINGE MED 30ML STD CLR PLAS LUERLOCK TIP N CTRL DISP

## (undated) DEVICE — SUTURE PERMAHAND SZ 4-0 L12X30IN NONABSORBABLE BLK SILK A303H

## (undated) DEVICE — GARMENT,MEDLINE,DVT,INT,CALF,MED, GEN2: Brand: MEDLINE

## (undated) DEVICE — CONMED SCOPE SAVER BITE BLOCK, 20X27 MM: Brand: SCOPE SAVER

## (undated) DEVICE — SOLUTION IRRIG 1000ML 0.9% SOD CHL USP POUR PLAS BTL

## (undated) DEVICE — SUTURE MONOCRYL STRATAFIX SPRL + SZ 3-0 L18IN ABSRB UD PS-2 SXMP1B107

## (undated) DEVICE — SHEATH INTRO 16FR L33CM OD6.1MM ID5.3MM HYDRPHLC KINK

## (undated) DEVICE — SYRINGE 20ML LL S/C 50

## (undated) DEVICE — SUTURE NONABSORBABLE MONOFILAMENT 7-0 BV-1 1X24 IN PROLENE 8702H

## (undated) DEVICE — SUTURE PERMAHAND SZ 2-0 L30IN NONABSORBABLE BLK SILK W/O A305H

## (undated) DEVICE — GUIDEWIRE VASC L260CM DIA0.035IN TIP L7CM PTFE S STL STR

## (undated) DEVICE — KIT OR ROOM TURNOVER W/STRAP

## (undated) DEVICE — 3M™ TEGADERM™ CHG DRESSING 25/CARTON 4 CARTONS/CASE 1657: Brand: TEGADERM™

## (undated) DEVICE — SOL IRR SOD CHL 0.9% TITAN XL CNTNR 3000ML

## (undated) DEVICE — ELECTRODE ES AD CRD L15FT DISP FOR PT BELOW 30LB REM

## (undated) DEVICE — SUTURE MONOCRYL + ABSORBABLE MONOFILAMENT 4-0 PS2 27 IN UD SXMP1B119

## (undated) DEVICE — PERCLOSE™ PROSTYLE™ SUTURE-MEDIATED CLOSURE AND REPAIR SYSTEM: Brand: PERCLOSE™ PROSTYLE™

## (undated) DEVICE — TOWEL,OR,DSP,ST,WHITE,DLX,4/PK,20PK/CS: Brand: MEDLINE

## (undated) DEVICE — NEEDLE SPNL L3.5IN PNK HUB S STL REG WALL FIT STYL W/ QNCKE

## (undated) DEVICE — PINNACLE INTRODUCER SHEATH: Brand: PINNACLE

## (undated) DEVICE — CANNULA NSL 13FT TUBE AD ETCO2 DIV SAMP M

## (undated) DEVICE — MEDTRONIC JR4.0 DIAGNOSTIC CATHETER

## (undated) DEVICE — BLANKET WRM W29.9XL79.1IN UP BODY FORC AIR MISTRAL-AIR

## (undated) DEVICE — 1LYRTR 16FR10ML100%SILTMPS SNP: Brand: MEDLINE INDUSTRIES, INC.

## (undated) DEVICE — SUTURE VICRYL SZ 0 L27IN ABSRB UD L36MM CT-1 1/2 CIR J260H

## (undated) DEVICE — DRAPE SURG W82XL124IN SMS INTVENT FEM ANGIO PCH POLY CIR FEN

## (undated) DEVICE — LARGE BORE STOPCOCK WITH ROTATING MALE LUER LOCK

## (undated) DEVICE — COVIDIEN WHOLEY 145CM GUIDE WIRE

## (undated) DEVICE — GLOVE ORANGE PI 7   MSG9070

## (undated) DEVICE — FORCEPS ENDOSCP BX L230CM DIA28MM ALGTR CUP SPEC RETRV GI

## (undated) DEVICE — STOPCOCK FLUID MGMT 3 W 1050 PSI IV ROTATABLE HI PRESSURE

## (undated) DEVICE — SWITCH FLO CTRL HI PRESSURE 1200 PSI SINGLE CHANNEL M LUER

## (undated) DEVICE — SUTURE ABSORBABLE BRAIDED 2-0 CT-1 27 IN UD VICRYL J259H

## (undated) DEVICE — COVER,TABLE,HEAVY DUTY,77"X90",STRL: Brand: MEDLINE

## (undated) DEVICE — ELECTRODE,RADIOTRANSLUCENT,FOAM,3PK: Brand: MEDLINE

## (undated) DEVICE — COVER US PRB W15XL120CM W/ GEL RUBBERBAND TAPE STRP FLD GEN

## (undated) DEVICE — PILLOW POS W15XH6XL22IN RASPBERRY FOAM ABD W/ STRP DISP FOR

## (undated) DEVICE — SUTURE STRATAFIX SYMMETRIC PDS + SZ 0 L18IN ABSRB VLT CT 2 SXPP1A407

## (undated) DEVICE — GAUZE,SPONGE,4"X4",16PLY,XRAY,STRL,LF: Brand: MEDLINE

## (undated) DEVICE — INSTRUMENT TRAY: Brand: MEDLINE INDUSTRIES, INC.

## (undated) DEVICE — SPONGE LAP W18XL18IN WHT COT 4 PLY FLD STRUNG RADPQ DISP ST 2 PER PACK

## (undated) DEVICE — GOWN SURG XL L48IN BLU SMS NONREINFORCED VELC TIE 3 LEV

## (undated) DEVICE — 3M™ STERI-STRIP™ REINFORCED ADHESIVE SKIN CLOSURES, R1548, 1 IN X 5 IN (25 MM X 125 MM), 4 STRIPS/ENVELOPE: Brand: 3M™ STERI-STRIP™

## (undated) DEVICE — IR TRAY: Brand: MEDLINE INDUSTRIES, INC.

## (undated) DEVICE — RADIFOCUS GLIDEWIRE ADVANTAGE GUIDEWIRE: Brand: GLIDEWIRE ADVANTAGE

## (undated) DEVICE — ADHESIVE SKIN CLOSURE WND 8.661X1.5 IN 22 CM LIQUIBAND SECUR

## (undated) DEVICE — PENCIL SMK EVAC ALL IN 1 DSGN ENH VISIBILITY IMPROVED AIR

## (undated) DEVICE — GLOVE SURG SZ 6 L11.2IN FNGR THK9.8MIL STRW LTX POLYMER

## (undated) DEVICE — SOLUTION SURG PREP 26 CC PURPREP

## (undated) DEVICE — CLIP SM RED INTERN HMOCLP TITAN LIGATING

## (undated) DEVICE — SPONGE,LAP,18"X18",DLX,XR,ST,5/PK,40/PK: Brand: MEDLINE

## (undated) DEVICE — SUTURE NONABSORBABLE MONOFILAMENT 6-0 BV-1 1X30 IN PROLENE 8709H

## (undated) DEVICE — CATHETER DIAG 5FR L100CM LUMN ID0.047IN JL3.5 CRV 0 SIDE H

## (undated) DEVICE — CODA, LP BALLOON CATHETER: Brand: CODA

## (undated) DEVICE — BOWL MED L 32OZ PLAS W/ MOLD GRAD EZ OPN PEEL PCH